# Patient Record
Sex: FEMALE | Race: WHITE | NOT HISPANIC OR LATINO
[De-identification: names, ages, dates, MRNs, and addresses within clinical notes are randomized per-mention and may not be internally consistent; named-entity substitution may affect disease eponyms.]

---

## 2016-08-25 RX ORDER — METOPROLOL TARTRATE 50 MG
0.5 TABLET ORAL
Qty: 30 | Refills: 0 | DISCHARGE
Start: 2016-08-25 | End: 2016-09-24

## 2017-01-18 ENCOUNTER — APPOINTMENT (OUTPATIENT)
Dept: PULMONOLOGY | Facility: CLINIC | Age: 62
End: 2017-01-18

## 2017-01-20 ENCOUNTER — OTHER (OUTPATIENT)
Age: 62
End: 2017-01-20

## 2017-01-22 ENCOUNTER — FORM ENCOUNTER (OUTPATIENT)
Age: 62
End: 2017-01-22

## 2017-01-23 ENCOUNTER — OUTPATIENT (OUTPATIENT)
Dept: OUTPATIENT SERVICES | Facility: HOSPITAL | Age: 62
LOS: 1 days | End: 2017-01-23
Payer: COMMERCIAL

## 2017-01-23 ENCOUNTER — APPOINTMENT (OUTPATIENT)
Dept: ULTRASOUND IMAGING | Facility: CLINIC | Age: 62
End: 2017-01-23

## 2017-01-23 DIAGNOSIS — M16.11 UNILATERAL PRIMARY OSTEOARTHRITIS, RIGHT HIP: ICD-10-CM

## 2017-01-23 PROCEDURE — 20611 DRAIN/INJ JOINT/BURSA W/US: CPT

## 2017-02-02 ENCOUNTER — APPOINTMENT (OUTPATIENT)
Dept: FAMILY MEDICINE | Facility: CLINIC | Age: 62
End: 2017-02-02

## 2017-02-02 VITALS — WEIGHT: 273 LBS | BODY MASS INDEX: 41.37 KG/M2 | HEIGHT: 68 IN

## 2017-02-02 VITALS — DIASTOLIC BLOOD PRESSURE: 75 MMHG | SYSTOLIC BLOOD PRESSURE: 130 MMHG | HEART RATE: 82 BPM

## 2017-02-02 DIAGNOSIS — Z87.898 PERSONAL HISTORY OF OTHER SPECIFIED CONDITIONS: ICD-10-CM

## 2017-02-02 DIAGNOSIS — Z87.2 PERSONAL HISTORY OF DISEASES OF THE SKIN AND SUBCUTANEOUS TISSUE: ICD-10-CM

## 2017-02-02 DIAGNOSIS — Z87.81 PERSONAL HISTORY OF (HEALED) TRAUMATIC FRACTURE: ICD-10-CM

## 2017-02-03 LAB
25(OH)D3 SERPL-MCNC: 18.1 NG/ML
ALBUMIN SERPL ELPH-MCNC: 4.2 G/DL
ALP BLD-CCNC: 77 U/L
ALT SERPL-CCNC: 39 U/L
ANION GAP SERPL CALC-SCNC: 20 MMOL/L
AST SERPL-CCNC: 37 U/L
BASOPHILS # BLD AUTO: 0.02 K/UL
BASOPHILS NFR BLD AUTO: 0.2 %
BILIRUB SERPL-MCNC: 0.3 MG/DL
BUN SERPL-MCNC: 16 MG/DL
CALCIUM SERPL-MCNC: 9.8 MG/DL
CHLORIDE SERPL-SCNC: 97 MMOL/L
CHOLEST SERPL-MCNC: 205 MG/DL
CHOLEST/HDLC SERPL: 3 RATIO
CO2 SERPL-SCNC: 27 MMOL/L
CREAT SERPL-MCNC: 0.86 MG/DL
EOSINOPHIL # BLD AUTO: 0.16 K/UL
EOSINOPHIL NFR BLD AUTO: 1.5 %
GLUCOSE SERPL-MCNC: 115 MG/DL
HBA1C MFR BLD HPLC: 5.9 %
HCT VFR BLD CALC: 41.5 %
HDLC SERPL-MCNC: 68 MG/DL
HGB BLD-MCNC: 12.8 G/DL
IMM GRANULOCYTES NFR BLD AUTO: 0.7 %
LDLC SERPL CALC-MCNC: 86 MG/DL
LYMPHOCYTES # BLD AUTO: 1.18 K/UL
LYMPHOCYTES NFR BLD AUTO: 11.3 %
MAN DIFF?: NORMAL
MCHC RBC-ENTMCNC: 30.3 PG
MCHC RBC-ENTMCNC: 30.8 GM/DL
MCV RBC AUTO: 98.1 FL
MONOCYTES # BLD AUTO: 0.79 K/UL
MONOCYTES NFR BLD AUTO: 7.6 %
NEUTROPHILS # BLD AUTO: 8.2 K/UL
NEUTROPHILS NFR BLD AUTO: 78.7 %
PLATELET # BLD AUTO: 251 K/UL
POTASSIUM SERPL-SCNC: 4 MMOL/L
PROT SERPL-MCNC: 7.4 G/DL
RBC # BLD: 4.23 M/UL
RBC # FLD: 14.7 %
SODIUM SERPL-SCNC: 144 MMOL/L
TRIGL SERPL-MCNC: 256 MG/DL
TSH SERPL-ACNC: 3.6 UIU/ML
WBC # FLD AUTO: 10.42 K/UL

## 2017-02-13 ENCOUNTER — APPOINTMENT (OUTPATIENT)
Dept: ORTHOPEDIC SURGERY | Facility: CLINIC | Age: 62
End: 2017-02-13

## 2017-02-13 VITALS
DIASTOLIC BLOOD PRESSURE: 72 MMHG | WEIGHT: 273 LBS | SYSTOLIC BLOOD PRESSURE: 132 MMHG | BODY MASS INDEX: 41.37 KG/M2 | HEART RATE: 84 BPM | HEIGHT: 68 IN

## 2017-03-06 ENCOUNTER — OTHER (OUTPATIENT)
Age: 62
End: 2017-03-06

## 2017-03-27 RX ORDER — AZITHROMYCIN 250 MG/1
250 TABLET, FILM COATED ORAL
Qty: 1 | Refills: 1 | Status: DISCONTINUED | COMMUNITY
Start: 2017-02-02 | End: 2017-03-27

## 2017-03-27 RX ORDER — METHYLPREDNISOLONE 4 MG/1
4 TABLET ORAL
Qty: 1 | Refills: 0 | Status: DISCONTINUED | COMMUNITY
Start: 2017-02-13 | End: 2017-03-27

## 2017-03-27 RX ORDER — METHYLPREDNISOLONE 4 MG/1
4 TABLET ORAL
Qty: 1 | Refills: 0 | Status: DISCONTINUED | COMMUNITY
Start: 2017-03-06 | End: 2017-03-27

## 2017-03-27 RX ORDER — GUAIFEN/DEXTROMETHORPHAN/PE 100-10-5MG
5-10-100 LIQUID (ML) ORAL EVERY 4 HOURS
Qty: 1 | Refills: 0 | Status: DISCONTINUED | COMMUNITY
Start: 2017-02-02 | End: 2017-03-27

## 2017-03-28 ENCOUNTER — RX RENEWAL (OUTPATIENT)
Age: 62
End: 2017-03-28

## 2017-04-07 ENCOUNTER — OUTPATIENT (OUTPATIENT)
Dept: OUTPATIENT SERVICES | Facility: HOSPITAL | Age: 62
LOS: 1 days | End: 2017-04-07
Payer: COMMERCIAL

## 2017-04-07 ENCOUNTER — APPOINTMENT (OUTPATIENT)
Dept: RADIOLOGY | Facility: CLINIC | Age: 62
End: 2017-04-07

## 2017-04-07 DIAGNOSIS — Z00.8 ENCOUNTER FOR OTHER GENERAL EXAMINATION: ICD-10-CM

## 2017-04-07 PROCEDURE — 71120 X-RAY EXAM BREASTBONE 2/>VWS: CPT

## 2017-04-11 ENCOUNTER — OUTPATIENT (OUTPATIENT)
Dept: OUTPATIENT SERVICES | Facility: HOSPITAL | Age: 62
LOS: 1 days | End: 2017-04-11
Payer: COMMERCIAL

## 2017-04-11 VITALS
HEIGHT: 68 IN | HEART RATE: 88 BPM | SYSTOLIC BLOOD PRESSURE: 120 MMHG | RESPIRATION RATE: 16 BRPM | DIASTOLIC BLOOD PRESSURE: 70 MMHG | TEMPERATURE: 99 F | WEIGHT: 271.83 LBS

## 2017-04-11 DIAGNOSIS — M16.11 UNILATERAL PRIMARY OSTEOARTHRITIS, RIGHT HIP: ICD-10-CM

## 2017-04-11 DIAGNOSIS — Z90.49 ACQUIRED ABSENCE OF OTHER SPECIFIED PARTS OF DIGESTIVE TRACT: Chronic | ICD-10-CM

## 2017-04-11 DIAGNOSIS — Z98.890 OTHER SPECIFIED POSTPROCEDURAL STATES: Chronic | ICD-10-CM

## 2017-04-11 DIAGNOSIS — I27.2 OTHER SECONDARY PULMONARY HYPERTENSION: ICD-10-CM

## 2017-04-11 DIAGNOSIS — I48.91 UNSPECIFIED ATRIAL FIBRILLATION: ICD-10-CM

## 2017-04-11 DIAGNOSIS — Z01.818 ENCOUNTER FOR OTHER PREPROCEDURAL EXAMINATION: ICD-10-CM

## 2017-04-11 DIAGNOSIS — I26.99 OTHER PULMONARY EMBOLISM WITHOUT ACUTE COR PULMONALE: ICD-10-CM

## 2017-04-11 DIAGNOSIS — I10 ESSENTIAL (PRIMARY) HYPERTENSION: ICD-10-CM

## 2017-04-11 DIAGNOSIS — Z95.828 PRESENCE OF OTHER VASCULAR IMPLANTS AND GRAFTS: Chronic | ICD-10-CM

## 2017-04-11 LAB
ANION GAP SERPL CALC-SCNC: 14 MMOL/L — SIGNIFICANT CHANGE UP (ref 5–17)
APTT BLD: 33.6 SEC — SIGNIFICANT CHANGE UP (ref 27.5–37.4)
BASOPHILS # BLD AUTO: 0 K/UL — SIGNIFICANT CHANGE UP (ref 0–0.2)
BASOPHILS NFR BLD AUTO: 0.2 % — SIGNIFICANT CHANGE UP (ref 0–2)
BLD GP AB SCN SERPL QL: SIGNIFICANT CHANGE UP
BUN SERPL-MCNC: 21 MG/DL — HIGH (ref 8–20)
CALCIUM SERPL-MCNC: 10.1 MG/DL — SIGNIFICANT CHANGE UP (ref 8.6–10.2)
CHLORIDE SERPL-SCNC: 97 MMOL/L — LOW (ref 98–107)
CO2 SERPL-SCNC: 30 MMOL/L — HIGH (ref 22–29)
CREAT SERPL-MCNC: 0.78 MG/DL — SIGNIFICANT CHANGE UP (ref 0.5–1.3)
EOSINOPHIL # BLD AUTO: 0.3 K/UL — SIGNIFICANT CHANGE UP (ref 0–0.5)
EOSINOPHIL NFR BLD AUTO: 3.3 % — SIGNIFICANT CHANGE UP (ref 0–6)
GLUCOSE SERPL-MCNC: 96 MG/DL — SIGNIFICANT CHANGE UP (ref 70–115)
HCT VFR BLD CALC: 40.9 % — SIGNIFICANT CHANGE UP (ref 37–47)
HGB BLD-MCNC: 13.3 G/DL — SIGNIFICANT CHANGE UP (ref 12–16)
INR BLD: 1.2 RATIO — HIGH (ref 0.88–1.16)
LYMPHOCYTES # BLD AUTO: 1.2 K/UL — SIGNIFICANT CHANGE UP (ref 1–4.8)
LYMPHOCYTES # BLD AUTO: 14.6 % — LOW (ref 20–55)
MCHC RBC-ENTMCNC: 30.3 PG — SIGNIFICANT CHANGE UP (ref 27–31)
MCHC RBC-ENTMCNC: 32.5 G/DL — SIGNIFICANT CHANGE UP (ref 32–36)
MCV RBC AUTO: 93.2 FL — SIGNIFICANT CHANGE UP (ref 81–99)
MONOCYTES # BLD AUTO: 0.9 K/UL — HIGH (ref 0–0.8)
MONOCYTES NFR BLD AUTO: 10.5 % — HIGH (ref 3–10)
MRSA PCR RESULT.: SIGNIFICANT CHANGE UP
NEUTROPHILS # BLD AUTO: 6 K/UL — SIGNIFICANT CHANGE UP (ref 1.8–8)
NEUTROPHILS NFR BLD AUTO: 70.8 % — SIGNIFICANT CHANGE UP (ref 37–73)
PLAT MORPH BLD: NORMAL — SIGNIFICANT CHANGE UP
PLATELET # BLD AUTO: 316 K/UL — SIGNIFICANT CHANGE UP (ref 150–400)
POTASSIUM SERPL-MCNC: 4.1 MMOL/L — SIGNIFICANT CHANGE UP (ref 3.5–5.3)
POTASSIUM SERPL-SCNC: 4.1 MMOL/L — SIGNIFICANT CHANGE UP (ref 3.5–5.3)
PROTHROM AB SERPL-ACNC: 13.2 SEC — HIGH (ref 9.8–12.7)
RBC # BLD: 4.39 M/UL — LOW (ref 4.4–5.2)
RBC # FLD: 14.6 % — SIGNIFICANT CHANGE UP (ref 11–15.6)
RBC BLD AUTO: NORMAL — SIGNIFICANT CHANGE UP
S AUREUS DNA NOSE QL NAA+PROBE: SIGNIFICANT CHANGE UP
SODIUM SERPL-SCNC: 141 MMOL/L — SIGNIFICANT CHANGE UP (ref 135–145)
TYPE + AB SCN PNL BLD: SIGNIFICANT CHANGE UP
WBC # BLD: 8.5 K/UL — SIGNIFICANT CHANGE UP (ref 4.8–10.8)
WBC # FLD AUTO: 8.5 K/UL — SIGNIFICANT CHANGE UP (ref 4.8–10.8)

## 2017-04-11 PROCEDURE — 86900 BLOOD TYPING SEROLOGIC ABO: CPT

## 2017-04-11 PROCEDURE — 93005 ELECTROCARDIOGRAM TRACING: CPT

## 2017-04-11 PROCEDURE — 85730 THROMBOPLASTIN TIME PARTIAL: CPT

## 2017-04-11 PROCEDURE — 93010 ELECTROCARDIOGRAM REPORT: CPT

## 2017-04-11 PROCEDURE — 87640 STAPH A DNA AMP PROBE: CPT

## 2017-04-11 PROCEDURE — 87641 MR-STAPH DNA AMP PROBE: CPT

## 2017-04-11 PROCEDURE — 86901 BLOOD TYPING SEROLOGIC RH(D): CPT

## 2017-04-11 PROCEDURE — 80048 BASIC METABOLIC PNL TOTAL CA: CPT

## 2017-04-11 PROCEDURE — G0463: CPT

## 2017-04-11 PROCEDURE — 86850 RBC ANTIBODY SCREEN: CPT

## 2017-04-11 PROCEDURE — 85027 COMPLETE CBC AUTOMATED: CPT

## 2017-04-11 PROCEDURE — 85610 PROTHROMBIN TIME: CPT

## 2017-04-11 NOTE — H&P PST ADULT - HISTORY OF PRESENT ILLNESS
61F 61F with right hip pain for over 1 year. Has had some back pain as well, seeing pain management for injections, with some relief. Has put off fixing the hip secondary to back pain, which has improved. Now for Right Total Hip Replacement.

## 2017-04-11 NOTE — H&P PST ADULT - PSH
History of cholecystectomy History of cholecystectomy    History of dilation and curettage    History of ear surgery    Presence of IVC filter

## 2017-04-11 NOTE — H&P PST ADULT - PMH
Anemia    Atrial fibrillation    Hip fx    HTN (hypertension)    Hypothyroid    Sleep apnea Anemia    Atrial fibrillation    DVT (deep venous thrombosis)    Hiatal hernia    Hip fx    HTN (hypertension)    Hypothyroid    Osteoarthritis    Osteopenia    Pulmonary embolism  Saddle Embolus  Sleep apnea Anemia    Atrial fibrillation    DVT (deep venous thrombosis)    Hiatal hernia    Hip fx    HTN (hypertension)    Hypothyroid    Osteoarthritis    Osteopenia    Pulmonary embolism  Saddle Embolus  Pulmonary hypertension    Sleep apnea

## 2017-04-11 NOTE — H&P PST ADULT - FAMILY HISTORY
No pertinent family history in first degree relatives Mother  Still living? No  Family history of pulmonary hypertension, Age at diagnosis: Age Unknown  Family history of CHF (congestive heart failure), Age at diagnosis: 81-90     Father  Still living? No  Family history of esophageal cancer, Age at diagnosis: 71-80  Family history of lung cancer, Age at diagnosis: Age Unknown  Family history of rheumatoid arthritis, Age at diagnosis: Age Unknown

## 2017-04-11 NOTE — H&P PST ADULT - ASSESSMENT
61F PMH HTN, AF, PE, DVT, Sleep Apnea, Hiatal Hernia, Hypothyroid, Osteopenia, Anemia and Osteoarthritis for Right Total Hip Replacement. 61F PMH HTN, AF, PE, DVT, Sleep Apnea, Pulmonary Hypertension, Hiatal Hernia, Hypothyroid, Osteopenia, Anemia and Osteoarthritis for Right Total Hip Replacement.

## 2017-04-12 ENCOUNTER — OTHER (OUTPATIENT)
Age: 62
End: 2017-04-12

## 2017-04-12 DIAGNOSIS — M16.11 UNILATERAL PRIMARY OSTEOARTHRITIS, RIGHT HIP: ICD-10-CM

## 2017-04-12 DIAGNOSIS — Z01.818 ENCOUNTER FOR OTHER PREPROCEDURAL EXAMINATION: ICD-10-CM

## 2017-04-17 ENCOUNTER — APPOINTMENT (OUTPATIENT)
Dept: PULMONOLOGY | Facility: CLINIC | Age: 62
End: 2017-04-17

## 2017-04-17 VITALS — SYSTOLIC BLOOD PRESSURE: 122 MMHG | DIASTOLIC BLOOD PRESSURE: 80 MMHG

## 2017-04-17 VITALS — WEIGHT: 271 LBS | BODY MASS INDEX: 41.21 KG/M2

## 2017-04-17 VITALS — OXYGEN SATURATION: 99 %

## 2017-04-17 DIAGNOSIS — Z86.79 PERSONAL HISTORY OF OTHER DISEASES OF THE CIRCULATORY SYSTEM: ICD-10-CM

## 2017-04-20 ENCOUNTER — APPOINTMENT (OUTPATIENT)
Dept: FAMILY MEDICINE | Facility: CLINIC | Age: 62
End: 2017-04-20

## 2017-04-20 VITALS
OXYGEN SATURATION: 100 % | WEIGHT: 272 LBS | SYSTOLIC BLOOD PRESSURE: 120 MMHG | HEIGHT: 68 IN | BODY MASS INDEX: 41.22 KG/M2 | RESPIRATION RATE: 12 BRPM | DIASTOLIC BLOOD PRESSURE: 80 MMHG

## 2017-04-20 DIAGNOSIS — J06.9 ACUTE UPPER RESPIRATORY INFECTION, UNSPECIFIED: ICD-10-CM

## 2017-04-21 ENCOUNTER — APPOINTMENT (OUTPATIENT)
Dept: ORTHOPEDIC SURGERY | Facility: CLINIC | Age: 62
End: 2017-04-21

## 2017-04-21 VITALS
HEIGHT: 68 IN | SYSTOLIC BLOOD PRESSURE: 131 MMHG | HEART RATE: 86 BPM | WEIGHT: 272 LBS | TEMPERATURE: 97.9 F | DIASTOLIC BLOOD PRESSURE: 83 MMHG | BODY MASS INDEX: 41.22 KG/M2

## 2017-04-28 ENCOUNTER — APPOINTMENT (OUTPATIENT)
Dept: FAMILY MEDICINE | Facility: CLINIC | Age: 62
End: 2017-04-28

## 2017-04-28 VITALS
OXYGEN SATURATION: 99 % | BODY MASS INDEX: 41.22 KG/M2 | WEIGHT: 272 LBS | DIASTOLIC BLOOD PRESSURE: 86 MMHG | TEMPERATURE: 97.6 F | SYSTOLIC BLOOD PRESSURE: 148 MMHG | HEART RATE: 74 BPM | HEIGHT: 68 IN

## 2017-04-28 RX ORDER — IPRATROPIUM BROMIDE AND ALBUTEROL SULFATE 2.5; .5 MG/3ML; MG/3ML
0.5-2.5 (3) SOLUTION RESPIRATORY (INHALATION)
Qty: 0 | Refills: 0 | Status: COMPLETED | OUTPATIENT
Start: 2017-04-28

## 2017-04-28 RX ORDER — AZITHROMYCIN 250 MG/1
250 TABLET, FILM COATED ORAL
Qty: 1 | Refills: 1 | Status: DISCONTINUED | COMMUNITY
Start: 2017-04-24 | End: 2017-04-28

## 2017-04-28 RX ADMIN — IPRATROPIUM BROMIDE AND ALBUTEROL SULFATE 1 MG/3ML: .5; 3 SOLUTION RESPIRATORY (INHALATION) at 00:00

## 2017-05-01 RX ORDER — OXYCODONE HYDROCHLORIDE 5 MG/1
20 TABLET ORAL ONCE
Qty: 0 | Refills: 0 | Status: DISCONTINUED | OUTPATIENT
Start: 2017-05-02 | End: 2017-05-02

## 2017-05-01 RX ORDER — CELECOXIB 200 MG/1
400 CAPSULE ORAL ONCE
Qty: 0 | Refills: 0 | Status: COMPLETED | OUTPATIENT
Start: 2017-05-02 | End: 2017-05-02

## 2017-05-01 RX ORDER — GABAPENTIN 400 MG/1
600 CAPSULE ORAL ONCE
Qty: 0 | Refills: 0 | Status: COMPLETED | OUTPATIENT
Start: 2017-05-02 | End: 2017-05-02

## 2017-05-02 ENCOUNTER — APPOINTMENT (OUTPATIENT)
Dept: ORTHOPEDIC SURGERY | Facility: HOSPITAL | Age: 62
End: 2017-05-02

## 2017-05-02 ENCOUNTER — INPATIENT (INPATIENT)
Facility: HOSPITAL | Age: 62
LOS: 1 days | Discharge: HOME | DRG: 470 | End: 2017-05-04
Attending: ORTHOPAEDIC SURGERY | Admitting: ORTHOPAEDIC SURGERY
Payer: COMMERCIAL

## 2017-05-02 ENCOUNTER — TRANSCRIPTION ENCOUNTER (OUTPATIENT)
Age: 62
End: 2017-05-02

## 2017-05-02 ENCOUNTER — RESULT REVIEW (OUTPATIENT)
Age: 62
End: 2017-05-02

## 2017-05-02 VITALS
OXYGEN SATURATION: 100 % | HEIGHT: 68 IN | DIASTOLIC BLOOD PRESSURE: 75 MMHG | RESPIRATION RATE: 16 BRPM | HEART RATE: 84 BPM | WEIGHT: 270.07 LBS | TEMPERATURE: 208 F | SYSTOLIC BLOOD PRESSURE: 153 MMHG

## 2017-05-02 DIAGNOSIS — I48.0 PAROXYSMAL ATRIAL FIBRILLATION: ICD-10-CM

## 2017-05-02 DIAGNOSIS — Z95.828 PRESENCE OF OTHER VASCULAR IMPLANTS AND GRAFTS: Chronic | ICD-10-CM

## 2017-05-02 DIAGNOSIS — Z98.890 OTHER SPECIFIED POSTPROCEDURAL STATES: Chronic | ICD-10-CM

## 2017-05-02 DIAGNOSIS — M16.11 UNILATERAL PRIMARY OSTEOARTHRITIS, RIGHT HIP: ICD-10-CM

## 2017-05-02 DIAGNOSIS — Z90.49 ACQUIRED ABSENCE OF OTHER SPECIFIED PARTS OF DIGESTIVE TRACT: Chronic | ICD-10-CM

## 2017-05-02 DIAGNOSIS — Z86.711 PERSONAL HISTORY OF PULMONARY EMBOLISM: ICD-10-CM

## 2017-05-02 DIAGNOSIS — E03.9 HYPOTHYROIDISM, UNSPECIFIED: ICD-10-CM

## 2017-05-02 DIAGNOSIS — I10 ESSENTIAL (PRIMARY) HYPERTENSION: ICD-10-CM

## 2017-05-02 DIAGNOSIS — G47.30 SLEEP APNEA, UNSPECIFIED: ICD-10-CM

## 2017-05-02 LAB
APTT BLD: 29.1 SEC — SIGNIFICANT CHANGE UP (ref 27.5–37.4)
BLD GP AB SCN SERPL QL: SIGNIFICANT CHANGE UP
INR BLD: 0.96 RATIO — SIGNIFICANT CHANGE UP (ref 0.88–1.16)
PROTHROM AB SERPL-ACNC: 10.6 SEC — SIGNIFICANT CHANGE UP (ref 9.8–12.7)
TYPE + AB SCN PNL BLD: SIGNIFICANT CHANGE UP

## 2017-05-02 PROCEDURE — 99223 1ST HOSP IP/OBS HIGH 75: CPT

## 2017-05-02 PROCEDURE — 27130 TOTAL HIP ARTHROPLASTY: CPT | Mod: RT

## 2017-05-02 PROCEDURE — 88305 TISSUE EXAM BY PATHOLOGIST: CPT | Mod: 26

## 2017-05-02 PROCEDURE — 27130 TOTAL HIP ARTHROPLASTY: CPT | Mod: AS,RT

## 2017-05-02 PROCEDURE — 88311 DECALCIFY TISSUE: CPT | Mod: 26

## 2017-05-02 RX ORDER — CELECOXIB 200 MG/1
200 CAPSULE ORAL
Qty: 0 | Refills: 0 | Status: DISCONTINUED | OUTPATIENT
Start: 2017-05-02 | End: 2017-05-04

## 2017-05-02 RX ORDER — TRANEXAMIC ACID 100 MG/ML
1250 INJECTION, SOLUTION INTRAVENOUS ONCE
Qty: 0 | Refills: 0 | Status: DISCONTINUED | OUTPATIENT
Start: 2017-05-02 | End: 2017-05-02

## 2017-05-02 RX ORDER — DOCUSATE SODIUM 100 MG
100 CAPSULE ORAL THREE TIMES A DAY
Qty: 0 | Refills: 0 | Status: DISCONTINUED | OUTPATIENT
Start: 2017-05-02 | End: 2017-05-04

## 2017-05-02 RX ORDER — METOPROLOL TARTRATE 50 MG
12.5 TABLET ORAL DAILY
Qty: 0 | Refills: 0 | Status: DISCONTINUED | OUTPATIENT
Start: 2017-05-02 | End: 2017-05-04

## 2017-05-02 RX ORDER — FENTANYL CITRATE 50 UG/ML
50 INJECTION INTRAVENOUS
Qty: 0 | Refills: 0 | Status: DISCONTINUED | OUTPATIENT
Start: 2017-05-02 | End: 2017-05-02

## 2017-05-02 RX ORDER — BUPROPION HYDROCHLORIDE 150 MG/1
150 TABLET, EXTENDED RELEASE ORAL DAILY
Qty: 0 | Refills: 0 | Status: DISCONTINUED | OUTPATIENT
Start: 2017-05-02 | End: 2017-05-04

## 2017-05-02 RX ORDER — HYDROMORPHONE HYDROCHLORIDE 2 MG/ML
2 INJECTION INTRAMUSCULAR; INTRAVENOUS; SUBCUTANEOUS
Qty: 0 | Refills: 0 | Status: DISCONTINUED | OUTPATIENT
Start: 2017-05-02 | End: 2017-05-04

## 2017-05-02 RX ORDER — ONDANSETRON 8 MG/1
4 TABLET, FILM COATED ORAL EVERY 6 HOURS
Qty: 0 | Refills: 0 | Status: DISCONTINUED | OUTPATIENT
Start: 2017-05-02 | End: 2017-05-04

## 2017-05-02 RX ORDER — ALBUTEROL 90 UG/1
1 AEROSOL, METERED ORAL EVERY 4 HOURS
Qty: 0 | Refills: 0 | Status: DISCONTINUED | OUTPATIENT
Start: 2017-05-02 | End: 2017-05-02

## 2017-05-02 RX ORDER — HYDROMORPHONE HYDROCHLORIDE 2 MG/ML
0.5 INJECTION INTRAMUSCULAR; INTRAVENOUS; SUBCUTANEOUS
Qty: 0 | Refills: 0 | Status: DISCONTINUED | OUTPATIENT
Start: 2017-05-02 | End: 2017-05-04

## 2017-05-02 RX ORDER — ACETAMINOPHEN 500 MG
650 TABLET ORAL EVERY 6 HOURS
Qty: 0 | Refills: 0 | Status: DISCONTINUED | OUTPATIENT
Start: 2017-05-02 | End: 2017-05-04

## 2017-05-02 RX ORDER — SENNA PLUS 8.6 MG/1
2 TABLET ORAL AT BEDTIME
Qty: 0 | Refills: 0 | Status: DISCONTINUED | OUTPATIENT
Start: 2017-05-02 | End: 2017-05-04

## 2017-05-02 RX ORDER — OXYCODONE HYDROCHLORIDE 5 MG/1
10 TABLET ORAL
Qty: 0 | Refills: 0 | Status: DISCONTINUED | OUTPATIENT
Start: 2017-05-02 | End: 2017-05-04

## 2017-05-02 RX ORDER — ACETAMINOPHEN 500 MG
1000 TABLET ORAL
Qty: 0 | Refills: 0 | Status: COMPLETED | OUTPATIENT
Start: 2017-05-02 | End: 2017-05-02

## 2017-05-02 RX ORDER — FAMOTIDINE 10 MG/ML
20 INJECTION INTRAVENOUS DAILY
Qty: 0 | Refills: 0 | Status: DISCONTINUED | OUTPATIENT
Start: 2017-05-02 | End: 2017-05-04

## 2017-05-02 RX ORDER — DIGOXIN 250 MCG
0.25 TABLET ORAL DAILY
Qty: 0 | Refills: 0 | Status: DISCONTINUED | OUTPATIENT
Start: 2017-05-02 | End: 2017-05-04

## 2017-05-02 RX ORDER — SODIUM CHLORIDE 9 MG/ML
1000 INJECTION, SOLUTION INTRAVENOUS
Qty: 0 | Refills: 0 | Status: DISCONTINUED | OUTPATIENT
Start: 2017-05-02 | End: 2017-05-04

## 2017-05-02 RX ORDER — ACETAMINOPHEN 500 MG
1000 TABLET ORAL ONCE
Qty: 0 | Refills: 0 | Status: DISCONTINUED | OUTPATIENT
Start: 2017-05-02 | End: 2017-05-02

## 2017-05-02 RX ORDER — LEVOTHYROXINE SODIUM 125 MCG
150 TABLET ORAL DAILY
Qty: 0 | Refills: 0 | Status: DISCONTINUED | OUTPATIENT
Start: 2017-05-02 | End: 2017-05-04

## 2017-05-02 RX ORDER — VANCOMYCIN HCL 1 G
1000 VIAL (EA) INTRAVENOUS
Qty: 0 | Refills: 0 | Status: COMPLETED | OUTPATIENT
Start: 2017-05-02 | End: 2017-05-02

## 2017-05-02 RX ORDER — CEFAZOLIN SODIUM 1 G
2000 VIAL (EA) INJECTION
Qty: 0 | Refills: 0 | Status: COMPLETED | OUTPATIENT
Start: 2017-05-02 | End: 2017-05-03

## 2017-05-02 RX ORDER — ALBUTEROL 90 UG/1
1 AEROSOL, METERED ORAL EVERY 4 HOURS
Qty: 0 | Refills: 0 | Status: DISCONTINUED | OUTPATIENT
Start: 2017-05-02 | End: 2017-05-04

## 2017-05-02 RX ORDER — APIXABAN 2.5 MG/1
2.5 TABLET, FILM COATED ORAL
Qty: 0 | Refills: 0 | Status: DISCONTINUED | OUTPATIENT
Start: 2017-05-03 | End: 2017-05-04

## 2017-05-02 RX ORDER — VANCOMYCIN HCL 1 G
1500 VIAL (EA) INTRAVENOUS ONCE
Qty: 0 | Refills: 0 | Status: COMPLETED | OUTPATIENT
Start: 2017-05-02 | End: 2017-05-02

## 2017-05-02 RX ORDER — SODIUM CHLORIDE 9 MG/ML
3 INJECTION INTRAMUSCULAR; INTRAVENOUS; SUBCUTANEOUS EVERY 8 HOURS
Qty: 0 | Refills: 0 | Status: DISCONTINUED | OUTPATIENT
Start: 2017-05-02 | End: 2017-05-02

## 2017-05-02 RX ORDER — OXYCODONE HYDROCHLORIDE 5 MG/1
5 TABLET ORAL
Qty: 0 | Refills: 0 | Status: DISCONTINUED | OUTPATIENT
Start: 2017-05-02 | End: 2017-05-04

## 2017-05-02 RX ORDER — OXYCODONE HYDROCHLORIDE 5 MG/1
10 TABLET ORAL EVERY 12 HOURS
Qty: 0 | Refills: 0 | Status: DISCONTINUED | OUTPATIENT
Start: 2017-05-02 | End: 2017-05-04

## 2017-05-02 RX ORDER — ACETAMINOPHEN 500 MG
975 TABLET ORAL EVERY 8 HOURS
Qty: 0 | Refills: 0 | Status: DISCONTINUED | OUTPATIENT
Start: 2017-05-02 | End: 2017-05-04

## 2017-05-02 RX ORDER — BUPIVACAINE 13.3 MG/ML
20 INJECTION, SUSPENSION, LIPOSOMAL INFILTRATION ONCE
Qty: 0 | Refills: 0 | Status: DISCONTINUED | OUTPATIENT
Start: 2017-05-02 | End: 2017-05-02

## 2017-05-02 RX ORDER — FUROSEMIDE 40 MG
10 TABLET ORAL DAILY
Qty: 0 | Refills: 0 | Status: DISCONTINUED | OUTPATIENT
Start: 2017-05-03 | End: 2017-05-04

## 2017-05-02 RX ORDER — CEFAZOLIN SODIUM 1 G
3000 VIAL (EA) INJECTION ONCE
Qty: 0 | Refills: 0 | Status: DISCONTINUED | OUTPATIENT
Start: 2017-05-02 | End: 2017-05-02

## 2017-05-02 RX ADMIN — SODIUM CHLORIDE 125 MILLILITER(S): 9 INJECTION, SOLUTION INTRAVENOUS at 19:56

## 2017-05-02 RX ADMIN — ALBUTEROL 1 PUFF(S): 90 AEROSOL, METERED ORAL at 20:37

## 2017-05-02 RX ADMIN — GABAPENTIN 600 MILLIGRAM(S): 400 CAPSULE ORAL at 06:48

## 2017-05-02 RX ADMIN — Medication 400 MILLIGRAM(S): at 21:39

## 2017-05-02 RX ADMIN — Medication 300 MILLIGRAM(S): at 07:35

## 2017-05-02 RX ADMIN — Medication 1000 MILLIGRAM(S): at 21:44

## 2017-05-02 RX ADMIN — OXYCODONE HYDROCHLORIDE 10 MILLIGRAM(S): 5 TABLET ORAL at 15:30

## 2017-05-02 RX ADMIN — OXYCODONE HYDROCHLORIDE 10 MILLIGRAM(S): 5 TABLET ORAL at 14:31

## 2017-05-02 RX ADMIN — CELECOXIB 400 MILLIGRAM(S): 200 CAPSULE ORAL at 06:48

## 2017-05-02 RX ADMIN — Medication 250 MILLIGRAM(S): at 21:39

## 2017-05-02 RX ADMIN — Medication 100 MILLIGRAM(S): at 21:40

## 2017-05-02 RX ADMIN — Medication 100 MILLIGRAM(S): at 17:32

## 2017-05-02 RX ADMIN — OXYCODONE HYDROCHLORIDE 20 MILLIGRAM(S): 5 TABLET ORAL at 06:48

## 2017-05-02 NOTE — CONSULT NOTE ADULT - ASSESSMENT
62 yo morbidly obese female with multiple medical problems, cronic back pain , osteoarthritis of hip s/p R hip arthroplasty post op medical care requested

## 2017-05-02 NOTE — PHYSICAL THERAPY INITIAL EVALUATION ADULT - PERTINENT HX OF CURRENT PROBLEM, REHAB EVAL
OA of right hip, pt now s/p elective posterior THR OA of right hip, pt now s/p elective posterior THR, of note: Pt with h/o left hip THR approx 1 year ago with DVT and PE complications

## 2017-05-02 NOTE — PHYSICAL THERAPY INITIAL EVALUATION ADULT - GENERAL OBSERVATIONS, REHAB EVAL
Pt received supine in bed in PACU, c/o 1/10 pain, agreeable to PT, + Venous Compression Boots + telemetry

## 2017-05-02 NOTE — PROGRESS NOTE ADULT - SUBJECTIVE AND OBJECTIVE BOX
Orthopedic PA Postop Note  Patient S/P Right CHAPIS  Patient in bed comfortable   Right Leg  Dressing C/D/I  Pulse intact  Calf Soft NT  Dorsi/Plantar Flexion Intact  Abduction Pillow in place     Vital Signs Last 24 Hrs  T(C): 36.6, Max: 98 (05-02 @ 06:22)  T(F): 97.9, Max: 208.4 (05-02 @ 06:22)  HR: 76 (62 - 84)  BP: 103/57 (98/56 - 153/75)  BP(mean): --  RR: 12 (10 - 20)  SpO2: 96% (96% - 100%)

## 2017-05-02 NOTE — DISCHARGE NOTE ADULT - MEDICATION SUMMARY - MEDICATIONS TO TAKE
I will START or STAY ON the medications listed below when I get home from the hospital:    dme  -- rolling walker  -- Indication: For Assistance    dme  -- 3 in 1 commode  -- Indication: For Assistance    oxyCODONE 5 mg oral tablet  -- 1-2 tab(s) by mouth every 4 to 6 hours, As Needed, Pain MDD:8  -- Indication: For Pain    digoxin 250 mcg (0.25 mg) oral tablet  -- 1 tab(s) by mouth once a day  -- Indication: For Home med    apixaban 5 mg oral tablet  -- 1 tab(s) by mouth 2 times a day  -- Indication: For Home med    Toprol-XL 25 mg oral tablet, extended release  -- 0.5 tab(s) by mouth once a day  -- Indication: For Home emd    ProAir HFA 90 mcg/inh inhalation aerosol  -- 1 inhaler(s) inhaled every 4 hours as needed if Shortness of breath/wheezing  -- Indication: For Home med    Lasix 20 mg oral tablet  -- 0.5 tab(s) by mouth once a day  -- Indication: For Home med    famotidine 20 mg oral tablet  -- 1 tab(s) by mouth once a day  -- Indication: For Home med    docusate sodium 100 mg oral capsule  -- 1 cap(s) by mouth 2 times a day  -- Indication: For constipation    Wellbutrin  mg/24 hours oral tablet, extended release  -- 1 tab(s) by mouth every 24 hours  -- Indication: For Home med    Synthroid 150 mcg (0.15 mg) oral tablet  -- 1 tab(s) by mouth once a day  -- Indication: For Home med

## 2017-05-02 NOTE — PHYSICAL THERAPY INITIAL EVALUATION ADULT - RANGE OF MOTION EXAMINATION, REHAB EVAL
bilateral upper extremity ROM was WFL (within functional limits)/bilateral lower extremity ROM was WFL (within functional limits)/right hip not tested > 90 degree flexion

## 2017-05-02 NOTE — DISCHARGE NOTE ADULT - HOSPITAL COURSE
The patient underwent a Right POSTERIOR TOTAL HIP REPLACEMENT on 5/2/17. The patient received antibiotics consistent with SCIP guidelines. The patient underwent the procedure and had no intra-operative complications. Post-operatively, the patient was seen by medicine and PT. The patient received Eliquis for DVTP. The patient received pain medications per orthopedic pain managment protocol and the pain was appropriately controlled. Patient was instructed on posterior total hip precautions by PT. The patient did not have any post-operative medical complications. The patient was discharged in stable condition.

## 2017-05-02 NOTE — DISCHARGE NOTE ADULT - CARE PROVIDERS DIRECT ADDRESSES
[email or web address]

## 2017-05-02 NOTE — CONSULT NOTE ADULT - SUBJECTIVE AND OBJECTIVE BOX
PMD :Dr Hogan  Cardio: [facility] cardiology     CC : hip pain       HPI:  61F with right hip pain for many years worsening over the last year , she has also cronic back pain and has been getting injections, she had postponed her hip surgery last year due to DVT and saddle pulmonary embolism , had pain limiting her daily functions inability to walk due to severe pain on the hip . She is s/p surgery spinal anesthesia seen in recovery room, awake alert no pain at present legs numb below waist , feels her thigh slightly can not  her legs yet .      PAST MEDICAL & SURGICAL HISTORY:  Pulmonary hypertension  Hiatal hernia  Pulmonary embolism: Saddle Embolus  DVT (deep venous thrombosis)  Osteoarthritis  Osteopenia  Anemia  Sleep apnea  Hip fx  Atrial fibrillation  Hypothyroid  HTN (hypertension)  History of ear surgery  Presence of IVC filter  History of dilation and curettage  History of cholecystectomy  No significant past surgical history      Social History:  Tabacco - denies  ETOH - denies   Illicit drug abuse - denies    FAMILY HISTORY:  Family history of rheumatoid arthritis (Father)  Family history of lung cancer (Father)  Family history of esophageal cancer (Father)  Family history of CHF (congestive heart failure) (Mother)  Family history of pulmonary hypertension (Mother)  No pertinent family history in first degree relatives      Allergies    No Known Allergies    Intolerances        HOME MEDICATIONS :     REVIEW OF SYSTEMS:    CONSTITUTIONAL: No fever, weight loss, or fatigue  EYES: No eye pain, visual disturbances, or discharge  NECK: No pain or stiffness  RESPIRATORY: No cough, wheezing, chills or hemoptysis; No shortness of breath  CARDIOVASCULAR: No chest pain, palpitations, dizziness, or leg swelling  GASTROINTESTINAL: No abdominal or epigastric pain. No nausea, vomiting, or hematemesis; No diarrhea or constipation. No melena or hematochezia.  GENITOURINARY: No dysuria, frequency, hematuria, or incontinence  NEUROLOGICAL: No headaches, memory loss, loss of strength, numbness, or tremors  SKIN: No itching, burning, rashes, or lesions   LYMPH NODES: No enlarged glands  ENDOCRINE: No heat or cold intolerance; No hair loss  MUSCULOSKELETAL:v hip and back pain   PSYCHIATRIC: No depression, anxiety, mood swings, or difficulty sleeping  HEME/LYMPH: No easy bruising, or bleeding gums  ALLERGY AND IMMUNOLOGIC: No hives or eczema    MEDICATIONS  (STANDING):  ceFAZolin   IVPB 2000milliGRAM(s) IV Intermittent <User Schedule>  vancomycin  IVPB 1000milliGRAM(s) IV Intermittent <User Schedule>    MEDICATIONS  (PRN):  fentaNYL    Injectable 50MICROGram(s) IV Push every 5 minutes PRN Severe Pain      Vital Signs Last 24 Hrs  T(C): 36.6, Max: 98 (05-02 @ 06:22)  T(F): 97.9, Max: 208.4 (05-02 @ 06:22)  HR: 63 (63 - 84)  BP: 113/65 (107/63 - 153/75)  BP(mean): --  RR: 11 (11 - 19)  SpO2: 100% (99% - 100%)    PHYSICAL EXAM:    GENERAL: NAD, well-groomed, well-developed  HEAD:  Atraumatic, Normocephalic  EYES: EOMI, PERRLA, conjunctiva and sclera clear  NECK: Supple, No JVD, Normal thyroid  NERVOUS SYSTEM:  Alert & Oriented X3, Good concentration; Motor Strength 5/5 B/L upper and lower extremities; DTRs 2+ intact and symmetric  CHEST/LUNG: CTA  b/l,  no rales, rhonchi, wheezing, or rubs  HEART: Regular rate and rhythm; No murmurs, rubs, or gallops  ABDOMEN: Soft, Nontender, Nondistended; Bowel sounds present  EXTREMITIES:  2+ Peripheral Pulses, No clubbing, cyanosis, or edema ,   LYMPH: No lymphadenopathy noted  SKIN: No rashes or lesions    LABS:          PT/INR - ( 02 May 2017 06:36 )   PT: 10.6 sec;   INR: 0.96 ratio         PTT - ( 02 May 2017 06:36 )  PTT:29.1 sec        RADIOLOGY & ADDITIONAL STUDIES:

## 2017-05-02 NOTE — DISCHARGE NOTE ADULT - CARE PLAN
Instructions for follow-up, activity and diet:	The patient will be seen in the office between 2-3 weeks for wound check. Tape will be removed at that time. Patient may shower after post-op day #5. The dressing is to be removed on day # 7. The patient will contact the office if the wound becomes red, has increasing pain, develops bleeding or discharge, an injury occurs, or has other concerns. The patient will continue PT consistent with posterior total hip replacement protocol. The patient will continue to practice posterior total hip precautions for a minimum of 6 week. The patient will continue LOVENOX for 2 weeks and then begin Eliquis for DVTP. The patient will take oxycodone for pain control and titrate according to prescription and patient needs. The patient is FULL weight bearing. Principal Discharge DX:	Primary osteoarthritis of right hip  Goal:	improve pain and function  Instructions for follow-up, activity and diet:	The patient will be seen in the office between 2-3 weeks for wound check. Tape will be removed at that time. Patient may shower after post-op day #5. The dressing is to be removed on day # 7. The patient will contact the office if the wound becomes red, has increasing pain, develops bleeding or discharge, an injury occurs, or has other concerns. The patient will continue PT consistent with posterior total hip replacement protocol. The patient will continue to practice posterior total hip precautions for a minimum of 6 week. The patient will continue LOVENOX for 2 weeks and then begin Eliquis for DVTP. The patient will take oxycodone for pain control and titrate according to prescription and patient needs. The patient is FULL weight bearing.

## 2017-05-02 NOTE — PHYSICAL THERAPY INITIAL EVALUATION ADULT - ADDITIONAL COMMENTS
Pt lives in a private home with her spouse. 3 steps to enter with handrails, no steps inside. Pt was independent PTA with SAC. Pt owns SAC, RW, and grab bars in bath. Pt lives in a private home with her spouse. 3 steps to enter with handrails, no steps inside. Pt was independent PTA with SAC. Pt owns SAC, rollator, shower chair, commode and grab bars in bath.

## 2017-05-02 NOTE — CONSULT NOTE ADULT - PROBLEM SELECTOR RECOMMENDATION 9
post op care, perioperative abx use   pain medications ,DVT prophylaxis full anticoagulation once ok by orthopedics surgeon

## 2017-05-02 NOTE — DISCHARGE NOTE ADULT - PLAN OF CARE
The patient will be seen in the office between 2-3 weeks for wound check. Tape will be removed at that time. Patient may shower after post-op day #5. The dressing is to be removed on day # 7. The patient will contact the office if the wound becomes red, has increasing pain, develops bleeding or discharge, an injury occurs, or has other concerns. The patient will continue PT consistent with posterior total hip replacement protocol. The patient will continue to practice posterior total hip precautions for a minimum of 6 week. The patient will continue LOVENOX for 2 weeks and then begin Eliquis for DVTP. The patient will take oxycodone for pain control and titrate according to prescription and patient needs. The patient is FULL weight bearing. improve pain and function

## 2017-05-02 NOTE — DISCHARGE NOTE ADULT - CARE PROVIDER_API CALL
Qamar Bradley), Orthopaedic Surgery  [address] [place] [address]  Phone: [phone]  Fax: [phone]

## 2017-05-03 ENCOUNTER — TRANSCRIPTION ENCOUNTER (OUTPATIENT)
Age: 62
End: 2017-05-03

## 2017-05-03 LAB
ANION GAP SERPL CALC-SCNC: 11 MMOL/L — SIGNIFICANT CHANGE UP (ref 5–17)
BUN SERPL-MCNC: 16 MG/DL — SIGNIFICANT CHANGE UP (ref 8–20)
CALCIUM SERPL-MCNC: 8.6 MG/DL — SIGNIFICANT CHANGE UP (ref 8.6–10.2)
CHLORIDE SERPL-SCNC: 100 MMOL/L — SIGNIFICANT CHANGE UP (ref 98–107)
CO2 SERPL-SCNC: 27 MMOL/L — SIGNIFICANT CHANGE UP (ref 22–29)
CREAT SERPL-MCNC: 0.85 MG/DL — SIGNIFICANT CHANGE UP (ref 0.5–1.3)
GLUCOSE SERPL-MCNC: 112 MG/DL — SIGNIFICANT CHANGE UP (ref 70–115)
HCT VFR BLD CALC: 31.7 % — LOW (ref 37–47)
HGB BLD-MCNC: 9.9 G/DL — LOW (ref 12–16)
MCHC RBC-ENTMCNC: 30.2 PG — SIGNIFICANT CHANGE UP (ref 27–31)
MCHC RBC-ENTMCNC: 31.2 G/DL — LOW (ref 32–36)
MCV RBC AUTO: 96.6 FL — SIGNIFICANT CHANGE UP (ref 81–99)
PLATELET # BLD AUTO: 234 K/UL — SIGNIFICANT CHANGE UP (ref 150–400)
POTASSIUM SERPL-MCNC: 4.3 MMOL/L — SIGNIFICANT CHANGE UP (ref 3.5–5.3)
POTASSIUM SERPL-SCNC: 4.3 MMOL/L — SIGNIFICANT CHANGE UP (ref 3.5–5.3)
RBC # BLD: 3.28 M/UL — LOW (ref 4.4–5.2)
RBC # FLD: 13.9 % — SIGNIFICANT CHANGE UP (ref 11–15.6)
SODIUM SERPL-SCNC: 138 MMOL/L — SIGNIFICANT CHANGE UP (ref 135–145)
WBC # BLD: 7.6 K/UL — SIGNIFICANT CHANGE UP (ref 4.8–10.8)
WBC # FLD AUTO: 7.6 K/UL — SIGNIFICANT CHANGE UP (ref 4.8–10.8)

## 2017-05-03 PROCEDURE — 99233 SBSQ HOSP IP/OBS HIGH 50: CPT

## 2017-05-03 RX ORDER — ENOXAPARIN SODIUM 100 MG/ML
0 INJECTION SUBCUTANEOUS
Qty: 0 | Refills: 0 | COMMUNITY

## 2017-05-03 RX ORDER — DOCUSATE SODIUM 100 MG
1 CAPSULE ORAL
Qty: 60 | Refills: 0 | OUTPATIENT
Start: 2017-05-03

## 2017-05-03 RX ORDER — TRAMADOL HYDROCHLORIDE 50 MG/1
1 TABLET ORAL
Qty: 0 | Refills: 0 | COMMUNITY

## 2017-05-03 RX ORDER — OXYCODONE HYDROCHLORIDE 5 MG/1
1 TABLET ORAL
Qty: 60 | Refills: 0 | OUTPATIENT
Start: 2017-05-03

## 2017-05-03 RX ADMIN — OXYCODONE HYDROCHLORIDE 10 MILLIGRAM(S): 5 TABLET ORAL at 02:28

## 2017-05-03 RX ADMIN — Medication 100 MILLIGRAM(S): at 05:49

## 2017-05-03 RX ADMIN — CELECOXIB 200 MILLIGRAM(S): 200 CAPSULE ORAL at 19:29

## 2017-05-03 RX ADMIN — Medication 12.5 MILLIGRAM(S): at 05:48

## 2017-05-03 RX ADMIN — FAMOTIDINE 20 MILLIGRAM(S): 10 INJECTION INTRAVENOUS at 13:26

## 2017-05-03 RX ADMIN — CELECOXIB 200 MILLIGRAM(S): 200 CAPSULE ORAL at 18:59

## 2017-05-03 RX ADMIN — Medication 975 MILLIGRAM(S): at 21:37

## 2017-05-03 RX ADMIN — Medication 10 MILLIGRAM(S): at 05:48

## 2017-05-03 RX ADMIN — Medication 975 MILLIGRAM(S): at 05:48

## 2017-05-03 RX ADMIN — BUPROPION HYDROCHLORIDE 150 MILLIGRAM(S): 150 TABLET, EXTENDED RELEASE ORAL at 13:26

## 2017-05-03 RX ADMIN — Medication 1 TABLET(S): at 13:27

## 2017-05-03 RX ADMIN — OXYCODONE HYDROCHLORIDE 10 MILLIGRAM(S): 5 TABLET ORAL at 13:27

## 2017-05-03 RX ADMIN — OXYCODONE HYDROCHLORIDE 10 MILLIGRAM(S): 5 TABLET ORAL at 03:29

## 2017-05-03 RX ADMIN — Medication 100 MILLIGRAM(S): at 13:26

## 2017-05-03 RX ADMIN — CELECOXIB 200 MILLIGRAM(S): 200 CAPSULE ORAL at 08:54

## 2017-05-03 RX ADMIN — Medication 0.25 MILLIGRAM(S): at 05:48

## 2017-05-03 RX ADMIN — Medication 100 MILLIGRAM(S): at 21:37

## 2017-05-03 RX ADMIN — OXYCODONE HYDROCHLORIDE 10 MILLIGRAM(S): 5 TABLET ORAL at 18:59

## 2017-05-03 RX ADMIN — OXYCODONE HYDROCHLORIDE 10 MILLIGRAM(S): 5 TABLET ORAL at 05:55

## 2017-05-03 RX ADMIN — Medication 975 MILLIGRAM(S): at 13:26

## 2017-05-03 RX ADMIN — APIXABAN 2.5 MILLIGRAM(S): 2.5 TABLET, FILM COATED ORAL at 06:46

## 2017-05-03 RX ADMIN — OXYCODONE HYDROCHLORIDE 10 MILLIGRAM(S): 5 TABLET ORAL at 19:29

## 2017-05-03 RX ADMIN — Medication 100 MILLIGRAM(S): at 02:03

## 2017-05-03 RX ADMIN — OXYCODONE HYDROCHLORIDE 10 MILLIGRAM(S): 5 TABLET ORAL at 05:47

## 2017-05-03 RX ADMIN — Medication 150 MICROGRAM(S): at 05:48

## 2017-05-03 RX ADMIN — APIXABAN 2.5 MILLIGRAM(S): 2.5 TABLET, FILM COATED ORAL at 18:59

## 2017-05-03 RX ADMIN — OXYCODONE HYDROCHLORIDE 10 MILLIGRAM(S): 5 TABLET ORAL at 14:15

## 2017-05-03 RX ADMIN — CELECOXIB 200 MILLIGRAM(S): 200 CAPSULE ORAL at 09:30

## 2017-05-03 NOTE — PROGRESS NOTE ADULT - ASSESSMENT
62 yo morbidly obese female with multiple medical problems, cronic back pain , osteoarthritis of hip s/p R hip arthroplasty post op day # 1, anemia of blood loss post operative

## 2017-05-03 NOTE — PROGRESS NOTE ADULT - SUBJECTIVE AND OBJECTIVE BOX
05-03    138  |  100  |  16.0  ----------------------------<  112  4.3   |  27.0  |  0.85    Ca    8.6      03 May 2017 06:00    PMD :Dr Hogan  Cardio: [facility] cardiology     CC : hip pain       HPI:  61F with right hip pain for many years worsening over the last year , she has also cronic back pain and has been getting injections, she had postponed her hip surgery last year due to DVT and saddle pulmonary embolism , had pain limiting her daily functions inability to walk due to severe pain on the hip . She is s/p surgery spinal anesthesia seen in recovery room, awake alert no pain at present legs numb below waist , feels her thigh slightly can not  her legs yet .      HOME MEDICATIONS : reviewed in file     REVIEW OF SYSTEMS:  as above , + back pain ,otherwise negative     Vital Signs Last 24 Hrs  T(C): 37.4, Max: 37.7 (05-02 @ 22:32)  T(F): 99.3, Max: 99.8 (05-02 @ 22:32)  HR: 86 (62 - 97)  BP: 124/61 (90/56 - 142/67)  BP(mean): --  RR: 16 (10 - 20)  SpO2: 91% (91% - 100%)PHYSICAL EXAM:    GENERAL: NAD, well-groomed, well-developed  CHEST/LUNG: CTA  b/l,  no rales, rhonchi, wheezing, or rubs  HEART: Regular rate and rhythm; No murmurs, rubs, or gallops  ABDOMEN: Soft, Nontender, Nondistended; Bowel sounds present  EXTREMITIES:  2+ Peripheral Pulses, No clubbing, cyanosis, or edema ,   right hip dressing intact       :                      9.9    7.6   )-----------( 234      ( 03 May 2017 06:00 )             31.7

## 2017-05-03 NOTE — PROGRESS NOTE ADULT - SUBJECTIVE AND OBJECTIVE BOX
KIERAN WINTER    [id number]    History: Patient is status post right posterior total hip arthroplasty on 5/2/2017, pod #1. Patient is doing well. The patient's pain is controlled using the prescribed pain medications. The patient is participating in physical therapy. Denies nausea, vomiting, chest pain, shortness of breath, abdominal pain or fever. No new complaints.                              9.9    7.6   )-----------( 234      ( 03 May 2017 06:00 )             31.7     05-03    138  |  100  |  16.0  ----------------------------<  112  4.3   |  27.0  |  0.85    Ca    8.6      03 May 2017 06:00          Vital Signs Last 24 Hrs  T(C): 37.4, Max: 37.7 (05-02 @ 22:32)  T(F): 99.3, Max: 99.8 (05-02 @ 22:32)  HR: 86 (62 - 97)  BP: 124/61 (90/56 - 142/67)  BP(mean): --  RR: 16 (10 - 20)  SpO2: 91% (91% - 100%)      Physical exam: The right hip dressing is clean, dry and intact. No drainage or discharge. No erythema is noted. No blistering. No ecchymosis. The calf is supple nontender. Passive range of motion is acceptable to due postoperative pain. No calf tenderness. Sensation to light touch is grossly intact distally. Motor function distally is 5/5. No foot drop. 2+ dorsalis pedis pulse. Capillary refill is less than 2 seconds. No cyanosis.    Primary Orthopedic Assessment:  • s/p RIGHT POSTERIOR total hip replacement pod #1    Plan:   • DVT prophylaxis eliquis, including use of compression devices and ankle pumps  • Continue physical therapy  • Weightbearing as tolerated of the right lower extremity with assistance of a walker  • Incentive spirometry encouraged  • Pain control as clinically indicated  • Posterior hip precautions reviewed with patient  • Discharge planning – anticipated discharge is Home Thursday

## 2017-05-04 VITALS
SYSTOLIC BLOOD PRESSURE: 96 MMHG | RESPIRATION RATE: 18 BRPM | OXYGEN SATURATION: 99 % | TEMPERATURE: 98 F | DIASTOLIC BLOOD PRESSURE: 58 MMHG | HEART RATE: 68 BPM

## 2017-05-04 PROCEDURE — C1713: CPT

## 2017-05-04 PROCEDURE — 86900 BLOOD TYPING SEROLOGIC ABO: CPT

## 2017-05-04 PROCEDURE — 86901 BLOOD TYPING SEROLOGIC RH(D): CPT

## 2017-05-04 PROCEDURE — 85730 THROMBOPLASTIN TIME PARTIAL: CPT

## 2017-05-04 PROCEDURE — 86850 RBC ANTIBODY SCREEN: CPT

## 2017-05-04 PROCEDURE — 85610 PROTHROMBIN TIME: CPT

## 2017-05-04 PROCEDURE — 97167 OT EVAL HIGH COMPLEX 60 MIN: CPT

## 2017-05-04 PROCEDURE — 36415 COLL VENOUS BLD VENIPUNCTURE: CPT

## 2017-05-04 PROCEDURE — 73501 X-RAY EXAM HIP UNI 1 VIEW: CPT

## 2017-05-04 PROCEDURE — C1776: CPT

## 2017-05-04 PROCEDURE — 99233 SBSQ HOSP IP/OBS HIGH 50: CPT

## 2017-05-04 PROCEDURE — 85027 COMPLETE CBC AUTOMATED: CPT

## 2017-05-04 PROCEDURE — 88305 TISSUE EXAM BY PATHOLOGIST: CPT

## 2017-05-04 PROCEDURE — 80048 BASIC METABOLIC PNL TOTAL CA: CPT

## 2017-05-04 PROCEDURE — 97163 PT EVAL HIGH COMPLEX 45 MIN: CPT

## 2017-05-04 PROCEDURE — 88311 DECALCIFY TISSUE: CPT

## 2017-05-04 PROCEDURE — 94640 AIRWAY INHALATION TREATMENT: CPT

## 2017-05-04 RX ADMIN — OXYCODONE HYDROCHLORIDE 5 MILLIGRAM(S): 5 TABLET ORAL at 06:53

## 2017-05-04 RX ADMIN — CELECOXIB 200 MILLIGRAM(S): 200 CAPSULE ORAL at 09:43

## 2017-05-04 RX ADMIN — CELECOXIB 200 MILLIGRAM(S): 200 CAPSULE ORAL at 08:44

## 2017-05-04 RX ADMIN — Medication 0.25 MILLIGRAM(S): at 05:25

## 2017-05-04 RX ADMIN — Medication 100 MILLIGRAM(S): at 05:24

## 2017-05-04 RX ADMIN — OXYCODONE HYDROCHLORIDE 10 MILLIGRAM(S): 5 TABLET ORAL at 05:26

## 2017-05-04 RX ADMIN — Medication 10 MILLIGRAM(S): at 05:26

## 2017-05-04 RX ADMIN — Medication 12.5 MILLIGRAM(S): at 05:24

## 2017-05-04 RX ADMIN — Medication 975 MILLIGRAM(S): at 05:24

## 2017-05-04 RX ADMIN — OXYCODONE HYDROCHLORIDE 5 MILLIGRAM(S): 5 TABLET ORAL at 05:08

## 2017-05-04 RX ADMIN — Medication 150 MICROGRAM(S): at 05:23

## 2017-05-04 RX ADMIN — APIXABAN 2.5 MILLIGRAM(S): 2.5 TABLET, FILM COATED ORAL at 06:36

## 2017-05-04 NOTE — PROGRESS NOTE ADULT - ASSESSMENT
62 yo morbidly obese female with multiple medical problems, cronic back pain , osteoarthritis of hip s/p R hip arthroplasty post op day # 2, post op anemia of blood loss, constipation due to opiates

## 2017-05-04 NOTE — PROGRESS NOTE ADULT - PROBLEM SELECTOR PLAN 2
post op hypotension  holding parameters for metoprolol , instructed the patient as well
stable now, continue home medications

## 2017-05-04 NOTE — PROGRESS NOTE ADULT - SUBJECTIVE AND OBJECTIVE BOX
PMD :Dr Hogan  Cardio: [facility] cardiology     CC : hip pain better controlled with pain medications s/p hip replacement   chart reviewed no overnight events       HPI:  61F with right hip pain for many years worsening over the last year , she has also cronic back pain and has been getting injections, she had postponed her hip surgery last year due to DVT and saddle pulmonary embolism , had pain limiting her daily functions inability to walk due to severe pain on the hip . She is s/p surgery spinal anesthesia seen in recovery room, awake alert no pain at present legs numb below waist , feels her thigh slightly can not  her legs yet .          REVIEW OF SYSTEMS: hip pain intermittent , cronic  back pain ,denies nausea, chest pain sob ,dizziness , no bowel movement constipated  Vital Signs Last 24 Hrs  T(C): 36.4, Max: 37.3 (05-03 @ 16:17)  T(F): 97.5, Max: 99.2 (05-03 @ 16:17)  HR: 65 (65 - 95)  BP: 88/53 (88/53 - 123/70)  BP(mean): --  RR: 16 (16 - 17)  SpO2: 99% (92% - 99%)  GENERAL: NAD, well-groomed, well-developed  CHEST/LUNG: CTA  b/l,  no rales, rhonchi, wheezing, or rubs  HEART: Regular rate and rhythm; No murmurs, rubs, or gallops  ABDOMEN: Soft, Nontender, Nondistended; Bowel sounds present  EXTREMITIES:  2+ Peripheral Pulses, No clubbing, cyanosis, or edema ,   right hip dressing intact                           9.9    7.6   )-----------( 234      ( 03 May 2017 06:00 )             31.7   05-03    138  |  100  |  16.0  ----------------------------<  112  4.3   |  27.0  |  0.85    Ca    8.6      03 May 2017 06:00      :

## 2017-05-04 NOTE — PROGRESS NOTE ADULT - PROBLEM SELECTOR PLAN 1
post op hip replacement day # 2  PT/OT , pain medications as needed, continue  bowel regimen for opiate induced constipation  h/o DVT /PE and a fib on eliquis ;full anticoagulation started 5/3
post op hip replacement day # 1   PT/OT , pain control, h/o DVT /PE , a fib full anticoagulation when debby by orthopedics

## 2017-05-05 LAB — SURGICAL PATHOLOGY FINAL REPORT - CH: SIGNIFICANT CHANGE UP

## 2017-05-24 ENCOUNTER — APPOINTMENT (OUTPATIENT)
Dept: ORTHOPEDIC SURGERY | Facility: CLINIC | Age: 62
End: 2017-05-24

## 2017-05-24 VITALS
BODY MASS INDEX: 41.22 KG/M2 | HEART RATE: 87 BPM | WEIGHT: 272 LBS | SYSTOLIC BLOOD PRESSURE: 143 MMHG | DIASTOLIC BLOOD PRESSURE: 92 MMHG | HEIGHT: 68 IN

## 2017-05-30 ENCOUNTER — APPOINTMENT (OUTPATIENT)
Dept: ORTHOPEDIC SURGERY | Facility: CLINIC | Age: 62
End: 2017-05-30

## 2017-05-30 ENCOUNTER — FORM ENCOUNTER (OUTPATIENT)
Age: 62
End: 2017-05-30

## 2017-05-30 VITALS
SYSTOLIC BLOOD PRESSURE: 138 MMHG | DIASTOLIC BLOOD PRESSURE: 84 MMHG | TEMPERATURE: 97.9 F | WEIGHT: 272 LBS | BODY MASS INDEX: 41.22 KG/M2 | HEART RATE: 77 BPM | HEIGHT: 68 IN

## 2017-05-30 DIAGNOSIS — M16.11 UNILATERAL PRIMARY OSTEOARTHRITIS, RIGHT HIP: ICD-10-CM

## 2017-05-31 ENCOUNTER — CHART COPY (OUTPATIENT)
Age: 62
End: 2017-05-31

## 2017-05-31 ENCOUNTER — APPOINTMENT (OUTPATIENT)
Dept: CT IMAGING | Facility: CLINIC | Age: 62
End: 2017-05-31

## 2017-05-31 ENCOUNTER — OUTPATIENT (OUTPATIENT)
Dept: OUTPATIENT SERVICES | Facility: HOSPITAL | Age: 62
LOS: 1 days | End: 2017-05-31
Payer: COMMERCIAL

## 2017-05-31 DIAGNOSIS — Z98.890 OTHER SPECIFIED POSTPROCEDURAL STATES: Chronic | ICD-10-CM

## 2017-05-31 DIAGNOSIS — Z90.49 ACQUIRED ABSENCE OF OTHER SPECIFIED PARTS OF DIGESTIVE TRACT: Chronic | ICD-10-CM

## 2017-05-31 DIAGNOSIS — Z96.641 PRESENCE OF RIGHT ARTIFICIAL HIP JOINT: ICD-10-CM

## 2017-05-31 DIAGNOSIS — Z95.828 PRESENCE OF OTHER VASCULAR IMPLANTS AND GRAFTS: Chronic | ICD-10-CM

## 2017-05-31 PROCEDURE — 73700 CT LOWER EXTREMITY W/O DYE: CPT

## 2017-05-31 PROCEDURE — 76377 3D RENDER W/INTRP POSTPROCES: CPT

## 2017-06-05 ENCOUNTER — APPOINTMENT (OUTPATIENT)
Dept: ORTHOPEDIC SURGERY | Facility: CLINIC | Age: 62
End: 2017-06-05

## 2017-06-20 ENCOUNTER — APPOINTMENT (OUTPATIENT)
Dept: ORTHOPEDIC SURGERY | Facility: CLINIC | Age: 62
End: 2017-06-20

## 2017-06-20 VITALS
TEMPERATURE: 97.9 F | WEIGHT: 272 LBS | HEART RATE: 89 BPM | DIASTOLIC BLOOD PRESSURE: 101 MMHG | HEIGHT: 68 IN | BODY MASS INDEX: 41.22 KG/M2 | SYSTOLIC BLOOD PRESSURE: 162 MMHG

## 2017-06-26 ENCOUNTER — RX RENEWAL (OUTPATIENT)
Age: 62
End: 2017-06-26

## 2017-07-10 ENCOUNTER — APPOINTMENT (OUTPATIENT)
Dept: FAMILY MEDICINE | Facility: CLINIC | Age: 62
End: 2017-07-10

## 2017-07-10 VITALS
HEART RATE: 84 BPM | BODY MASS INDEX: 41.83 KG/M2 | DIASTOLIC BLOOD PRESSURE: 90 MMHG | HEIGHT: 68 IN | SYSTOLIC BLOOD PRESSURE: 122 MMHG | WEIGHT: 276 LBS

## 2017-07-10 DIAGNOSIS — Z71.9 COUNSELING, UNSPECIFIED: ICD-10-CM

## 2017-07-10 DIAGNOSIS — Z01.811 ENCOUNTER FOR PREPROCEDURAL RESPIRATORY EXAMINATION: ICD-10-CM

## 2017-07-10 DIAGNOSIS — M79.89 OTHER SPECIFIED SOFT TISSUE DISORDERS: ICD-10-CM

## 2017-07-10 RX ORDER — OXYCODONE 5 MG/1
5 TABLET ORAL
Qty: 60 | Refills: 0 | Status: COMPLETED | COMMUNITY
Start: 2017-05-03

## 2017-07-10 RX ORDER — ENOXAPARIN SODIUM 100 MG/ML
80 INJECTION SUBCUTANEOUS
Qty: 4 | Refills: 0 | Status: DISCONTINUED | COMMUNITY
Start: 2017-04-17 | End: 2017-07-10

## 2017-07-12 PROBLEM — Z01.811 PREOP RESPIRATORY EXAM: Status: RESOLVED | Noted: 2017-04-17 | Resolved: 2017-07-12

## 2017-07-12 PROBLEM — M79.89 LEG SWELLING: Status: ACTIVE | Noted: 2017-07-12

## 2017-07-12 PROBLEM — Z71.9 ENCOUNTER FOR CONSULTATION: Status: RESOLVED | Noted: 2017-04-21 | Resolved: 2017-07-12

## 2017-07-13 ENCOUNTER — FORM ENCOUNTER (OUTPATIENT)
Age: 62
End: 2017-07-13

## 2017-07-14 ENCOUNTER — OUTPATIENT (OUTPATIENT)
Dept: OUTPATIENT SERVICES | Facility: HOSPITAL | Age: 62
LOS: 1 days | End: 2017-07-14
Payer: COMMERCIAL

## 2017-07-14 ENCOUNTER — APPOINTMENT (OUTPATIENT)
Dept: ULTRASOUND IMAGING | Facility: CLINIC | Age: 62
End: 2017-07-14

## 2017-07-14 ENCOUNTER — APPOINTMENT (OUTPATIENT)
Dept: RADIOLOGY | Facility: CLINIC | Age: 62
End: 2017-07-14

## 2017-07-14 DIAGNOSIS — Z90.49 ACQUIRED ABSENCE OF OTHER SPECIFIED PARTS OF DIGESTIVE TRACT: Chronic | ICD-10-CM

## 2017-07-14 DIAGNOSIS — Z95.828 PRESENCE OF OTHER VASCULAR IMPLANTS AND GRAFTS: Chronic | ICD-10-CM

## 2017-07-14 DIAGNOSIS — Z98.890 OTHER SPECIFIED POSTPROCEDURAL STATES: Chronic | ICD-10-CM

## 2017-07-14 DIAGNOSIS — Z00.8 ENCOUNTER FOR OTHER GENERAL EXAMINATION: ICD-10-CM

## 2017-07-14 PROCEDURE — 93970 EXTREMITY STUDY: CPT

## 2017-07-14 PROCEDURE — 77080 DXA BONE DENSITY AXIAL: CPT

## 2017-07-17 ENCOUNTER — APPOINTMENT (OUTPATIENT)
Dept: RADIOLOGY | Facility: CLINIC | Age: 62
End: 2017-07-17

## 2017-07-21 DIAGNOSIS — M79.89 OTHER SPECIFIED SOFT TISSUE DISORDERS: ICD-10-CM

## 2017-07-21 DIAGNOSIS — M85.89 OTHER SPECIFIED DISORDERS OF BONE DENSITY AND STRUCTURE, MULTIPLE SITES: ICD-10-CM

## 2017-07-26 ENCOUNTER — APPOINTMENT (OUTPATIENT)
Dept: ORTHOPEDIC SURGERY | Facility: CLINIC | Age: 62
End: 2017-07-26

## 2017-07-26 VITALS
BODY MASS INDEX: 41.83 KG/M2 | DIASTOLIC BLOOD PRESSURE: 82 MMHG | HEART RATE: 83 BPM | HEIGHT: 68 IN | WEIGHT: 276 LBS | SYSTOLIC BLOOD PRESSURE: 140 MMHG

## 2017-08-07 ENCOUNTER — OUTPATIENT (OUTPATIENT)
Dept: OUTPATIENT SERVICES | Facility: HOSPITAL | Age: 62
LOS: 1 days | End: 2017-08-07
Payer: COMMERCIAL

## 2017-08-07 ENCOUNTER — APPOINTMENT (OUTPATIENT)
Dept: ULTRASOUND IMAGING | Facility: CLINIC | Age: 62
End: 2017-08-07
Payer: COMMERCIAL

## 2017-08-07 DIAGNOSIS — Z90.49 ACQUIRED ABSENCE OF OTHER SPECIFIED PARTS OF DIGESTIVE TRACT: Chronic | ICD-10-CM

## 2017-08-07 DIAGNOSIS — Z95.828 PRESENCE OF OTHER VASCULAR IMPLANTS AND GRAFTS: Chronic | ICD-10-CM

## 2017-08-07 DIAGNOSIS — Z00.8 ENCOUNTER FOR OTHER GENERAL EXAMINATION: ICD-10-CM

## 2017-08-07 DIAGNOSIS — Z98.890 OTHER SPECIFIED POSTPROCEDURAL STATES: Chronic | ICD-10-CM

## 2017-08-07 PROCEDURE — 76700 US EXAM ABDOM COMPLETE: CPT | Mod: 26

## 2017-08-07 PROCEDURE — 76700 US EXAM ABDOM COMPLETE: CPT

## 2017-08-07 PROCEDURE — 76856 US EXAM PELVIC COMPLETE: CPT

## 2017-08-07 PROCEDURE — 76856 US EXAM PELVIC COMPLETE: CPT | Mod: 26

## 2017-09-15 ENCOUNTER — APPOINTMENT (OUTPATIENT)
Dept: ORTHOPEDIC SURGERY | Facility: CLINIC | Age: 62
End: 2017-09-15
Payer: COMMERCIAL

## 2017-09-15 VITALS
DIASTOLIC BLOOD PRESSURE: 85 MMHG | WEIGHT: 276 LBS | HEART RATE: 88 BPM | SYSTOLIC BLOOD PRESSURE: 133 MMHG | HEIGHT: 68 IN | BODY MASS INDEX: 41.83 KG/M2

## 2017-09-15 PROCEDURE — 99214 OFFICE O/P EST MOD 30 MIN: CPT

## 2017-09-15 PROCEDURE — 73502 X-RAY EXAM HIP UNI 2-3 VIEWS: CPT | Mod: RT

## 2017-09-15 RX ORDER — TRAMADOL HYDROCHLORIDE 50 MG/1
50 TABLET, COATED ORAL
Qty: 40 | Refills: 0 | Status: DISCONTINUED | COMMUNITY
Start: 2017-04-20 | End: 2017-09-15

## 2017-09-15 RX ORDER — ALBUTEROL SULFATE 90 UG/1
108 (90 BASE) AEROSOL, METERED RESPIRATORY (INHALATION)
Qty: 1 | Refills: 2 | Status: DISCONTINUED | COMMUNITY
Start: 2017-04-28 | End: 2017-09-15

## 2017-09-15 RX ORDER — TRAMADOL HYDROCHLORIDE 50 MG/1
50 TABLET, COATED ORAL
Qty: 60 | Refills: 0 | Status: DISCONTINUED | COMMUNITY
Start: 2017-04-21 | End: 2017-09-15

## 2017-09-15 RX ORDER — FLUTICASONE PROPIONATE 50 UG/1
50 SPRAY, METERED NASAL DAILY
Qty: 1 | Refills: 3 | Status: DISCONTINUED | COMMUNITY
Start: 2017-04-24 | End: 2017-09-15

## 2017-09-15 RX ORDER — ERGOCALCIFEROL 1.25 MG/1
1.25 MG CAPSULE, LIQUID FILLED ORAL
Qty: 1 | Refills: 0 | Status: DISCONTINUED | COMMUNITY
Start: 2017-02-03 | End: 2017-09-15

## 2017-09-24 ENCOUNTER — RX RENEWAL (OUTPATIENT)
Age: 62
End: 2017-09-24

## 2017-09-29 ENCOUNTER — APPOINTMENT (OUTPATIENT)
Dept: FAMILY MEDICINE | Facility: CLINIC | Age: 62
End: 2017-09-29
Payer: COMMERCIAL

## 2017-09-29 VITALS — TEMPERATURE: 97.8 F

## 2017-09-29 PROCEDURE — 90686 IIV4 VACC NO PRSV 0.5 ML IM: CPT

## 2017-09-29 PROCEDURE — G0008: CPT

## 2017-10-19 ENCOUNTER — APPOINTMENT (OUTPATIENT)
Dept: PULMONOLOGY | Facility: CLINIC | Age: 62
End: 2017-10-19
Payer: COMMERCIAL

## 2017-10-19 VITALS — BODY MASS INDEX: 41.36 KG/M2 | SYSTOLIC BLOOD PRESSURE: 160 MMHG | DIASTOLIC BLOOD PRESSURE: 80 MMHG | WEIGHT: 272 LBS

## 2017-10-19 VITALS — HEART RATE: 67 BPM | OXYGEN SATURATION: 96 %

## 2017-10-19 DIAGNOSIS — Z87.39 PERSONAL HISTORY OF OTHER DISEASES OF THE MUSCULOSKELETAL SYSTEM AND CONNECTIVE TISSUE: ICD-10-CM

## 2017-10-19 PROCEDURE — 99214 OFFICE O/P EST MOD 30 MIN: CPT

## 2017-11-07 ENCOUNTER — APPOINTMENT (OUTPATIENT)
Dept: ORTHOPEDIC SURGERY | Facility: CLINIC | Age: 62
End: 2017-11-07
Payer: COMMERCIAL

## 2017-11-07 ENCOUNTER — RX RENEWAL (OUTPATIENT)
Age: 62
End: 2017-11-07

## 2017-11-07 VITALS — HEIGHT: 68 IN | BODY MASS INDEX: 41.22 KG/M2 | WEIGHT: 272 LBS

## 2017-11-07 PROCEDURE — 20610 DRAIN/INJ JOINT/BURSA W/O US: CPT | Mod: LT

## 2017-11-16 ENCOUNTER — OTHER (OUTPATIENT)
Age: 62
End: 2017-11-16

## 2017-12-04 ENCOUNTER — APPOINTMENT (OUTPATIENT)
Dept: ORTHOPEDIC SURGERY | Facility: CLINIC | Age: 62
End: 2017-12-04
Payer: COMMERCIAL

## 2017-12-04 VITALS
WEIGHT: 272 LBS | SYSTOLIC BLOOD PRESSURE: 158 MMHG | HEART RATE: 79 BPM | DIASTOLIC BLOOD PRESSURE: 88 MMHG | BODY MASS INDEX: 41.22 KG/M2 | HEIGHT: 68 IN

## 2017-12-04 PROCEDURE — 20610 DRAIN/INJ JOINT/BURSA W/O US: CPT | Mod: LT

## 2017-12-04 PROCEDURE — 99213 OFFICE O/P EST LOW 20 MIN: CPT | Mod: 25

## 2017-12-20 ENCOUNTER — RX RENEWAL (OUTPATIENT)
Age: 62
End: 2017-12-20

## 2018-02-22 ENCOUNTER — APPOINTMENT (OUTPATIENT)
Dept: FAMILY MEDICINE | Facility: CLINIC | Age: 63
End: 2018-02-22
Payer: COMMERCIAL

## 2018-02-22 VITALS
BODY MASS INDEX: 43.35 KG/M2 | WEIGHT: 286 LBS | DIASTOLIC BLOOD PRESSURE: 80 MMHG | HEART RATE: 80 BPM | SYSTOLIC BLOOD PRESSURE: 130 MMHG | HEIGHT: 68 IN

## 2018-02-22 PROCEDURE — 99214 OFFICE O/P EST MOD 30 MIN: CPT

## 2018-02-22 RX ORDER — HYALURONATE SOD, CROSS-LINKED 30 MG/3 ML
30 SYRINGE (ML) INTRAARTICULAR
Qty: 3 | Refills: 0 | Status: DISCONTINUED | OUTPATIENT
Start: 2017-09-15 | End: 2018-02-22

## 2018-02-22 RX ORDER — PREDNISONE 5 MG/5ML
5 SOLUTION ORAL
Refills: 0 | Status: DISCONTINUED | COMMUNITY
End: 2018-02-22

## 2018-02-22 RX ORDER — AMOXICILLIN 500 MG/1
500 CAPSULE ORAL
Qty: 12 | Refills: 0 | Status: DISCONTINUED | COMMUNITY
Start: 2017-08-16 | End: 2018-02-22

## 2018-02-22 RX ORDER — NEOMYCIN AND POLYMYXIN B SULFATES AND DEXAMETHASONE 3.5; 10000; 1 MG/G; [IU]/G; MG/G
3.5-10000-0.1 OINTMENT OPHTHALMIC
Qty: 3 | Refills: 0 | Status: DISCONTINUED | COMMUNITY
Start: 2017-11-06 | End: 2018-02-22

## 2018-03-15 ENCOUNTER — APPOINTMENT (OUTPATIENT)
Dept: PULMONOLOGY | Facility: CLINIC | Age: 63
End: 2018-03-15
Payer: COMMERCIAL

## 2018-03-15 VITALS
SYSTOLIC BLOOD PRESSURE: 130 MMHG | OXYGEN SATURATION: 96 % | BODY MASS INDEX: 42.88 KG/M2 | DIASTOLIC BLOOD PRESSURE: 78 MMHG | HEIGHT: 68 IN | HEART RATE: 74 BPM

## 2018-03-15 VITALS — BODY MASS INDEX: 42.88 KG/M2 | WEIGHT: 282 LBS

## 2018-03-15 DIAGNOSIS — I26.02 SADDLE EMBOLUS OF PULMONARY ARTERY WITH ACUTE COR PULMONALE: ICD-10-CM

## 2018-03-15 PROCEDURE — 94010 BREATHING CAPACITY TEST: CPT

## 2018-03-15 PROCEDURE — 94729 DIFFUSING CAPACITY: CPT

## 2018-03-15 PROCEDURE — 99215 OFFICE O/P EST HI 40 MIN: CPT | Mod: 25

## 2018-03-15 PROCEDURE — 85018 HEMOGLOBIN: CPT | Mod: QW

## 2018-03-15 PROCEDURE — 94727 GAS DIL/WSHOT DETER LNG VOL: CPT

## 2018-03-16 ENCOUNTER — FORM ENCOUNTER (OUTPATIENT)
Age: 63
End: 2018-03-16

## 2018-03-17 ENCOUNTER — APPOINTMENT (OUTPATIENT)
Dept: CT IMAGING | Facility: CLINIC | Age: 63
End: 2018-03-17
Payer: COMMERCIAL

## 2018-03-17 ENCOUNTER — OUTPATIENT (OUTPATIENT)
Dept: OUTPATIENT SERVICES | Facility: HOSPITAL | Age: 63
LOS: 1 days | End: 2018-03-17
Payer: COMMERCIAL

## 2018-03-17 DIAGNOSIS — Z95.828 PRESENCE OF OTHER VASCULAR IMPLANTS AND GRAFTS: Chronic | ICD-10-CM

## 2018-03-17 DIAGNOSIS — Z98.890 OTHER SPECIFIED POSTPROCEDURAL STATES: Chronic | ICD-10-CM

## 2018-03-17 DIAGNOSIS — Z90.49 ACQUIRED ABSENCE OF OTHER SPECIFIED PARTS OF DIGESTIVE TRACT: Chronic | ICD-10-CM

## 2018-03-17 DIAGNOSIS — R06.02 SHORTNESS OF BREATH: ICD-10-CM

## 2018-03-17 DIAGNOSIS — Z00.8 ENCOUNTER FOR OTHER GENERAL EXAMINATION: ICD-10-CM

## 2018-03-17 PROCEDURE — 71275 CT ANGIOGRAPHY CHEST: CPT | Mod: 26

## 2018-03-17 PROCEDURE — 82565 ASSAY OF CREATININE: CPT

## 2018-03-17 PROCEDURE — 71275 CT ANGIOGRAPHY CHEST: CPT

## 2018-03-21 ENCOUNTER — FORM ENCOUNTER (OUTPATIENT)
Age: 63
End: 2018-03-21

## 2018-03-22 ENCOUNTER — OUTPATIENT (OUTPATIENT)
Dept: OUTPATIENT SERVICES | Facility: HOSPITAL | Age: 63
LOS: 1 days | End: 2018-03-22
Payer: COMMERCIAL

## 2018-03-22 ENCOUNTER — APPOINTMENT (OUTPATIENT)
Dept: ULTRASOUND IMAGING | Facility: CLINIC | Age: 63
End: 2018-03-22
Payer: COMMERCIAL

## 2018-03-22 DIAGNOSIS — R06.02 SHORTNESS OF BREATH: ICD-10-CM

## 2018-03-22 DIAGNOSIS — I27.20 PULMONARY HYPERTENSION, UNSPECIFIED: ICD-10-CM

## 2018-03-22 DIAGNOSIS — Z95.828 PRESENCE OF OTHER VASCULAR IMPLANTS AND GRAFTS: Chronic | ICD-10-CM

## 2018-03-22 DIAGNOSIS — Z98.890 OTHER SPECIFIED POSTPROCEDURAL STATES: Chronic | ICD-10-CM

## 2018-03-22 DIAGNOSIS — Z90.49 ACQUIRED ABSENCE OF OTHER SPECIFIED PARTS OF DIGESTIVE TRACT: Chronic | ICD-10-CM

## 2018-03-22 DIAGNOSIS — I26.02 SADDLE EMBOLUS OF PULMONARY ARTERY WITH ACUTE COR PULMONALE: ICD-10-CM

## 2018-03-22 PROCEDURE — 93970 EXTREMITY STUDY: CPT

## 2018-03-22 PROCEDURE — 93970 EXTREMITY STUDY: CPT | Mod: 26

## 2018-03-27 ENCOUNTER — RX RENEWAL (OUTPATIENT)
Age: 63
End: 2018-03-27

## 2018-04-04 ENCOUNTER — OUTPATIENT (OUTPATIENT)
Dept: OUTPATIENT SERVICES | Facility: HOSPITAL | Age: 63
LOS: 1 days | End: 2018-04-04
Payer: COMMERCIAL

## 2018-04-04 VITALS
WEIGHT: 279.99 LBS | RESPIRATION RATE: 18 BRPM | HEART RATE: 67 BPM | OXYGEN SATURATION: 99 % | TEMPERATURE: 98 F | DIASTOLIC BLOOD PRESSURE: 88 MMHG | SYSTOLIC BLOOD PRESSURE: 177 MMHG | HEIGHT: 68 IN

## 2018-04-04 DIAGNOSIS — Z95.828 PRESENCE OF OTHER VASCULAR IMPLANTS AND GRAFTS: Chronic | ICD-10-CM

## 2018-04-04 DIAGNOSIS — I20.9 ANGINA PECTORIS, UNSPECIFIED: ICD-10-CM

## 2018-04-04 DIAGNOSIS — Z96.641 PRESENCE OF RIGHT ARTIFICIAL HIP JOINT: Chronic | ICD-10-CM

## 2018-04-04 DIAGNOSIS — Z01.810 ENCOUNTER FOR PREPROCEDURAL CARDIOVASCULAR EXAMINATION: ICD-10-CM

## 2018-04-04 DIAGNOSIS — Z98.890 OTHER SPECIFIED POSTPROCEDURAL STATES: Chronic | ICD-10-CM

## 2018-04-04 DIAGNOSIS — Z90.49 ACQUIRED ABSENCE OF OTHER SPECIFIED PARTS OF DIGESTIVE TRACT: Chronic | ICD-10-CM

## 2018-04-04 DIAGNOSIS — I27.89 OTHER SPECIFIED PULMONARY HEART DISEASES: ICD-10-CM

## 2018-04-04 DIAGNOSIS — R06.02 SHORTNESS OF BREATH: ICD-10-CM

## 2018-04-04 LAB
ANION GAP SERPL CALC-SCNC: 13 MMOL/L — SIGNIFICANT CHANGE UP (ref 5–17)
ANISOCYTOSIS BLD QL: SLIGHT — SIGNIFICANT CHANGE UP
APTT BLD: 32.7 SEC — SIGNIFICANT CHANGE UP (ref 27.5–37.4)
BASOPHILS NFR BLD AUTO: 1 % — SIGNIFICANT CHANGE UP (ref 0–2)
BUN SERPL-MCNC: 17 MG/DL — SIGNIFICANT CHANGE UP (ref 8–20)
CALCIUM SERPL-MCNC: 8.8 MG/DL — SIGNIFICANT CHANGE UP (ref 8.6–10.2)
CHLORIDE SERPL-SCNC: 101 MMOL/L — SIGNIFICANT CHANGE UP (ref 98–107)
CO2 SERPL-SCNC: 27 MMOL/L — SIGNIFICANT CHANGE UP (ref 22–29)
CREAT SERPL-MCNC: 0.8 MG/DL — SIGNIFICANT CHANGE UP (ref 0.5–1.3)
EOSINOPHIL NFR BLD AUTO: 2 % — SIGNIFICANT CHANGE UP (ref 0–5)
GLUCOSE SERPL-MCNC: 109 MG/DL — SIGNIFICANT CHANGE UP (ref 70–115)
HCT VFR BLD CALC: 40.2 % — SIGNIFICANT CHANGE UP (ref 37–47)
HGB BLD-MCNC: 12.1 G/DL — SIGNIFICANT CHANGE UP (ref 12–16)
HYPOCHROMIA BLD QL: SLIGHT — SIGNIFICANT CHANGE UP
INR BLD: 1.23 RATIO — HIGH (ref 0.88–1.16)
LYMPHOCYTES # BLD AUTO: 20 % — SIGNIFICANT CHANGE UP (ref 20–55)
MACROCYTES BLD QL: SLIGHT — SIGNIFICANT CHANGE UP
MCHC RBC-ENTMCNC: 28.6 PG — SIGNIFICANT CHANGE UP (ref 27–31)
MCHC RBC-ENTMCNC: 30.1 G/DL — LOW (ref 32–36)
MCV RBC AUTO: 95 FL — SIGNIFICANT CHANGE UP (ref 81–99)
METAMYELOCYTES # FLD: 1 % — HIGH (ref 0–0)
MICROCYTES BLD QL: SLIGHT — SIGNIFICANT CHANGE UP
MONOCYTES NFR BLD AUTO: 2 % — LOW (ref 3–10)
NEUTROPHILS NFR BLD AUTO: 71 % — SIGNIFICANT CHANGE UP (ref 37–73)
NEUTS BAND # BLD: 1 % — SIGNIFICANT CHANGE UP (ref 0–8)
PLAT MORPH BLD: NORMAL — SIGNIFICANT CHANGE UP
PLATELET # BLD AUTO: 224 K/UL — SIGNIFICANT CHANGE UP (ref 150–400)
POTASSIUM SERPL-MCNC: 4.2 MMOL/L — SIGNIFICANT CHANGE UP (ref 3.5–5.3)
POTASSIUM SERPL-SCNC: 4.2 MMOL/L — SIGNIFICANT CHANGE UP (ref 3.5–5.3)
PROTHROM AB SERPL-ACNC: 13.6 SEC — HIGH (ref 9.8–12.7)
RBC # BLD: 4.23 M/UL — LOW (ref 4.4–5.2)
RBC # FLD: 14.1 % — SIGNIFICANT CHANGE UP (ref 11–15.6)
RBC BLD AUTO: ABNORMAL
SODIUM SERPL-SCNC: 141 MMOL/L — SIGNIFICANT CHANGE UP (ref 135–145)
VARIANT LYMPHS # BLD: 2 % — SIGNIFICANT CHANGE UP (ref 0–6)
WBC # BLD: 7.5 K/UL — SIGNIFICANT CHANGE UP (ref 4.8–10.8)
WBC # FLD AUTO: 7.5 K/UL — SIGNIFICANT CHANGE UP (ref 4.8–10.8)

## 2018-04-04 PROCEDURE — 80048 BASIC METABOLIC PNL TOTAL CA: CPT

## 2018-04-04 PROCEDURE — 85610 PROTHROMBIN TIME: CPT

## 2018-04-04 PROCEDURE — 93005 ELECTROCARDIOGRAM TRACING: CPT

## 2018-04-04 PROCEDURE — 93010 ELECTROCARDIOGRAM REPORT: CPT

## 2018-04-04 PROCEDURE — 85027 COMPLETE CBC AUTOMATED: CPT

## 2018-04-04 PROCEDURE — G0463: CPT

## 2018-04-04 PROCEDURE — 85730 THROMBOPLASTIN TIME PARTIAL: CPT

## 2018-04-04 PROCEDURE — 36415 COLL VENOUS BLD VENIPUNCTURE: CPT

## 2018-04-04 RX ORDER — ENOXAPARIN SODIUM 100 MG/ML
120 INJECTION SUBCUTANEOUS
Qty: 480 | Refills: 0 | OUTPATIENT
Start: 2018-04-04 | End: 2018-04-05

## 2018-04-04 NOTE — ASU PATIENT PROFILE, ADULT - PSH
History of cholecystectomy    History of dilation and curettage    History of ear surgery    History of total hip replacement, right    Presence of IVC filter

## 2018-04-04 NOTE — H&P PST ADULT - HISTORY OF PRESENT ILLNESS
61 yo female with history of paroxsymal afib on eliquis, s/p PE, hypertension, s/p acute saddle PE, hypothyroidism, pulmonary hypertension, thyroid disease, IVC filter for DVT and PE and home O2.   Patient has recently been complaining of SOB at rest.  Patient has a new diagnosis of lupus and Scholegrenes and was started on prednisone and plaquenil.  Patient put on 30 lbs with prednisone and was weaned off.  Patient started at that time with SOB again.  She is here today to r/o CAD with type 3 or 4 angina from SOB at rest.  Patient suffers from sleep apnea but uses Cpap machine every night.

## 2018-04-04 NOTE — ASU PATIENT PROFILE, ADULT - PMH
Anemia    Atrial fibrillation    DVT (deep venous thrombosis)    Hiatal hernia    Hip fx    HTN (hypertension)    Hypothyroid    Osteoarthritis    Osteopenia    Pulmonary embolism  Saddle Embolus  Pulmonary hypertension    Sleep apnea

## 2018-04-09 ENCOUNTER — OUTPATIENT (OUTPATIENT)
Dept: OUTPATIENT SERVICES | Facility: HOSPITAL | Age: 63
LOS: 1 days | Discharge: HOME | End: 2018-04-09
Payer: COMMERCIAL

## 2018-04-09 ENCOUNTER — TRANSCRIPTION ENCOUNTER (OUTPATIENT)
Age: 63
End: 2018-04-09

## 2018-04-09 VITALS
HEART RATE: 62 BPM | RESPIRATION RATE: 14 BRPM | TEMPERATURE: 98 F | OXYGEN SATURATION: 99 % | SYSTOLIC BLOOD PRESSURE: 167 MMHG | DIASTOLIC BLOOD PRESSURE: 77 MMHG

## 2018-04-09 VITALS
DIASTOLIC BLOOD PRESSURE: 74 MMHG | OXYGEN SATURATION: 97 % | RESPIRATION RATE: 16 BRPM | TEMPERATURE: 98 F | SYSTOLIC BLOOD PRESSURE: 145 MMHG | HEART RATE: 79 BPM

## 2018-04-09 DIAGNOSIS — Z01.810 ENCOUNTER FOR PREPROCEDURAL CARDIOVASCULAR EXAMINATION: ICD-10-CM

## 2018-04-09 DIAGNOSIS — Z96.641 PRESENCE OF RIGHT ARTIFICIAL HIP JOINT: Chronic | ICD-10-CM

## 2018-04-09 DIAGNOSIS — Z90.49 ACQUIRED ABSENCE OF OTHER SPECIFIED PARTS OF DIGESTIVE TRACT: Chronic | ICD-10-CM

## 2018-04-09 DIAGNOSIS — I27.89 OTHER SPECIFIED PULMONARY HEART DISEASES: ICD-10-CM

## 2018-04-09 DIAGNOSIS — Z95.828 PRESENCE OF OTHER VASCULAR IMPLANTS AND GRAFTS: Chronic | ICD-10-CM

## 2018-04-09 DIAGNOSIS — Z98.890 OTHER SPECIFIED POSTPROCEDURAL STATES: Chronic | ICD-10-CM

## 2018-04-09 PROCEDURE — C1894: CPT

## 2018-04-09 PROCEDURE — C1889: CPT

## 2018-04-09 PROCEDURE — 93451 RIGHT HEART CATH: CPT

## 2018-04-09 PROCEDURE — C1769: CPT

## 2018-04-09 NOTE — DISCHARGE NOTE ADULT - PATIENT PORTAL LINK FT
You can access the [facility], offered by [facility], by registering with the following website: [email or web address]

## 2018-04-09 NOTE — PROGRESS NOTE ADULT - SUBJECTIVE AND OBJECTIVE BOX
Nurse Practitioner Progress note:     INTERVAL HISTORY: 62 year old female with pHTN.      TELEMETRY: NSR 69 bpm    T(C): 36.9 (04-09-18 @ 12:15), Max: 36.9 (04-09-18 @ 12:15)  HR: 62 (04-09-18 @ 12:15) (62 - 62)  BP: 167/77 (04-09-18 @ 12:15) (167/77 - 167/77)  RR: 14 (04-09-18 @ 12:15) (14 - 14)  SpO2: 99% (04-09-18 @ 12:15) (99% - 99%)  Wt(kg): --    PHYSICAL EXAM:  Appearance: Normal	  Cardiovascular: Normal S1 S2, No JVD, No murmurs, No edema  Respiratory: Lungs clear to auscultation	  Psychiatry: A & O x 3, Mood & affect appropriate  Neurologic: Non-focal, A&O X3.  No neuro deficits  Procedure Site: Right groin site benign.  No bleeding/hematoma/ecchymosis.  + palp pedal pulse      PROCEDURE RESULTS: S/P RHC which revealed pHTN.  Full report to follow    ASSESSMENT/PLAN: 	  -Groin precautions  -Bedrest X 3h  -Resume home meds  -Follow up with Dr. Houston  -Discharge to home when criteria met Nurse Practitioner Progress note:     INTERVAL HISTORY: 62 year old female with pHTN.      TELEMETRY: NSR 69 bpm    T(C): 36.9 (04-09-18 @ 12:15), Max: 36.9 (04-09-18 @ 12:15)  HR: 62 (04-09-18 @ 12:15) (62 - 62)  BP: 167/77 (04-09-18 @ 12:15) (167/77 - 167/77)  RR: 14 (04-09-18 @ 12:15) (14 - 14)  SpO2: 99% (04-09-18 @ 12:15) (99% - 99%)  Wt(kg): --    PHYSICAL EXAM:  Appearance: Normal	  Cardiovascular: Normal S1 S2, No JVD, No murmurs, No edema  Respiratory: Lungs clear to auscultation	  Psychiatry: A & O x 3, Mood & affect appropriate  Neurologic: Non-focal, A&O X3.  No neuro deficits  Procedure Site: Right groin site benign.  No bleeding/hematoma/ecchymosis.  + palp pedal pulse      PROCEDURE RESULTS: S/P RHC which revealed pHTN.  Full report to follow    ASSESSMENT/PLAN: 	  -Groin precautions  -Bedrest X 3h  -Resume home meds  -Follow up with Dr. Houston  -As per Dr. Betancur pt does not require lovenox bridge d/t IVC filter.  Resume AC tonight.   -Discharge to home when criteria met

## 2018-04-09 NOTE — DISCHARGE NOTE ADULT - MEDICATION SUMMARY - MEDICATIONS TO TAKE
I will START or STAY ON the medications listed below when I get home from the hospital:    predniSONE 5 mg oral tablet  -- 1 tab(s) by mouth once a day  -- Indication: For steriod    digoxin 250 mcg (0.25 mg) oral tablet  -- 1 tab(s) by mouth once a day  -- Indication: For heart    apixaban 5 mg oral tablet  -- 1 tab(s) by mouth 2 times a day  -- Indication: For blood thinner    hydroxychloroquine 200 mg oral tablet  -- 2 tab(s) by mouth once a day  -- Indication: For pain    Toprol-XL 25 mg oral tablet, extended release  -- 0.5 tab(s) by mouth once a day  -- Indication: For heart    Lasix 20 mg oral tablet  -- 0.5 tab(s) by mouth once a day  -- Indication: For water pill    famotidine 20 mg oral tablet  -- 1 tab(s) by mouth once a day  -- Indication: For gerd    Wellbutrin  mg/24 hours oral tablet, extended release  -- 1 tab(s) by mouth every 24 hours  -- Indication: For mood    Synthroid 150 mcg (0.15 mg) oral tablet  -- 1 tab(s) by mouth once a day  -- Indication: For thyroid

## 2018-04-09 NOTE — DISCHARGE NOTE ADULT - CARE PROVIDER_API CALL
Lucretia Houston), Cardiovascular Disease; Internal Medicine  [address] [place] [id number]  Phone: [phone]  Fax: [phone]

## 2018-04-09 NOTE — DISCHARGE NOTE ADULT - PLAN OF CARE
optimal cardiac/respiratory function No changes to medications No heavy lifting, driving, sex, tub baths, swimming, or any activity that submerges the lower half of the body in water for 48 hours.  Limited walking and stairs for 48 hours.    Change the bandaid after 24 hours and every 24 hours after that.  Keep the puncture site dry and covered with a bandaid until a scab forms.    Observe the site frequently.  If bleeding or a large lump (the size of a golf ball or bigger) occurs lie flat, apply continuous direct pressure just above the puncture site for at least 10 minutes, and notify your physician immediately.  If the bleeding cannot be controlled, call 911 immediately for assistance.  Notify your physician of pain, swelling or any drainage.    Notify your physician immediately if coldness, numbness, discoloration or pain in your foot occurs.

## 2018-04-09 NOTE — DISCHARGE NOTE ADULT - NS AS ACTIVITY OBS
Walking-Indoors allowed/No Heavy lifting/straining/Walking-Outdoors allowed/Stairs allowed/Showering allowed

## 2018-04-09 NOTE — DISCHARGE NOTE ADULT - HOSPITAL COURSE
62 year old female with pulmonary HTN, Afib, s/p PE with IVC filter now s/p RHC which revealed pulmonary HTN,  No change to medications.  Resume AC tonight, no lovenox bridge.   Discharge to home.

## 2018-04-09 NOTE — DISCHARGE NOTE ADULT - CARE PLAN
Principal Discharge DX:	Pulmonary hypertension  Goal:	optimal cardiac/respiratory function  Assessment and plan of treatment:	No changes to medications Principal Discharge DX:	Pulmonary hypertension  Goal:	optimal cardiac/respiratory function  Assessment and plan of treatment:	No heavy lifting, driving, sex, tub baths, swimming, or any activity that submerges the lower half of the body in water for 48 hours.  Limited walking and stairs for 48 hours.    Change the bandaid after 24 hours and every 24 hours after that.  Keep the puncture site dry and covered with a bandaid until a scab forms.    Observe the site frequently.  If bleeding or a large lump (the size of a golf ball or bigger) occurs lie flat, apply continuous direct pressure just above the puncture site for at least 10 minutes, and notify your physician immediately.  If the bleeding cannot be controlled, call 911 immediately for assistance.  Notify your physician of pain, swelling or any drainage.    Notify your physician immediately if coldness, numbness, discoloration or pain in your foot occurs.

## 2018-04-17 DIAGNOSIS — I27.0 PRIMARY PULMONARY HYPERTENSION: ICD-10-CM

## 2018-04-18 ENCOUNTER — APPOINTMENT (OUTPATIENT)
Dept: PULMONOLOGY | Facility: CLINIC | Age: 63
End: 2018-04-18
Payer: COMMERCIAL

## 2018-04-18 VITALS
SYSTOLIC BLOOD PRESSURE: 130 MMHG | DIASTOLIC BLOOD PRESSURE: 60 MMHG | BODY MASS INDEX: 43.19 KG/M2 | OXYGEN SATURATION: 98 % | WEIGHT: 285 LBS | HEIGHT: 68 IN | HEART RATE: 80 BPM

## 2018-04-18 PROCEDURE — 99215 OFFICE O/P EST HI 40 MIN: CPT

## 2018-04-23 ENCOUNTER — FORM ENCOUNTER (OUTPATIENT)
Age: 63
End: 2018-04-23

## 2018-04-24 ENCOUNTER — OUTPATIENT (OUTPATIENT)
Dept: OUTPATIENT SERVICES | Facility: HOSPITAL | Age: 63
LOS: 1 days | End: 2018-04-24
Payer: COMMERCIAL

## 2018-04-24 ENCOUNTER — APPOINTMENT (OUTPATIENT)
Dept: MAMMOGRAPHY | Facility: CLINIC | Age: 63
End: 2018-04-24
Payer: COMMERCIAL

## 2018-04-24 DIAGNOSIS — Z95.828 PRESENCE OF OTHER VASCULAR IMPLANTS AND GRAFTS: Chronic | ICD-10-CM

## 2018-04-24 DIAGNOSIS — Z98.890 OTHER SPECIFIED POSTPROCEDURAL STATES: Chronic | ICD-10-CM

## 2018-04-24 DIAGNOSIS — Z96.641 PRESENCE OF RIGHT ARTIFICIAL HIP JOINT: Chronic | ICD-10-CM

## 2018-04-24 DIAGNOSIS — Z90.49 ACQUIRED ABSENCE OF OTHER SPECIFIED PARTS OF DIGESTIVE TRACT: Chronic | ICD-10-CM

## 2018-04-24 DIAGNOSIS — Z12.31 ENCOUNTER FOR SCREENING MAMMOGRAM FOR MALIGNANT NEOPLASM OF BREAST: ICD-10-CM

## 2018-04-24 PROCEDURE — 77067 SCR MAMMO BI INCL CAD: CPT

## 2018-04-24 PROCEDURE — 77067 SCR MAMMO BI INCL CAD: CPT | Mod: 26

## 2018-04-24 PROCEDURE — 77063 BREAST TOMOSYNTHESIS BI: CPT | Mod: 26

## 2018-04-24 PROCEDURE — 77063 BREAST TOMOSYNTHESIS BI: CPT

## 2018-05-11 ENCOUNTER — APPOINTMENT (OUTPATIENT)
Dept: ORTHOPEDIC SURGERY | Facility: CLINIC | Age: 63
End: 2018-05-11
Payer: COMMERCIAL

## 2018-05-11 VITALS
HEART RATE: 70 BPM | WEIGHT: 275 LBS | BODY MASS INDEX: 41.68 KG/M2 | SYSTOLIC BLOOD PRESSURE: 164 MMHG | HEIGHT: 68 IN | DIASTOLIC BLOOD PRESSURE: 91 MMHG

## 2018-05-11 PROCEDURE — 99214 OFFICE O/P EST MOD 30 MIN: CPT | Mod: 25

## 2018-05-11 PROCEDURE — 73502 X-RAY EXAM HIP UNI 2-3 VIEWS: CPT | Mod: RT

## 2018-05-11 PROCEDURE — 20610 DRAIN/INJ JOINT/BURSA W/O US: CPT | Mod: LT

## 2018-06-14 ENCOUNTER — RX RENEWAL (OUTPATIENT)
Age: 63
End: 2018-06-14

## 2018-06-19 ENCOUNTER — APPOINTMENT (OUTPATIENT)
Dept: PULMONOLOGY | Facility: CLINIC | Age: 63
End: 2018-06-19
Payer: COMMERCIAL

## 2018-06-19 VITALS — SYSTOLIC BLOOD PRESSURE: 118 MMHG | DIASTOLIC BLOOD PRESSURE: 76 MMHG | OXYGEN SATURATION: 99 % | HEART RATE: 69 BPM

## 2018-06-19 PROCEDURE — G0009: CPT

## 2018-06-19 PROCEDURE — 90670 PCV13 VACCINE IM: CPT

## 2018-06-19 PROCEDURE — 99215 OFFICE O/P EST HI 40 MIN: CPT | Mod: 25

## 2018-06-19 RX ORDER — ENOXAPARIN SODIUM 150 MG/ML
120 INJECTION SUBCUTANEOUS
Qty: 16 | Refills: 0 | Status: DISCONTINUED | COMMUNITY
Start: 2018-04-04 | End: 2018-06-19

## 2018-06-21 ENCOUNTER — RX RENEWAL (OUTPATIENT)
Age: 63
End: 2018-06-21

## 2018-07-16 PROBLEM — I26.99 OTHER PULMONARY EMBOLISM WITHOUT ACUTE COR PULMONALE: Chronic | Status: ACTIVE | Noted: 2017-04-11

## 2018-07-23 ENCOUNTER — OTHER (OUTPATIENT)
Age: 63
End: 2018-07-23

## 2018-08-03 ENCOUNTER — OTHER (OUTPATIENT)
Age: 63
End: 2018-08-03

## 2018-09-13 ENCOUNTER — RX RENEWAL (OUTPATIENT)
Age: 63
End: 2018-09-13

## 2018-09-14 ENCOUNTER — RX RENEWAL (OUTPATIENT)
Age: 63
End: 2018-09-14

## 2018-09-17 ENCOUNTER — RX RENEWAL (OUTPATIENT)
Age: 63
End: 2018-09-17

## 2018-10-04 PROBLEM — I27.2 OTHER SECONDARY PULMONARY HYPERTENSION: Chronic | Status: ACTIVE | Noted: 2017-04-11

## 2018-10-04 PROBLEM — D64.9 ANEMIA, UNSPECIFIED: Chronic | Status: ACTIVE | Noted: 2017-04-11

## 2018-10-04 PROBLEM — I82.409 ACUTE EMBOLISM AND THROMBOSIS OF UNSPECIFIED DEEP VEINS OF UNSPECIFIED LOWER EXTREMITY: Chronic | Status: ACTIVE | Noted: 2017-04-11

## 2018-10-04 PROBLEM — M19.90 UNSPECIFIED OSTEOARTHRITIS, UNSPECIFIED SITE: Chronic | Status: ACTIVE | Noted: 2017-04-11

## 2018-10-04 PROBLEM — M85.80 OTHER SPECIFIED DISORDERS OF BONE DENSITY AND STRUCTURE, UNSPECIFIED SITE: Chronic | Status: ACTIVE | Noted: 2017-04-11

## 2018-10-04 PROBLEM — K44.9 DIAPHRAGMATIC HERNIA WITHOUT OBSTRUCTION OR GANGRENE: Chronic | Status: ACTIVE | Noted: 2017-04-11

## 2018-10-11 ENCOUNTER — APPOINTMENT (OUTPATIENT)
Dept: FAMILY MEDICINE | Facility: CLINIC | Age: 63
End: 2018-10-11

## 2018-12-12 ENCOUNTER — RX RENEWAL (OUTPATIENT)
Age: 63
End: 2018-12-12

## 2019-02-06 ENCOUNTER — APPOINTMENT (OUTPATIENT)
Dept: PULMONOLOGY | Facility: CLINIC | Age: 64
End: 2019-02-06
Payer: COMMERCIAL

## 2019-02-06 VITALS
OXYGEN SATURATION: 97 % | SYSTOLIC BLOOD PRESSURE: 124 MMHG | HEART RATE: 77 BPM | DIASTOLIC BLOOD PRESSURE: 80 MMHG | RESPIRATION RATE: 16 BRPM

## 2019-02-06 VITALS — BODY MASS INDEX: 45.31 KG/M2 | WEIGHT: 293 LBS

## 2019-02-06 DIAGNOSIS — J96.91 RESPIRATORY FAILURE, UNSPECIFIED WITH HYPOXIA: ICD-10-CM

## 2019-02-06 PROCEDURE — 94060 EVALUATION OF WHEEZING: CPT

## 2019-02-06 PROCEDURE — 99215 OFFICE O/P EST HI 40 MIN: CPT | Mod: 25

## 2019-02-06 PROCEDURE — 94664 DEMO&/EVAL PT USE INHALER: CPT | Mod: 59

## 2019-02-06 RX ORDER — SPIRONOLACTONE 25 MG/1
25 TABLET ORAL
Qty: 30 | Refills: 0 | Status: DISCONTINUED | COMMUNITY
Start: 2018-04-23 | End: 2019-02-06

## 2019-02-06 RX ORDER — PREDNISONE 5 MG/1
5 TABLET ORAL
Qty: 90 | Refills: 0 | Status: DISCONTINUED | COMMUNITY
Start: 2017-09-14 | End: 2019-02-06

## 2019-02-06 NOTE — REVIEW OF SYSTEMS
[As Noted in HPI] : as noted in HPI [Negative] : Dermatologic [Fever] : no fever [Chills] : no chills [Fatigue] : no fatigue [Poor Appetite] : normal appetite  [Dry Eyes] : no dryness of the eyes [Eye Irritation] : no ~T irritation of the eyes [Epistaxis] : no nosebleeds [Postnasal Drip] : no postnasal drip [Heartburn] : no heartburn [Reflux] : no reflux

## 2019-02-06 NOTE — REASON FOR VISIT
[Follow-Up] : a follow-up visit [Pulmonary Embolism] : pulmonary embolism [Shortness of Breath] : shortness of Breath [Sleep Apnea] : sleep apnea

## 2019-02-06 NOTE — PROCEDURE
[FreeTextEntry1] : spirometry: obstruction which is new, decreased FVC\par \par  review of records from Dr Houston 2/9/19\par \par review of echo report 10/22/18

## 2019-02-06 NOTE — CONSULT LETTER
[Dear  ___] : Dear  [unfilled], [Courtesy Letter:] : I had the pleasure of seeing your patient, [unfilled], in my office today. [Consult Closing:] : Thank you very much for allowing me to participate in the care of this patient.  If you have any questions, please do not hesitate to contact me. [DrSimona  ___] : Dr. COLES [Bentley Meek MD] : Bentley Meek MD

## 2019-02-06 NOTE — PHYSICAL EXAM
[General Appearance - Well Developed] : well developed [General Appearance - Well Nourished] : well nourished [General Appearance - In No Acute Distress] : no acute distress [Normal Conjunctiva] : the conjunctiva exhibited no abnormalities [Neck Appearance] : the appearance of the neck was normal [Neck Circumference: ___] : neck circumference is [unfilled] [Heart Rate And Rhythm] : heart rate and rhythm were normal [Heart Sounds] : normal S1 and S2 [Veins - Varicosity Changes] : no varicosital changes were noted in the lower extremities [] : no respiratory distress [Respiration, Rhythm And Depth] : normal respiratory rhythm and effort [Auscultation Breath Sounds / Voice Sounds] : lungs were clear to auscultation bilaterally [Bowel Sounds] : normal bowel sounds [Abdomen Soft] : soft [Abnormal Walk] : normal gait [Cranial Nerves] : cranial nerves 2-12 were intact [Oriented To Time, Place, And Person] : oriented to person, place, and time [Impaired Insight] : insight and judgment were intact [Normal Oral Mucosa] : normal oral mucosa [No Oral Cyanosis] : no oral cyanosis [Nail Clubbing] : no clubbing of the fingernails [Cyanosis, Localized] : no localized cyanosis [FreeTextEntry1] : severely crowded oropharynx

## 2019-02-06 NOTE — ASSESSMENT
[FreeTextEntry1] : Multiple respiratory issues. S/P saddle PE with cor pulmonale for which she received TPA, IVCF. Still on eliquis. Chronic PE seen on CTPA but RHC with elevated wedge indicating pulm htn also from L sided heart dz. Diastolic CHF. Hypoxia resolved.  Latest echo with improved PASP. New onset obstruction, possibly asthmatic bronchitis. Decreased lung volumes as well. Underlying Sjogren's. Underlying POOL. Weight gain contributing. Episodes of afib with RVR. Currently rate controlled.

## 2019-02-06 NOTE — HISTORY OF PRESENT ILLNESS
[FreeTextEntry1] : Hx of POOL. 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 Had refractory hypoxia while in the hospital which resolved.  Also  diagnosed with SLE and Sjogren's for which she is on plaquenil and low dose prednisone. She had recurrence of hypoxia in March. That has since resolved. O2 was returned. \par \par She has had issues lately with afib with RVR. Plans on seeing an EP at [facility]. \par \par Latest echo done in Oct showed improved of pulm pressures. \par \par Some increased THEODORE.  Some cough with postnasal drip. No wheeze, no cp. \par \par She is using her CPAP every night. Compliance excellent. Auto-titrating machine 4-20. Therapeutic AHI WNL.\par \par On pred from her rheumatologist. \par \par

## 2019-03-08 ENCOUNTER — RX RENEWAL (OUTPATIENT)
Age: 64
End: 2019-03-08

## 2019-04-10 ENCOUNTER — APPOINTMENT (OUTPATIENT)
Dept: FAMILY MEDICINE | Facility: CLINIC | Age: 64
End: 2019-04-10
Payer: COMMERCIAL

## 2019-04-15 ENCOUNTER — APPOINTMENT (OUTPATIENT)
Dept: FAMILY MEDICINE | Facility: CLINIC | Age: 64
End: 2019-04-15
Payer: COMMERCIAL

## 2019-04-15 VITALS
HEART RATE: 114 BPM | SYSTOLIC BLOOD PRESSURE: 126 MMHG | HEIGHT: 68 IN | DIASTOLIC BLOOD PRESSURE: 80 MMHG | BODY MASS INDEX: 44.41 KG/M2 | OXYGEN SATURATION: 96 % | WEIGHT: 293 LBS

## 2019-04-15 DIAGNOSIS — Z87.898 PERSONAL HISTORY OF OTHER SPECIFIED CONDITIONS: ICD-10-CM

## 2019-04-15 DIAGNOSIS — Z92.29 PERSONAL HISTORY OF OTHER DRUG THERAPY: ICD-10-CM

## 2019-04-15 PROCEDURE — G0402 INITIAL PREVENTIVE EXAM: CPT

## 2019-04-15 PROCEDURE — 36415 COLL VENOUS BLD VENIPUNCTURE: CPT

## 2019-04-15 PROCEDURE — 99214 OFFICE O/P EST MOD 30 MIN: CPT | Mod: 25

## 2019-04-15 RX ORDER — AZITHROMYCIN 250 MG/1
250 TABLET, FILM COATED ORAL
Qty: 1 | Refills: 1 | Status: DISCONTINUED | COMMUNITY
Start: 2019-02-06 | End: 2019-04-15

## 2019-04-15 NOTE — COUNSELING
[Healthy eating counseling provided] : healthy eating [Fall prevention counseling provided] : fall prevention

## 2019-04-15 NOTE — HEALTH RISK ASSESSMENT
[Patient reported mammogram was normal] : Patient reported mammogram was normal [Good] : ~his/her~  mood as  good [No falls in past year] : Patient reported no falls in the past year [0] : 2) Feeling down, depressed, or hopeless: Not at all (0) [Patient reported bone density results were abnormal] : Patient reported bone density results were abnormal [Patient reported colonoscopy was normal] : Patient reported colonoscopy was normal [None] : None [With Significant Other] : lives with significant other [Employed] : employed [High School] : high school [] :  [# Of Children ___] : has [unfilled] children [Feels Safe at Home] : Feels safe at home [Fully functional (bathing, dressing, toileting, transferring, walking, feeding)] : Fully functional (bathing, dressing, toileting, transferring, walking, feeding) [Fully functional (using the telephone, shopping, preparing meals, housekeeping, doing laundry, using] : Fully functional and needs no help or supervision to perform IADLs (using the telephone, shopping, preparing meals, housekeeping, doing laundry, using transportation, managing medications and managing finances) [Smoke Detector] : smoke detector [Carbon Monoxide Detector] : carbon monoxide detector [Seat Belt] :  uses seat belt [FreeTextEntry1] : more generalized fatigue  [] : No [de-identified] : Pulmonology /Dr. Meek;  cardiology/Dr. Houston/ Rheumatology    [de-identified] : limited due to pain  [[id number]] : 0 [Change in mental status noted] : No change in mental status noted [Reports changes in hearing] : Reports no changes in hearing [Guns at Home] : no guns at home [Travel to Developing Areas] : does not  travel to developing areas [MammogramDate] : 4/18 [MammogramComments] : Bi Rads 1 [BoneDensityDate] : 2017  [ColonoscopyDate] : "Years"  [BoneDensityComments] : osteopenia  [FreeTextEntry2] :  at [facility] .

## 2019-04-15 NOTE — REVIEW OF SYSTEMS
[Fatigue] : fatigue [Lower Ext Edema] : lower extremity edema [Joint Pain] : joint pain [Joint Stiffness] : joint stiffness [Negative] : Gastrointestinal [Fever] : no fever [Night Sweats] : no night sweats [Redness] : no redness [Vision Problems] : no vision problems [Chest Pain] : no chest pain [FreeTextEntry9] : Acute on Chronic LEFT KNEE  [de-identified] : as in HPI  [de-identified] : endorses neuropathy of LE

## 2019-04-15 NOTE — HISTORY OF PRESENT ILLNESS
[FreeTextEntry1] : BIGG [de-identified] : Last seen  here in Feb. 2018 and encounter reviewed.\par Since reports: \par Episodes of AFIB ( cardiology reviewed 2/19) ; last episode January\par Consultation with EPS MD (Gato) at [facility] next week\par \par Rheumatology  Dr. Zavala  remains on Plaquenil  unable to come off Prednisone due to joint pain  managed on 5 mgs daily ; now on Duloxetine 30 mgs \par \par Dermatologist ( [facility]) for LEFT SHIN wound as consequence of rubbing from low boots and treated with Silvadene .  \par Seen in  consultation with  Vascular, Dr. Tillman , and now in [place].

## 2019-04-15 NOTE — ASSESSMENT
[FreeTextEntry1] :  Well exam for  63  year old WF with PMH as stated in HPI / active list. \par \par Management : \par \par See HPI and Plan\par \par Labs in office today.   Will advise. \par \par Best wishes offered !\par

## 2019-04-15 NOTE — PHYSICAL EXAM
[No Acute Distress] : no acute distress [Well Developed] : well developed [Normal Sclera/Conjunctiva] : normal sclera/conjunctiva [Normal TMs] : both tympanic membranes were normal [Supple] : supple [No Respiratory Distress] : no respiratory distress  [No Lymphadenopathy] : no lymphadenopathy [Clear to Auscultation] : lungs were clear to auscultation bilaterally [Normal Rate] : normal rate  [Regular Rhythm] : with a regular rhythm [Soft] : abdomen soft [Non Tender] : non-tender [Normal Supraclavicular Nodes] : no supraclavicular lymphadenopathy [No Spinal Tenderness] : no spinal tenderness [No Rash] : no rash [Alert and Oriented x3] : oriented to person, place, and time [No Focal Deficits] : no focal deficits [Normal Insight/Judgement] : insight and judgment were intact [de-identified] : calm and  engaging [de-identified] : difficult to visulaize pharynx  [de-identified] : 1+ edema ankles and pedal region  [de-identified] : OBESE  [de-identified] : warm and dry  [de-identified] : a bit antalgic

## 2019-04-16 LAB
ALBUMIN SERPL ELPH-MCNC: 4.5 G/DL
ALP BLD-CCNC: 102 U/L
ALT SERPL-CCNC: 18 U/L
ANION GAP SERPL CALC-SCNC: 23 MMOL/L
APPEARANCE: CLEAR
AST SERPL-CCNC: 25 U/L
BASOPHILS # BLD AUTO: 0.05 K/UL
BASOPHILS NFR BLD AUTO: 0.7 %
BILIRUB SERPL-MCNC: 0.4 MG/DL
BILIRUBIN URINE: NEGATIVE
BLOOD URINE: NEGATIVE
BUN SERPL-MCNC: 25 MG/DL
CALCIUM SERPL-MCNC: 9.8 MG/DL
CHLORIDE SERPL-SCNC: 92 MMOL/L
CHOLEST SERPL-MCNC: 216 MG/DL
CHOLEST/HDLC SERPL: 2 RATIO
CO2 SERPL-SCNC: 27 MMOL/L
COLOR: NORMAL
CREAT SERPL-MCNC: 1.07 MG/DL
EOSINOPHIL # BLD AUTO: 0.09 K/UL
EOSINOPHIL NFR BLD AUTO: 1.2 %
ESTIMATED AVERAGE GLUCOSE: 111 MG/DL
GLUCOSE QUALITATIVE U: NEGATIVE
GLUCOSE SERPL-MCNC: 35 MG/DL
HBA1C MFR BLD HPLC: 5.5 %
HCT VFR BLD CALC: 47 %
HDLC SERPL-MCNC: 110 MG/DL
HGB BLD-MCNC: 14.5 G/DL
IMM GRANULOCYTES NFR BLD AUTO: 0.3 %
KETONES URINE: NEGATIVE
LDLC SERPL CALC-MCNC: 80 MG/DL
LEUKOCYTE ESTERASE URINE: NEGATIVE
LYMPHOCYTES # BLD AUTO: 1.54 K/UL
LYMPHOCYTES NFR BLD AUTO: 21.2 %
MAN DIFF?: NORMAL
MCHC RBC-ENTMCNC: 30.9 GM/DL
MCHC RBC-ENTMCNC: 31.7 PG
MCV RBC AUTO: 102.8 FL
MONOCYTES # BLD AUTO: 0.69 K/UL
MONOCYTES NFR BLD AUTO: 9.5 %
NEUTROPHILS # BLD AUTO: 4.86 K/UL
NEUTROPHILS NFR BLD AUTO: 67.1 %
NITRITE URINE: NEGATIVE
PH URINE: 6.5
PLATELET # BLD AUTO: 256 K/UL
POTASSIUM SERPL-SCNC: 4.7 MMOL/L
PROT SERPL-MCNC: 7.8 G/DL
PROTEIN URINE: NEGATIVE
RBC # BLD: 4.57 M/UL
RBC # FLD: 14.4 %
SODIUM SERPL-SCNC: 142 MMOL/L
SPECIFIC GRAVITY URINE: 1.01
TRIGL SERPL-MCNC: 132 MG/DL
TSH SERPL-ACNC: 3.37 UIU/ML
UROBILINOGEN URINE: NORMAL
WBC # FLD AUTO: 7.25 K/UL

## 2019-05-03 ENCOUNTER — APPOINTMENT (OUTPATIENT)
Dept: ORTHOPEDIC SURGERY | Facility: CLINIC | Age: 64
End: 2019-05-03
Payer: COMMERCIAL

## 2019-05-03 VITALS — HEIGHT: 68 IN | BODY MASS INDEX: 44.41 KG/M2 | WEIGHT: 293 LBS

## 2019-05-03 PROCEDURE — 99214 OFFICE O/P EST MOD 30 MIN: CPT | Mod: 25

## 2019-05-03 PROCEDURE — 20610 DRAIN/INJ JOINT/BURSA W/O US: CPT

## 2019-05-03 NOTE — HISTORY OF PRESENT ILLNESS
[Worsening] : worsening [___ mths] : [unfilled] month(s) ago [de-identified] : 63 year old female presents for evaluation of left knee pain. 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 She notes that pain has worsened about 2 months ago. Her pain is localized medially. She has received cortisone injections in the past with good relief. \par She is also S/P right THR DOS:05/02/17. She has no complaints of pain in her right hip today. \par She is taking Eliquis and Prednisone. She reports a pressure ulcer in her distal left lower extremity, and is using a compression wrap.

## 2019-05-03 NOTE — PHYSICAL EXAM
[Normal] : Gait: normal [LE] : Sensory: Intact in bilateral lower extremities [ALL] : dorsalis pedis, posterior tibial, femoral, popliteal, and radial 2+ and symmetric bilaterally [Obese] : obese [Antalgic] : not antalgic [Poor Appearance] : well-appearing [Acute Distress] : not in acute distress [de-identified] : GENERAL APPEARANCE: Well nourished and hydrated, pleasant, alert, and oriented x 3. Appears their stated age. \par HEENT: Normocephalic, extraocular eye motion intact. Nasal septum midline. Oral cavity clear. External auditory canal clear. \par RESPIRATORY: Breath sounds clear and audible in all lobes. No wheezing, No accessory muscle use.\par CARDIOVASCULAR: No apparent abnormalities. No lower leg edema. No varicosities. Pedal pulses are palpable.\par NEUROLOGIC: Sensation is normal, no muscle weakness in the upper or lower extremities.\par DERMATOLOGIC: No apparent skin lesions, moist, warm, no rash.\par SPINE: Cervical spine appears normal and moves freely; thoracic spine appears normal and moves freely; lumbosacral spine appears normal and moves freely, normal, nontender.\par MUSCULOSKELETAL: Hands, wrists, and elbows are normal and move freely, shoulders are normal and move freely.  [de-identified] : Left knee examination demonstrates medial and lateral joint line tenderness and pain and crepitus with range of motion. Compression wrap intact on the left lower extremity. \par

## 2019-05-03 NOTE — REASON FOR VISIT
[Follow-Up Visit] : a follow-up visit for [FreeTextEntry2] : Left knee; S/P right THR DOS:05/02/17.

## 2019-05-03 NOTE — PROCEDURE
[de-identified] : I injected the patient's left knee today with cortisone.\par \par I discussed at length with the patient the planned steroid and lidocaine injection. The risks, benefits, convalescence and alternatives were reviewed. The possible side effects discussed included but were not limited to: pain, swelling, heat, bleeding, and redness. Symptoms are generally mild but if they are extensive then contact the office. Giving pain relievers by mouth such as NSAIDs or Tylenol can generally treat the reactions to steroid and lidocaine. Rare cases of infection have been noted. Rash, hives and itching may occur post injection. If you have muscle pain or cramps, flushing and or swelling of the face, rapid heart beat, nausea, dizziness, fever, chills, headache, difficulty breathing, swelling in the arms or legs, or have a prickly feeling of your skin, contact a health care provider immediately. Following this discussion, the knee was prepped with Alcohol and under sterile condition the 80 mg Depo-Medrol and 6 cc Lidocaine injection was performed with a 20 gauge needle through a superolateral injection site. The needle was introduced into the joint, aspiration was performed to ensure intra-articular placement and the medication was injected. Upon withdrawal of the needle the site was cleaned with alcohol and a band aid applied. The patient tolerated the injection well and there were no adverse effects. Post injection instructions included no strenuous activity for 24 hours, cryotherapy and if there are any adverse effects to contact the office. \par \par \par

## 2019-05-03 NOTE — DISCUSSION/SUMMARY
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 Ultimately she is a candidate for left TKA based on imaging. She defers surgery and wishes to continue with conservative treatment at this time. Her pain has been well controlled with cortisone injections in the past. She elected to receive a cortisone injection in her left knee which she tolerated well. \par \par She is also S/P successful right THR DOS:05/02/17, with no residual pain or discomfort. \par \par She can follow-up in 3 months for re-evaluation and possible repeat cortisone injections. \par \par A knee replacement means resurfacing of all 3 surfaces of the patella, the femur, and tibia with metal and plastic parts. The prosthetic parts are usually cemented into position and well outpatient range of motion from full extension to about 120° of flexion. The postoperative motion, however, is determined by multiple factors, the most important of which is preoperative motion. In general, the better motion preoperatively, the better the motion postoperatively. The operation, pending medical clearance, gently requires hospitalization of 3-4 days for one knee, 5-7 days for bilateral knee replacements. In general, we prefer to perform the procedure under spinal anesthesia with femoral nerve block and occasional single shot sciatic nerve block. We may ask a patient to give 2 units of blood for bilateral total knee arthroplasties, for one knee we institute a normogram which may include administration of preoperative Procrit. The operative procedure takes probably 1-2 hours. The operation requires a straight incision anywhere from 5-7 inches down from the knee. Postoperatively, the patient is positioned in a continuous passive motion machine within the first 24 hours and is walking the day after surgery. The first couple days are very painful and the pain medication will alleviate, but not eliminate the pain. The patient must really push hard to get range of motion. Our goal for having a person go home as that the range of motion is approximately 0-90° of flexion and that they can walk with a walker or cane. A walking aid is to be dispensed once the patient is secure enough. In general there, there is no cast or brace required with routine knee replacement. In the long term, we do not encourage our patients to run for the sake of running, although pending their preoperative status, we often allow patient to play doubles tennis or comparative activities. We also allowed them to do gentle intermediate downhill skiing if they are truly an expert skier. Biking is encouraged as well swimming. The followup periods are usually 3 weeks, 6 weeks, 3 months, and yearly intervals. Potential complications with total knee replacement included anesthetic complications and death, infection around 1%, nerve damage, by which means peroneal nerve palsy, footdrop or flapping foot with ambulation. This is particularly more apt to occur in the patient with a valgus or knock-knee deformity. The incidence can be quite high in this particular patient population. There will be areas of skin numbness, but this is not an untoward effect nor do we consider it a complication. Other potential complications include dislocation of the patella component, usually less than 2%; loosening of the tibial or femoral component is much more infrequent. Most often this occurs with infection or long-term use. Patient of extreme risk including markedly overweight patient's may be more prone to prosthetic wear. Major blood vessel damage is also extremely rare. Directly because of the anatomic proximity of the popliteal artery this could be lacerated with subsequent repair required. Be it unlikely, disruption of the popliteal artery could theoretically result in amputation. Similarly, infection could theoretically result in amputation if one were to grow out of an organism that cannot be controlled with antibiotics. General medical complications include phlebitis, for which we would prophylactically anticoagulate patients, but could still occur, and fatal pulmonary embolus which has been reported. Cardiovascular problems, such as heart attack or ischemia are always a concern with such hemodynamic changes in the blood vascular system. Other General complications are rare, but anything medicine could theoretically happen. I think the patient understands the risk benefit ratio of total knee replacement and will think about whether there like to pursue with an operation or nonoperative treatment program.

## 2019-05-03 NOTE — ADDENDUM
[FreeTextEntry1] : I, Suresh Mcginnis, acted solely as a scribe for Dr. Qamar Bradley on this date 05/03/2019.

## 2019-05-07 ENCOUNTER — APPOINTMENT (OUTPATIENT)
Dept: PULMONOLOGY | Facility: CLINIC | Age: 64
End: 2019-05-07

## 2019-06-09 ENCOUNTER — FORM ENCOUNTER (OUTPATIENT)
Age: 64
End: 2019-06-09

## 2019-06-10 ENCOUNTER — RX RENEWAL (OUTPATIENT)
Age: 64
End: 2019-06-10

## 2019-06-10 ENCOUNTER — OUTPATIENT (OUTPATIENT)
Dept: OUTPATIENT SERVICES | Facility: HOSPITAL | Age: 64
LOS: 1 days | End: 2019-06-10
Payer: COMMERCIAL

## 2019-06-10 DIAGNOSIS — Z98.890 OTHER SPECIFIED POSTPROCEDURAL STATES: Chronic | ICD-10-CM

## 2019-06-10 DIAGNOSIS — Z96.641 PRESENCE OF RIGHT ARTIFICIAL HIP JOINT: Chronic | ICD-10-CM

## 2019-06-10 DIAGNOSIS — I48.0 PAROXYSMAL ATRIAL FIBRILLATION: ICD-10-CM

## 2019-06-10 DIAGNOSIS — Z95.828 PRESENCE OF OTHER VASCULAR IMPLANTS AND GRAFTS: Chronic | ICD-10-CM

## 2019-06-10 DIAGNOSIS — Z90.49 ACQUIRED ABSENCE OF OTHER SPECIFIED PARTS OF DIGESTIVE TRACT: Chronic | ICD-10-CM

## 2019-06-10 PROCEDURE — 75574 CT ANGIO HRT W/3D IMAGE: CPT

## 2019-06-10 PROCEDURE — 75574 CT ANGIO HRT W/3D IMAGE: CPT | Mod: 26

## 2019-07-30 ENCOUNTER — TRANSCRIPTION ENCOUNTER (OUTPATIENT)
Age: 64
End: 2019-07-30

## 2019-08-20 ENCOUNTER — RX RENEWAL (OUTPATIENT)
Age: 64
End: 2019-08-20

## 2019-09-20 ENCOUNTER — FORM ENCOUNTER (OUTPATIENT)
Age: 64
End: 2019-09-20

## 2019-09-21 ENCOUNTER — APPOINTMENT (OUTPATIENT)
Dept: RADIOLOGY | Facility: CLINIC | Age: 64
End: 2019-09-21
Payer: COMMERCIAL

## 2019-09-21 ENCOUNTER — OUTPATIENT (OUTPATIENT)
Dept: OUTPATIENT SERVICES | Facility: HOSPITAL | Age: 64
LOS: 1 days | End: 2019-09-21
Payer: COMMERCIAL

## 2019-09-21 ENCOUNTER — APPOINTMENT (OUTPATIENT)
Dept: MAMMOGRAPHY | Facility: CLINIC | Age: 64
End: 2019-09-21
Payer: COMMERCIAL

## 2019-09-21 DIAGNOSIS — Z95.828 PRESENCE OF OTHER VASCULAR IMPLANTS AND GRAFTS: Chronic | ICD-10-CM

## 2019-09-21 DIAGNOSIS — Z90.49 ACQUIRED ABSENCE OF OTHER SPECIFIED PARTS OF DIGESTIVE TRACT: Chronic | ICD-10-CM

## 2019-09-21 DIAGNOSIS — Z98.890 OTHER SPECIFIED POSTPROCEDURAL STATES: Chronic | ICD-10-CM

## 2019-09-21 DIAGNOSIS — Z12.31 ENCOUNTER FOR SCREENING MAMMOGRAM FOR MALIGNANT NEOPLASM OF BREAST: ICD-10-CM

## 2019-09-21 DIAGNOSIS — Z96.641 PRESENCE OF RIGHT ARTIFICIAL HIP JOINT: Chronic | ICD-10-CM

## 2019-09-21 PROCEDURE — 77080 DXA BONE DENSITY AXIAL: CPT | Mod: 26

## 2019-09-21 PROCEDURE — 77080 DXA BONE DENSITY AXIAL: CPT

## 2019-09-21 PROCEDURE — 77063 BREAST TOMOSYNTHESIS BI: CPT | Mod: 26

## 2019-09-21 PROCEDURE — 77063 BREAST TOMOSYNTHESIS BI: CPT

## 2019-09-21 PROCEDURE — 77067 SCR MAMMO BI INCL CAD: CPT | Mod: 26

## 2019-09-21 PROCEDURE — 77067 SCR MAMMO BI INCL CAD: CPT

## 2019-09-25 ENCOUNTER — RX RENEWAL (OUTPATIENT)
Age: 64
End: 2019-09-25

## 2019-09-25 RX ORDER — POTASSIUM CHLORIDE 1500 MG/1
20 TABLET, EXTENDED RELEASE ORAL DAILY
Qty: 90 | Refills: 1 | Status: ACTIVE | COMMUNITY
Start: 2017-07-10 | End: 1900-01-01

## 2019-10-10 ENCOUNTER — APPOINTMENT (OUTPATIENT)
Dept: FAMILY MEDICINE | Facility: CLINIC | Age: 64
End: 2019-10-10
Payer: COMMERCIAL

## 2019-10-10 PROCEDURE — 90662 IIV NO PRSV INCREASED AG IM: CPT

## 2019-10-10 PROCEDURE — G0008: CPT

## 2019-10-11 ENCOUNTER — TRANSCRIPTION ENCOUNTER (OUTPATIENT)
Age: 64
End: 2019-10-11

## 2019-10-14 ENCOUNTER — APPOINTMENT (OUTPATIENT)
Dept: FAMILY MEDICINE | Facility: CLINIC | Age: 64
End: 2019-10-14
Payer: COMMERCIAL

## 2019-10-14 VITALS — SYSTOLIC BLOOD PRESSURE: 128 MMHG | DIASTOLIC BLOOD PRESSURE: 88 MMHG

## 2019-10-14 VITALS
BODY MASS INDEX: 44.41 KG/M2 | HEART RATE: 84 BPM | DIASTOLIC BLOOD PRESSURE: 100 MMHG | SYSTOLIC BLOOD PRESSURE: 150 MMHG | WEIGHT: 293 LBS | HEIGHT: 68 IN

## 2019-10-14 DIAGNOSIS — Z47.1 AFTERCARE FOLLOWING JOINT REPLACEMENT SURGERY: ICD-10-CM

## 2019-10-14 DIAGNOSIS — Z96.641 AFTERCARE FOLLOWING JOINT REPLACEMENT SURGERY: ICD-10-CM

## 2019-10-14 DIAGNOSIS — Z96.641 PRESENCE OF RIGHT ARTIFICIAL HIP JOINT: ICD-10-CM

## 2019-10-14 PROCEDURE — 99214 OFFICE O/P EST MOD 30 MIN: CPT

## 2019-10-19 PROBLEM — Z47.1 AFTERCARE FOLLOWING RIGHT HIP JOINT REPLACEMENT SURGERY: Status: RESOLVED | Noted: 2017-05-24 | Resolved: 2019-10-19

## 2019-10-19 PROBLEM — Z96.641 STATUS POST TOTAL REPLACEMENT OF RIGHT HIP: Status: RESOLVED | Noted: 2017-05-24 | Resolved: 2019-10-19

## 2019-10-21 NOTE — PHYSICAL EXAM
[No Acute Distress] : no acute distress [Well Developed] : well developed [Normal Sclera/Conjunctiva] : normal sclera/conjunctiva [Normal TMs] : both tympanic membranes were normal [Supple] : supple [No Respiratory Distress] : no respiratory distress  [Normal Rate] : normal rate  [Clear to Auscultation] : lungs were clear to auscultation bilaterally [Regular Rhythm] : with a regular rhythm [Soft] : abdomen soft [Non Tender] : non-tender [Normal Supraclavicular Nodes] : no supraclavicular lymphadenopathy [No Spinal Tenderness] : no spinal tenderness [No Rash] : no rash [No Focal Deficits] : no focal deficits [Alert and Oriented x3] : oriented to person, place, and time [Normal Insight/Judgement] : insight and judgment were intact [de-identified] : [place] [de-identified] : calm and  engaging   a bit tearful at times.... [de-identified] : OBESE  [de-identified] : 1+ edema ankles and pedal region  CHRONIC  [de-identified] : warm and dry  [de-identified] : a bit antalgic  CANE  [de-identified] : challenges with regard to health heightens depression and anxiety

## 2019-10-21 NOTE — REVIEW OF SYSTEMS
[Fatigue] : fatigue [Lower Ext Edema] : lower extremity edema [Joint Pain] : joint pain [Joint Stiffness] : joint stiffness [Negative] : Gastrointestinal [Night Sweats] : no night sweats [Fever] : no fever [Redness] : no redness [Vision Problems] : no vision problems [Chest Pain] : no chest pain [FreeTextEntry9] : anticipating KNEE REPLACEMENT [de-identified] : endorses neuropathy of LE

## 2019-10-21 NOTE — HISTORY OF PRESENT ILLNESS
[de-identified] : Since she has undergone an ABLATION at [facility] with Dr. Hoskins for AFIIB and has ILR which she endorses was successful. \par \par Now follows with Rheumatology  Dr Aliya Leon  at [facility] ; anticipating starting Methotrexate  next week \par \par Endorse Vitamin D 3 low  13   now on 5000 IU daily  and now  31 after  one month of supplements\par PTH was elevated  now  improved \par BONE density pending. \par \par NEEDS Left knee replacement and will schedule.\par \par Would like to start Wellbutrin;  has improved mood in the past. Struggling with all her "illnesses"\par \par CRC screening  reminded to submit FIT! [FreeTextEntry1] : pt in  office for a follow up. \par Last seen in April for CPE and encounter reviewed.

## 2019-10-21 NOTE — ASSESSMENT
[FreeTextEntry1] : Much support rendered and Best wishes offered.\par \par See HPI and Plan. \par \par

## 2019-10-31 NOTE — H&P PST ADULT - CONSTITUTIONAL DETAILS
General Surgery Week 2 Survey      Responses   Facility patient discharged from?  [facility]   Does the patient have one of the following disease processes/diagnoses(primary or secondary)?  General Surgery   Week 2 attempt successful?  Yes   Call start time  [id number]   Call end time  1800   Discharge diagnosis  s/p amputation of 3rd toe of left foot   Person spoke with today (if not patient) and relationship  wife- Janie Cm reviewed with patient/caregiver?  Yes   Is the patient having any side effects they believe may be caused by any medication additions or changes?  No   Does the patient have all medications related to this admission filled (includes all antibiotics, pain medications, etc.)  Yes   Is the patient taking all medications as directed (includes completed medication regime)?  Yes   Does the patient have a follow up appointment scheduled with their surgeon?  Yes   Has the patient kept scheduled appointments due by today?  Yes   What is the Home health agency?   [facility] for IV ABO's   Has home health visited the patient within 72 hours of discharge?  Yes   Psychosocial issues?  No   Comments  PICC line dsgs done in office weekly   Did the patient receive a copy of their discharge instructions?  Yes   Nursing interventions  Reviewed instructions with patient   What is the patient's perception of their health status since discharge?  Improving   Nursing interventions  Nurse provided patient education   Is the patient /caregiver able to teach back basic post-op care?  Keep incision areas clean,dry and protected   Is the patient/caregiver able to teach back signs and symptoms of incisional infection?  Increased redness, swelling or pain at the incisonal site, Increased drainage or bleeding, Incisional warmth, Pus or odor from incision, Fever   Is the patient/caregiver able to teach back steps to recovery at home?  Set small, achievable goals for return to baseline health   Is the  patient/caregiver able to teach back the hierarchy of who to call/visit for symptoms/problems? PCP, Specialist, Home health nurse, Urgent Care, ED, 911  Yes   Week 2 call completed?  Yes          Jennifer Mera RN   obese

## 2019-12-03 ENCOUNTER — TRANSCRIPTION ENCOUNTER (OUTPATIENT)
Age: 64
End: 2019-12-03

## 2019-12-09 ENCOUNTER — RX RENEWAL (OUTPATIENT)
Age: 64
End: 2019-12-09

## 2020-01-24 ENCOUNTER — APPOINTMENT (OUTPATIENT)
Dept: ORTHOPEDIC SURGERY | Facility: CLINIC | Age: 65
End: 2020-01-24
Payer: COMMERCIAL

## 2020-01-24 VITALS — WEIGHT: 293 LBS | HEIGHT: 68 IN | BODY MASS INDEX: 44.41 KG/M2

## 2020-01-24 PROCEDURE — 99214 OFFICE O/P EST MOD 30 MIN: CPT

## 2020-01-24 PROCEDURE — 73562 X-RAY EXAM OF KNEE 3: CPT | Mod: LT

## 2020-01-24 NOTE — REVIEW OF SYSTEMS
[Joint Pain] : joint pain [Negative] : Heme/Lymph [Joint Stiffness] : joint stiffness [Joint Swelling] : joint swelling [FreeTextEntry9] : left knee

## 2020-01-24 NOTE — DISCUSSION/SUMMARY
[Surgical risks reviewed] : Surgical risks reviewed [de-identified] : 64 year old female with severe bone-on-bone medial compartment osteoarthritis of the left knee with varus deformity. Based on imaging and the failure of conservative treatment to provide adequate relief she is a candidate for left TKA. We discussed total knee replacement in detail. She notes medical hx of MARICHUY rosas and is taking Eliquis. She will need clearance from her cardiologist. She is interested in pursuing surgery. She can schedule left TKA. \par \par A knee replacement means resurfacing of all 3 surfaces of the patella, the femur, and tibia with metal and plastic parts. The prosthetic parts are usually cemented into position and well outpatient range of motion from full extension to about 120° of flexion. The postoperative motion, however, is determined by multiple factors, the most important of which is preoperative motion. In general, the better motion preoperatively, the better the motion postoperatively. The operation, pending medical clearance, gently requires hospitalization of 1 to 2 days for one knee, 2 to 4 days for bilateral knee replacements. In general, we prefer to perform the procedure under spinal anesthesia with femoral nerve block and occasional single shot sciatic nerve block. We may ask a patient to give 2 units of blood for bilateral total knee arthroplasties, for one knee we institute a normogram which may include administration of preoperative Procrit. The operative procedure takes probably 1-2 hours. The operation requires a straight incision anywhere from 5-7 inches down from the knee. Post-operatively the patient will be walking the day of surgery. The first couple days are very painful and the pain medication will alleviate, but not eliminate the pain. The patient must really push hard to get range of motion. Our goal for having a person go home as that the range of motion is approximately 0-90° of flexion and that they can walk with a walker or cane. A walking aid is to be dispensed once the patient is secure enough. In general there, there is no cast or brace required with routine knee replacement. In the long term, we do not encourage our patients to run for the sake of running, although pending their preoperative status, we often allow patient to play doubles tennis or comparative activities. We also allowed them to do gentle intermediate downhill skiing if they are truly an expert skier. Biking is encouraged as well swimming. The followup periods are usually 3 weeks, 6 weeks, 3 months, and yearly intervals. Potential complications with total knee replacement included anesthetic complications and death, infection around 1%, nerve damage, by which means peroneal nerve palsy, footdrop or flapping foot with ambulation. This is particularly more apt to occur in the patient with a valgus or knock-knee deformity. The incidence can be quite high in this particular patient population. There will be areas of skin numbness, but this is not an untoward effect nor do we consider it a complication. Other potential complications include dislocation of the patella component, usually less than 2%; loosening of the tibial or femoral component is much more infrequent. Most often this occurs with infection or long-term use. Patient of extreme risk including markedly overweight patient's may be more prone to prosthetic wear. Major blood vessel damage is also extremely rare. Directly because of the anatomic proximity of the popliteal artery this could be lacerated with subsequent repair required. Be it unlikely, disruption of the popliteal artery could theoretically result in amputation. Similarly, infection could theoretically result in amputation if one were to grow out of an organism that cannot be controlled with antibiotics. General medical complications include phlebitis, for which we would prophylactically anticoagulate patients, but could still occur, and fatal pulmonary embolus which has been reported. Cardiovascular problems, such as heart attack or ischemia are always a concern with such hemodynamic changes in the blood vascular system. Other General complications are rare, but anything medicine could theoretically happen. I think the patient understands the risk benefit ratio of total knee replacement and will think about whether there like to pursue with an operation or nonoperative treatment program. I reviewed the plan of care as well as a model of a total knee implant equivalent to the one that will be used for their total knee joint replacement. The patient agreed to the plan of care as well as the use of implants for their knee total joint replacement.

## 2020-01-24 NOTE — HISTORY OF PRESENT ILLNESS
[Worsening] : worsening [___ mths] : [unfilled] month(s) ago [Pain Location] : pain [8] : a current pain level of 8/10 [Walking] : worsened by walking [Knee Flexion] : worsened with knee flexion [NSAIDs] : relieved by nonsteroidal anti-inflammatory drugs [Rest] : relieved by rest [de-identified] : 64 year old female here for evaluation of left knee pain. Patient with known bone-on-bone left knee OA. Patient had left knee cortisone injection at last visit in May 2019. Patient states the cortisone injection did not help. She notes worsening left knee pain. She states she has returned to using cane due to left knee pain. She is considering left total knee arthroplasty. She shares that she is on a Eliquis for A. fib. She had cardiac ablation recently. She will be seeing cardiologist next month. She is also on methotrexate from the rheumatologist. patient underwent right total hip arthroplasty with good outcome 2 years ago. [de-identified] : standing  [de-identified] : using cane

## 2020-01-24 NOTE — PHYSICAL EXAM
[LE] : 5/5 motor strength in bilateral lower extremities [ALL] : Biceps, brachioradialis, triceps, patellar, ankle and plantar 2+ and symmetric bilaterally [Obese] : obese [Antalgic] : antalgic [Cane] : ambulates with cane [Poor Appearance] : well-appearing [de-identified] : GENERAL APPEARANCE: Well nourished and hydrated, pleasant, alert, and oriented x 3. Appears their stated age. \par HEENT: Normocephalic, extraocular eye motion intact. Nasal septum midline. Oral cavity clear. External auditory canal clear. \par RESPIRATORY: Breath sounds clear and audible in all lobes. No wheezing, No accessory muscle use.\par CARDIOVASCULAR: No apparent abnormalities. No lower leg edema. No varicosities. Pedal pulses are palpable.\par NEUROLOGIC: Sensation is normal, no muscle weakness in the upper or lower extremities.\par DERMATOLOGIC: No apparent skin lesions, moist, warm, no rash.\par SPINE: Cervical spine appears normal and moves freely; thoracic spine appears normal and moves freely; lumbosacral spine appears normal and moves freely, normal, nontender.\par MUSCULOSKELETAL: Hands, wrists, and elbows are normal and move freely, shoulders are normal and move freely.  [Acute Distress] : not in acute distress [de-identified] : Left knee examination demonstrates medial and lateral joint line tenderness and pain and crepitus with range of motion. ROM is 0-90 degrees. \par  [de-identified] : 3V Xray of the left knee done in office today and reviewed by Dr. Qamar Bradley demonstrates severe bone-on-bone medial compartment osteoarthritis of the left knee with varus deformity and periarticular osteophytes.

## 2020-01-24 NOTE — ADDENDUM
[FreeTextEntry1] : I, Suresh Mcginnis, acted solely as a scribe for Dr. Qamar Bradley on this date 01/24/2020.

## 2020-02-03 ENCOUNTER — APPOINTMENT (OUTPATIENT)
Dept: FAMILY MEDICINE | Facility: CLINIC | Age: 65
End: 2020-02-03
Payer: COMMERCIAL

## 2020-02-03 VITALS
SYSTOLIC BLOOD PRESSURE: 124 MMHG | HEIGHT: 68 IN | DIASTOLIC BLOOD PRESSURE: 86 MMHG | HEART RATE: 68 BPM | BODY MASS INDEX: 44.41 KG/M2 | WEIGHT: 293 LBS

## 2020-02-03 DIAGNOSIS — G62.9 POLYNEUROPATHY, UNSPECIFIED: ICD-10-CM

## 2020-02-03 PROCEDURE — 99214 OFFICE O/P EST MOD 30 MIN: CPT

## 2020-02-03 RX ORDER — PREDNISONE 5 MG/1
5 TABLET ORAL
Refills: 0 | Status: DISCONTINUED | COMMUNITY
Start: 2017-10-19 | End: 2020-02-03

## 2020-02-03 RX ORDER — DOXYCYCLINE HYCLATE 100 MG/1
100 TABLET ORAL
Qty: 14 | Refills: 0 | Status: COMPLETED | COMMUNITY
Start: 2019-12-03

## 2020-02-03 RX ORDER — TOPIRAMATE 25 MG/1
25 TABLET, FILM COATED ORAL
Qty: 30 | Refills: 0 | Status: COMPLETED | COMMUNITY
Start: 2019-11-06

## 2020-02-03 RX ORDER — BENZONATATE 200 MG/1
200 CAPSULE ORAL
Qty: 21 | Refills: 0 | Status: COMPLETED | COMMUNITY
Start: 2019-12-03

## 2020-02-11 PROBLEM — G62.9 NEUROPATHY, PERIPHERAL: Status: ACTIVE | Noted: 2020-02-11

## 2020-02-11 NOTE — REVIEW OF SYSTEMS
[Fatigue] : fatigue [Lower Ext Edema] : lower extremity edema [Joint Pain] : joint pain [Joint Stiffness] : joint stiffness [Negative] : Gastrointestinal [Fever] : no fever [Night Sweats] : no night sweats [Redness] : no redness [Chest Pain] : no chest pain [Vision Problems] : no vision problems [FreeTextEntry9] : anticipating KNEE REPLACEMENT [de-identified] : endorses neuropathy of LE

## 2020-02-11 NOTE — PHYSICAL EXAM
[Well Developed] : well developed [Normal Sclera/Conjunctiva] : normal sclera/conjunctiva [No Acute Distress] : no acute distress [Normal TMs] : both tympanic membranes were normal [Supple] : supple [Clear to Auscultation] : lungs were clear to auscultation bilaterally [No Respiratory Distress] : no respiratory distress  [Soft] : abdomen soft [Normal Rate] : normal rate  [Regular Rhythm] : with a regular rhythm [No Spinal Tenderness] : no spinal tenderness [Non Tender] : non-tender [Normal Supraclavicular Nodes] : no supraclavicular lymphadenopathy [Normal Insight/Judgement] : insight and judgment were intact [No Rash] : no rash [No Focal Deficits] : no focal deficits [Alert and Oriented x3] : oriented to person, place, and time [de-identified] : calm and  engaging   a bit tearful at times.... [de-identified] : 1+ edema ankles and pedal region  CHRONIC  [de-identified] : [place] [de-identified] : OBESE  [de-identified] : warm and dry  [de-identified] : a bit antalgic  CANE  [de-identified] : challenges with regard to health heightens depression and anxiety

## 2020-02-11 NOTE — HISTORY OF PRESENT ILLNESS
[FreeTextEntry8] : pt in office to talk about medications form another doctor. \par \par Last seen by me in OCTOBER 2019 in [facility] \par Follows with multiple specialists: Pulmonology/ Rheumatology/Cardiology/ Ortho\par Complains that " my joints ache "all the time" \par Neuropathy of feet " a bit better" on gabapentin ( her 's; requesting a script).\par \par Felt best when taking steroids  discontinued by rheumatology due to decrease BD evident on DEXA.

## 2020-02-11 NOTE — ASSESSMENT
[FreeTextEntry1] : Advised to DW rheumatology risks vs benefits of Steroids \par BONE density from September for bump in Femoral Neck to 2.7 ( from 2.3 in 2017) .\par \par Will allow for gabapentin.

## 2020-03-06 ENCOUNTER — RX RENEWAL (OUTPATIENT)
Age: 65
End: 2020-03-06

## 2020-04-17 ENCOUNTER — APPOINTMENT (OUTPATIENT)
Dept: FAMILY MEDICINE | Facility: CLINIC | Age: 65
End: 2020-04-17

## 2020-04-28 ENCOUNTER — APPOINTMENT (OUTPATIENT)
Dept: FAMILY MEDICINE | Facility: CLINIC | Age: 65
End: 2020-04-28
Payer: COMMERCIAL

## 2020-04-28 PROCEDURE — 99213 OFFICE O/P EST LOW 20 MIN: CPT | Mod: 95

## 2020-04-28 NOTE — HISTORY OF PRESENT ILLNESS
[Home] : at home, [unfilled] , at the time of the visit. [Medical Office: ([facility])___] : at the medical office located in  [Patient] : the patient [Self] : self [FreeTextEntry8] : 9:51-10:02am\par patient started with nasal congestion 1 week ago and then she started allegra and then two days ago she started \par fever 100.6 , feels better on tylenol  and then fever goes up again. no bodyaches, left ear is clogged. chronic ear inf\par Pndrip kind of cough, dry mostly no chest discomfort appetite,taste and smell is normal\par  is nurse who examined patient and her lungs are clear\par /62/ temp 99.4\par HR 64\par Pulse ox 96-98\par lungs are clear/ normal rhythm after ablation

## 2020-04-28 NOTE — PLAN
[FreeTextEntry1] : sinusitis/ ear pain - zpack and allegra/ Flonase\par may get tested for COVID is symptoms continues\par .PND-Supportive and conservative therapies reviewed and advised: to consider:\par Increase fluids  and rest.\par Consider saline nasal spray/ irrigation  3-4 times daily\par Salt water gargles 3-4 times daily\par Cool mist humidifier. \par HAND WASHING/PURELL\par Acetaminophen/Advil for fever,  headache, myalgias\par \par

## 2020-04-30 ENCOUNTER — APPOINTMENT (OUTPATIENT)
Dept: PULMONOLOGY | Facility: CLINIC | Age: 65
End: 2020-04-30

## 2020-05-03 ENCOUNTER — RX RENEWAL (OUTPATIENT)
Age: 65
End: 2020-05-03

## 2020-05-19 ENCOUNTER — APPOINTMENT (OUTPATIENT)
Dept: ORTHOPEDIC SURGERY | Facility: HOSPITAL | Age: 65
End: 2020-05-19

## 2020-06-04 ENCOUNTER — TRANSCRIPTION ENCOUNTER (OUTPATIENT)
Age: 65
End: 2020-06-04

## 2020-06-10 ENCOUNTER — APPOINTMENT (OUTPATIENT)
Dept: ORTHOPEDIC SURGERY | Facility: CLINIC | Age: 65
End: 2020-06-10

## 2020-06-15 ENCOUNTER — APPOINTMENT (OUTPATIENT)
Dept: FAMILY MEDICINE | Facility: CLINIC | Age: 65
End: 2020-06-15
Payer: COMMERCIAL

## 2020-06-15 ENCOUNTER — LABORATORY RESULT (OUTPATIENT)
Age: 65
End: 2020-06-15

## 2020-06-15 VITALS
DIASTOLIC BLOOD PRESSURE: 90 MMHG | HEART RATE: 62 BPM | HEIGHT: 68 IN | WEIGHT: 284 LBS | TEMPERATURE: 98.2 F | BODY MASS INDEX: 43.04 KG/M2 | SYSTOLIC BLOOD PRESSURE: 122 MMHG

## 2020-06-15 PROCEDURE — G0439: CPT

## 2020-06-15 PROCEDURE — 36415 COLL VENOUS BLD VENIPUNCTURE: CPT

## 2020-06-15 PROCEDURE — 86580 TB INTRADERMAL TEST: CPT

## 2020-06-15 PROCEDURE — 99396 PREV VISIT EST AGE 40-64: CPT

## 2020-06-15 RX ORDER — DULOXETINE HYDROCHLORIDE 30 MG/1
30 CAPSULE, DELAYED RELEASE ORAL
Refills: 0 | Status: COMPLETED | COMMUNITY
Start: 1900-01-01 | End: 2020-06-15

## 2020-06-15 NOTE — COUNSELING
[Fall prevention counseling provided] : fall prevention  [Healthy eating counseling provided] : healthy eating

## 2020-06-22 NOTE — ASSESSMENT
[FreeTextEntry1] :  Well exam for  65  year old WF with PMH as stated in HPI / active list. \par \par Management : \par \par See HPI and Plan\par \par Labs in office today.   Will advise. \par \par Best wishes offered !\par

## 2020-06-22 NOTE — PHYSICAL EXAM
[No Acute Distress] : no acute distress [Well Developed] : well developed [Normal TMs] : both tympanic membranes were normal [Supple] : supple [Normal Sclera/Conjunctiva] : normal sclera/conjunctiva [No Respiratory Distress] : no respiratory distress  [No Lymphadenopathy] : no lymphadenopathy [Clear to Auscultation] : lungs were clear to auscultation bilaterally [Normal Rate] : normal rate  [Non Tender] : non-tender [Regular Rhythm] : with a regular rhythm [Soft] : abdomen soft [No Spinal Tenderness] : no spinal tenderness [Normal Supraclavicular Nodes] : no supraclavicular lymphadenopathy [No Focal Deficits] : no focal deficits [Alert and Oriented x3] : oriented to person, place, and time [No Rash] : no rash [Normal Insight/Judgement] : insight and judgment were intact [de-identified] : difficult to visualize pharynx  [de-identified] : calm and  engaging [de-identified] : 1+ edema ankles and pedal region  [de-identified] : warm and dry  [de-identified] : OBESE  [de-identified] : a bit antalgic

## 2020-06-22 NOTE — HEALTH RISK ASSESSMENT
[Good] : ~his/her~  mood as  good [No falls in past year] : Patient reported no falls in the past year [0] : 2) Feeling down, depressed, or hopeless: Not at all (0) [Patient reported bone density results were abnormal] : Patient reported bone density results were abnormal [Patient reported mammogram was normal] : Patient reported mammogram was normal [Patient reported colonoscopy was normal] : Patient reported colonoscopy was normal [None] : None [With Significant Other] : lives with significant other [Employed] : employed [High School] : high school [] :  [# Of Children ___] : has [unfilled] children [Fully functional (bathing, dressing, toileting, transferring, walking, feeding)] : Fully functional (bathing, dressing, toileting, transferring, walking, feeding) [Feels Safe at Home] : Feels safe at home [Fully functional (using the telephone, shopping, preparing meals, housekeeping, doing laundry, using] : Fully functional and needs no help or supervision to perform IADLs (using the telephone, shopping, preparing meals, housekeeping, doing laundry, using transportation, managing medications and managing finances) [Carbon Monoxide Detector] : carbon monoxide detector [Smoke Detector] : smoke detector [Seat Belt] :  uses seat belt [FreeTextEntry1] : more generalized fatigue  [] : No [de-identified] : Pulmonology /Dr. Meek;  cardiology/Dr. Houston/ Rheumatology    [[id number]] : 0 [de-identified] : limited due to pain  [Change in mental status noted] : No change in mental status noted [Guns at Home] : no guns at home [Reports changes in hearing] : Reports no changes in hearing [Travel to Developing Areas] : does not  travel to developing areas [MammogramComments] : Bi Rads 1 [MammogramDate] : 4/18 [BoneDensityComments] : osteopenia  [BoneDensityDate] : 2017  [ColonoscopyDate] : "Years"  [FreeTextEntry2] :  at [facility] .

## 2020-06-22 NOTE — REVIEW OF SYSTEMS
[Fatigue] : fatigue [Joint Pain] : joint pain [Lower Ext Edema] : lower extremity edema [Joint Stiffness] : joint stiffness [Negative] : Gastrointestinal [Fever] : no fever [Night Sweats] : no night sweats [Redness] : no redness [Vision Problems] : no vision problems [Chest Pain] : no chest pain [Anxiety] : anxiety [de-identified] : as in HPI  [FreeTextEntry9] : Acute on Chronic LEFT KNEE  [de-identified] : endorses neuropathy of LE

## 2020-06-22 NOTE — REVIEW OF SYSTEMS
[Fatigue] : fatigue [Joint Pain] : joint pain [Lower Ext Edema] : lower extremity edema [Joint Stiffness] : joint stiffness [Negative] : Gastrointestinal [Fever] : no fever [Night Sweats] : no night sweats [Vision Problems] : no vision problems [Redness] : no redness [Chest Pain] : no chest pain [Anxiety] : anxiety [FreeTextEntry9] : Acute on Chronic LEFT KNEE  [de-identified] : as in HPI  [de-identified] : endorses neuropathy of LE

## 2020-06-22 NOTE — PHYSICAL EXAM
[Well Developed] : well developed [No Acute Distress] : no acute distress [Supple] : supple [Normal Sclera/Conjunctiva] : normal sclera/conjunctiva [Normal TMs] : both tympanic membranes were normal [No Lymphadenopathy] : no lymphadenopathy [No Respiratory Distress] : no respiratory distress  [Normal Rate] : normal rate  [Clear to Auscultation] : lungs were clear to auscultation bilaterally [Non Tender] : non-tender [Soft] : abdomen soft [Regular Rhythm] : with a regular rhythm [Normal Supraclavicular Nodes] : no supraclavicular lymphadenopathy [No Spinal Tenderness] : no spinal tenderness [No Rash] : no rash [No Focal Deficits] : no focal deficits [Alert and Oriented x3] : oriented to person, place, and time [Normal Insight/Judgement] : insight and judgment were intact [de-identified] : difficult to visualize pharynx  [de-identified] : calm and  engaging [de-identified] : 1+ edema ankles and pedal region  [de-identified] : OBESE  [de-identified] : warm and dry  [de-identified] : a bit antalgic

## 2020-06-22 NOTE — HISTORY OF PRESENT ILLNESS
[FreeTextEntry1] : AMCW\par Last was April 2019 \par Last seen by me in Feb. 20201 and encounter reviewed. \par Appreciates benefit of gabapentin for neuropathy of feet. \par  [de-identified] : In recent year, Followed with ORTHO Dr. Bradley, and advised LEFT TKR. \par Cardiac clearance noted however no scheduled date as yet. \par Cardiac status stable. \par \par With regard to COVID worked her usual job ;  lives with  who also works. \par No concerning symptoms for illness. \par \par \par In review : April 2019 \par Last seen  here in Feb. 2018 and encounter reviewed.\par Since reports: \par Episodes of AFIB ( cardiology reviewed 2/19) ; last episode January\par Consultation with EPS MD (Gato) at [facility] next week\par \par Rheumatology  Dr. Zavala  remains on Plaquenil  unable to come off Prednisone due to joint pain  managed on 5 mgs daily ; now on Duloxetine 30 mgs \par \par Dermatologist ( [facility] office) for LEFT SHIN wound as consequence of rubbing from low boots and treated with Silvadene .  \par Seen in  consultation with  Vascular, Dr. Tillman , and now in [place].

## 2020-06-22 NOTE — HISTORY OF PRESENT ILLNESS
[FreeTextEntry1] : AMCW\par Last was April 2019 \par Last seen by me in Feb. 20201 and encounter reviewed. \par Appreciates benefit of gabapentin for neuropathy of feet. \par  [de-identified] : In recent year, Followed with ORTHO Dr. Bradley, and advised LEFT TKR. \par Cardiac clearance noted however no scheduled date as yet. \par Cardiac status stable. \par \par With regard to COVID worked her usual job ;  lives with  who also works. \par No concerning symptoms for illness. \par \par \par In review : April 2019 \par Last seen  here in Feb. 2018 and encounter reviewed.\par Since reports: \par Episodes of AFIB ( cardiology reviewed 2/19) ; last episode January\par Consultation with EPS MD (Gato) at [facility] next week\par \par Rheumatology  Dr. Zavala  remains on Plaquenil  unable to come off Prednisone due to joint pain  managed on 5 mgs daily ; now on Duloxetine 30 mgs \par \par Dermatologist ( [facility] office) for LEFT SHIN wound as consequence of rubbing from low boots and treated with Silvadene .  \par Seen in  consultation with  Vascular, Dr. Tillman , and now in [place].

## 2020-06-23 LAB
ALBUMIN SERPL ELPH-MCNC: 4.6 G/DL
ALP BLD-CCNC: 63 U/L
ALT SERPL-CCNC: 13 U/L
ANION GAP SERPL CALC-SCNC: 13 MMOL/L
APPEARANCE: ABNORMAL
AST SERPL-CCNC: 19 U/L
BASOPHILS # BLD AUTO: 0.03 K/UL
BASOPHILS NFR BLD AUTO: 0.7 %
BILIRUB SERPL-MCNC: 0.4 MG/DL
BILIRUBIN URINE: NEGATIVE
BLOOD URINE: NEGATIVE
BUN SERPL-MCNC: 22 MG/DL
CALCIUM SERPL-MCNC: 9.8 MG/DL
CHLORIDE SERPL-SCNC: 95 MMOL/L
CHOLEST SERPL-MCNC: 219 MG/DL
CHOLEST/HDLC SERPL: 2.6 RATIO
CO2 SERPL-SCNC: 30 MMOL/L
COLOR: YELLOW
CREAT SERPL-MCNC: 1.13 MG/DL
EOSINOPHIL # BLD AUTO: 0.09 K/UL
EOSINOPHIL NFR BLD AUTO: 2 %
ESTIMATED AVERAGE GLUCOSE: 97 MG/DL
GLUCOSE QUALITATIVE U: NEGATIVE
GLUCOSE SERPL-MCNC: 96 MG/DL
HBA1C MFR BLD HPLC: 5 %
HCT VFR BLD CALC: 41.1 %
HDLC SERPL-MCNC: 85 MG/DL
HGB BLD-MCNC: 12.9 G/DL
IMM GRANULOCYTES NFR BLD AUTO: 0.2 %
KETONES URINE: NEGATIVE
LDLC SERPL CALC-MCNC: 88 MG/DL
LEUKOCYTE ESTERASE URINE: ABNORMAL
LYMPHOCYTES # BLD AUTO: 0.92 K/UL
LYMPHOCYTES NFR BLD AUTO: 20.3 %
MAN DIFF?: NORMAL
MCHC RBC-ENTMCNC: 31.4 GM/DL
MCHC RBC-ENTMCNC: 31.5 PG
MCV RBC AUTO: 100.5 FL
MONOCYTES # BLD AUTO: 0.46 K/UL
MONOCYTES NFR BLD AUTO: 10.1 %
NEUTROPHILS # BLD AUTO: 3.03 K/UL
NEUTROPHILS NFR BLD AUTO: 66.7 %
NITRITE URINE: NEGATIVE
PH URINE: 6
PLATELET # BLD AUTO: 239 K/UL
POTASSIUM SERPL-SCNC: 4.6 MMOL/L
PROT SERPL-MCNC: 7.4 G/DL
PROTEIN URINE: NORMAL
RBC # BLD: 4.09 M/UL
RBC # FLD: 15.1 %
SODIUM SERPL-SCNC: 138 MMOL/L
SPECIFIC GRAVITY URINE: 1.02
TRIGL SERPL-MCNC: 228 MG/DL
TSH SERPL-ACNC: 4.3 UIU/ML
UROBILINOGEN URINE: NORMAL
WBC # FLD AUTO: 4.54 K/UL

## 2020-06-26 NOTE — PHYSICAL THERAPY INITIAL EVALUATION ADULT - LIVES WITH, PROFILE
47 y/o female with a PMHx of anxiety, GERD presents to the ED c/o toe pain. Pt reports she developed a growth on her right great toe 1 week ago. Family hx of skin cancer. Pt has a dermatologist scheduled in one month. No other complaints at this time. spouse

## 2020-06-28 ENCOUNTER — OUTPATIENT (OUTPATIENT)
Dept: OUTPATIENT SERVICES | Facility: HOSPITAL | Age: 65
LOS: 1 days | End: 2020-06-28

## 2020-06-28 ENCOUNTER — APPOINTMENT (OUTPATIENT)
Dept: MRI IMAGING | Facility: CLINIC | Age: 65
End: 2020-06-28

## 2020-06-28 DIAGNOSIS — Z96.641 PRESENCE OF RIGHT ARTIFICIAL HIP JOINT: Chronic | ICD-10-CM

## 2020-06-28 DIAGNOSIS — Z98.890 OTHER SPECIFIED POSTPROCEDURAL STATES: Chronic | ICD-10-CM

## 2020-06-28 DIAGNOSIS — G62.9 POLYNEUROPATHY, UNSPECIFIED: ICD-10-CM

## 2020-06-28 DIAGNOSIS — Z95.828 PRESENCE OF OTHER VASCULAR IMPLANTS AND GRAFTS: Chronic | ICD-10-CM

## 2020-06-28 DIAGNOSIS — Z90.49 ACQUIRED ABSENCE OF OTHER SPECIFIED PARTS OF DIGESTIVE TRACT: Chronic | ICD-10-CM

## 2020-07-02 ENCOUNTER — APPOINTMENT (OUTPATIENT)
Dept: MRI IMAGING | Facility: CLINIC | Age: 65
End: 2020-07-02

## 2020-07-02 ENCOUNTER — OUTPATIENT (OUTPATIENT)
Dept: OUTPATIENT SERVICES | Facility: HOSPITAL | Age: 65
LOS: 1 days | End: 2020-07-02

## 2020-07-02 DIAGNOSIS — Z90.49 ACQUIRED ABSENCE OF OTHER SPECIFIED PARTS OF DIGESTIVE TRACT: Chronic | ICD-10-CM

## 2020-07-02 DIAGNOSIS — G62.9 POLYNEUROPATHY, UNSPECIFIED: ICD-10-CM

## 2020-07-02 DIAGNOSIS — Z98.890 OTHER SPECIFIED POSTPROCEDURAL STATES: Chronic | ICD-10-CM

## 2020-07-02 DIAGNOSIS — Z00.00 ENCOUNTER FOR GENERAL ADULT MEDICAL EXAMINATION WITHOUT ABNORMAL FINDINGS: ICD-10-CM

## 2020-07-02 DIAGNOSIS — Z96.641 PRESENCE OF RIGHT ARTIFICIAL HIP JOINT: Chronic | ICD-10-CM

## 2020-07-02 DIAGNOSIS — Z95.828 PRESENCE OF OTHER VASCULAR IMPLANTS AND GRAFTS: Chronic | ICD-10-CM

## 2020-07-07 ENCOUNTER — APPOINTMENT (OUTPATIENT)
Dept: ORTHOPEDIC SURGERY | Facility: CLINIC | Age: 65
End: 2020-07-07

## 2020-07-11 ENCOUNTER — APPOINTMENT (OUTPATIENT)
Dept: MRI IMAGING | Facility: CLINIC | Age: 65
End: 2020-07-11
Payer: COMMERCIAL

## 2020-07-11 ENCOUNTER — OUTPATIENT (OUTPATIENT)
Dept: OUTPATIENT SERVICES | Facility: HOSPITAL | Age: 65
LOS: 1 days | End: 2020-07-11
Payer: COMMERCIAL

## 2020-07-11 DIAGNOSIS — Z98.890 OTHER SPECIFIED POSTPROCEDURAL STATES: Chronic | ICD-10-CM

## 2020-07-11 DIAGNOSIS — Z90.49 ACQUIRED ABSENCE OF OTHER SPECIFIED PARTS OF DIGESTIVE TRACT: Chronic | ICD-10-CM

## 2020-07-11 DIAGNOSIS — Z96.641 PRESENCE OF RIGHT ARTIFICIAL HIP JOINT: Chronic | ICD-10-CM

## 2020-07-11 DIAGNOSIS — G62.9 POLYNEUROPATHY, UNSPECIFIED: ICD-10-CM

## 2020-07-11 DIAGNOSIS — Z95.828 PRESENCE OF OTHER VASCULAR IMPLANTS AND GRAFTS: Chronic | ICD-10-CM

## 2020-07-11 PROCEDURE — 72148 MRI LUMBAR SPINE W/O DYE: CPT | Mod: 26

## 2020-07-11 PROCEDURE — 72148 MRI LUMBAR SPINE W/O DYE: CPT

## 2020-07-17 ENCOUNTER — TRANSCRIPTION ENCOUNTER (OUTPATIENT)
Age: 65
End: 2020-07-17

## 2020-07-29 NOTE — PHYSICAL THERAPY INITIAL EVALUATION ADULT - SITTING BALANCE: DYNAMIC
Virtual Telephone Check-In    Tobiasroxane Casper verbally consents to a Virtual/Telephone Check-In visit on 07/29/20. Patient has been referred to the [facility] website at [email or web address] to review the yearly Consent to Treat document.     Patient Desiree Moscoso good balance

## 2020-08-12 ENCOUNTER — APPOINTMENT (OUTPATIENT)
Dept: ORTHOPEDIC SURGERY | Facility: CLINIC | Age: 65
End: 2020-08-12

## 2020-08-28 ENCOUNTER — TRANSCRIPTION ENCOUNTER (OUTPATIENT)
Age: 65
End: 2020-08-28

## 2020-09-02 ENCOUNTER — RX RENEWAL (OUTPATIENT)
Age: 65
End: 2020-09-02

## 2020-09-07 ENCOUNTER — RX RENEWAL (OUTPATIENT)
Age: 65
End: 2020-09-07

## 2020-09-24 ENCOUNTER — RX RENEWAL (OUTPATIENT)
Age: 65
End: 2020-09-24

## 2020-09-25 RX ORDER — FLUTICASONE PROPIONATE 50 UG/1
50 SPRAY, METERED NASAL
Qty: 1 | Refills: 0 | Status: ACTIVE | COMMUNITY
Start: 1900-01-01 | End: 1900-01-01

## 2020-10-12 ENCOUNTER — APPOINTMENT (OUTPATIENT)
Dept: FAMILY MEDICINE | Facility: CLINIC | Age: 65
End: 2020-10-12
Payer: COMMERCIAL

## 2020-10-12 VITALS
TEMPERATURE: 97.6 F | SYSTOLIC BLOOD PRESSURE: 140 MMHG | WEIGHT: 290 LBS | DIASTOLIC BLOOD PRESSURE: 90 MMHG | HEART RATE: 82 BPM | BODY MASS INDEX: 43.95 KG/M2 | HEIGHT: 68 IN

## 2020-10-12 VITALS — DIASTOLIC BLOOD PRESSURE: 78 MMHG | SYSTOLIC BLOOD PRESSURE: 134 MMHG

## 2020-10-12 DIAGNOSIS — Z11.9 ENCOUNTER FOR SCREENING FOR INFECTIOUS AND PARASITIC DISEASES, UNSPECIFIED: ICD-10-CM

## 2020-10-12 DIAGNOSIS — Z87.898 PERSONAL HISTORY OF OTHER SPECIFIED CONDITIONS: ICD-10-CM

## 2020-10-12 DIAGNOSIS — Z86.39 PERSONAL HISTORY OF OTHER ENDOCRINE, NUTRITIONAL AND METABOLIC DISEASE: ICD-10-CM

## 2020-10-12 DIAGNOSIS — J01.10 ACUTE FRONTAL SINUSITIS, UNSPECIFIED: ICD-10-CM

## 2020-10-12 DIAGNOSIS — G62.9 POLYNEUROPATHY, UNSPECIFIED: ICD-10-CM

## 2020-10-12 DIAGNOSIS — Z11.1 ENCOUNTER FOR SCREENING FOR RESPIRATORY TUBERCULOSIS: ICD-10-CM

## 2020-10-12 PROCEDURE — 90662 IIV NO PRSV INCREASED AG IM: CPT

## 2020-10-12 PROCEDURE — 90746 HEPB VACCINE 3 DOSE ADULT IM: CPT

## 2020-10-12 PROCEDURE — 99214 OFFICE O/P EST MOD 30 MIN: CPT | Mod: 25

## 2020-10-12 PROCEDURE — 90472 IMMUNIZATION ADMIN EACH ADD: CPT

## 2020-10-12 PROCEDURE — G0008: CPT

## 2020-10-12 RX ORDER — AZITHROMYCIN 250 MG/1
250 TABLET, FILM COATED ORAL
Qty: 1 | Refills: 0 | Status: DISCONTINUED | COMMUNITY
Start: 2020-04-28 | End: 2020-10-12

## 2020-10-14 ENCOUNTER — RX RENEWAL (OUTPATIENT)
Age: 65
End: 2020-10-14

## 2020-10-15 NOTE — PHYSICAL EXAM
[No Acute Distress] : no acute distress [Normal Sclera/Conjunctiva] : normal sclera/conjunctiva [No JVD] : no jugular venous distention [No Respiratory Distress] : no respiratory distress  [Clear to Auscultation] : lungs were clear to auscultation bilaterally [Regular Rhythm] : with a regular rhythm [Normal S1, S2] : normal S1 and S2 [No Edema] : there was no peripheral edema [Alert and Oriented x3] : oriented to person, place, and time [Normal Insight/Judgement] : insight and judgment were intact [de-identified] : calm and engaging  [de-identified] : OMM  MASK  [de-identified] : walks with cane

## 2020-10-15 NOTE — REVIEW OF SYSTEMS
[Fatigue] : fatigue [Negative] : Psychiatric [Fever] : no fever [Memory Loss] : no memory loss [Dizziness] : no dizziness

## 2020-10-15 NOTE — HISTORY OF PRESENT ILLNESS
[FreeTextEntry1] : pt in office for a follow up, flu shot and hep B shot.  [de-identified] : LAst seen for CPE in JUNE and encounter reviewed.\par \par New issues: \par Follows with Rheumatology  now on Humira  only had 2 doses \par Stopped Gabapentin b/c "to sedating" was taking 2 tabs HS   advised try taking only one \par \par Hep B;  labs "not immune "  Will give booster.\par \par LEFT KNEE anticipating replacement   NOVEMBER 10, Dr. Bradley \par \par POOL  nasal cushions good compliance

## 2020-10-15 NOTE — HISTORY OF PRESENT ILLNESS
[FreeTextEntry1] : pt in office for a follow up, flu shot and hep B shot.  [de-identified] : LAst seen for CPE in JUNE and encounter reviewed.\par \par New issues: \par Follows with Rheumatology  now on Humira  only had 2 doses \par Stopped Gabapentin b/c "to sedating" was taking 2 tabs HS   advised try taking only one \par \par Hep B;  labs "not immune "  Will give booster.\par \par LEFT KNEE anticipating replacement   NOVEMBER 10, Dr. Bradley \par \par POOL  nasal cushions good compliance

## 2020-10-15 NOTE — PHYSICAL EXAM
[No Acute Distress] : no acute distress [Normal Sclera/Conjunctiva] : normal sclera/conjunctiva [No JVD] : no jugular venous distention [No Respiratory Distress] : no respiratory distress  [Clear to Auscultation] : lungs were clear to auscultation bilaterally [Regular Rhythm] : with a regular rhythm [Normal S1, S2] : normal S1 and S2 [No Edema] : there was no peripheral edema [Alert and Oriented x3] : oriented to person, place, and time [Normal Insight/Judgement] : insight and judgment were intact [de-identified] : calm and engaging  [de-identified] : OMM  MASK  [de-identified] : walks with cane

## 2020-10-20 ENCOUNTER — RESULT REVIEW (OUTPATIENT)
Age: 65
End: 2020-10-20

## 2020-10-20 ENCOUNTER — OUTPATIENT (OUTPATIENT)
Dept: OUTPATIENT SERVICES | Facility: HOSPITAL | Age: 65
LOS: 1 days | End: 2020-10-20
Payer: COMMERCIAL

## 2020-10-20 VITALS
WEIGHT: 288.81 LBS | DIASTOLIC BLOOD PRESSURE: 70 MMHG | HEIGHT: 68 IN | RESPIRATION RATE: 20 BRPM | TEMPERATURE: 99 F | SYSTOLIC BLOOD PRESSURE: 130 MMHG | HEART RATE: 88 BPM

## 2020-10-20 DIAGNOSIS — Z98.890 OTHER SPECIFIED POSTPROCEDURAL STATES: Chronic | ICD-10-CM

## 2020-10-20 DIAGNOSIS — Z96.641 PRESENCE OF RIGHT ARTIFICIAL HIP JOINT: Chronic | ICD-10-CM

## 2020-10-20 DIAGNOSIS — Z01.818 ENCOUNTER FOR OTHER PREPROCEDURAL EXAMINATION: ICD-10-CM

## 2020-10-20 DIAGNOSIS — Z95.828 PRESENCE OF OTHER VASCULAR IMPLANTS AND GRAFTS: Chronic | ICD-10-CM

## 2020-10-20 DIAGNOSIS — M17.12 UNILATERAL PRIMARY OSTEOARTHRITIS, LEFT KNEE: ICD-10-CM

## 2020-10-20 DIAGNOSIS — I48.91 UNSPECIFIED ATRIAL FIBRILLATION: ICD-10-CM

## 2020-10-20 DIAGNOSIS — Z90.49 ACQUIRED ABSENCE OF OTHER SPECIFIED PARTS OF DIGESTIVE TRACT: Chronic | ICD-10-CM

## 2020-10-20 DIAGNOSIS — I10 ESSENTIAL (PRIMARY) HYPERTENSION: ICD-10-CM

## 2020-10-20 DIAGNOSIS — Z29.9 ENCOUNTER FOR PROPHYLACTIC MEASURES, UNSPECIFIED: ICD-10-CM

## 2020-10-20 LAB
A1C WITH ESTIMATED AVERAGE GLUCOSE RESULT: 5.1 % — SIGNIFICANT CHANGE UP (ref 4–5.6)
ANION GAP SERPL CALC-SCNC: 12 MMOL/L — SIGNIFICANT CHANGE UP (ref 5–17)
APTT BLD: 34.7 SEC — SIGNIFICANT CHANGE UP (ref 27.5–35.5)
BASOPHILS # BLD AUTO: 0.02 K/UL — SIGNIFICANT CHANGE UP (ref 0–0.2)
BASOPHILS NFR BLD AUTO: 0.4 % — SIGNIFICANT CHANGE UP (ref 0–2)
BLD GP AB SCN SERPL QL: SIGNIFICANT CHANGE UP
BUN SERPL-MCNC: 25 MG/DL — HIGH (ref 8–20)
CALCIUM SERPL-MCNC: 9.1 MG/DL — SIGNIFICANT CHANGE UP (ref 8.6–10.2)
CHLORIDE SERPL-SCNC: 98 MMOL/L — SIGNIFICANT CHANGE UP (ref 98–107)
CO2 SERPL-SCNC: 30 MMOL/L — HIGH (ref 22–29)
CREAT SERPL-MCNC: 1.08 MG/DL — SIGNIFICANT CHANGE UP (ref 0.5–1.3)
EOSINOPHIL # BLD AUTO: 0.08 K/UL — SIGNIFICANT CHANGE UP (ref 0–0.5)
EOSINOPHIL NFR BLD AUTO: 1.6 % — SIGNIFICANT CHANGE UP (ref 0–6)
ESTIMATED AVERAGE GLUCOSE: 100 MG/DL — SIGNIFICANT CHANGE UP (ref 68–114)
GLUCOSE SERPL-MCNC: 91 MG/DL — SIGNIFICANT CHANGE UP (ref 70–99)
HCT VFR BLD CALC: 37.9 % — SIGNIFICANT CHANGE UP (ref 34.5–45)
HGB BLD-MCNC: 12.3 G/DL — SIGNIFICANT CHANGE UP (ref 11.5–15.5)
IMM GRANULOCYTES NFR BLD AUTO: 0.4 % — SIGNIFICANT CHANGE UP (ref 0–1.5)
INR BLD: 1.5 RATIO — HIGH (ref 0.88–1.16)
LYMPHOCYTES # BLD AUTO: 1.28 K/UL — SIGNIFICANT CHANGE UP (ref 1–3.3)
LYMPHOCYTES # BLD AUTO: 25 % — SIGNIFICANT CHANGE UP (ref 13–44)
MCHC RBC-ENTMCNC: 32.1 PG — SIGNIFICANT CHANGE UP (ref 27–34)
MCHC RBC-ENTMCNC: 32.5 GM/DL — SIGNIFICANT CHANGE UP (ref 32–36)
MCV RBC AUTO: 99 FL — SIGNIFICANT CHANGE UP (ref 80–100)
MONOCYTES # BLD AUTO: 0.66 K/UL — SIGNIFICANT CHANGE UP (ref 0–0.9)
MONOCYTES NFR BLD AUTO: 12.9 % — SIGNIFICANT CHANGE UP (ref 2–14)
MRSA PCR RESULT.: SIGNIFICANT CHANGE UP
NEUTROPHILS # BLD AUTO: 3.05 K/UL — SIGNIFICANT CHANGE UP (ref 1.8–7.4)
NEUTROPHILS NFR BLD AUTO: 59.7 % — SIGNIFICANT CHANGE UP (ref 43–77)
PLATELET # BLD AUTO: 207 K/UL — SIGNIFICANT CHANGE UP (ref 150–400)
POTASSIUM SERPL-MCNC: 4.9 MMOL/L — SIGNIFICANT CHANGE UP (ref 3.5–5.3)
POTASSIUM SERPL-SCNC: 4.9 MMOL/L — SIGNIFICANT CHANGE UP (ref 3.5–5.3)
PROTHROM AB SERPL-ACNC: 17.1 SEC — HIGH (ref 10.6–13.6)
RBC # BLD: 3.83 M/UL — SIGNIFICANT CHANGE UP (ref 3.8–5.2)
RBC # FLD: 13.2 % — SIGNIFICANT CHANGE UP (ref 10.3–14.5)
S AUREUS DNA NOSE QL NAA+PROBE: SIGNIFICANT CHANGE UP
SODIUM SERPL-SCNC: 139 MMOL/L — SIGNIFICANT CHANGE UP (ref 135–145)
WBC # BLD: 5.11 K/UL — SIGNIFICANT CHANGE UP (ref 3.8–10.5)
WBC # FLD AUTO: 5.11 K/UL — SIGNIFICANT CHANGE UP (ref 3.8–10.5)

## 2020-10-20 PROCEDURE — 93010 ELECTROCARDIOGRAM REPORT: CPT

## 2020-10-20 PROCEDURE — G0463: CPT

## 2020-10-20 PROCEDURE — 71046 X-RAY EXAM CHEST 2 VIEWS: CPT

## 2020-10-20 PROCEDURE — 71046 X-RAY EXAM CHEST 2 VIEWS: CPT | Mod: 26

## 2020-10-20 PROCEDURE — 93005 ELECTROCARDIOGRAM TRACING: CPT

## 2020-10-20 RX ORDER — FUROSEMIDE 40 MG
0.5 TABLET ORAL
Qty: 0 | Refills: 0 | DISCHARGE

## 2020-10-20 NOTE — H&P PST ADULT - NSICDXPASTMEDICALHX_GEN_ALL_CORE_FT
PAST MEDICAL HISTORY:  Anemia     Atrial fibrillation     DVT (deep venous thrombosis)     Hiatal hernia     Hip fx     HTN (hypertension)     Hypothyroid     Osteoarthritis     Osteopenia     Pulmonary embolism Saddle Embolus    Pulmonary hypertension     Sleep apnea      PAST MEDICAL HISTORY:  Anemia     Atrial fibrillation     DVT (deep venous thrombosis)     Hiatal hernia     Hip fx     HTN (hypertension)     Hypothyroid     Osteoarthritis     Osteopenia     Pulmonary embolism Saddle Embolus    Pulmonary hypertension     Rheumatoid arthritis     Sjogren's disease     Sleep apnea cpap

## 2020-10-20 NOTE — H&P PST ADULT - RS GEN PE MLT RESP DETAILS PC
breath sounds equal/no chest wall tenderness/no rhonchi/good air movement/airway patent/no rales/respirations non-labored/clear to auscultation bilaterally

## 2020-10-20 NOTE — H&P PST ADULT - NSICDXFAMILYHX_GEN_ALL_CORE_FT
FAMILY HISTORY:  Father  Still living? No  Family history of esophageal cancer, Age at diagnosis: 71-80  Family history of lung cancer, Age at diagnosis: Age Unknown  Family history of rheumatoid arthritis, Age at diagnosis: Age Unknown    Mother  Still living? No  Family history of CHF (congestive heart failure), Age at diagnosis: 81-90  Family history of pulmonary hypertension, Age at diagnosis: Age Unknown

## 2020-10-20 NOTE — H&P PST ADULT - NSICDXPASTSURGICALHX_GEN_ALL_CORE_FT
PAST SURGICAL HISTORY:  History of cholecystectomy     History of dilation and curettage     History of ear surgery     History of total hip replacement, right     Presence of IVC filter      PAST SURGICAL HISTORY:  H/O cardiac radiofrequency ablation     History of cholecystectomy     History of dilation and curettage     History of ear surgery     History of loop recorder     History of total hip replacement, right     Presence of IVC filter

## 2020-10-20 NOTE — H&P PST ADULT - RESPIRATORY
BRIEF OPERATIVE NOTE    Date of Procedure: 12/6/2019   Preoperative Diagnosis: CHRONIC KIDNEY DISEASE STAGE V  Postoperative Diagnosis: CHRONIC KIDNEY DISEASE STAGE V    Procedure(s):  CREATION TRANSPOSED ARTERIO VENOUS FISTULA LEFT ARM (brachio-basilic)  Surgeon(s) and Role:     Nereida Ibanez MD - Primary         Surgical Assistant: staff    Surgical Staff:  Circ-1: Amando Aquino RN  Scrub Tech-1: Radha [facility]  Surg Asst-1: Ana Garcia  Event Time In Time Out   Incision Start 0804    Incision Close       Anesthesia: General   Estimated Blood Loss: 50 ml  Specimens: * No specimens in log *   Findings: Palpable thrill in AV fistula post op.      Complications: none  Implants: * No implants in log *
detailed exam

## 2020-10-20 NOTE — H&P PST ADULT - ASSESSMENT
CAPRINI SCORE [CLOT]    AGE RELATED RISK FACTORS                                                       MOBILITY RELATED FACTORS  [ ] Age 41-60 years                                            (1 Point)                  [ ] Bed rest                                                        (1 Point)  [ ] Age: 61-74 years                                           (2 Points)                 [ ] Plaster cast                                                   (2 Points)  [ ] Age= 75 years                                              (3 Points)                 [ ] Bed bound for more than 72 hours                 (2 Points)    DISEASE RELATED RISK FACTORS                                               GENDER SPECIFIC FACTORS  [ ] Edema in the lower extremities                       (1 Point)                  [ ] Pregnancy                                                     (1 Point)  [ ] Varicose veins                                               (1 Point)                  [ ] Post-partum < 6 weeks                                   (1 Point)             [ ] BMI > 25 Kg/m2                                            (1 Point)                  [ ] Hormonal therapy  or oral contraception          (1 Point)                 [ ] Sepsis (in the previous month)                        (1 Point)                  [ ] History of pregnancy complications                 (1 point)  [ ] Pneumonia or serious lung disease                                               [ ] Unexplained or recurrent                     (1 Point)           (in the previous month)                               (1 Point)  [ ] Abnormal pulmonary function test                     (1 Point)                 SURGERY RELATED RISK FACTORS  [ ] Acute myocardial infarction                              (1 Point)                 [ ]  Section                                             (1 Point)  [ ] Congestive heart failure (in the previous month)  (1 Point)               [ ] Minor surgery                                                  (1 Point)   [ ] Inflammatory bowel disease                             (1 Point)                 [ ] Arthroscopic surgery                                        (2 Points)  [ ] Central venous access                                      (2 Points)                [ ] General surgery lasting more than 45 minutes   (2 Points)       [ ] Stroke (in the previous month)                          (5 Points)               [ ] Elective arthroplasty                                         (5 Points)                                                                                                                                               HEMATOLOGY RELATED FACTORS                                                 TRAUMA RELATED RISK FACTORS  [ ] Prior episodes of VTE                                     (3 Points)                [ ] Fracture of the hip, pelvis, or leg                       (5 Points)  [ ] Positive family history for VTE                         (3 Points)                 [ ] Acute spinal cord injury (in the previous month)  (5 Points)  [ ] Prothrombin 66368 A                                     (3 Points)                 [ ] Paralysis  (less than 1 month)                             (5 Points)  [ ] Factor V Leiden                                             (3 Points)                  [ ] Multiple Trauma within 1 month                        (5 Points)  [ ] Lupus anticoagulants                                     (3 Points)                                                           [ ] Anticardiolipin antibodies                               (3 Points)                                                       [ ] High homocysteine in the blood                      (3 Points)                                             [ ] Other congenital or acquired thrombophilia      (3 Points)                                                [ ] Heparin induced thrombocytopenia                  (3 Points)                                          Total Score [          ]    Caprini Score 0 - 2:  Low Risk, No VTE Prophylaxis required for most patients, encourage ambulation  Caprini Score 3 - 6:  At Risk, pharmacologic VTE prophylaxis is indicated for most patients (in the absence of a contraindication)  Caprini Score Greater than or = 7:  High Risk, pharmacologic VTE prophylaxis is indicated for most patients (in the absence of a contraindication)  OPIOID RISK TOOL    DANNY EACH BOX THAT APPLIES AND ADD TOTALS AT THE END    FAMILY HISTORY OF SUBSTANCE ABUSE                 FEMALE         MALE                                                Alcohol                             [  ]1 pt          [  ]3pts                                               Illegal Durgs                     [  ]2 pts        [  ]3pts                                               Rx Drugs                           [  ]4 pts        [  ]4 pts    PERSONAL HISTORY OF SUBSTANCE ABUSE                                                                                          Alcohol                             [  ]3 pts       [  ]3 pts                                               Illegal Drugs                     [  ]4 pts        [  ]4 pts                                               Rx Drugs                           [  ]5 pts        [  ]5 pts    AGE BETWEEN 16-45 YEARS                                      [  ]1 pt         [  ]1 pt    HISTORY OF PREADOLESCENT   SEXUAL ABUSE                                                             [  ]3 pts        [  ]0pts    PSYCHOLOGICAL DISEASE                     ADD, OCD, Bipolar, Schizophrenia        [  ]2 pts         [  ]2 pts                      Depression                                               [  ]1 pt           [  ]1 pt           SCORING TOTAL   (add numbers and type here)              (***)                                     A score of 3 or lower indicated LOW risk for future opioid abuse  A score of 4 to 7 indicated moderate risk for future opioid abuse  A score of 8 or higher indicates a high risk for opioid abuse CAPRINI SCORE [CLOT]    AGE RELATED RISK FACTORS                                                       MOBILITY RELATED FACTORS  [ ] Age 41-60 years                                            (1 Point)                  [ ] Bed rest                                                        (1 Point)  [x ] Age: 61-74 years                                           (2 Points)                 [ ] Plaster cast                                                   (2 Points)  [ ] Age= 75 years                                              (3 Points)                 [ ] Bed bound for more than 72 hours                 (2 Points)    DISEASE RELATED RISK FACTORS                                               GENDER SPECIFIC FACTORS  [x ] Edema in the lower extremities                       (1 Point)                  [ ] Pregnancy                                                     (1 Point)  [x ] Varicose veins                                               (1 Point)                  [ ] Post-partum < 6 weeks                                   (1 Point)             [ ] BMI > 25 Kg/m2                                            (1 Point)                  [ ] Hormonal therapy  or oral contraception          (1 Point)                 [ ] Sepsis (in the previous month)                        (1 Point)                  [ ] History of pregnancy complications                 (1 point)  [ ] Pneumonia or serious lung disease                                               [ ] Unexplained or recurrent                     (1 Point)           (in the previous month)                               (1 Point)  [ ] Abnormal pulmonary function test                     (1 Point)                 SURGERY RELATED RISK FACTORS  [ ] Acute myocardial infarction                              (1 Point)                 [ ]  Section                                             (1 Point)  [ ] Congestive heart failure (in the previous month)  (1 Point)               [ ] Minor surgery                                                  (1 Point)   [ ] Inflammatory bowel disease                             (1 Point)                 [ ] Arthroscopic surgery                                        (2 Points)  [ ] Central venous access                                      (2 Points)                [ ] General surgery lasting more than 45 minutes   (2 Points)       [ ] Stroke (in the previous month)                          (5 Points)               [x ] Elective arthroplasty                                         (5 Points)                                                                                                                                               HEMATOLOGY RELATED FACTORS                                                 TRAUMA RELATED RISK FACTORS  [x ] Prior episodes of VTE                                     (3 Points)                [ ] Fracture of the hip, pelvis, or leg                       (5 Points)  [ ] Positive family history for VTE                         (3 Points)                 [ ] Acute spinal cord injury (in the previous month)  (5 Points)  [ ] Prothrombin 96900 A                                     (3 Points)                 [ ] Paralysis  (less than 1 month)                             (5 Points)  [ ] Factor V Leiden                                             (3 Points)                  [ ] Multiple Trauma within 1 month                        (5 Points)  [ ] Lupus anticoagulants                                     (3 Points)                                                           [ ] Anticardiolipin antibodies                               (3 Points)                                                       [ ] High homocysteine in the blood                      (3 Points)                                             [ ] Other congenital or acquired thrombophilia      (3 Points)                                                [ ] Heparin induced thrombocytopenia                  (3 Points)                                          Total Score [ 12 ]    Caprini Score 0 - 2:  Low Risk, No VTE Prophylaxis required for most patients, encourage ambulation  Caprini Score 3 - 6:  At Risk, pharmacologic VTE prophylaxis is indicated for most patients (in the absence of a contraindication)  Caprini Score Greater than or = 7:  High Risk, pharmacologic VTE prophylaxis is indicated for most patients (in the absence of a contraindication)  OPIOID RISK TOOL    DANNY EACH BOX THAT APPLIES AND ADD TOTALS AT THE END    FAMILY HISTORY OF SUBSTANCE ABUSE                 FEMALE         MALE                                                Alcohol                             [  ]1 pt          [  ]3pts                                               Illegal Durgs                     [  ]2 pts        [  ]3pts                                               Rx Drugs                           [  ]4 pts        [  ]4 pts    PERSONAL HISTORY OF SUBSTANCE ABUSE                                                                                          Alcohol                             [  ]3 pts       [  ]3 pts                                               Illegal Drugs                     [  ]4 pts        [  ]4 pts                                               Rx Drugs                           [  ]5 pts        [  ]5 pts    AGE BETWEEN 16-45 YEARS                                      [  ]1 pt         [  ]1 pt    HISTORY OF PREADOLESCENT   SEXUAL ABUSE                                                             [  ]3 pts        [  ]0pts    PSYCHOLOGICAL DISEASE                     ADD, OCD, Bipolar, Schizophrenia        [  ]2 pts         [  ]2 pts                      Depression                                               [  ]1 pt           [  ]1 pt           SCORING TOTAL   (zero)                                     A score of 3 or lower indicated LOW risk for future opioid abuse  A score of 4 to 7 indicated moderate risk for future opioid abuse  A score of 8 or higher indicates a high risk for opioid abuse      65 year old female present to pst with OA of the left knee. She is schedule for left total knee replacement.   Patient educated on pre op written and verbal instructions.

## 2020-10-20 NOTE — PATIENT PROFILE ADULT - NSPREOP1_ABLETOREACHPT_GEN_A_NUR
[phone]    denies domestic or international travel in the past 3 weeks [phone]    denies domestic or international travel in the past 3 weeks/yes

## 2020-10-20 NOTE — H&P PST ADULT - NSICDXPROBLEM_GEN_ALL_CORE_FT
PROBLEM DIAGNOSES  Problem: Osteoarthritis of left knee  Assessment and Plan:     Problem: Atrial fibrillation  Assessment and Plan:     Problem: HTN (hypertension)  Assessment and Plan:     Problem: Need for prophylactic measure  Assessment and Plan:        PROBLEM DIAGNOSES  Problem: HTN (hypertension)  Assessment and Plan: routine labs and ekg  continue medication as directed  medical and cardiac clearance     Problem: Atrial fibrillation  Assessment and Plan: s/p cardiac ablation, patient was sinus khadijah on ekg  cardiac evaluation  Patient on eliquis- will follow up with cardiologist on when to hold.    Problem: Osteoarthritis of left knee  Assessment and Plan: left total knee replacement     Problem: Need for prophylactic measure  Assessment and Plan: high risk, the surgical team will order appropriate VTE prophylaxis

## 2020-10-20 NOTE — PATIENT PROFILE ADULT - NSPROHMSYMPCOND_GEN_A_NUR
none/Pre op teaching surgical scrub pain management instructions given to pt    Covid swab to be done Nov 7

## 2020-10-20 NOTE — H&P PST ADULT - NEGATIVE CARDIOVASCULAR SYMPTOMS
no palpitations/no paroxysmal nocturnal dyspnea/no orthopnea/no claudication/no dyspnea on exertion/no chest pain

## 2020-10-26 PROBLEM — M06.9 RHEUMATOID ARTHRITIS, UNSPECIFIED: Chronic | Status: ACTIVE | Noted: 2020-10-20

## 2020-10-26 PROBLEM — M35.00 SJOGREN SYNDROME, UNSPECIFIED: Chronic | Status: ACTIVE | Noted: 2020-10-20

## 2020-10-26 PROBLEM — G47.30 SLEEP APNEA, UNSPECIFIED: Chronic | Status: ACTIVE | Noted: 2017-04-11

## 2020-10-27 ENCOUNTER — RX RENEWAL (OUTPATIENT)
Age: 65
End: 2020-10-27

## 2020-10-28 ENCOUNTER — APPOINTMENT (OUTPATIENT)
Dept: DISASTER EMERGENCY | Facility: CLINIC | Age: 65
End: 2020-10-28

## 2020-10-28 ENCOUNTER — LABORATORY RESULT (OUTPATIENT)
Age: 65
End: 2020-10-28

## 2020-10-29 ENCOUNTER — APPOINTMENT (OUTPATIENT)
Dept: FAMILY MEDICINE | Facility: CLINIC | Age: 65
End: 2020-10-29
Payer: COMMERCIAL

## 2020-10-29 VITALS
DIASTOLIC BLOOD PRESSURE: 60 MMHG | OXYGEN SATURATION: 97 % | HEART RATE: 67 BPM | SYSTOLIC BLOOD PRESSURE: 140 MMHG | WEIGHT: 290 LBS | HEIGHT: 68 IN | TEMPERATURE: 98.5 F | BODY MASS INDEX: 43.95 KG/M2

## 2020-10-29 DIAGNOSIS — Z92.29 PERSONAL HISTORY OF OTHER DRUG THERAPY: ICD-10-CM

## 2020-10-29 PROCEDURE — 99072 ADDL SUPL MATRL&STAF TM PHE: CPT

## 2020-10-29 PROCEDURE — 99215 OFFICE O/P EST HI 40 MIN: CPT

## 2020-10-29 RX ORDER — METOPROLOL SUCCINATE 25 MG/1
25 TABLET, EXTENDED RELEASE ORAL DAILY
Qty: 90 | Refills: 3 | Status: DISCONTINUED | COMMUNITY
Start: 2019-04-15 | End: 2020-10-29

## 2020-10-29 RX ORDER — FOLIC ACID 1 MG/1
1 TABLET ORAL
Qty: 90 | Refills: 0 | Status: DISCONTINUED | COMMUNITY
Start: 2020-01-22 | End: 2020-10-29

## 2020-10-29 RX ORDER — METHOTREXATE 2.5 MG/1
2.5 TABLET ORAL
Qty: 12 | Refills: 0 | Status: DISCONTINUED | COMMUNITY
Start: 2020-01-22 | End: 2020-10-29

## 2020-10-30 ENCOUNTER — APPOINTMENT (OUTPATIENT)
Dept: PULMONOLOGY | Facility: CLINIC | Age: 65
End: 2020-10-30
Payer: COMMERCIAL

## 2020-10-30 ENCOUNTER — RX RENEWAL (OUTPATIENT)
Age: 65
End: 2020-10-30

## 2020-10-30 VITALS — DIASTOLIC BLOOD PRESSURE: 76 MMHG | OXYGEN SATURATION: 90 % | HEART RATE: 78 BPM | SYSTOLIC BLOOD PRESSURE: 124 MMHG

## 2020-10-30 VITALS — HEIGHT: 67 IN | WEIGHT: 290 LBS | BODY MASS INDEX: 45.52 KG/M2

## 2020-10-30 DIAGNOSIS — E66.9 OBESITY, UNSPECIFIED: ICD-10-CM

## 2020-10-30 PROCEDURE — 99215 OFFICE O/P EST HI 40 MIN: CPT | Mod: 25

## 2020-10-30 PROCEDURE — 94727 GAS DIL/WSHOT DETER LNG VOL: CPT

## 2020-10-30 PROCEDURE — 99072 ADDL SUPL MATRL&STAF TM PHE: CPT

## 2020-10-30 PROCEDURE — 94729 DIFFUSING CAPACITY: CPT

## 2020-10-30 PROCEDURE — 94010 BREATHING CAPACITY TEST: CPT

## 2020-10-30 PROCEDURE — 85018 HEMOGLOBIN: CPT | Mod: QW

## 2020-11-01 PROBLEM — Z92.29 HISTORY OF HEPATITIS B VACCINATION: Status: RESOLVED | Noted: 2020-10-12 | Resolved: 2020-11-01

## 2020-11-01 NOTE — PHYSICAL EXAM
[No Acute Distress] : no acute distress [Well-Appearing] : well-appearing [Normal Sclera/Conjunctiva] : normal sclera/conjunctiva [No JVD] : no jugular venous distention [Supple] : supple [No Respiratory Distress] : no respiratory distress  [Clear to Auscultation] : lungs were clear to auscultation bilaterally [Regular Rhythm] : with a regular rhythm [Normal S1, S2] : normal S1 and S2 [No Edema] : there was no peripheral edema [Soft] : abdomen soft [Non Tender] : non-tender [Normal Supraclavicular Nodes] : no supraclavicular lymphadenopathy [No Spinal Tenderness] : no spinal tenderness [No Joint Swelling] : no joint swelling [No Focal Deficits] : no focal deficits [Alert and Oriented x3] : oriented to person, place, and time [Normal Insight/Judgement] : insight and judgment were intact [de-identified] : calm and engaging  [de-identified] : OMM  MASK  [de-identified] : obese  [de-identified] : no tremors   CANE   [de-identified] : a

## 2020-11-01 NOTE — HISTORY OF PRESENT ILLNESS
[Atrial Fibrillation] : atrial fibrillation [Implantable Device/Pacemaker] : implantable device/pacemaker [Sleep Apnea] : sleep apnea [(Patient denies any chest pain, claudication, dyspnea on exertion, orthopnea, palpitations or syncope)] : Patient denies any chest pain, claudication, dyspnea on exertion, orthopnea, palpitations or syncope [Moderate (4-6 METs)] : Moderate (4-6 METs) [Aortic Stenosis] : no aortic stenosis [Coronary Artery Disease] : no coronary artery disease [Recent Myocardial Infarction] : no recent myocardial infarction [Asthma] : no asthma [COPD] : no COPD [Smoker] : not a smoker [Self] : no previous adverse anesthesia reaction [Chronic Anticoagulation] : chronic anticoagulation [Chronic Kidney Disease] : no chronic kidney disease [Diabetes] : no diabetes [FreeTextEntry1] : LEFT TKR [FreeTextEntry2] : 11/10/20 [FreeTextEntry3] : Dr. Bradley [FreeTextEntry4] : at [facility] \par  [id number]\par \par Has cardiac clearance next week \par Pulmonary CLearance and  PFT's   tomorrow. \par \par Chronic medical Conditions HTN/ RA ( follows with rheumatology) and hypothyroidism ALL STABLE.  [FreeTextEntry5] : LOOP recorder

## 2020-11-01 NOTE — ASSESSMENT
[FreeTextEntry4] :  At this time, I see no absolute  contraindication for proposed procedure if cleared by Pulmonogy and cardiology and PENDING COVID TEST RESULTS.\par Reviewed, and advised no aspirin, NSAIDs, fish oil vitamin E. one week prior to procedure.\par If you take BP medication, take the AM of surgery with small sip of water. \par Advised to ensure normal BM day prior to surgery.\par Strategies to achieve reviewed. \par \par Best wishes offered.

## 2020-11-01 NOTE — REVIEW OF SYSTEMS
[Fatigue] : fatigue [Joint Pain] : joint pain [Joint Stiffness] : joint stiffness [Negative] : Psychiatric [Fever] : no fever [Night Sweats] : no night sweats [Abdominal Pain] : no abdominal pain [Constipation] : no constipation [Heartburn] : no heartburn

## 2020-11-06 ENCOUNTER — APPOINTMENT (OUTPATIENT)
Dept: ORTHOPEDIC SURGERY | Facility: CLINIC | Age: 65
End: 2020-11-06
Payer: COMMERCIAL

## 2020-11-06 VITALS
DIASTOLIC BLOOD PRESSURE: 84 MMHG | SYSTOLIC BLOOD PRESSURE: 147 MMHG | HEART RATE: 64 BPM | WEIGHT: 290 LBS | HEIGHT: 67 IN | BODY MASS INDEX: 45.52 KG/M2

## 2020-11-06 VITALS — TEMPERATURE: 97.4 F

## 2020-11-06 DIAGNOSIS — M17.12 UNILATERAL PRIMARY OSTEOARTHRITIS, LEFT KNEE: ICD-10-CM

## 2020-11-06 PROCEDURE — 99214 OFFICE O/P EST MOD 30 MIN: CPT

## 2020-11-06 PROCEDURE — 99072 ADDL SUPL MATRL&STAF TM PHE: CPT

## 2020-11-06 NOTE — DISCUSSION/SUMMARY
[Medication Risks Reviewed] : Medication risks reviewed [de-identified] : 64 y/o F with severe bone-on-bone medial compartment osteoarthritis of the left knee with varus deformity. She is scheduled to her left TKA on 11/10/2020. I counseled the patient regarding the surgery and post-op. She is currently taking Eliquis and we reviewed the dosage of the medication. She understands that she will take 2.5 mg of Eliquis BID for 5 days after the surgery. After the 5th day, she may return to full dosage of Eliquis. \par \par \par \par The patient is a 65 year individual with end stage arthritis of their left knee joint. Based upon the patient's continued symptoms and failure to respond to conservative treatment I have recommended a left total knee arthroplasty for this patient. A long discussion took place with the patient describing what a total joint replacement is and what the procedure would entail. A total knee arthroplasty model, similar to the implant that was used during the operation, was utilized to demonstrate and to discuss the various bearing surfaces of the implants. The hospitalization and post-operative care and rehabilitation were also discussed. The use of perioperative antibiotics and DVT prophylaxis were discussed. The risk, benefits and alternatives to a surgical intervention were discussed at length with the patient. The patient was also advised of risks related to the medical comorbidities, elevated body mass index (BMI), and smoking where applicable. We discussed how to reduce modifiable risk factors and encouraged smoking cessation were applicable.. A lengthy discussion took place to review the most common complications including but not limited to: deep vein thrombosis, pulmonary embolus, heart attack, stroke, infection, wound breakdown, numbness, damage to nerves, tendon, muscles, arteries or other blood vessels, death and other possible complications from anesthesia. The patient was told that we will take steps to minimize these risks by using sterile technique, antibiotics and DVT prophylaxis when appropriate and follow the patient postoperatively in the office setting to monitor progress. The possibility of recurrent pain, no improvement in pain and actual worsening of pain were also discussed with the patient.\par The discharge plan of care focused on the patient going home following surgery. The patient was encouraged to make the necessary arrangements to have someone stay with them when they are discharged home. Following discharge, a home care nurse was to the patient. The home care nurse would open the patient’s home care case and request home physical therapy services. Home physical therapy was to commence following discharge provided it was appropriate and covered by the health insurance benefit plan. \par The benefits of surgery were discussed with the patient including the potential for improving her current clinical condition through operative intervention. Alternatives to surgical intervention including continued conservative management were also discussed in detail. All questions were answered to the satisfaction of the patient. The treatment plan of care, as well as a model of a total knee arthroplasty equivalent to the one that will be used for their total joint replacement, was shared with the patient. The patient agreed to the plan of care as well as the use of implants in their total joint replacement.

## 2020-11-06 NOTE — REASON FOR VISIT
[Follow-Up Visit] : a follow-up visit for [Other: ____] : [unfilled] [FreeTextEntry2] : S/P right THR DOS:05/02/17.  \par  \par

## 2020-11-06 NOTE — END OF VISIT
[FreeTextEntry3] : I, Vic Becerra, acted solely as a scribe for Dr. Qamar Bradley on this date 11/06/2020.

## 2020-11-06 NOTE — HISTORY OF PRESENT ILLNESS
[Pain Location] : pain [Worsening] : worsening [___ mths] : [unfilled] month(s) ago [8] : a current pain level of 8/10 [Walking] : worsened by walking [Knee Flexion] : worsened with knee flexion [NSAIDs] : relieved by nonsteroidal anti-inflammatory drugs [Rest] : relieved by rest [de-identified] : 64 y/o F presents with left knee pain. She has known severe bone-on-bone medial compartment osteoarthritis of the left knee with varus deformity. She is scheduled to her left TKA on 11/10/2020. She is here today to review the surgery. The patient is taking Eliquis. Pt has neuropathy in both feet and states that it limits her. [de-identified] : standing  [de-identified] : using cane

## 2020-11-06 NOTE — PHYSICAL EXAM
[LE] : Sensory: Intact in bilateral lower extremities [ALL] : dorsalis pedis, posterior tibial, femoral, popliteal, and radial 2+ and symmetric bilaterally [Cane] : ambulates with cane [de-identified] : GENERAL APPEARANCE: Well nourished and hydrated, pleasant, alert, and oriented x 3. Appears their stated age. \par HEENT: Normocephalic, extraocular eye motion intact. Nasal septum midline. Oral cavity clear. External auditory canal clear. \par RESPIRATORY: Breath sounds clear and audible in all lobes. No wheezing, No accessory muscle use.\par CARDIOVASCULAR: No apparent abnormalities. No lower leg edema. No varicosities. Pedal pulses are palpable.\par NEUROLOGIC: Sensation is normal, no muscle weakness in the upper or lower extremities.\par DERMATOLOGIC: No apparent skin lesions, moist, warm, no rash.\par SPINE: Cervical spine appears normal and moves freely; thoracic spine appears normal and moves freely; lumbosacral spine appears normal and moves freely, normal, nontender.\par MUSCULOSKELETAL: Hands, wrists, and elbows are normal and move freely, shoulders are normal and move freely.  [de-identified] : Left knee examination demonstrates no open wounds, medial and lateral joint line tenderness, pain and crepitus with range of motion, ROM is 0-110 degrees.

## 2020-11-07 ENCOUNTER — APPOINTMENT (OUTPATIENT)
Dept: DISASTER EMERGENCY | Facility: CLINIC | Age: 65
End: 2020-11-07

## 2020-11-09 ENCOUNTER — TRANSCRIPTION ENCOUNTER (OUTPATIENT)
Age: 65
End: 2020-11-09

## 2020-11-09 ENCOUNTER — FORM ENCOUNTER (OUTPATIENT)
Age: 65
End: 2020-11-09

## 2020-11-09 LAB — SARS-COV-2 N GENE NPH QL NAA+PROBE: NOT DETECTED

## 2020-11-10 ENCOUNTER — TRANSCRIPTION ENCOUNTER (OUTPATIENT)
Age: 65
End: 2020-11-10

## 2020-11-10 ENCOUNTER — APPOINTMENT (OUTPATIENT)
Dept: ORTHOPEDIC SURGERY | Facility: HOSPITAL | Age: 65
End: 2020-11-10

## 2020-11-10 ENCOUNTER — INPATIENT (INPATIENT)
Facility: HOSPITAL | Age: 65
LOS: 0 days | Discharge: HOME | DRG: 470 | End: 2020-11-11
Attending: ORTHOPAEDIC SURGERY | Admitting: ORTHOPAEDIC SURGERY
Payer: COMMERCIAL

## 2020-11-10 VITALS
RESPIRATION RATE: 20 BRPM | WEIGHT: 288.81 LBS | HEART RATE: 67 BPM | SYSTOLIC BLOOD PRESSURE: 170 MMHG | TEMPERATURE: 99 F | HEIGHT: 68 IN | OXYGEN SATURATION: 99 % | DIASTOLIC BLOOD PRESSURE: 80 MMHG

## 2020-11-10 DIAGNOSIS — Z98.890 OTHER SPECIFIED POSTPROCEDURAL STATES: Chronic | ICD-10-CM

## 2020-11-10 DIAGNOSIS — M17.12 UNILATERAL PRIMARY OSTEOARTHRITIS, LEFT KNEE: ICD-10-CM

## 2020-11-10 DIAGNOSIS — Z90.49 ACQUIRED ABSENCE OF OTHER SPECIFIED PARTS OF DIGESTIVE TRACT: Chronic | ICD-10-CM

## 2020-11-10 DIAGNOSIS — Z95.828 PRESENCE OF OTHER VASCULAR IMPLANTS AND GRAFTS: Chronic | ICD-10-CM

## 2020-11-10 DIAGNOSIS — Z96.641 PRESENCE OF RIGHT ARTIFICIAL HIP JOINT: Chronic | ICD-10-CM

## 2020-11-10 LAB
APTT BLD: 29.7 SEC — SIGNIFICANT CHANGE UP (ref 27.5–35.5)
BLD GP AB SCN SERPL QL: SIGNIFICANT CHANGE UP
GLUCOSE BLDC GLUCOMTR-MCNC: 110 MG/DL — HIGH (ref 70–99)
GLUCOSE BLDC GLUCOMTR-MCNC: 92 MG/DL — SIGNIFICANT CHANGE UP (ref 70–99)
GLUCOSE BLDC GLUCOMTR-MCNC: 99 MG/DL — SIGNIFICANT CHANGE UP (ref 70–99)
INR BLD: 0.98 RATIO — SIGNIFICANT CHANGE UP (ref 0.88–1.16)
PROTHROM AB SERPL-ACNC: 11.4 SEC — SIGNIFICANT CHANGE UP (ref 10.6–13.6)

## 2020-11-10 PROCEDURE — 73560 X-RAY EXAM OF KNEE 1 OR 2: CPT | Mod: 26,LT

## 2020-11-10 PROCEDURE — 99223 1ST HOSP IP/OBS HIGH 75: CPT

## 2020-11-10 PROCEDURE — 27447 TOTAL KNEE ARTHROPLASTY: CPT | Mod: LT

## 2020-11-10 PROCEDURE — 20985 CPTR-ASST DIR MS PX: CPT

## 2020-11-10 PROCEDURE — 27447 TOTAL KNEE ARTHROPLASTY: CPT | Mod: AS,LT

## 2020-11-10 RX ORDER — OXYCODONE HYDROCHLORIDE 5 MG/1
10 TABLET ORAL
Refills: 0 | Status: DISCONTINUED | OUTPATIENT
Start: 2020-11-10 | End: 2020-11-11

## 2020-11-10 RX ORDER — DIPHENHYDRAMINE HCL 50 MG
25 CAPSULE ORAL AT BEDTIME
Refills: 0 | Status: DISCONTINUED | OUTPATIENT
Start: 2020-11-10 | End: 2020-11-11

## 2020-11-10 RX ORDER — METOPROLOL TARTRATE 50 MG
25 TABLET ORAL DAILY
Refills: 0 | Status: DISCONTINUED | OUTPATIENT
Start: 2020-11-11 | End: 2020-11-11

## 2020-11-10 RX ORDER — SODIUM CHLORIDE 9 MG/ML
1000 INJECTION, SOLUTION INTRAVENOUS
Refills: 0 | Status: DISCONTINUED | OUTPATIENT
Start: 2020-11-10 | End: 2020-11-11

## 2020-11-10 RX ORDER — FENTANYL CITRATE 50 UG/ML
50 INJECTION INTRAVENOUS
Refills: 0 | Status: DISCONTINUED | OUTPATIENT
Start: 2020-11-10 | End: 2020-11-10

## 2020-11-10 RX ORDER — CEPHALEXIN 500 MG
500 CAPSULE ORAL
Refills: 0 | Status: DISCONTINUED | OUTPATIENT
Start: 2020-11-11 | End: 2020-11-11

## 2020-11-10 RX ORDER — CELECOXIB 200 MG/1
400 CAPSULE ORAL ONCE
Refills: 0 | Status: COMPLETED | OUTPATIENT
Start: 2020-11-10 | End: 2020-11-10

## 2020-11-10 RX ORDER — CEFAZOLIN SODIUM 1 G
2000 VIAL (EA) INJECTION
Refills: 0 | Status: COMPLETED | OUTPATIENT
Start: 2020-11-10 | End: 2020-11-11

## 2020-11-10 RX ORDER — POLYETHYLENE GLYCOL 3350 17 G/17G
17 POWDER, FOR SOLUTION ORAL DAILY
Refills: 0 | Status: DISCONTINUED | OUTPATIENT
Start: 2020-11-10 | End: 2020-11-11

## 2020-11-10 RX ORDER — SODIUM CHLORIDE 9 MG/ML
1000 INJECTION, SOLUTION INTRAVENOUS
Refills: 0 | Status: DISCONTINUED | OUTPATIENT
Start: 2020-11-10 | End: 2020-11-10

## 2020-11-10 RX ORDER — SENNA PLUS 8.6 MG/1
2 TABLET ORAL AT BEDTIME
Refills: 0 | Status: DISCONTINUED | OUTPATIENT
Start: 2020-11-10 | End: 2020-11-11

## 2020-11-10 RX ORDER — LEVOTHYROXINE SODIUM 125 MCG
150 TABLET ORAL DAILY
Refills: 0 | Status: DISCONTINUED | OUTPATIENT
Start: 2020-11-10 | End: 2020-11-11

## 2020-11-10 RX ORDER — ACETAMINOPHEN 500 MG
975 TABLET ORAL ONCE
Refills: 0 | Status: COMPLETED | OUTPATIENT
Start: 2020-11-10 | End: 2020-11-10

## 2020-11-10 RX ORDER — ALBUTEROL 90 UG/1
1 AEROSOL, METERED ORAL EVERY 4 HOURS
Refills: 0 | Status: DISCONTINUED | OUTPATIENT
Start: 2020-11-10 | End: 2020-11-11

## 2020-11-10 RX ORDER — ONDANSETRON 8 MG/1
4 TABLET, FILM COATED ORAL ONCE
Refills: 0 | Status: DISCONTINUED | OUTPATIENT
Start: 2020-11-10 | End: 2020-11-10

## 2020-11-10 RX ORDER — CEFAZOLIN SODIUM 1 G
3000 VIAL (EA) INJECTION ONCE
Refills: 0 | Status: DISCONTINUED | OUTPATIENT
Start: 2020-11-10 | End: 2020-11-10

## 2020-11-10 RX ORDER — ONDANSETRON 8 MG/1
4 TABLET, FILM COATED ORAL EVERY 6 HOURS
Refills: 0 | Status: DISCONTINUED | OUTPATIENT
Start: 2020-11-10 | End: 2020-11-11

## 2020-11-10 RX ORDER — LEVOTHYROXINE SODIUM 125 MCG
1 TABLET ORAL
Qty: 30 | Refills: 0 | DISCHARGE

## 2020-11-10 RX ORDER — BUDESONIDE AND FORMOTEROL FUMARATE DIHYDRATE 160; 4.5 UG/1; UG/1
2 AEROSOL RESPIRATORY (INHALATION)
Refills: 0 | Status: DISCONTINUED | OUTPATIENT
Start: 2020-11-10 | End: 2020-11-11

## 2020-11-10 RX ORDER — SODIUM CHLORIDE 9 MG/ML
500 INJECTION INTRAMUSCULAR; INTRAVENOUS; SUBCUTANEOUS ONCE
Refills: 0 | Status: COMPLETED | OUTPATIENT
Start: 2020-11-10 | End: 2020-11-10

## 2020-11-10 RX ORDER — KETOROLAC TROMETHAMINE 30 MG/ML
15 SYRINGE (ML) INJECTION EVERY 6 HOURS
Refills: 0 | Status: COMPLETED | OUTPATIENT
Start: 2020-11-10 | End: 2020-11-11

## 2020-11-10 RX ORDER — APREPITANT 80 MG/1
40 CAPSULE ORAL ONCE
Refills: 0 | Status: COMPLETED | OUTPATIENT
Start: 2020-11-10 | End: 2020-11-10

## 2020-11-10 RX ORDER — OXYCODONE HYDROCHLORIDE 5 MG/1
5 TABLET ORAL
Refills: 0 | Status: DISCONTINUED | OUTPATIENT
Start: 2020-11-10 | End: 2020-11-11

## 2020-11-10 RX ORDER — TRANEXAMIC ACID 100 MG/ML
1000 INJECTION, SOLUTION INTRAVENOUS ONCE
Refills: 0 | Status: DISCONTINUED | OUTPATIENT
Start: 2020-11-10 | End: 2020-11-10

## 2020-11-10 RX ORDER — HYDROMORPHONE HYDROCHLORIDE 2 MG/ML
0.5 INJECTION INTRAMUSCULAR; INTRAVENOUS; SUBCUTANEOUS
Refills: 0 | Status: DISCONTINUED | OUTPATIENT
Start: 2020-11-10 | End: 2020-11-11

## 2020-11-10 RX ORDER — ACETAMINOPHEN 500 MG
975 TABLET ORAL EVERY 8 HOURS
Refills: 0 | Status: DISCONTINUED | OUTPATIENT
Start: 2020-11-10 | End: 2020-11-11

## 2020-11-10 RX ORDER — APIXABAN 2.5 MG/1
2.5 TABLET, FILM COATED ORAL
Refills: 0 | Status: DISCONTINUED | OUTPATIENT
Start: 2020-11-11 | End: 2020-11-11

## 2020-11-10 RX ORDER — FUROSEMIDE 40 MG
40 TABLET ORAL DAILY
Refills: 0 | Status: DISCONTINUED | OUTPATIENT
Start: 2020-11-12 | End: 2020-11-11

## 2020-11-10 RX ORDER — MAGNESIUM HYDROXIDE 400 MG/1
30 TABLET, CHEWABLE ORAL DAILY
Refills: 0 | Status: DISCONTINUED | OUTPATIENT
Start: 2020-11-10 | End: 2020-11-11

## 2020-11-10 RX ORDER — HYDROMORPHONE HYDROCHLORIDE 2 MG/ML
4 INJECTION INTRAMUSCULAR; INTRAVENOUS; SUBCUTANEOUS
Refills: 0 | Status: DISCONTINUED | OUTPATIENT
Start: 2020-11-10 | End: 2020-11-11

## 2020-11-10 RX ORDER — BUPROPION HYDROCHLORIDE 150 MG/1
150 TABLET, EXTENDED RELEASE ORAL DAILY
Refills: 0 | Status: DISCONTINUED | OUTPATIENT
Start: 2020-11-10 | End: 2020-11-11

## 2020-11-10 RX ORDER — CELECOXIB 200 MG/1
200 CAPSULE ORAL
Refills: 0 | Status: DISCONTINUED | OUTPATIENT
Start: 2020-11-12 | End: 2020-11-11

## 2020-11-10 RX ORDER — SODIUM CHLORIDE 9 MG/ML
3 INJECTION INTRAMUSCULAR; INTRAVENOUS; SUBCUTANEOUS EVERY 8 HOURS
Refills: 0 | Status: DISCONTINUED | OUTPATIENT
Start: 2020-11-10 | End: 2020-11-10

## 2020-11-10 RX ORDER — HYDROXYCHLOROQUINE SULFATE 200 MG
200 TABLET ORAL DAILY
Refills: 0 | Status: DISCONTINUED | OUTPATIENT
Start: 2020-11-10 | End: 2020-11-11

## 2020-11-10 RX ORDER — ACETAMINOPHEN 500 MG
1000 TABLET ORAL ONCE
Refills: 0 | Status: DISCONTINUED | OUTPATIENT
Start: 2020-11-10 | End: 2020-11-11

## 2020-11-10 RX ADMIN — Medication 15 MILLIGRAM(S): at 23:18

## 2020-11-10 RX ADMIN — Medication 975 MILLIGRAM(S): at 06:51

## 2020-11-10 RX ADMIN — Medication 100 MILLIGRAM(S): at 18:41

## 2020-11-10 RX ADMIN — CELECOXIB 400 MILLIGRAM(S): 200 CAPSULE ORAL at 07:17

## 2020-11-10 RX ADMIN — CELECOXIB 400 MILLIGRAM(S): 200 CAPSULE ORAL at 06:51

## 2020-11-10 RX ADMIN — OXYCODONE HYDROCHLORIDE 10 MILLIGRAM(S): 5 TABLET ORAL at 17:51

## 2020-11-10 RX ADMIN — OXYCODONE HYDROCHLORIDE 10 MILLIGRAM(S): 5 TABLET ORAL at 16:51

## 2020-11-10 RX ADMIN — SODIUM CHLORIDE 1000 MILLILITER(S): 9 INJECTION INTRAMUSCULAR; INTRAVENOUS; SUBCUTANEOUS at 13:29

## 2020-11-10 RX ADMIN — Medication 975 MILLIGRAM(S): at 21:16

## 2020-11-10 RX ADMIN — APREPITANT 40 MILLIGRAM(S): 80 CAPSULE ORAL at 06:50

## 2020-11-10 RX ADMIN — Medication 975 MILLIGRAM(S): at 07:17

## 2020-11-10 RX ADMIN — Medication 15 MILLIGRAM(S): at 18:41

## 2020-11-10 RX ADMIN — BUDESONIDE AND FORMOTEROL FUMARATE DIHYDRATE 2 PUFF(S): 160; 4.5 AEROSOL RESPIRATORY (INHALATION) at 22:12

## 2020-11-10 RX ADMIN — Medication 975 MILLIGRAM(S): at 23:06

## 2020-11-10 NOTE — PHYSICAL THERAPY INITIAL EVALUATION ADULT - ADDITIONAL COMMENTS
Pt reports living in a private home with her  who is semi-retired able to assist as needed. Pt has 3 +1 steps to enter with bilateral handrails and no stairs inside. Pt ambulates with cane at baseline, also owns RW, shower chair and commode. Pt drives & works.

## 2020-11-10 NOTE — DISCHARGE NOTE PROVIDER - CARE PROVIDER_API CALL
Qamar Bradley  ORTHOPAEDIC SURGERY  [address] [facility] [facility] [address]  [place] [address]  Phone: [phone]  Fax: [phone]  Follow Up Time:

## 2020-11-10 NOTE — DISCHARGE NOTE PROVIDER - NSDCCPCAREPLAN_GEN_ALL_CORE_FT
PRINCIPAL DISCHARGE DIAGNOSIS  Diagnosis: Primary osteoarthritis of left knee  Assessment and Plan of Treatment:       SECONDARY DISCHARGE DIAGNOSES  Diagnosis: Hypothyroid  Assessment and Plan of Treatment:

## 2020-11-10 NOTE — DISCHARGE NOTE PROVIDER - NSDCMRMEDTOKEN_GEN_ALL_CORE_FT
ELIQUIS 5 MG TABLET:   HUMIRA(CF) 40 MG/0.4 ML SYRING:   hydroxychloroquine 200 mg oral tablet: 2 tab(s) orally once a day  Lasix 40 mg oral tablet: 1 tab(s) orally once a day  METOPROLOL SUCC ER 25 MG TAB:   Synthroid 150 mcg (0.15 mg) oral tablet: 1 tab(s) orally once a day  Wellbutrin  mg/24 hours oral tablet, extended release: 1 tab(s) orally every 24 hours   apixaban 2.5 mg oral tablet: 1 tab(s) orally 2 times a day.  resume home dose on 11/16  cefadroxil 500 mg oral capsule: 1 cap(s) orally every 12 hours   celecoxib 200 mg oral capsule: 1 cap(s) orally 2 times a day  ELIQUIS 5 MG TABLET: 5 milligram(s) orally 2 times a day resume on 11/16  HUMIRA(CF) 40 MG/0.4 ML SYRING:   hydroxychloroquine 200 mg oral tablet: 2 tab(s) orally once a day  Lasix 40 mg oral tablet: 1 tab(s) orally once a day  METOPROLOL SUCC ER 25 MG TAB:   oxyCODONE 5 mg oral tablet: 1-2 tab(s) orally every 4 to 6 hours, As Needed  Pain (1 - 3) MDD:6  Senna S 50 mg-8.6 mg oral tablet: 2 tab(s) orally once a day (at bedtime)   Synthroid 150 mcg (0.15 mg) oral tablet: 1 tab(s) orally once a day  Wellbutrin  mg/24 hours oral tablet, extended release: 1 tab(s) orally every 24 hours

## 2020-11-10 NOTE — PHYSICAL THERAPY INITIAL EVALUATION ADULT - RANGE OF MOTION EXAMINATION, REHAB EVAL
Left Knee AROM flexion/extension decreased by ~25% otherwise WNL/bilateral upper extremity ROM was WNL (within normal limits)/Right LE ROM was WNL (within normal limits)

## 2020-11-10 NOTE — DISCHARGE NOTE PROVIDER - NSDCFUSCHEDAPPT_GEN_ALL_CORE_FT
KIERAN WINTER ; 12/02/2020 ; [facility] [address] ; 12/29/2020 ; [facility] [address] ; 01/26/2021 ; [facility] [address] [facility]  KIERAN WITNER ; 02/01/2021 ; [facility] [address] [facility]

## 2020-11-10 NOTE — DISCHARGE NOTE PROVIDER - NSDCFUADDINST_GEN_ALL_CORE_FT
The patient will be seen in the office between 2-3 weeks for wound check.   **Your first post-operative visit has been scheduled prior to your admission. PLEASE CONTACT OFFICE TO CONFIRM THE APPOINTMENT DATE. Sutures/Staples/Tape will be removed at that time.  **  The silver based dressing is to be removed 7 days from the date of surgery.   ** CONTACT THE OFFICE IF THE FOLLOWING DEVELOP:  - the dressing becomes soiled or saturated  - you develop a fever greater that 101F  - the wound becomes red or you develop blistering around the wound  * Patient may shower after post-op day #3.   * The patient will continue home PT consistent with  total knee replacement protocol. Transition to outpatient PT will occur at the time of the first office visit.   * The patient will practice knee extension exercises regularly to minimize hamstring contraction.   * The patient is FULL weight bearing.  The patient will continue Eliquis 2.5mg twice daily by mouth for 5 days after surgery - starting on 11/11/20 - last dose on 11/15/20. Patient is to return to Eliquis 5mg twice daily on 11/16/20.  * The patient will take OXYCODONE AND TYLENOL for pain control and adjust according to prescription and patient needs. Contact the office if pain increases while taking prescribed pain medications or related concerns develop.  * Celebrex will be taken twice daily for 3 weeks for pain control and prevention of excessive bone growth. Additional prescription may be requested at your office follow-up visit.   * The patient will take Senna S while taking oxycodone to prevent narcotic associated constipation.  Additionally, increase water intake (drink at least 8 glasses of water daily) and try adding fiber to the diet by eating fruits, vegetables and foods that are rich in grains. If constipation is experienced, contact the medical/primary care provider to discuss further treatment options.  * To avoid injury at home:  - continue use of rolling walker until cleared by physical therapist  - have family or friend remove all throw rug or objects in hallways that may present a trip hazard.  - if you experience any dizziness or medical concerns, call your medical doctor or  911.  * The implant may activate metal detection devices.

## 2020-11-10 NOTE — PHYSICAL THERAPY INITIAL EVALUATION ADULT - GENERAL OBSERVATIONS, REHAB EVAL
Pt received in bed, + IV, + bilateral venous compression boots + Left ace bandage, breathing on RA in NAD, in 2/10 pain,  present, agreeable to PT evaluation

## 2020-11-10 NOTE — CONSULT NOTE ADULT - SUBJECTIVE AND OBJECTIVE BOX
chief complaint of left knee pain       HPI:  65 year old female with PMH of Sjogren's syndrome, RA, Pulm HTN, POOL on CPAP, DVT, PE - submassive requiring EKOS, HTN, afib ( s/p cardiac ablations), hypothyroidism, morbidly obese with chronic c/o left knee pain with failed conservative medical therapies limiting their daily activities presented for an elective left knee replacement. She is s/p Left knee arthroplasty POD#0.  Doing well, pain is well controlled , ambulated with PT,tolerating po, urinating without any issues. denies any light headedness/dizziness on ambulation        PAST MEDICAL & SURGICAL HISTORY:  Sjogren&#x27;s disease  Rheumatoid arthritis  Pulmonary hypertension  Hiatal hernia  Pulmonary embolism Saddle Embolus  DVT (deep venous thrombosis)  Osteoarthritis  Osteopenia  Anemia  Sleep apnea cpap  Hip fx  Atrial fibrillation  Hypothyroid  HTN (hypertension)  History of loop recorder  H/O cardiac radiofrequency ablation  History of total hip replacement, right  History of ear surgery  Presence of IVC filter  History of dilation and curettage  History of cholecystectomy        Social History:  Tobacco - denies  ETOH - denies   Illicit drug abuse - denies    FAMILY HISTORY:  Family history of rheumatoid arthritis (Father)  Family history of lung cancer (Father)  Family history of esophageal cancer (Father)  Family history of CHF (congestive heart failure) (Mother)  Famiy history of pulmonary hypertension (Mother)        Allergies    No Known Allergies  Intolerances        HOME MEDICATIONS :   Home Medications:  ELIQUIS 5 MG TABLET:  (10 Nov 2020 07:00)  HUMIRA(CF) 40 MG/0.4 ML SYRING:  (10 Nov 2020 07:00)  hydroxychloroquine 200 mg oral tablet: 2 tab(s) orally once a day (10 Nov 2020 07:00)  Lasix 40 mg oral tablet: 1 tab(s) orally once a day (10 Nov 2020 07:00)  METOPROLOL SUCC ER 25 MG TAB:  (10 Nov 2020 07:00)  Synthroid 150 mcg (0.15 mg) oral tablet: 1 tab(s) orally once a day (10 Nov 2020 07:00)  Wellbutrin  mg/24 hours oral tablet, extended release: 1 tab(s) orally every 24 hours (10 Nov 2020 07:00)        REVIEW OF SYSTEMS:    CONSTITUTIONAL: No fever, weight loss, or fatigue  EYES: No eye pain, visual disturbances, or discharge  NECK: No pain or stiffness  RESPIRATORY: No cough, wheezing, chills No shortness of breath  CARDIOVASCULAR: No chest pain, palpitations, dizziness, or leg swelling  GASTROINTESTINAL: No abdominal or epigastric pain. No nausea, vomiting  GENITOURINARY: No dysuria, frequency, hematuria, or incontinence  NEUROLOGICAL: No headaches, memory loss, loss of strength, numbness, or tremors  SKIN: No itching, burning, rashes, or lesions   ENDOCRINE: No heat or cold intolerance; No hair loss  MUSCULOSKELETAL:   PSYCHIATRIC: No depression, anxiety, mood swings, or difficulty sleeping        MEDICATIONS  (STANDING):  acetaminophen   Tablet .. 975 milliGRAM(s) Oral every 8 hours  acetaminophen  IVPB .. 1000 milliGRAM(s) IV Intermittent once  ALBUTerol    90 MICROgram(s) HFA Inhaler 1 Puff(s) Inhalation every 4 hours  budesonide  80 MICROgram(s)/formoterol 4.5 MICROgram(s) Inhaler 2 Puff(s) Inhalation two times a day  buPROPion XL . 150 milliGRAM(s) Oral daily  ceFAZolin   IVPB 2000 milliGRAM(s) IV Intermittent <User Schedule>  hydroxychloroquine 200 milliGRAM(s) Oral daily  ketorolac   Injectable 15 milliGRAM(s) IV Push every 6 hours  lactated ringers. 1000 milliLiter(s) (150 mL/Hr) IV Continuous <Continuous>  levothyroxine 150 MICROGram(s) Oral daily  polyethylene glycol 3350 17 Gram(s) Oral daily    MEDICATIONS  (PRN):  aluminum hydroxide/magnesium hydroxide/simethicone Suspension 30 milliLiter(s) Oral four times a day PRN Indigestion  diphenhydrAMINE 25 milliGRAM(s) Oral at bedtime PRN Insomnia  HYDROmorphone   Tablet 4 milliGRAM(s) Oral every 3 hours PRN Severe Pain (7 - 10)  HYDROmorphone  Injectable 0.5 milliGRAM(s) IV Push every 3 hours PRN Severe/ breakthrough Pain (7 - 10)  magnesium hydroxide Suspension 30 milliLiter(s) Oral daily PRN Constipation  ondansetron Injectable 4 milliGRAM(s) IV Push every 6 hours PRN Nausea and/or Vomiting  oxyCODONE    IR 5 milliGRAM(s) Oral every 3 hours PRN Mild Pain (1 - 3)  oxyCODONE    IR 10 milliGRAM(s) Oral every 3 hours PRN Moderate Pain (4 - 6)  senna 2 Tablet(s) Oral at bedtime PRN Constipation      Vital Signs Last 24 Hrs  T(C): 36.7 (10 Nov 2020 15:20), Max: 37.1 (10 Nov 2020 06:46)  T(F): 98 (10 Nov 2020 15:20), Max: 98.8 (10 Nov 2020 06:46)  HR: 64 (10 Nov 2020 15:20) (50 - 67)  BP: 145/82 (10 Nov 2020 15:20) (113/59 - 170/80)  BP(mean): --  RR: 18 (10 Nov 2020 15:20) (12 - 20)  SpO2: 96% (10 Nov 2020 15:20) (96% - 100%)    PHYSICAL EXAM:    GENERAL: NAD, well-groomed, well-developed  HEAD:  Atraumatic, Normocephalic  EYES: EOMI, PERRLA, conjunctiva and sclera clear  NECK: Supple  NERVOUS SYSTEM:  Alert & Oriented X3, Good concentration  CHEST/LUNG: CTA  b/l,  no rales, rhonchi  HEART: Regular rate and rhythm; No murmur  ABDOMEN: Soft, Nontender, Nondistended; Bowel sounds present  EXTREMITIES: No clubbing, cyanosis, or edema ,   SKIN: No rashes or lesions    LABS:      PT/INR - ( 10 Nov 2020 06:50 )   PT: 11.4 sec;   INR: 0.98 ratio       PTT - ( 10 Nov 2020 06:50 )  PTT:29.7 sec      reviewed       RADIOLOGY & ADDITIONAL STUDIES:    Office notes from PMD, cardio, Pulm reviewed      chief complaint of left knee pain       HPI:  65 year old female with PMH of Sjogren's syndrome, RA, Pulm HTN, POOL on CPAP, DVT, PE in 2016 post hip replacement - submassive requiring EKOS, HTN, afib ( s/p cardiac ablations), hypothyroidism, morbidly obese with chronic c/o left knee pain with failed conservative medical therapies limiting their daily activities presented for an elective left knee replacement. She is s/p Left knee arthroplasty POD#0.  Doing well, c/o left knee pain 5/10, ambulated with PT. denies any light headedness/dizziness on ambulation.  at bedside.         PAST MEDICAL & SURGICAL HISTORY:  Sjogren&#x27;s disease  Rheumatoid arthritis  Pulmonary hypertension  Hiatal hernia  Pulmonary embolism Saddle Embolus  DVT (deep venous thrombosis)  Osteoarthritis  Osteopenia  Anemia  Sleep apnea cpap  Hip fx  Atrial fibrillation  Hypothyroid  HTN (hypertension)  History of loop recorder  H/O cardiac radiofrequency ablation  History of total hip replacement, right  History of ear surgery  Presence of IVC filter  History of dilation and curettage  History of cholecystectomy        Social History:  Tobacco - denies  ETOH - denies   Illicit drug abuse - denies    FAMILY HISTORY:  Family history of rheumatoid arthritis (Father)  Family history of lung cancer (Father)  Family history of esophageal cancer (Father)  Family history of CHF (congestive heart failure) (Mother)  Famiy history of pulmonary hypertension (Mother)        Allergies    No Known Allergies  Intolerances        HOME MEDICATIONS :   Home Medications:  ELIQUIS 5 MG TABLET:  (10 Nov 2020 07:00)  HUMIRA(CF) 40 MG/0.4 ML SYRING:  (10 Nov 2020 07:00)  hydroxychloroquine 200 mg oral tablet: 2 tab(s) orally once a day (10 Nov 2020 07:00)  Lasix 40 mg oral tablet: 1 tab(s) orally once a day (10 Nov 2020 07:00)  METOPROLOL SUCC ER 25 MG TAB:  (10 Nov 2020 07:00)  Synthroid 150 mcg (0.15 mg) oral tablet: 1 tab(s) orally once a day (10 Nov 2020 07:00)  Wellbutrin  mg/24 hours oral tablet, extended release: 1 tab(s) orally every 24 hours (10 Nov 2020 07:00)        REVIEW OF SYSTEMS:    CONSTITUTIONAL: No fever, weight loss, or fatigue  EYES: No eye pain, visual disturbances, or discharge  NECK: No pain or stiffness  RESPIRATORY: No cough, wheezing, chills No shortness of breath  CARDIOVASCULAR: No chest pain, palpitations, dizziness, or leg swelling  GASTROINTESTINAL: No abdominal or epigastric pain. No nausea, vomiting  GENITOURINARY: No dysuria, frequency, hematuria, or incontinence  NEUROLOGICAL: No headaches, memory loss, loss of strength, numbness, or tremors  SKIN: No itching, burning, rashes, or lesions   ENDOCRINE: No heat or cold intolerance; No hair loss  MUSCULOSKELETAL:   PSYCHIATRIC: No depression, anxiety, mood swings, or difficulty sleeping        MEDICATIONS  (STANDING):  acetaminophen   Tablet .. 975 milliGRAM(s) Oral every 8 hours  acetaminophen  IVPB .. 1000 milliGRAM(s) IV Intermittent once  ALBUTerol    90 MICROgram(s) HFA Inhaler 1 Puff(s) Inhalation every 4 hours  budesonide  80 MICROgram(s)/formoterol 4.5 MICROgram(s) Inhaler 2 Puff(s) Inhalation two times a day  buPROPion XL . 150 milliGRAM(s) Oral daily  ceFAZolin   IVPB 2000 milliGRAM(s) IV Intermittent <User Schedule>  hydroxychloroquine 200 milliGRAM(s) Oral daily  ketorolac   Injectable 15 milliGRAM(s) IV Push every 6 hours  lactated ringers. 1000 milliLiter(s) (150 mL/Hr) IV Continuous <Continuous>  levothyroxine 150 MICROGram(s) Oral daily  polyethylene glycol 3350 17 Gram(s) Oral daily    MEDICATIONS  (PRN):  aluminum hydroxide/magnesium hydroxide/simethicone Suspension 30 milliLiter(s) Oral four times a day PRN Indigestion  diphenhydrAMINE 25 milliGRAM(s) Oral at bedtime PRN Insomnia  HYDROmorphone   Tablet 4 milliGRAM(s) Oral every 3 hours PRN Severe Pain (7 - 10)  HYDROmorphone  Injectable 0.5 milliGRAM(s) IV Push every 3 hours PRN Severe/ breakthrough Pain (7 - 10)  magnesium hydroxide Suspension 30 milliLiter(s) Oral daily PRN Constipation  ondansetron Injectable 4 milliGRAM(s) IV Push every 6 hours PRN Nausea and/or Vomiting  oxyCODONE    IR 5 milliGRAM(s) Oral every 3 hours PRN Mild Pain (1 - 3)  oxyCODONE    IR 10 milliGRAM(s) Oral every 3 hours PRN Moderate Pain (4 - 6)  senna 2 Tablet(s) Oral at bedtime PRN Constipation      Vital Signs Last 24 Hrs  T(C): 36.7 (10 Nov 2020 15:20), Max: 37.1 (10 Nov 2020 06:46)  T(F): 98 (10 Nov 2020 15:20), Max: 98.8 (10 Nov 2020 06:46)  HR: 64 (10 Nov 2020 15:20) (50 - 67)  BP: 145/82 (10 Nov 2020 15:20) (113/59 - 170/80)  BP(mean): --  RR: 18 (10 Nov 2020 15:20) (12 - 20)  SpO2: 96% (10 Nov 2020 15:20) (96% - 100%)    PHYSICAL EXAM:    GENERAL: NAD, well-groomed, obese  HEAD:  Atraumatic, Normocephalic  EYES: EOMI, PERRLA, conjunctiva and sclera clear  NECK: Supple  NERVOUS SYSTEM:  Alert & Oriented X3, Good concentration  CHEST/LUNG: CTA  b/l,  no rales, rhonchi  HEART: Regular rate and rhythm; No murmur  ABDOMEN: Soft, Nontender, Nondistended; Bowel sounds present  EXTREMITIES: No clubbing, cyanosis, or edema   SKIN: No rashes or lesions        LABS:      PT/INR - ( 10 Nov 2020 06:50 )   PT: 11.4 sec;   INR: 0.98 ratio       PTT - ( 10 Nov 2020 06:50 )  PTT:29.7 sec      reviewed       RADIOLOGY & ADDITIONAL STUDIES:    Office notes from PMD, cardio, Pulm reviewed

## 2020-11-10 NOTE — PROGRESS NOTE ADULT - SUBJECTIVE AND OBJECTIVE BOX
Orthopedic PA Postop Note  Patient S/P LEFT TKA  Patient in bed comfortable   LEFT Leg  Dressing C/D/I - ACE wrap without staining  DP Pulse intact   Calf Soft NT  Dorsi/Plantar Flexion/EHL/FHL intact   Sensation intact to light touch        Vital Signs Last 24 Hrs  T(C): 36.7 (10 Nov 2020 15:20), Max: 37.1 (10 Nov 2020 06:46)  T(F): 98 (10 Nov 2020 15:20), Max: 98.8 (10 Nov 2020 06:46)  HR: 64 (10 Nov 2020 15:20) (50 - 67)  BP: 145/82 (10 Nov 2020 15:20) (113/59 - 170/80)  BP(mean): --  RR: 18 (10 Nov 2020 15:20) (12 - 20)  SpO2: 96% (10 Nov 2020 15:20) (96% - 100%)    < from: Xray Knee 1 or 2 Views, Left (11.10.20 @ 12:25) >     EXAM:  KNEE-LEFT                          PROCEDURE DATE:  11/10/2020          INTERPRETATION:  History: Status post knee replacement    Technique: 2 views of the left knee    Findings:    Patient status post total left knee replacement in good anatomic alignment. Postsurgical changes and surgical drain in place.    Impression:    Status post left total knee replacement in good anatomic alignment                LAURA FERNANDO MD; Attending Radiologist  This document has been electronically signed. Nov 10 2020  1:18PM    < end of copied text >      A/P:  S/P LEFT TKA  1. DVTP - ELIQUIS  2. Physical Therapy   3. Pain Control as clinically indicated

## 2020-11-10 NOTE — DISCHARGE NOTE PROVIDER - HOSPITAL COURSE
The patient underwent a Left TOTAL KNEE REPLACEMENT on 11/10/20. The patient received antibiotics consistent with SCIP guidelines. The patient underwent the procedure and had no intra-operative complications. Post-operatively, the patient was seen by medicine and PT. The patient received Eliquis for DVTP. The patient received pain medications per orthopedic pain management protocol and the pain was appropriately controlled. The patient did not have any post-operative medical complications. The patient was discharged in stable condition.

## 2020-11-10 NOTE — DISCHARGE NOTE NURSING/CASE MANAGEMENT/SOCIAL WORK - PATIENT PORTAL LINK FT
You can access the FollowMyHealth Patient Portal offered by [facility] by registering at the following website: [email or web address]. By joining [employer]’s FollowMyHealth portal, you will also be able to view your health information using other applications (apps) compatible with our system.

## 2020-11-10 NOTE — CONSULT NOTE ADULT - ASSESSMENT
65 year old female with PMH of Sjogren's syndrome, RA, Pulm HTN, POOL on CPAP, DVT, PE - submassive requiring EKOS, HTN, afib (s/p cardiac ablations), hypothyroidism, morbidly obese with chronic c/o left knee pain with failed conservative medical therapies limiting their daily activities presented for an elective left knee replacement. She is s/p Left knee arthroplasty.      # Left knee OA - s/p Lt TKR   Pain control   Bowel regimen   Incentive spirometry  DVT px - per ortho   PT    # Sjogren's syndrome/ RA - on Humira, Plaquenil   # Afib s/p ablation - now in NSR.  - ct metoprolol with holding parameters  # Pulm HTN with asthmatic bronchitis - On anuity - advised Pre and post op albuterol prn per Pulm   # POOL on CPAP -   - use own equipement   # HTN - on lasix 40mg - hold for today   # DVT/PE - Resume Eliquis full Dose when ok with ortho   # Morbid obesity - will benefit from weight reduction, recommendation dietary eval on outpatient    Labs in am     will follow    65 year old female with PMH of Sjogren's syndrome, RA, Pulm HTN, POOL on CPAP, DVT, PE - submassive requiring EKOS, HTN, afib (s/p cardiac ablations), hypothyroidism, morbidly obese with chronic c/o left knee pain with failed conservative medical therapies limiting their daily activities presented for an elective left knee replacement. She is s/p Left knee arthroplasty.      # Left knee OA - s/p Lt TKR   Pain control   Bowel regimen   Incentive spirometry  DVT px - per ortho   PT    # Sjogren's syndrome/ RA - on Humira last dose 10/30, Plaquenil   # Afib s/p ablation - now in NSR.  - ct metoprolol with holding parameters  # Pulm HTN with asthmatic bronchitis - On anuity - advised Pre and post op albuterol prn per Pulm   # POOL on CPAP -   - use own equipment   # HTN - on lasix 40mg - hold for today   # DVT/PE - Resume Eliquis full Dose when ok with ortho   # Morbid obesity - will benefit from weight reduction, recommendation dietary eval on outpatient  # hypothyrodism - ct home dose synthroid  # Peripheral neuropathy - follows with neurology   # Depression/anxiety - on wellbutrin     Labs in am   will follow

## 2020-11-11 VITALS — OXYGEN SATURATION: 99 %

## 2020-11-11 LAB
ANION GAP SERPL CALC-SCNC: 11 MMOL/L — SIGNIFICANT CHANGE UP (ref 5–17)
BASE EXCESS BLDA CALC-SCNC: 0 MMOL/L — SIGNIFICANT CHANGE UP (ref -3–3)
BLOOD GAS COMMENTS ARTERIAL: SIGNIFICANT CHANGE UP
BUN SERPL-MCNC: 24 MG/DL — HIGH (ref 8–20)
CALCIUM SERPL-MCNC: 8.8 MG/DL — SIGNIFICANT CHANGE UP (ref 8.6–10.2)
CHLORIDE SERPL-SCNC: 100 MMOL/L — SIGNIFICANT CHANGE UP (ref 98–107)
CO2 SERPL-SCNC: 23 MMOL/L — SIGNIFICANT CHANGE UP (ref 22–29)
CREAT SERPL-MCNC: 1.15 MG/DL — SIGNIFICANT CHANGE UP (ref 0.5–1.3)
GAS PNL BLDA: SIGNIFICANT CHANGE UP
GLUCOSE SERPL-MCNC: 117 MG/DL — HIGH (ref 70–99)
HCO3 BLDA-SCNC: 24 MMOL/L — SIGNIFICANT CHANGE UP (ref 20–26)
HCT VFR BLD CALC: 32.6 % — LOW (ref 34.5–45)
HGB BLD-MCNC: 10.7 G/DL — LOW (ref 11.5–15.5)
HOROWITZ INDEX BLDA+IHG-RTO: 21 — SIGNIFICANT CHANGE UP
MCHC RBC-ENTMCNC: 32.8 GM/DL — SIGNIFICANT CHANGE UP (ref 32–36)
MCHC RBC-ENTMCNC: 32.8 PG — SIGNIFICANT CHANGE UP (ref 27–34)
MCV RBC AUTO: 100 FL — SIGNIFICANT CHANGE UP (ref 80–100)
PCO2 BLDA: 44 MMHG — SIGNIFICANT CHANGE UP (ref 35–45)
PH BLDA: 7.37 — SIGNIFICANT CHANGE UP (ref 7.35–7.45)
PLATELET # BLD AUTO: 168 K/UL — SIGNIFICANT CHANGE UP (ref 150–400)
PO2 BLDA: 88 MMHG — SIGNIFICANT CHANGE UP (ref 83–108)
POTASSIUM SERPL-MCNC: 5.3 MMOL/L — SIGNIFICANT CHANGE UP (ref 3.5–5.3)
POTASSIUM SERPL-SCNC: 5.3 MMOL/L — SIGNIFICANT CHANGE UP (ref 3.5–5.3)
RBC # BLD: 3.26 M/UL — LOW (ref 3.8–5.2)
RBC # FLD: 13.1 % — SIGNIFICANT CHANGE UP (ref 10.3–14.5)
SAO2 % BLDA: 98 % — SIGNIFICANT CHANGE UP (ref 95–99)
SODIUM SERPL-SCNC: 134 MMOL/L — LOW (ref 135–145)
WBC # BLD: 9.27 K/UL — SIGNIFICANT CHANGE UP (ref 3.8–10.5)
WBC # FLD AUTO: 9.27 K/UL — SIGNIFICANT CHANGE UP (ref 3.8–10.5)

## 2020-11-11 PROCEDURE — 82962 GLUCOSE BLOOD TEST: CPT

## 2020-11-11 PROCEDURE — 80048 BASIC METABOLIC PNL TOTAL CA: CPT

## 2020-11-11 PROCEDURE — 94640 AIRWAY INHALATION TREATMENT: CPT

## 2020-11-11 PROCEDURE — 85027 COMPLETE CBC AUTOMATED: CPT

## 2020-11-11 PROCEDURE — 99232 SBSQ HOSP IP/OBS MODERATE 35: CPT

## 2020-11-11 PROCEDURE — 86901 BLOOD TYPING SEROLOGIC RH(D): CPT

## 2020-11-11 PROCEDURE — 85610 PROTHROMBIN TIME: CPT

## 2020-11-11 PROCEDURE — 97167 OT EVAL HIGH COMPLEX 60 MIN: CPT

## 2020-11-11 PROCEDURE — 82803 BLOOD GASES ANY COMBINATION: CPT

## 2020-11-11 PROCEDURE — 85730 THROMBOPLASTIN TIME PARTIAL: CPT

## 2020-11-11 PROCEDURE — C1776: CPT

## 2020-11-11 PROCEDURE — 86900 BLOOD TYPING SEROLOGIC ABO: CPT

## 2020-11-11 PROCEDURE — 36415 COLL VENOUS BLD VENIPUNCTURE: CPT

## 2020-11-11 PROCEDURE — 73560 X-RAY EXAM OF KNEE 1 OR 2: CPT

## 2020-11-11 PROCEDURE — 99223 1ST HOSP IP/OBS HIGH 75: CPT

## 2020-11-11 PROCEDURE — 97163 PT EVAL HIGH COMPLEX 45 MIN: CPT

## 2020-11-11 PROCEDURE — 86850 RBC ANTIBODY SCREEN: CPT

## 2020-11-11 PROCEDURE — C1713: CPT

## 2020-11-11 RX ORDER — APIXABAN 2.5 MG/1
0 TABLET, FILM COATED ORAL
Qty: 0 | Refills: 0 | DISCHARGE

## 2020-11-11 RX ORDER — APIXABAN 2.5 MG/1
1 TABLET, FILM COATED ORAL
Qty: 9 | Refills: 0
Start: 2020-11-11 | End: 2020-11-14

## 2020-11-11 RX ORDER — OXYCODONE HYDROCHLORIDE 5 MG/1
1 TABLET ORAL
Qty: 40 | Refills: 0
Start: 2020-11-11

## 2020-11-11 RX ORDER — SENNOSIDES/DOCUSATE SODIUM 8.6MG-50MG
2 TABLET ORAL
Qty: 30 | Refills: 0
Start: 2020-11-11

## 2020-11-11 RX ADMIN — Medication 100 MILLIGRAM(S): at 02:03

## 2020-11-11 RX ADMIN — Medication 975 MILLIGRAM(S): at 06:20

## 2020-11-11 RX ADMIN — Medication 15 MILLIGRAM(S): at 06:20

## 2020-11-11 RX ADMIN — Medication 15 MILLIGRAM(S): at 00:15

## 2020-11-11 RX ADMIN — Medication 975 MILLIGRAM(S): at 05:20

## 2020-11-11 RX ADMIN — Medication 25 MILLIGRAM(S): at 05:20

## 2020-11-11 RX ADMIN — BUDESONIDE AND FORMOTEROL FUMARATE DIHYDRATE 2 PUFF(S): 160; 4.5 AEROSOL RESPIRATORY (INHALATION) at 09:11

## 2020-11-11 RX ADMIN — APIXABAN 2.5 MILLIGRAM(S): 2.5 TABLET, FILM COATED ORAL at 05:20

## 2020-11-11 RX ADMIN — Medication 500 MILLIGRAM(S): at 05:21

## 2020-11-11 RX ADMIN — Medication 150 MICROGRAM(S): at 05:20

## 2020-11-11 RX ADMIN — Medication 15 MILLIGRAM(S): at 05:20

## 2020-11-11 NOTE — OCCUPATIONAL THERAPY INITIAL EVALUATION ADULT - SENSORY TESTS
pt denies any recent changes in sensation; pt reports chronic numbness to bilateral feet; pt with +bilateral pedal pulses; pt with +capillary refill in bilateral toes

## 2020-11-11 NOTE — CHART NOTE - NSCHARTNOTEFT_GEN_A_CORE
Patient with oxygen saturation into 80's while ambulating to the restroom  Patient's O2 saturation increased to 100% when returning to bed on room air  Patient s/p Left knee arthroplasty POD#1   Patient seen and evaluated at bedside   Denies chest pain, palpitations, SOB, light headedness, dizziness.     Vital Signs  T(F): 97.5   HR: 52   BP: 140/84   RR: 18  SpO2: 100% on RA    GENERAL: NAD, lying comfortably  NERVOUS SYSTEM: Alert and awake, Good concentration  CHEST/LUNG: Clear to auscultation b/l; Unlabored respirations  HEART: S1S2+, Regular rate and rhythm  ABDOMEN: Soft, Nontender, Nondistended; BS+   EXTREMITIES:  2+ Peripheral Pulses, No LE edema, no tenderness to palpation of b/l LE  SKIN: Warm and dry    STAT ABG ordered -->   pH, Arterial: 7.37   pCO2, Arterial: 44 mmHg   pO2, Arterial: 88 mmHg   HCO3, Arterial: 24 mmoL/L   Base Excess, Arterial: 0.0 mmol/L   Oxygen Saturation, Arterial: 98 %   FIO2, Arterial: 21   Blood Gas Comments Arterial: room air   Blood Gas Source Arterial: Arterial    Patient in no acute distress at this time, no shortness of breath   Continue Eliquis 2.5mg BID   Continue with nocturnal CPAP  Continue with tele/ continuous pulse ox for further evidence of desaturation  RN to notify with any changes

## 2020-11-11 NOTE — PROGRESS NOTE ADULT - REASON FOR ADMISSION
Post Anesthesia Note





- EVALUATION WITHIN 48HRS OF ANESTHETIC


Vital Signs in Normal Range: Yes


Patient Participated in Evaluation: Yes


Respiratory Function Stable: Yes


Airway Patent: Yes


Cardiovascular Function Stable: Yes


Hydration Status Stable: Yes


Pain Control Satisfactory: Yes


Nausea and Vomiting Control Satisfactory: Yes


Mental Status Recovered: Yes


Vital Signs: 


                                Last Vital Signs











Temp  36.2 C   06/24/20 14:19


 


Pulse  67   06/24/20 14:46


 


Resp  14   06/24/20 14:46


 


BP  131/78   06/24/20 14:46


 


Pulse Ox  94 L  06/24/20 14:46














- COMMENTS/OBSERVATIONS


Free Text/Narrative:: 





No anesthesia problems
Lt knee replacement

## 2020-11-11 NOTE — PROGRESS NOTE ADULT - SUBJECTIVE AND OBJECTIVE BOX
KIERAN WINTER  [id number]    History: 65y Female is status post left total knee arthroplasty on 11/10/2020, POD # 1. Patient is doing well and is comfortable. The patient's pain is controlled using the prescribed pain medications. The patient is participating in physical therapy.   Pt got out of bed this am and O2 sat dropped to 80's.  Pt seen by sean FERNANDEZ, O2 sat returned to 100 when sat down.  Denies nausea, vomiting, chest pain, shortness of breath, palpitations, abdominal pain or fever. No new complaints.                              10.7   9.27  )-----------( 168      ( 11 Nov 2020 05:52 )             32.6     11-11    134<L>  |  100  |  24.0<H>  ----------------------------<  117<H>  5.3   |  23.0  |  1.15    Ca    8.8      11 Nov 2020 05:52        MEDICATIONS  (STANDING):  acetaminophen   Tablet .. 975 milliGRAM(s) Oral every 8 hours  acetaminophen  IVPB .. 1000 milliGRAM(s) IV Intermittent once  ALBUTerol    90 MICROgram(s) HFA Inhaler 1 Puff(s) Inhalation every 4 hours  apixaban 2.5 milliGRAM(s) Oral two times a day  budesonide  80 MICROgram(s)/formoterol 4.5 MICROgram(s) Inhaler 2 Puff(s) Inhalation two times a day  buPROPion XL . 150 milliGRAM(s) Oral daily  cephalexin 500 milliGRAM(s) Oral four times a day  hydroxychloroquine 200 milliGRAM(s) Oral daily  ketorolac   Injectable 15 milliGRAM(s) IV Push every 6 hours  lactated ringers. 1000 milliLiter(s) (150 mL/Hr) IV Continuous <Continuous>  levothyroxine 150 MICROGram(s) Oral daily  metoprolol succinate ER 25 milliGRAM(s) Oral daily  polyethylene glycol 3350 17 Gram(s) Oral daily    MEDICATIONS  (PRN):  aluminum hydroxide/magnesium hydroxide/simethicone Suspension 30 milliLiter(s) Oral four times a day PRN Indigestion  diphenhydrAMINE 25 milliGRAM(s) Oral at bedtime PRN Insomnia  HYDROmorphone   Tablet 4 milliGRAM(s) Oral every 3 hours PRN Severe Pain (7 - 10)  HYDROmorphone  Injectable 0.5 milliGRAM(s) IV Push every 3 hours PRN Severe/ breakthrough Pain (7 - 10)  magnesium hydroxide Suspension 30 milliLiter(s) Oral daily PRN Constipation  ondansetron Injectable 4 milliGRAM(s) IV Push every 6 hours PRN Nausea and/or Vomiting  oxyCODONE    IR 5 milliGRAM(s) Oral every 3 hours PRN Mild Pain (1 - 3)  oxyCODONE    IR 10 milliGRAM(s) Oral every 3 hours PRN Moderate Pain (4 - 6)  senna 2 Tablet(s) Oral at bedtime PRN Constipation      Physical exam: The left knee dressing is clean, dry and intact.  The calf is supple nontender.  No calf tenderness. Sensation to light touch is grossly intact distally. Motor function distally is 5/5. Extensor hallucis longus and flexor hallucis longus are intact. No foot drop. 2+ dorsalis pedis pulse. Capillary refill is less than 2 seconds. No cyanosis.    Primary Orthopedic Assessment:  •	s/p LEFT total knee replacement pod #1	    Plan:   •	DVT prophylaxis as prescribed, including use of compression devices and ankle pumps  •	Continue physical therapy  •	Weightbearing as tolerated of the left lower extremity with assistance of a walker  •	Incentive spirometry encouraged  •	Pain control as clinically indicated  •	Discharge planning – anticipated discharge is Home today when cleared by med/PT

## 2020-11-11 NOTE — OCCUPATIONAL THERAPY INITIAL EVALUATION ADULT - ASSISTIVE DEVICE FOR TOILET TRANSFER, REHAB EVAL
elevated toilet seat with right grab bar; recommend use of commode/raised toilet seat over standard toilet at home to increase surface height and provide armrests/rolling walker

## 2020-11-11 NOTE — OCCUPATIONAL THERAPY INITIAL EVALUATION ADULT - SPECIAL TRAINING, OT EVAL
Functional mobility around room and bathroom with modified independence with RW with overall good balance, proper foot placement/body positioning and safe negotiation of environmental obstacles. Pt without any losses of balance and with overall good safety awareness.

## 2020-11-11 NOTE — OCCUPATIONAL THERAPY INITIAL EVALUATION ADULT - ADDITIONAL COMMENTS
Pt lives in house with 3+1 CLYDE and no steps inside; bedroom and bathroom are on main level. Bathroom has shower stall with curtains. Pt owns shower chair, RW, cane, raised toilet seat, reacher. Pt is right handed. Pt drives. Pt's  works part-time however otherwise is available to assist upon discharge. Pt's daughter will be staying with pt for one week upon discharge to assist as needed. Pt's sister lives local and is able to assist upon discharge.

## 2020-11-11 NOTE — PROGRESS NOTE ADULT - ASSESSMENT
65 year old female with PMH of Sjogren's syndrome, RA, Pulm HTN, POOL on CPAP, DVT, PE - submassive requiring EKOS, HTN, afib (s/p cardiac ablations), hypothyroidism, morbidly obese with chronic c/o left knee pain with failed conservative medical therapies limiting their daily activities presented for an elective left knee replacement. She is s/p Left knee arthroplasty.      # Left knee OA - s/p Lt TKR   Pain control   Bowel regimen   Incentive spirometry  DVT px - per ortho   PT  # acute blood loss anemia 2/2 post operative state - HD stable   # Sjogren's syndrome/ RA - on Humira last dose 10/30, Plaquenil   # Afib s/p ablation - now in NSR.  - ct metoprolol with holding parameters  # Pulm HTN with asthmatic bronchitis - On anuity - advised Pre and post op albuterol prn per Pulm   # POOL on CPAP -   - use own equipment   # HTN - on lasix 40mg - hold for today   # DVT/PE - Resume Eliquis full Dose when ok with ortho   # Morbid obesity - will benefit from weight reduction, recommendation dietary eval on outpatient  # hypothyrodism - ct home dose synthroid  # Peripheral neuropathy - follows with neurology   # Depression/anxiety - on wellbutrin   # Possible CKD-3 - avoid nephrotoxic drugs. Consider Dc Celebrex.     Medically stable for discharge.

## 2020-11-12 RX ORDER — CELECOXIB 200 MG/1
1 CAPSULE ORAL
Qty: 42 | Refills: 0
Start: 2020-11-12

## 2020-11-13 ENCOUNTER — NON-APPOINTMENT (OUTPATIENT)
Age: 65
End: 2020-11-13

## 2020-11-16 RX ORDER — APIXABAN 2.5 MG/1
5 TABLET, FILM COATED ORAL
Qty: 0 | Refills: 0 | DISCHARGE
Start: 2020-11-16

## 2020-12-01 ENCOUNTER — RX RENEWAL (OUTPATIENT)
Age: 65
End: 2020-12-01

## 2020-12-02 ENCOUNTER — APPOINTMENT (OUTPATIENT)
Dept: ORTHOPEDIC SURGERY | Facility: CLINIC | Age: 65
End: 2020-12-02
Payer: COMMERCIAL

## 2020-12-02 VITALS
BODY MASS INDEX: 45.52 KG/M2 | WEIGHT: 290 LBS | SYSTOLIC BLOOD PRESSURE: 125 MMHG | HEART RATE: 85 BPM | HEIGHT: 67 IN | DIASTOLIC BLOOD PRESSURE: 79 MMHG

## 2020-12-02 PROCEDURE — 99024 POSTOP FOLLOW-UP VISIT: CPT

## 2020-12-02 PROCEDURE — 73562 X-RAY EXAM OF KNEE 3: CPT | Mod: LT

## 2020-12-02 NOTE — END OF VISIT
[FreeTextEntry3] : I, Vic Becerra, acted solely as a scribe for Dr. Qamar Bradley on this date 12/02/2020.

## 2020-12-02 NOTE — HISTORY OF PRESENT ILLNESS
[Procedure: ___] : status post [unfilled] [Clean/Dry/Intact] : clean, dry and intact [Healed] : healed [Swelling] : swollen [Xray (Date:___)] : [unfilled] Xray -  [Doing Well] : is doing well [No Sign of Infection] : is showing no signs of infection [4] : the patient reports pain that is 4/10 in severity [Adequate Pain Control] : has adequate pain control [Chills] : no chills [Constipation] : no constipation [Diarrhea] : no diarrhea [Dysuria] : no dysuria [Fever] : no fever [Nausea] : no nausea [Vomiting] : no vomiting [Erythema] : not erythematous [Discharge] : absent of discharge [Dehiscence] : not dehisced [de-identified] : S/P Left total knee arthroplasty.\par  Computerassisted tibial resection, OrthAlign procedure, DOS: 11/10/20. [de-identified] : Pt is 3 weeks post-op. Pt states that she is doing steps better now than prior to surgery. She has night pain. She only takes one tablet of medication at night. Pt ambulates with a cane. She is currently in home PT.  [de-identified] : Left knee exam shows healed incision with no sign of infection. Range of motion 0-90 degrees.  [de-identified] : 3V xray of the left knee done in the office today and reviewed by Dr. Qamar Bradley demonstrates s/p implants in good positioning with no evidence of wear, loosening, or subsidence.  [de-identified] : Pt should continue taking Aspirin BID for DVTP. We prescribed outpatient PT. Pt understands the importance of prophylaxis for invasive dental procedures. F/u with us in 3 weeks.

## 2020-12-06 ENCOUNTER — RX RENEWAL (OUTPATIENT)
Age: 65
End: 2020-12-06

## 2020-12-28 ENCOUNTER — TRANSCRIPTION ENCOUNTER (OUTPATIENT)
Age: 65
End: 2020-12-28

## 2020-12-29 ENCOUNTER — APPOINTMENT (OUTPATIENT)
Dept: ORTHOPEDIC SURGERY | Facility: CLINIC | Age: 65
End: 2020-12-29
Payer: COMMERCIAL

## 2020-12-29 VITALS
HEART RATE: 70 BPM | DIASTOLIC BLOOD PRESSURE: 103 MMHG | HEIGHT: 67 IN | BODY MASS INDEX: 45.52 KG/M2 | SYSTOLIC BLOOD PRESSURE: 166 MMHG | WEIGHT: 290 LBS

## 2020-12-29 PROCEDURE — 99024 POSTOP FOLLOW-UP VISIT: CPT

## 2020-12-29 PROCEDURE — 73562 X-RAY EXAM OF KNEE 3: CPT | Mod: 26,LT

## 2020-12-29 NOTE — HISTORY OF PRESENT ILLNESS
[0] : no pain reported [Healed] : healed [Neuro Intact] : an unremarkable neurological exam [Vascular Intact] : ~T peripheral vascular exam normal [Negative Antwon's] : maneuvers demonstrated a negative Antwon's sign [Xray (Date:___)] : [unfilled] Xray -  [Doing Well] : is doing well [Excellent Pain Control] : has excellent pain control [No Sign of Infection] : is showing no signs of infection [Chills] : no chills [Constipation] : no constipation [Diarrhea] : no diarrhea [Dysuria] : no dysuria [Fever] : no fever [Nausea] : no nausea [Erythema] : not erythematous [Discharge] : absent of discharge [Swelling] : not swollen [Dehiscence] : not dehisced [de-identified] : status post 11/10/2020. S/P Left total knee arthroplasty.\par  Computer_assisted tibial resection, OrthAlign procedure, DOS: 11/10/20.  [de-identified] : pt is 7 weeks from  from left total knee arthroplasty he is doing well. Physical therapy she walks with a cane mostly for bilateral foot neuropathy. She is not using any pain medication she is ready to return to work as a  in 2 weeks.   [de-identified] : Examination of left knee demonstrate well-healed incision. Smooth painless range of motion of 0-120 degree\par  [de-identified] : 3 view X-ray of left knee demonstrate implants are in good alignment. No evidence of subsidence or loosening or wear  [de-identified] : Patient will continue with exercise and PT I provided another PT rx. cleared to return to work on 1/11/2021 we discussed use of antibiotic before dental work for 2 years. f/u in 1 month.\par

## 2020-12-31 NOTE — BRIEF OPERATIVE NOTE - OPERATION/FINDINGS
Addended by: LAURA RODRIGUEZ on: 12/31/2020 01:55 PM     Modules accepted: Madisyn     Konrad Continuum 52/neutral/10 RNL EO/ 36+0

## 2021-01-23 ENCOUNTER — TRANSCRIPTION ENCOUNTER (OUTPATIENT)
Age: 66
End: 2021-01-23

## 2021-01-26 ENCOUNTER — APPOINTMENT (OUTPATIENT)
Dept: PULMONOLOGY | Facility: CLINIC | Age: 66
End: 2021-01-26

## 2021-02-01 ENCOUNTER — APPOINTMENT (OUTPATIENT)
Dept: ORTHOPEDIC SURGERY | Facility: CLINIC | Age: 66
End: 2021-02-01

## 2021-03-01 ENCOUNTER — RX RENEWAL (OUTPATIENT)
Age: 66
End: 2021-03-01

## 2021-03-08 ENCOUNTER — APPOINTMENT (OUTPATIENT)
Dept: FAMILY MEDICINE | Facility: CLINIC | Age: 66
End: 2021-03-08
Payer: COMMERCIAL

## 2021-03-08 VITALS
SYSTOLIC BLOOD PRESSURE: 160 MMHG | HEIGHT: 67 IN | BODY MASS INDEX: 45.2 KG/M2 | WEIGHT: 288 LBS | DIASTOLIC BLOOD PRESSURE: 110 MMHG | HEART RATE: 74 BPM

## 2021-03-08 DIAGNOSIS — M54.5 LOW BACK PAIN: ICD-10-CM

## 2021-03-08 PROCEDURE — 99214 OFFICE O/P EST MOD 30 MIN: CPT

## 2021-03-08 PROCEDURE — 99072 ADDL SUPL MATRL&STAF TM PHE: CPT

## 2021-03-10 NOTE — PHYSICAL EXAM
[No JVD] : no jugular venous distention [No Respiratory Distress] : no respiratory distress  [Clear to Auscultation] : lungs were clear to auscultation bilaterally [Regular Rhythm] : with a regular rhythm [Normal S1, S2] : normal S1 and S2 [No Spinal Tenderness] : no spinal tenderness [No Focal Deficits] : no focal deficits [Alert and Oriented x3] : oriented to person, place, and time [Normal Insight/Judgement] : insight and judgment were intact [de-identified] : moderate distress  [de-identified] : m [de-identified] : MASK   OMMM  [de-identified] : no appreciable

## 2021-03-10 NOTE — HISTORY OF PRESENT ILLNESS
[FreeTextEntry8] : 11 days of ACUTE LBP with spasming and "terrible pain" ( near tears when saying it) \par Was seen by chiropractor and received injections of Toradol/lidocaine/steroids given in L-S in  2 locations\par Relief for one day \par \par No inciting injury "Never had this before"  Sleep is difficult \par NO radicular or Alarm features\par \par

## 2021-03-10 NOTE — ASSESSMENT
[FreeTextEntry1] : Acute on CHronic LUMBAGO  IM  Medrol given    OP  steroids   and advised to take with food.\par                         ASE to muscle relaxers. will give Valium; advised no driving! \par \par Fu in 2-3 days \par

## 2021-03-10 NOTE — REVIEW OF SYSTEMS
[Fatigue] : fatigue [Unsteady Walking] : ataxia [Anxiety] : anxiety [Negative] : Psychiatric [Fever] : no fever [Dizziness] : no dizziness

## 2021-03-11 ENCOUNTER — MED ADMIN CHARGE (OUTPATIENT)
Age: 66
End: 2021-03-11

## 2021-03-11 RX ORDER — METHYLPREDNISOLONE 40 MG/ML
40 INJECTION, POWDER, LYOPHILIZED, FOR SOLUTION INTRAMUSCULAR; INTRAVENOUS
Qty: 1 | Refills: 0 | Status: COMPLETED | OUTPATIENT
Start: 2021-03-10

## 2021-03-12 ENCOUNTER — TRANSCRIPTION ENCOUNTER (OUTPATIENT)
Age: 66
End: 2021-03-12

## 2021-03-16 ENCOUNTER — TRANSCRIPTION ENCOUNTER (OUTPATIENT)
Age: 66
End: 2021-03-16

## 2021-03-17 ENCOUNTER — TRANSCRIPTION ENCOUNTER (OUTPATIENT)
Age: 66
End: 2021-03-17

## 2021-03-18 ENCOUNTER — TRANSCRIPTION ENCOUNTER (OUTPATIENT)
Age: 66
End: 2021-03-18

## 2021-03-21 ENCOUNTER — RX RENEWAL (OUTPATIENT)
Age: 66
End: 2021-03-21

## 2021-04-28 ENCOUNTER — RX RENEWAL (OUTPATIENT)
Age: 66
End: 2021-04-28

## 2021-04-28 NOTE — ASU PATIENT PROFILE, ADULT - CAREGIVER ADDRESS
DATE OF PROCEDURE: 04/27/2021



Age: 82 

Gender: Female  

Height: 152 cm  

Weight: 59 kg  



REFERRING PHYSICIAN: Hailey Ruiz MD.   



INDICATION: Syncope.  



MEASUREMENTS: 

2D Measurements:   

      Aortic root 3.0 cm

    Left atrium 3.6 cm

      Left ventricle diastole 3.5 cm 

      Intraventricular septum 1.11 cm

      Posterior wall 1.10 cm

      Inferior vena cava 1.6 cm (more than 50% respiratory variation)

      

Doppler Measurements:

      No aortic stenosis 

      No aortic regurgitation 

      Aortic valve velocity 148 cm/s

      LVOT velocity 127 cm/s

      LVOT VTI 26.9 cm 

      No mitral regurgitation 

      No mitral stenosis 

      Mitral E velocity 81.6 cm/s 

      Mitral A velocity 106 cm/s 

      Mitral deceleration time 243 msec 

      No tricuspid valve regurgitation

      No pulmonic regurgitation

      Pulmonary artery acceleration time 151 msec 



MITRAL ANNULAR TISSUE DOPPLER 

     E prime septal 5.3 cm/s, E prime lateral 7.5 cm/s 

 

DESCRIPTION: Rhythm was sinus. This was a moderately technically difficult 
echocardiogram. This was a 2D, M-mode, color flow Doppler, and pulsed wave 
Doppler examination including mitral annular tissue Doppler. 



CONCLUSIONS:

1.  Normal left ventricle internal dimensions and wall thickness. Normal 
regional LV wall motion and wall thickening. Hyperdynamic LV systolic function. 
LVEF 70% to 75% by visual assessment. Grade 1 LV diastolic dysfunction (impaired
relaxation filling pattern). 

2.  Normal right ventricle size and hyperdynamic RV systolic function. 
Suggestive of normal pulmonary artery pressure. Suggestive of central venous 
pressure 5-10 mmHg.  

3.  Mild aortic valve sclerosis. No aortic regurgitation. 

4.  No pericardial effusion. 

5.  Otherwise normal appearing echocardiogram Doppler findings.  

MTDD same

## 2021-05-14 ENCOUNTER — APPOINTMENT (OUTPATIENT)
Dept: PULMONOLOGY | Facility: CLINIC | Age: 66
End: 2021-05-14
Payer: COMMERCIAL

## 2021-05-14 VITALS
DIASTOLIC BLOOD PRESSURE: 90 MMHG | OXYGEN SATURATION: 94 % | SYSTOLIC BLOOD PRESSURE: 138 MMHG | RESPIRATION RATE: 16 BRPM | HEIGHT: 67 IN | WEIGHT: 290 LBS | TEMPERATURE: 98 F | BODY MASS INDEX: 45.52 KG/M2 | HEART RATE: 67 BPM

## 2021-05-14 PROCEDURE — 94010 BREATHING CAPACITY TEST: CPT

## 2021-05-14 PROCEDURE — 99215 OFFICE O/P EST HI 40 MIN: CPT | Mod: 25

## 2021-05-14 PROCEDURE — 99072 ADDL SUPL MATRL&STAF TM PHE: CPT

## 2021-05-14 RX ORDER — OXYCODONE AND ACETAMINOPHEN 5; 325 MG/1; MG/1
5-325 TABLET ORAL
Qty: 10 | Refills: 0 | Status: DISCONTINUED | COMMUNITY
Start: 2021-03-08 | End: 2021-05-14

## 2021-05-14 RX ORDER — FLUTICASONE FUROATE 200 UG/1
200 POWDER RESPIRATORY (INHALATION) DAILY
Qty: 1 | Refills: 5 | Status: DISCONTINUED | COMMUNITY
Start: 2020-10-30 | End: 2021-05-14

## 2021-05-14 RX ORDER — LOSARTAN POTASSIUM 25 MG/1
25 TABLET, FILM COATED ORAL DAILY
Qty: 30 | Refills: 0 | Status: DISCONTINUED | COMMUNITY
Start: 2021-03-08 | End: 2021-05-14

## 2021-05-14 RX ORDER — DIAZEPAM 5 MG/1
5 TABLET ORAL
Qty: 10 | Refills: 0 | Status: DISCONTINUED | COMMUNITY
Start: 2021-03-08 | End: 2021-05-14

## 2021-05-14 RX ORDER — PREDNISONE 20 MG/1
20 TABLET ORAL DAILY
Qty: 20 | Refills: 0 | Status: DISCONTINUED | COMMUNITY
Start: 2021-03-08 | End: 2021-05-14

## 2021-05-16 ENCOUNTER — APPOINTMENT (OUTPATIENT)
Dept: DISASTER EMERGENCY | Facility: CLINIC | Age: 66
End: 2021-05-16

## 2021-05-22 ENCOUNTER — OUTPATIENT (OUTPATIENT)
Dept: OUTPATIENT SERVICES | Facility: HOSPITAL | Age: 66
LOS: 1 days | End: 2021-05-22
Payer: COMMERCIAL

## 2021-05-22 ENCOUNTER — APPOINTMENT (OUTPATIENT)
Dept: RADIOLOGY | Facility: CLINIC | Age: 66
End: 2021-05-22
Payer: COMMERCIAL

## 2021-05-22 DIAGNOSIS — Z98.890 OTHER SPECIFIED POSTPROCEDURAL STATES: Chronic | ICD-10-CM

## 2021-05-22 DIAGNOSIS — Z96.641 PRESENCE OF RIGHT ARTIFICIAL HIP JOINT: Chronic | ICD-10-CM

## 2021-05-22 DIAGNOSIS — Z90.49 ACQUIRED ABSENCE OF OTHER SPECIFIED PARTS OF DIGESTIVE TRACT: Chronic | ICD-10-CM

## 2021-05-22 DIAGNOSIS — Z95.828 PRESENCE OF OTHER VASCULAR IMPLANTS AND GRAFTS: Chronic | ICD-10-CM

## 2021-05-22 DIAGNOSIS — M48.062 SPINAL STENOSIS, LUMBAR REGION WITH NEUROGENIC CLAUDICATION: ICD-10-CM

## 2021-05-22 DIAGNOSIS — Z00.00 ENCOUNTER FOR GENERAL ADULT MEDICAL EXAMINATION WITHOUT ABNORMAL FINDINGS: ICD-10-CM

## 2021-05-22 PROCEDURE — 72110 X-RAY EXAM L-2 SPINE 4/>VWS: CPT | Mod: 26

## 2021-05-22 PROCEDURE — 72110 X-RAY EXAM L-2 SPINE 4/>VWS: CPT

## 2021-05-30 ENCOUNTER — RX RENEWAL (OUTPATIENT)
Age: 66
End: 2021-05-30

## 2021-06-28 ENCOUNTER — RESULT REVIEW (OUTPATIENT)
Age: 66
End: 2021-06-28

## 2021-06-28 ENCOUNTER — APPOINTMENT (OUTPATIENT)
Dept: FAMILY MEDICINE | Facility: CLINIC | Age: 66
End: 2021-06-28
Payer: COMMERCIAL

## 2021-06-28 VITALS
SYSTOLIC BLOOD PRESSURE: 125 MMHG | HEART RATE: 59 BPM | DIASTOLIC BLOOD PRESSURE: 80 MMHG | BODY MASS INDEX: 45.04 KG/M2 | WEIGHT: 287 LBS | RESPIRATION RATE: 16 BRPM | HEIGHT: 67 IN | OXYGEN SATURATION: 99 %

## 2021-06-28 DIAGNOSIS — Z01.818 ENCOUNTER FOR OTHER PREPROCEDURAL EXAMINATION: ICD-10-CM

## 2021-06-28 DIAGNOSIS — Z12.11 ENCOUNTER FOR SCREENING FOR MALIGNANT NEOPLASM OF COLON: ICD-10-CM

## 2021-06-28 PROCEDURE — 36415 COLL VENOUS BLD VENIPUNCTURE: CPT

## 2021-06-28 PROCEDURE — 99397 PER PM REEVAL EST PAT 65+ YR: CPT | Mod: 25

## 2021-06-28 PROCEDURE — 99214 OFFICE O/P EST MOD 30 MIN: CPT | Mod: 25

## 2021-06-28 RX ORDER — ADALIMUMAB 40MG/0.4ML
40 KIT SUBCUTANEOUS
Qty: 2 | Refills: 0 | Status: DISCONTINUED | COMMUNITY
Start: 2020-12-12 | End: 2021-06-28

## 2021-06-28 RX ORDER — ADALIMUMAB 40MG/0.4ML
40 KIT SUBCUTANEOUS
Qty: 2 | Refills: 0 | Status: DISCONTINUED | COMMUNITY
Start: 2020-09-18 | End: 2021-06-28

## 2021-06-28 RX ORDER — LEVALBUTEROL TARTRATE 45 UG/1
45 AEROSOL, METERED ORAL
Qty: 1 | Refills: 3 | Status: DISCONTINUED | COMMUNITY
Start: 2021-05-14 | End: 2021-06-28

## 2021-06-30 PROBLEM — Z01.818 PRE-OP EVALUATION: Status: RESOLVED | Noted: 2017-04-21 | Resolved: 2021-06-30

## 2021-06-30 NOTE — HEALTH RISK ASSESSMENT
[Good] : ~his/her~  mood as  good [No falls in past year] : Patient reported no falls in the past year [0] : 2) Feeling down, depressed, or hopeless: Not at all (0) [None] : None [With Significant Other] : lives with significant other [Employed] : employed [High School] : high school [] :  [# Of Children ___] : has [unfilled] children [Feels Safe at Home] : Feels safe at home [Fully functional (bathing, dressing, toileting, transferring, walking, feeding)] : Fully functional (bathing, dressing, toileting, transferring, walking, feeding) [Fully functional (using the telephone, shopping, preparing meals, housekeeping, doing laundry, using] : Fully functional and needs no help or supervision to perform IADLs (using the telephone, shopping, preparing meals, housekeeping, doing laundry, using transportation, managing medications and managing finances) [Smoke Detector] : smoke detector [Carbon Monoxide Detector] : carbon monoxide detector [Seat Belt] :  uses seat belt [Intercurrent hospitalizations] : was admitted to the hospital  [Yes] : Yes [2 - 4 times a month (2 pts)] : 2-4 times a month (2 points) [FreeTextEntry1] : PAIN [] : No [de-identified] : Pulmonology /Dr. Meek;  cardiology/Dr. Houston/ Rheumatology  Dr. QUINTON Leon  [facility]  [de-identified] : limited due to pain  [Formerly named [facility]] : 11 [[id number]] : 0 [Patient reported mammogram was normal] : Patient reported mammogram was normal [Change in mental status noted] : No change in mental status noted [Reports changes in hearing] : Reports no changes in hearing [Guns at Home] : no guns at home [Travel to Developing Areas] : does not  travel to developing areas [MammogramDate] : 2019 [BoneDensityDate] : 2019 [BoneDensityComments] : Oporosis 2.7 femoral neck SPINE NORMAL; osteopenia left hip  [ColonoscopyDate] : "Years"  [FreeTextEntry2] :  at [facility] .

## 2021-06-30 NOTE — ADDENDUM
[FreeTextEntry1] : I, Fausto Apple acting as a scribe for Dr. Erika Espinoza MD on 06/28/2021 at 10:25 AM.\par

## 2021-06-30 NOTE — COUNSELING
[Healthy eating counseling provided] : healthy eating [Fall prevention counseling provided] : Fall prevention counseling provided [Adequate lighting] : Adequate lighting [No throw rugs] : No throw rugs [Use proper foot wear] : Use proper foot wear [Use recommended devices] : Use recommended devices [de-identified] : advised meal replacement with Premier Protein Shake daily and drink 60-80 ounces water daily.\par

## 2021-06-30 NOTE — HISTORY OF PRESENT ILLNESS
[FreeTextEntry1] : AMCW\par Last was June 2020.\par Last seen by me in March 2021 for acute, and encounter reviewed. \par Has been evaluated by ORTHO, Dr. Best and   and Pain Management ( Dr. Harley Gonzalez) for acute on chronic L-S pain.\par \par MRI reviewed ( 6/2/21): Significant for subacute compression fxs L2/3;  OLD compression deformities T 11, 12 and L4 ; spondylosis \par advised repeat DEXA and consult with her rheumatologist!  \par \par Cardioverted at [facility] ( for AFIB) May 4th "and still in normal rhythm". \par FOLLOWS with Pulmonology for "Asthmatic Bronchitis"; POOL NO RECENT Exacerbations. Last encounter MAy 14, 2921, reviewed ; compliant with CPAP; is using inhaler Arunity  [de-identified] : Madelyn is a 67 y/o F here for CPE. She has received the Covid-19 vaccination.\par  Her back pain is persistent but improving. \par Is concerned that bone weakness could be due to long term prednisone use. Claims that she has been feeling  relief of neuropathy with  from Gabapentin.  Has been doing PT at [facility]. She has had 2 epidural injections in L4 and L5. She is currently on medical leave.\par \par In review, 2020:\par In recent year, Followed with ORTHO Dr. Bradley, and advised LEFT TKR. \par Cardiac clearance noted however no scheduled date as yet. \par Cardiac status stable. \par \par With regard to COVID worked her usual job ;  lives with  who also works. \par No concerning symptoms for illness. \par \par \par In review : April 2019 \par Last seen  here in Feb. 2018 and encounter reviewed.\par Since reports: \par Episodes of AFIB ( cardiology reviewed 2/19) ; last episode January\par Consultation with EPS MD (Gato) at [facility] next week\par \par Rheumatology  Dr. Zavala  remains on Plaquenil  unable to come off Prednisone due to joint pain  managed on 5 mgs daily ; now on Duloxetine 30 mgs \par \par Dermatologist ( [facility] office) for LEFT SHIN wound as consequence of rubbing from low boots and treated with Silvadene .  \par Seen in  consultation with  Vascular, Dr. Tillman , and now in [place] [employer].

## 2021-06-30 NOTE — END OF VISIT
[FreeTextEntry3] : Medical record entries made by the scribe today, were at my direction and personally dictated to them by me, Dr. Erika Espinoza on 06/28/2021. I have reviewed the chart and agree that the record accurately reflects my personal performance of the history, physical exam, assessment and plan.\par

## 2021-06-30 NOTE — REVIEW OF SYSTEMS
[Anxiety] : anxiety [Dyspnea on Exertion] : dyspnea on exertion [Negative] : Gastrointestinal [Fever] : no fever [Night Sweats] : no night sweats [Redness] : no redness [Vision Problems] : no vision problems [Chest Pain] : no chest pain [FreeTextEntry3] : Left  eye upper lid stye; follows with DERM injections  [FreeTextEntry9] : Spinal stenosis   as in hpi

## 2021-06-30 NOTE — PHYSICAL EXAM
[No Acute Distress] : no acute distress [Well Developed] : well developed [Normal Sclera/Conjunctiva] : normal sclera/conjunctiva [Normal TMs] : both tympanic membranes were normal [Supple] : supple [No Lymphadenopathy] : no lymphadenopathy [No Respiratory Distress] : no respiratory distress  [Clear to Auscultation] : lungs were clear to auscultation bilaterally [Normal Rate] : normal rate  [Regular Rhythm] : with a regular rhythm [Soft] : abdomen soft [Non Tender] : non-tender [Normal Supraclavicular Nodes] : no supraclavicular lymphadenopathy [No Spinal Tenderness] : no spinal tenderness [No Rash] : no rash [No Focal Deficits] : no focal deficits [Alert and Oriented x3] : oriented to person, place, and time [Normal Insight/Judgement] : insight and judgment were intact [Kyphosis] : no kyphosis [de-identified] : calm and  engaging [de-identified] : as in ROS  [de-identified] : difficult to visualize pharynx  [de-identified] : 1+ edema ankles and pedal region  [de-identified] : OBESE  [de-identified] : CANE    [de-identified] : warm and dry  [de-identified] : a bit antalgic

## 2021-06-30 NOTE — PLAN
[FreeTextEntry1] : CPE for 66 year old F with PMH as stated in HPI / active list. \par \par Management : \par \par See HPI and Plan\par \par Cholesterol, thyroid labs in office today.\par -bone density   FU with rheumatology in consideration of MRI of L-S results and OP as noted in 2019 DEXA. \par -mammogram\par \par Labs from [facility] in May reviewed, BMP unremarkable, elevated SED rate (17), CBC unremarkable.\par \par Best wishes offered!\par

## 2021-07-04 ENCOUNTER — TRANSCRIPTION ENCOUNTER (OUTPATIENT)
Age: 66
End: 2021-07-04

## 2021-07-04 LAB
CHOLEST SERPL-MCNC: 176 MG/DL
ESTIMATED AVERAGE GLUCOSE: 94 MG/DL
HBA1C MFR BLD HPLC: 4.9 %
HDLC SERPL-MCNC: 68 MG/DL
LDLC SERPL CALC-MCNC: 75 MG/DL
NONHDLC SERPL-MCNC: 108 MG/DL
TRIGL SERPL-MCNC: 166 MG/DL
TSH SERPL-ACNC: 0.78 UIU/ML

## 2021-07-06 ENCOUNTER — TRANSCRIPTION ENCOUNTER (OUTPATIENT)
Age: 66
End: 2021-07-06

## 2021-07-07 ENCOUNTER — APPOINTMENT (OUTPATIENT)
Dept: MAMMOGRAPHY | Facility: CLINIC | Age: 66
End: 2021-07-07
Payer: COMMERCIAL

## 2021-07-07 ENCOUNTER — APPOINTMENT (OUTPATIENT)
Dept: RADIOLOGY | Facility: CLINIC | Age: 66
End: 2021-07-07
Payer: COMMERCIAL

## 2021-07-07 ENCOUNTER — RESULT REVIEW (OUTPATIENT)
Age: 66
End: 2021-07-07

## 2021-07-07 ENCOUNTER — OUTPATIENT (OUTPATIENT)
Dept: OUTPATIENT SERVICES | Facility: HOSPITAL | Age: 66
LOS: 1 days | End: 2021-07-07
Payer: COMMERCIAL

## 2021-07-07 DIAGNOSIS — Z98.890 OTHER SPECIFIED POSTPROCEDURAL STATES: Chronic | ICD-10-CM

## 2021-07-07 DIAGNOSIS — Z90.49 ACQUIRED ABSENCE OF OTHER SPECIFIED PARTS OF DIGESTIVE TRACT: Chronic | ICD-10-CM

## 2021-07-07 DIAGNOSIS — Z96.641 PRESENCE OF RIGHT ARTIFICIAL HIP JOINT: Chronic | ICD-10-CM

## 2021-07-07 DIAGNOSIS — Z13.820 ENCOUNTER FOR SCREENING FOR OSTEOPOROSIS: ICD-10-CM

## 2021-07-07 DIAGNOSIS — Z12.31 ENCOUNTER FOR SCREENING MAMMOGRAM FOR MALIGNANT NEOPLASM OF BREAST: ICD-10-CM

## 2021-07-07 DIAGNOSIS — Z95.828 PRESENCE OF OTHER VASCULAR IMPLANTS AND GRAFTS: Chronic | ICD-10-CM

## 2021-07-07 PROCEDURE — 77067 SCR MAMMO BI INCL CAD: CPT

## 2021-07-07 PROCEDURE — 77080 DXA BONE DENSITY AXIAL: CPT | Mod: 26

## 2021-07-07 PROCEDURE — 77063 BREAST TOMOSYNTHESIS BI: CPT | Mod: 26

## 2021-07-07 PROCEDURE — 77067 SCR MAMMO BI INCL CAD: CPT | Mod: 26

## 2021-07-07 PROCEDURE — 77080 DXA BONE DENSITY AXIAL: CPT

## 2021-07-07 PROCEDURE — 77063 BREAST TOMOSYNTHESIS BI: CPT

## 2021-07-15 ENCOUNTER — OUTPATIENT (OUTPATIENT)
Dept: OUTPATIENT SERVICES | Facility: HOSPITAL | Age: 66
LOS: 1 days | End: 2021-07-15
Payer: COMMERCIAL

## 2021-07-15 ENCOUNTER — APPOINTMENT (OUTPATIENT)
Dept: FAMILY MEDICINE | Facility: CLINIC | Age: 66
End: 2021-07-15

## 2021-07-15 ENCOUNTER — APPOINTMENT (OUTPATIENT)
Dept: RADIOLOGY | Facility: CLINIC | Age: 66
End: 2021-07-15
Payer: COMMERCIAL

## 2021-07-15 ENCOUNTER — RESULT REVIEW (OUTPATIENT)
Age: 66
End: 2021-07-15

## 2021-07-15 DIAGNOSIS — Z98.890 OTHER SPECIFIED POSTPROCEDURAL STATES: Chronic | ICD-10-CM

## 2021-07-15 DIAGNOSIS — Z90.49 ACQUIRED ABSENCE OF OTHER SPECIFIED PARTS OF DIGESTIVE TRACT: Chronic | ICD-10-CM

## 2021-07-15 DIAGNOSIS — M54.9 DORSALGIA, UNSPECIFIED: ICD-10-CM

## 2021-07-15 DIAGNOSIS — Z95.828 PRESENCE OF OTHER VASCULAR IMPLANTS AND GRAFTS: Chronic | ICD-10-CM

## 2021-07-15 DIAGNOSIS — Z96.641 PRESENCE OF RIGHT ARTIFICIAL HIP JOINT: Chronic | ICD-10-CM

## 2021-07-15 PROCEDURE — 72070 X-RAY EXAM THORAC SPINE 2VWS: CPT

## 2021-07-15 PROCEDURE — 72070 X-RAY EXAM THORAC SPINE 2VWS: CPT | Mod: 26

## 2021-07-21 ENCOUNTER — OUTPATIENT (OUTPATIENT)
Dept: OUTPATIENT SERVICES | Facility: HOSPITAL | Age: 66
LOS: 1 days | End: 2021-07-21
Payer: COMMERCIAL

## 2021-07-21 ENCOUNTER — APPOINTMENT (OUTPATIENT)
Dept: CT IMAGING | Facility: CLINIC | Age: 66
End: 2021-07-21
Payer: COMMERCIAL

## 2021-07-21 DIAGNOSIS — Z90.49 ACQUIRED ABSENCE OF OTHER SPECIFIED PARTS OF DIGESTIVE TRACT: Chronic | ICD-10-CM

## 2021-07-21 DIAGNOSIS — Z98.890 OTHER SPECIFIED POSTPROCEDURAL STATES: Chronic | ICD-10-CM

## 2021-07-21 DIAGNOSIS — R06.2 WHEEZING: ICD-10-CM

## 2021-07-21 DIAGNOSIS — Z95.828 PRESENCE OF OTHER VASCULAR IMPLANTS AND GRAFTS: Chronic | ICD-10-CM

## 2021-07-21 DIAGNOSIS — Z96.641 PRESENCE OF RIGHT ARTIFICIAL HIP JOINT: Chronic | ICD-10-CM

## 2021-07-21 DIAGNOSIS — Z00.8 ENCOUNTER FOR OTHER GENERAL EXAMINATION: ICD-10-CM

## 2021-07-21 PROCEDURE — 71250 CT THORAX DX C-: CPT

## 2021-07-21 PROCEDURE — 71250 CT THORAX DX C-: CPT | Mod: 26

## 2021-07-27 ENCOUNTER — RX RENEWAL (OUTPATIENT)
Age: 66
End: 2021-07-27

## 2021-08-28 ENCOUNTER — RX RENEWAL (OUTPATIENT)
Age: 66
End: 2021-08-28

## 2021-09-05 ENCOUNTER — RX RENEWAL (OUTPATIENT)
Age: 66
End: 2021-09-05

## 2021-09-08 ENCOUNTER — APPOINTMENT (OUTPATIENT)
Dept: PULMONOLOGY | Facility: CLINIC | Age: 66
End: 2021-09-08

## 2021-09-09 ENCOUNTER — NON-APPOINTMENT (OUTPATIENT)
Age: 66
End: 2021-09-09

## 2021-09-09 ENCOUNTER — APPOINTMENT (OUTPATIENT)
Dept: FAMILY MEDICINE | Facility: CLINIC | Age: 66
End: 2021-09-09
Payer: COMMERCIAL

## 2021-09-09 VITALS
DIASTOLIC BLOOD PRESSURE: 84 MMHG | HEART RATE: 74 BPM | BODY MASS INDEX: 45.2 KG/M2 | SYSTOLIC BLOOD PRESSURE: 140 MMHG | WEIGHT: 288 LBS | HEIGHT: 67 IN | OXYGEN SATURATION: 98 %

## 2021-09-09 PROCEDURE — 99215 OFFICE O/P EST HI 40 MIN: CPT

## 2021-09-16 NOTE — END OF VISIT
[FreeTextEntry3] : Medical record entries made by the scribe today, were at my direction and personally dictated to them by me, Dr. Erika Espinoza on 09/09/2021. I have reviewed the chart and agree that the record accurately reflects my personal performance of the history, physical exam, assessment and plan.\par

## 2021-09-16 NOTE — HISTORY OF PRESENT ILLNESS
[FreeTextEntry1] : F/u   REQUESTING completion of disability forms for her employer. \par \par Last seen in office 6/28/21 for CPE, encounter reviewed.\par She has received the COVID-19 vaccination. [de-identified] : KIERAN WINTER is a 66 year old female who presents for follow-up evaluation of of severe back pain.\par CONSULTS with following reviewed today and noted for: \par Has been seen in evaluation with ORTHO, Dr. Sissy Diaz as well as PAIN Management and Rheumatology for compression fractures L 1,\par L 2 and L 3 as well as most recently T- 9.\par Pain is not associated with Radicular features or ALARM features. \par Prescribed PT and TLSO brace.\par Rheumatology is now aggressively managing her osteoporosis with self SQ  injections of Tymlos  ( PTH ) \par as well as Humira for psoriatic arthritis.\par C/o significant constant fatigue.\par \par Following with cardiology on 9/22/21.

## 2021-09-16 NOTE — ADDENDUM
[FreeTextEntry1] : I, Fausto Apple acting as a scribe for Dr. Erika Espinoza MD on 09/09/2021 at 3:44 PM.\par

## 2021-09-16 NOTE — PLAN
[FreeTextEntry1] : F/u for 66 year old F with PMH as stated in HPI / active list. \par \par Management : \par \par Much support rendered. \par \par See HPI and Plan\par \par Forms filled for handicap parking Today.\par \par Will complete disability forms .\par \par Follow up with specialists as planned. \par \par Continue PT. \par \par Best wishes offered!\par \par \par Time Based Billing:   I have spent  45   minutes of time for this encounter.\par  Greater than 50 % of the face to face encounter time was spent on review of chart and consultations as well as  medication reconciliation,  counseling and/or coordination of care.                                                    .

## 2021-10-11 ENCOUNTER — APPOINTMENT (OUTPATIENT)
Dept: FAMILY MEDICINE | Facility: CLINIC | Age: 66
End: 2021-10-11
Payer: MEDICARE

## 2021-10-11 PROCEDURE — G0008: CPT

## 2021-10-11 PROCEDURE — 90662 IIV NO PRSV INCREASED AG IM: CPT

## 2021-10-20 ENCOUNTER — TRANSCRIPTION ENCOUNTER (OUTPATIENT)
Age: 66
End: 2021-10-20

## 2021-11-23 ENCOUNTER — TRANSCRIPTION ENCOUNTER (OUTPATIENT)
Age: 66
End: 2021-11-23

## 2021-11-26 ENCOUNTER — RX RENEWAL (OUTPATIENT)
Age: 66
End: 2021-11-26

## 2022-01-11 ENCOUNTER — TRANSCRIPTION ENCOUNTER (OUTPATIENT)
Age: 67
End: 2022-01-11

## 2022-02-17 ENCOUNTER — APPOINTMENT (OUTPATIENT)
Dept: PSYCHIATRY | Facility: CLINIC | Age: 67
End: 2022-02-17
Payer: MEDICARE

## 2022-02-17 DIAGNOSIS — F33.1 MAJOR DEPRESSIVE DISORDER, RECURRENT, MODERATE: ICD-10-CM

## 2022-02-17 PROCEDURE — 90791 PSYCH DIAGNOSTIC EVALUATION: CPT | Mod: 95

## 2022-02-18 PROBLEM — F33.1 MODERATE EPISODE OF RECURRENT MAJOR DEPRESSIVE DISORDER: Status: ACTIVE | Noted: 2022-02-18

## 2022-02-24 ENCOUNTER — APPOINTMENT (OUTPATIENT)
Dept: PSYCHIATRY | Facility: CLINIC | Age: 67
End: 2022-02-24
Payer: MEDICARE

## 2022-02-24 PROCEDURE — 90837 PSYTX W PT 60 MINUTES: CPT | Mod: 95

## 2022-03-02 ENCOUNTER — APPOINTMENT (OUTPATIENT)
Dept: PSYCHIATRY | Facility: CLINIC | Age: 67
End: 2022-03-02
Payer: MEDICARE

## 2022-03-02 PROCEDURE — 90837 PSYTX W PT 60 MINUTES: CPT

## 2022-03-09 ENCOUNTER — APPOINTMENT (OUTPATIENT)
Dept: PSYCHIATRY | Facility: CLINIC | Age: 67
End: 2022-03-09
Payer: MEDICARE

## 2022-03-09 PROCEDURE — 90837 PSYTX W PT 60 MINUTES: CPT | Mod: 95

## 2022-03-16 ENCOUNTER — APPOINTMENT (OUTPATIENT)
Dept: PSYCHIATRY | Facility: CLINIC | Age: 67
End: 2022-03-16
Payer: MEDICARE

## 2022-03-16 PROCEDURE — 90837 PSYTX W PT 60 MINUTES: CPT

## 2022-03-23 ENCOUNTER — APPOINTMENT (OUTPATIENT)
Dept: PSYCHIATRY | Facility: CLINIC | Age: 67
End: 2022-03-23
Payer: MEDICARE

## 2022-03-23 PROCEDURE — 90837 PSYTX W PT 60 MINUTES: CPT

## 2022-03-31 ENCOUNTER — APPOINTMENT (OUTPATIENT)
Dept: PSYCHIATRY | Facility: CLINIC | Age: 67
End: 2022-03-31
Payer: MEDICARE

## 2022-03-31 PROCEDURE — 90837 PSYTX W PT 60 MINUTES: CPT

## 2022-04-06 ENCOUNTER — APPOINTMENT (OUTPATIENT)
Dept: PSYCHIATRY | Facility: CLINIC | Age: 67
End: 2022-04-06
Payer: MEDICARE

## 2022-04-06 PROCEDURE — 90837 PSYTX W PT 60 MINUTES: CPT

## 2022-04-13 ENCOUNTER — APPOINTMENT (OUTPATIENT)
Dept: PSYCHIATRY | Facility: CLINIC | Age: 67
End: 2022-04-13
Payer: MEDICARE

## 2022-04-13 PROCEDURE — 90837 PSYTX W PT 60 MINUTES: CPT

## 2022-04-20 ENCOUNTER — APPOINTMENT (OUTPATIENT)
Dept: PSYCHIATRY | Facility: CLINIC | Age: 67
End: 2022-04-20
Payer: MEDICARE

## 2022-04-20 PROCEDURE — 90837 PSYTX W PT 60 MINUTES: CPT

## 2022-04-27 ENCOUNTER — APPOINTMENT (OUTPATIENT)
Dept: PSYCHIATRY | Facility: CLINIC | Age: 67
End: 2022-04-27
Payer: MEDICARE

## 2022-04-27 PROCEDURE — 90837 PSYTX W PT 60 MINUTES: CPT

## 2022-05-04 ENCOUNTER — APPOINTMENT (OUTPATIENT)
Dept: PSYCHIATRY | Facility: CLINIC | Age: 67
End: 2022-05-04
Payer: MEDICARE

## 2022-05-04 PROCEDURE — 90837 PSYTX W PT 60 MINUTES: CPT

## 2022-05-11 ENCOUNTER — APPOINTMENT (OUTPATIENT)
Dept: PSYCHIATRY | Facility: CLINIC | Age: 67
End: 2022-05-11
Payer: MEDICARE

## 2022-05-11 PROCEDURE — 90837 PSYTX W PT 60 MINUTES: CPT

## 2022-05-13 ENCOUNTER — APPOINTMENT (OUTPATIENT)
Dept: PULMONOLOGY | Facility: CLINIC | Age: 67
End: 2022-05-13
Payer: MEDICARE

## 2022-05-13 VITALS — HEART RATE: 70 BPM | OXYGEN SATURATION: 98 %

## 2022-05-13 VITALS — BODY MASS INDEX: 43.85 KG/M2 | WEIGHT: 280 LBS | DIASTOLIC BLOOD PRESSURE: 64 MMHG | SYSTOLIC BLOOD PRESSURE: 116 MMHG

## 2022-05-13 PROCEDURE — 99214 OFFICE O/P EST MOD 30 MIN: CPT

## 2022-05-13 RX ORDER — HYDROXYCHLOROQUINE SULFATE 200 MG/1
200 TABLET ORAL
Refills: 0 | Status: DISCONTINUED | COMMUNITY
End: 2022-05-13

## 2022-05-13 RX ORDER — GABAPENTIN 100 MG/1
100 CAPSULE ORAL
Qty: 180 | Refills: 1 | Status: DISCONTINUED | COMMUNITY
Start: 2020-02-06 | End: 2022-05-13

## 2022-05-13 RX ORDER — METFORMIN HYDROCHLORIDE 500 MG/1
500 TABLET, COATED ORAL
Qty: 180 | Refills: 0 | Status: DISCONTINUED | COMMUNITY
Start: 2019-11-19 | End: 2022-05-13

## 2022-05-13 RX ORDER — BUPROPION HYDROCHLORIDE 100 MG/1
100 TABLET, FILM COATED, EXTENDED RELEASE ORAL
Qty: 90 | Refills: 2 | Status: DISCONTINUED | COMMUNITY
Start: 2022-01-11 | End: 2022-05-13

## 2022-05-19 ENCOUNTER — APPOINTMENT (OUTPATIENT)
Dept: PSYCHIATRY | Facility: CLINIC | Age: 67
End: 2022-05-19
Payer: MEDICARE

## 2022-05-19 PROCEDURE — 90837 PSYTX W PT 60 MINUTES: CPT

## 2022-05-20 ENCOUNTER — APPOINTMENT (OUTPATIENT)
Age: 67
End: 2022-05-20
Payer: MEDICARE

## 2022-05-20 ENCOUNTER — APPOINTMENT (OUTPATIENT)
Dept: PULMONOLOGY | Facility: CLINIC | Age: 67
End: 2022-05-20

## 2022-05-20 VITALS — WEIGHT: 280 LBS | BODY MASS INDEX: 47.8 KG/M2 | HEIGHT: 64 IN

## 2022-05-20 VITALS
DIASTOLIC BLOOD PRESSURE: 82 MMHG | OXYGEN SATURATION: 94 % | HEART RATE: 84 BPM | SYSTOLIC BLOOD PRESSURE: 128 MMHG | RESPIRATION RATE: 16 BRPM

## 2022-05-20 PROCEDURE — 99214 OFFICE O/P EST MOD 30 MIN: CPT | Mod: 25

## 2022-05-20 PROCEDURE — 94010 BREATHING CAPACITY TEST: CPT

## 2022-05-25 ENCOUNTER — APPOINTMENT (OUTPATIENT)
Dept: PSYCHIATRY | Facility: CLINIC | Age: 67
End: 2022-05-25
Payer: MEDICARE

## 2022-05-25 ENCOUNTER — NON-APPOINTMENT (OUTPATIENT)
Age: 67
End: 2022-05-25

## 2022-05-25 PROCEDURE — 90837 PSYTX W PT 60 MINUTES: CPT

## 2022-05-26 ENCOUNTER — TRANSCRIPTION ENCOUNTER (OUTPATIENT)
Age: 67
End: 2022-05-26

## 2022-05-27 ENCOUNTER — APPOINTMENT (OUTPATIENT)
Age: 67
End: 2022-05-27

## 2022-05-31 ENCOUNTER — TRANSCRIPTION ENCOUNTER (OUTPATIENT)
Age: 67
End: 2022-05-31

## 2022-06-02 NOTE — REVIEW OF SYSTEMS
HEART CARE CONSULTATON NOTE        Assessment/Recommendations   Assessment:   1.  Chronic congestive heart failure with reduced ejection fraction.  Ischemic cardiomyopathy. LVEF: 40-45%. 2.  Coronary Artery disease with occluded mid right coronary artery.  Collaterals from the left and proximal RCA.   2.  Severe coronary calcification on CT, coronary artery disease.   3.  Ischemic cardiomyopathy  4.  Pulmonary embolism, bilateral  5.  DVT right leg   6.  Severe anemia  Hemoglobin   Date Value Ref Range Status   05/01/2022 8.6 (L) 13.3 - 17.7 g/dL Final     7.  Thrombocytopenia,  Platelet Count   Date Value Ref Range Status   05/01/2022 351 150 - 450 10e3/uL Final     8.  Myeloproliferative disorder      Plan:   1.  Lasix 10 mg daily  2.  Stop lisinopril, change losartan 12.5 mg daily given diarrhea concern (if no improvement will reduce atorvastatin or switch to Crestor)   3.  Continue atorvastatin to 80 mg daily  4.  Warfarin for DVT  5.  Aspirin for coronary disease    Today's clinic visit entailed:  Review of prior external note(s) from - [facility] information from [facility] reviewed  Review of the result(s) of each unique test - labs, echo, cath  The following tests were independently interpreted by me as noted in my documentation: ECG  Prescription drug management    The level of medical decision making during this visit was of moderate complexity.       History of Present Illness/Subjective    Patient presents today after discharge from the hospital for congestive heart failure.  Something discharged in the hospital he has had a stable course.  Currently notes mild dyspnea with strenuous activity such as carrying heavy object.  Otherwise no active chest pain.  He has mild lower extremity edema.  Checking his weight daily and's been very stable.  Weight has not changed more than 1 pound in the past month.  Blood pressures controlled today.  Recent labs demonstrate normal renal function.    ECG: April 2022.   Sinus rhythm with premature atrial contractions.  Inferior infarct pattern    The most recent outpatient note by Dr. Castellon.      Long conversation regarding this coronary artery disease.    Regarding medications he is worried that there is a side effect with his lisinopril causing diarrhea.  We will switch to losartan.  If no improvement would consider switching his atorvastatin to Crestor.    Reviewed hospital discharge summary.  Reviewed outpatient note from [facility].  Reviewed outpatient note from [facility].    ECHO:   1. The left ventricle is normal in size. Left ventricular systolic performance  is moderately reduced. The ejection fraction is estimated to be 40%.  2. There is severe inferior hypokinesis. There is moderate anteroseptal  hypokinesis. In addition, there is moderate mid to distal posterior  hypokinesis and severe distal lateral hypokinesis  3. No significant valvular heart disease is identified on this study.  4. Borderline right ventricular enlargement with low normal right ventricular  systolic performance.  5. There is mild left atrial enlargement.  6. Right ventricular systolic pressure relative to right atrial pressure is  mildly increased. The pulmonary artery pressure is estimated to be 45-50mmHg  plus right atrial pressure (the IVC is of normal caliber).    Coronary Angiogram:   Left Main   Mid LM to Dist LM lesion is 25% stenosed. The lesion is calcified.   Left Anterior Descending   Prox LAD to Mid LAD lesion is 20% stenosed. The lesion is calcified.   Ramus Intermedius   The vessel is large.   Left Circumflex   Mid Cx lesion is 30% stenosed. The lesion is calcified.   First Obtuse Marginal Branch   The vessel is small.   Second Obtuse Marginal Branch   The vessel is small.   Right Coronary Artery   Prox RCA lesion is 90% stenosed.   Prox RCA to Dist RCA lesion is 100% stenosed.   Acute Marginal Branch   Collaterals   Acute Mrg filled by collaterals from RV Branch.      Right  Posterior Descending Artery   Collaterals   RPDA filled by collaterals from 2nd Sept.      Right Posterior Atrioventricular Artery   Collaterals   RPAV filled by collaterals from Dist Cx          Physical Examination  Review of Systems   VITALS: /60 (BP Location: Right arm, Patient Position: Sitting, Cuff Size: Adult Regular)   Pulse 64   Resp 16   Wt 89.4 kg (197 lb 1.6 oz)   BMI 31.81 kg/m    BMI: Body mass index is 31.81 kg/m .  Wt Readings from Last 3 Encounters:   06/02/22 89.4 kg (197 lb 1.6 oz)   05/12/22 88.9 kg (196 lb)   05/01/22 89 kg (196 lb 1.6 oz)       Intake/Output Summary (Last 24 hours) at 4/23/2022 0835  Last data filed at 4/23/2022 0627  Gross per 24 hour   Intake 740 ml   Output 571 ml   Net 169 ml     General Appearance:   no distress, normal body habitus, in bed.    ENT/Mouth: membranes moist, no oral lesions or bleeding gums.      EYES:  no scleral icterus, normal conjunctivae   Neck: no carotid bruits or thyromegaly   Chest/Lungs:   Clear bilateral    Cardiovascular:   Regular. Normal first and second heart sounds with no murmurs, rubs, or gallops; the carotid, radial and posterior tibial pulses are intact, Jugular venous pressure 6, mild edema.    Abdomen:  no  tenderness; bowel sounds are present   Extremities: no cyanosis or clubbing   Skin: no xanthelasma, warm.    Neurologic: normal  bilateral, no tremors     Psychiatric: alert and oriented x3, calm     Review Of Systems  Skin: negative  Eyes: negative  Ears/Nose/Throat: negative  Respiratory: Mild dyspnea.   Cardiovascular: No chest pain.  + BELL.   Gastrointestinal: negative  Genitourinary: negative  Musculoskeletal: negative  Neurologic: negative  Psychiatric: negative  Hematologic/Lymphatic/Immunologic: negative  Endocrine: negative          Lab Results    Chemistry/lipid CBC Cardiac Enzymes/BNP/TSH/INR   Recent Labs   Lab Test 05/03/21  1557   CHOL 110   HDL 35*   LDL 52   TRIG 115     Recent Labs   Lab Test  05/03/21  1557 08/13/19  0926   LDL 52 111     Recent Labs   Lab Test 04/23/22  0441      POTASSIUM 3.8   CHLORIDE 106   CO2 27      BUN 22   CR 0.76   GFRESTIMATED >90   GLENDY 7.7*     Lab Results   Component Value Date    WBC 5.9 05/01/2022     Lab Results   Component Value Date    RBC 2.40 05/01/2022     Lab Results   Component Value Date    HGB 8.6 05/01/2022     Lab Results   Component Value Date    HCT 27.9 05/01/2022     No components found for: MCT  Lab Results   Component Value Date     05/01/2022     Lab Results   Component Value Date    MCH 35.8 05/01/2022     Lab Results   Component Value Date    MCHC 30.8 05/01/2022     Lab Results   Component Value Date    RDW  05/01/2022      Comment:      Dimorphic Population - Unable to Calculate     Lab Results   Component Value Date     05/01/2022          Recent Labs     Recent Labs   Lab Test 04/23/22  0441 04/22/22  0558 04/21/22  0502   HGB 7.0* 7.5* 7.3*    Recent Labs   Lab Test 04/20/22  0405 04/19/22  2143 04/19/22  1441   TROPONINI 0.09 0.11 0.10     Recent Labs   Lab Test 04/19/22  1441 04/01/22  1208   BNP 1,221* 379*     No results for input(s): TSH in the last 16711 hours.  Recent Labs   Lab Test 04/23/22  0441 04/21/22  0502 04/20/22  0405   INR 1.97* 2.82* 2.67*        Medical History  Surgical History Family History Social History   Past Medical History:   Diagnosis Date     Cerebral infarction (H)      COPD (chronic obstructive pulmonary disease) (H)      HLD (hyperlipidemia)      Hypertension      Myeloproliferative disease (H)      Past Surgical History:   Procedure Laterality Date     CV CORONARY ANGIOGRAM N/A 4/26/2022    Procedure: CV CORONARY ANGIOGRAM;  Surgeon: Audrey Holder MD;  Location: [facility] CV     CV LEFT HEART CATH N/A 4/26/2022    Procedure: Left Heart Catheterization;  Surgeon: Audrey Holder MD;  Location: [facility] CATH LAB CV     OPEN REDUCTION INTERNAL FIXATION FOOT Right 2010     OTHER  SURGICAL HISTORY  2001    Lip lesion removal     TONSILLECTOMY & ADENOIDECTOMY       Family History   Problem Relation Age of Onset     Alcoholism Mother         Social History     Socioeconomic History     Marital status:      Spouse name: Not on file     Number of children: Not on file     Years of education: Not on file     Highest education level: Not on file   Occupational History     Not on file   Tobacco Use     Smoking status: Former Smoker     Packs/day: 1.00     Start date: 6/8/1965     Quit date: 1/1/2012     Years since quitting: 10.4     Smokeless tobacco: Never Used   Substance and Sexual Activity     Alcohol use: Yes     Alcohol/week: 19.0 standard drinks     Drug use: Never     Sexual activity: Not on file   Other Topics Concern     Not on file   Social History Narrative     Not on file     Social Determinants of Health     Financial Resource Strain: Not on file   Food Insecurity: Not on file   Transportation Needs: Not on file   Physical Activity: Not on file   Stress: Not on file   Social Connections: Not on file   Intimate Partner Violence: Not on file   Housing Stability: Not on file         Medications  Allergies   Current Outpatient Medications   Medication Sig Dispense Refill     albuterol (PROAIR HFA;PROVENTIL HFA;VENTOLIN HFA) 90 mcg/actuation inhaler [ALBUTEROL (PROAIR HFA;PROVENTIL HFA;VENTOLIN HFA) 90 MCG/ACTUATION INHALER] Inhale 2 puffs every 6 (six) hours as needed for wheezing.       aspirin (ASA) 81 MG EC tablet Take 1 tablet (81 mg) by mouth daily Start tomorrow morning. 90 tablet 3     atorvastatin (LIPITOR) 80 MG tablet Take 1 tablet (80 mg) by mouth every morning 90 tablet 1     folic acid (FOLVITE) 1 MG tablet Take 1 tablet (1 mg) by mouth daily 30 tablet 0     furosemide (LASIX) 20 MG tablet Take 0.5 tablets (10 mg) by mouth daily 30 tablet 0     hydroxyurea (HYDREA) 500 MG capsule Take 1 capsule (500 mg) by mouth daily (Patient taking differently: Take 500 mg by mouth  "daily 1500 mg - Mon, Wed, & Fri.  1000 mg - Tue, Thurs, Sat, & Sun) 30 capsule 0     lisinopril (ZESTRIL) 2.5 MG tablet Take 1 tablet (2.5 mg) by mouth daily 90 tablet 1     metoprolol succinate ER (TOPROL-XL) 25 MG 24 hr tablet Take 25 mg by mouth daily       nitroGLYcerin (NITROSTAT) 0.4 MG sublingual tablet One tablet under the tongue every 5 minutes if needed for chest pain. May repeat every 5 minutes for a maximum of 3 doses in 15 minutes\" 25 tablet 3     tiotropium (SPIRIVA) 18 mcg inhalation capsule [TIOTROPIUM (SPIRIVA) 18 MCG INHALATION CAPSULE] Place 18 mcg into inhaler and inhale daily.       warfarin ANTICOAGULANT (COUMADIN) 1 MG tablet Take 5 mg on 5/2, re dose warfarin on 5/3 per oncology based on INR (Patient taking differently: Take 5 mg on 5/2, re dose warfarin on 5/3 per oncology based on INR.    Taking 2 mg tablets) 60 tablet 0      No Known Allergies      Sp Larson DO    " [Fatigue] : fatigue [Negative] : Psychiatric [Fever] : no fever [Dizziness] : no dizziness [Memory Loss] : no memory loss

## 2022-06-08 ENCOUNTER — APPOINTMENT (OUTPATIENT)
Dept: PSYCHIATRY | Facility: CLINIC | Age: 67
End: 2022-06-08

## 2022-06-08 PROCEDURE — 90837 PSYTX W PT 60 MINUTES: CPT

## 2022-06-17 ENCOUNTER — OUTPATIENT (OUTPATIENT)
Dept: OUTPATIENT SERVICES | Facility: HOSPITAL | Age: 67
LOS: 1 days | End: 2022-06-17
Payer: MEDICARE

## 2022-06-17 ENCOUNTER — APPOINTMENT (OUTPATIENT)
Dept: RADIOLOGY | Facility: CLINIC | Age: 67
End: 2022-06-17
Payer: MEDICARE

## 2022-06-17 DIAGNOSIS — Z98.890 OTHER SPECIFIED POSTPROCEDURAL STATES: Chronic | ICD-10-CM

## 2022-06-17 DIAGNOSIS — Z95.828 PRESENCE OF OTHER VASCULAR IMPLANTS AND GRAFTS: Chronic | ICD-10-CM

## 2022-06-17 DIAGNOSIS — R06.02 SHORTNESS OF BREATH: ICD-10-CM

## 2022-06-17 DIAGNOSIS — Z90.49 ACQUIRED ABSENCE OF OTHER SPECIFIED PARTS OF DIGESTIVE TRACT: Chronic | ICD-10-CM

## 2022-06-17 DIAGNOSIS — Z96.641 PRESENCE OF RIGHT ARTIFICIAL HIP JOINT: Chronic | ICD-10-CM

## 2022-06-17 PROCEDURE — 71046 X-RAY EXAM CHEST 2 VIEWS: CPT

## 2022-06-17 PROCEDURE — 71046 X-RAY EXAM CHEST 2 VIEWS: CPT | Mod: 26

## 2022-06-21 ENCOUNTER — APPOINTMENT (OUTPATIENT)
Dept: PULMONOLOGY | Facility: CLINIC | Age: 67
End: 2022-06-21
Payer: MEDICARE

## 2022-06-21 VITALS
SYSTOLIC BLOOD PRESSURE: 120 MMHG | RESPIRATION RATE: 16 BRPM | HEART RATE: 71 BPM | OXYGEN SATURATION: 97 % | DIASTOLIC BLOOD PRESSURE: 80 MMHG

## 2022-06-21 VITALS — HEIGHT: 64 IN | WEIGHT: 272 LBS | BODY MASS INDEX: 46.44 KG/M2

## 2022-06-21 VITALS — BODY MASS INDEX: 47.2 KG/M2 | WEIGHT: 275 LBS

## 2022-06-21 DIAGNOSIS — Z86.16 PERSONAL HISTORY OF COVID-19: ICD-10-CM

## 2022-06-21 DIAGNOSIS — G47.33 OBSTRUCTIVE SLEEP APNEA (ADULT) (PEDIATRIC): ICD-10-CM

## 2022-06-21 PROCEDURE — 99214 OFFICE O/P EST MOD 30 MIN: CPT | Mod: 25

## 2022-06-21 PROCEDURE — 94010 BREATHING CAPACITY TEST: CPT

## 2022-06-21 RX ORDER — FLECAINIDE ACETATE 50 MG/1
50 TABLET ORAL
Qty: 180 | Refills: 0 | Status: ACTIVE | COMMUNITY
Start: 2022-01-07

## 2022-06-22 ENCOUNTER — APPOINTMENT (OUTPATIENT)
Dept: PSYCHIATRY | Facility: CLINIC | Age: 67
End: 2022-06-22
Payer: MEDICARE

## 2022-06-22 PROCEDURE — 90837 PSYTX W PT 60 MINUTES: CPT

## 2022-06-27 ENCOUNTER — APPOINTMENT (OUTPATIENT)
Dept: BARIATRICS/WEIGHT MGMT | Facility: CLINIC | Age: 67
End: 2022-06-27

## 2022-06-29 ENCOUNTER — APPOINTMENT (OUTPATIENT)
Dept: PSYCHIATRY | Facility: CLINIC | Age: 67
End: 2022-06-29

## 2022-06-29 PROCEDURE — 90837 PSYTX W PT 60 MINUTES: CPT

## 2022-06-30 ENCOUNTER — APPOINTMENT (OUTPATIENT)
Dept: FAMILY MEDICINE | Facility: CLINIC | Age: 67
End: 2022-06-30

## 2022-06-30 VITALS
SYSTOLIC BLOOD PRESSURE: 126 MMHG | HEART RATE: 82 BPM | BODY MASS INDEX: 46.1 KG/M2 | HEIGHT: 64 IN | WEIGHT: 270 LBS | DIASTOLIC BLOOD PRESSURE: 84 MMHG | OXYGEN SATURATION: 98 %

## 2022-06-30 PROCEDURE — 99214 OFFICE O/P EST MOD 30 MIN: CPT

## 2022-07-05 RX ORDER — METOPROLOL SUCCINATE 50 MG/1
50 TABLET, EXTENDED RELEASE ORAL
Qty: 90 | Refills: 0 | Status: ACTIVE | COMMUNITY
Start: 2020-10-14 | End: 1900-01-01

## 2022-07-05 RX ORDER — FUROSEMIDE 40 MG/1
40 TABLET ORAL
Refills: 0 | Status: COMPLETED | COMMUNITY
End: 2022-07-05

## 2022-07-05 RX ORDER — DENOSUMAB 60 MG/ML
60 INJECTION SUBCUTANEOUS
Refills: 0 | Status: ACTIVE | COMMUNITY
Start: 2022-07-05

## 2022-07-05 RX ORDER — SERTRALINE HYDROCHLORIDE 50 MG/1
50 TABLET, FILM COATED ORAL
Qty: 30 | Refills: 0 | Status: DISCONTINUED | COMMUNITY
Start: 2022-02-16 | End: 2022-07-05

## 2022-07-05 RX ORDER — METOPROLOL TARTRATE 25 MG/1
25 TABLET, FILM COATED ORAL
Refills: 0 | Status: ACTIVE | COMMUNITY
Start: 2022-07-05

## 2022-07-05 RX ORDER — PREDNISONE 10 MG/1
10 TABLET ORAL
Qty: 21 | Refills: 0 | Status: COMPLETED | COMMUNITY
Start: 2022-05-20 | End: 2022-07-05

## 2022-07-05 NOTE — REVIEW OF SYSTEMS
[Back Pain] : back pain [Depression] : depression [Fever] : no fever [FreeTextEntry5] : see HPI [FreeTextEntry9] : chronic, see HPI [de-identified] : no longer on Wellbutrin regimen, see HPI

## 2022-07-05 NOTE — ASSESSMENT
[FreeTextEntry1] : FUV for 67 year old WF with PMH as stated in HPI / active list. \par \par Management : \par \par See HPI and Plan.\par \par Labs from 6/21/22 Pulm reviewed with patient in office today:  \par AST 40 (>34)\par Otherwise unremarkable \par TSH completed in February 2022 : 1.88\par \par Refill(s) provided for:\par Metoprolol\par Levothyroxine \par Pepsid \par Eliquis (samples) \par Otezla (samples) \par \par Continue current medication regimens. \par \par Mammogram script provided.  Last completed July 2021.  \par \par Shingles injection advised, to be completed at pharmacy of choice, advised to get both otherwise process will have to be reinitiated.  \par \par Best wishes offered! \par

## 2022-07-05 NOTE — ADDENDUM
[FreeTextEntry1] : Medical record entries made by the scribe today, were at my direction and personally dictated to them by me, Dr. Erika Espinoza on 06/30/2022.  I have reviewed the chart and agree that the record accurately reflects my personal performance of the history, physical exam, assessment and plan.\par \par Phill CABRERA acting as scribe for Dr. Erika Espinoza MD on 06/30/2022 at 11:30 AM.\par

## 2022-07-05 NOTE — HISTORY OF PRESENT ILLNESS
[FreeTextEntry1] : Last seen in office September 2021 for same, encounter reviewed. \par \par Presents for FUV on chronic medical conditions of: Hypothyroidism, paroxysmal atrial fibrillation, reflux \par \par Requesting medication refills of: levothyroxine, metoprolol, famotidine \par \par Recent consults reviewed and noted for:\par Psychotherapy \par Pulm \par  [de-identified] : In recent weeks KIERAN endorses:\par  \par Followed with Pulm Dr. Meek last week, labs completed.  TSH completed in February 2022.\par \par Pt states she had episode of AFib, managed by Cardiology, Flecainide regimen initiated, Wellbutrin regimen discontinued.  Pt states that she has helped more depressed since halting the Wellbutrin regimen, states that she follows weekly with LCSW Barby Davis regarding Psychotherapy, states she greatly appreciates and endorses consultations.  Pt states she continues to recovery regarding pulmonary embolism and fractures in spine.  \par \par Pt states her back pain is not terrible at rest, exacerbated by movement and walking for longer periods of time.  Continues with PT for back.  Trying her best to lose weight given orthopedic complications.  \par \par Pt states she has experienced visual changes, consulted with new Ophthalmologist, states that she now has cataracts.  Pt is now considering eye surgery, does not believe that she can lay flat a this point in time.  \par \par Pt states she cannot drive long distances, drove herself to office today due to short distance.  Pt states she has rented [employer] in past to aid mobility.  \par \par Pt states she tested POSITIVE for COVID-19 approximately 1 month ago, was tested after failing a pulmonary function test, followed approximately 1 week ago for pulmonary function test and states she improved notably.  Mild case, full recover achieved.  \par \par Taking Calcium 800 mg daily.  \par

## 2022-07-06 ENCOUNTER — APPOINTMENT (OUTPATIENT)
Dept: PSYCHIATRY | Facility: CLINIC | Age: 67
End: 2022-07-06

## 2022-07-06 PROCEDURE — 90837 PSYTX W PT 60 MINUTES: CPT

## 2022-07-13 ENCOUNTER — APPOINTMENT (OUTPATIENT)
Dept: PSYCHIATRY | Facility: CLINIC | Age: 67
End: 2022-07-13

## 2022-07-13 PROCEDURE — 90837 PSYTX W PT 60 MINUTES: CPT

## 2022-07-20 ENCOUNTER — APPOINTMENT (OUTPATIENT)
Dept: PSYCHIATRY | Facility: CLINIC | Age: 67
End: 2022-07-20

## 2022-07-20 PROCEDURE — 90837 PSYTX W PT 60 MINUTES: CPT

## 2022-07-27 ENCOUNTER — APPOINTMENT (OUTPATIENT)
Dept: PSYCHIATRY | Facility: CLINIC | Age: 67
End: 2022-07-27

## 2022-07-27 PROCEDURE — 90837 PSYTX W PT 60 MINUTES: CPT

## 2022-08-03 ENCOUNTER — APPOINTMENT (OUTPATIENT)
Dept: PSYCHIATRY | Facility: CLINIC | Age: 67
End: 2022-08-03

## 2022-08-03 PROCEDURE — 90837 PSYTX W PT 60 MINUTES: CPT | Mod: 95

## 2022-08-06 NOTE — DISCHARGE NOTE ADULT - NSCORESITESY/N_GEN_A_CORE_RD
EXAM DESCRIPTION:  CT - Thorax Wo Con - 8/3/2022 6:27 am

 

CLINICAL HISTORY:  74 years   Male   cough, hemoptysis, dyspnea

 

TECHNIQUE:  Contiguous axial images obtained through the chest without IV contrast administration.   
Coronal and sagittal reformatted images provided.

This CT exam was performed according to our departmental dose-optimization program, which includes on
e or more of the following dose reduction techniques: automated exposure control, adjustment of the m
A and/or kV according to patient size, and/or use of iterative reconstruction technique.

 

COMPARISON:  7/8/2022

 

FINDINGS:  Again seen is moderate diffuse emphysema. Also again seen is a right suprahilar mass which
 has increased in size since the prior exam, measuring 5.1 x 6.5 x 5.1 cm where it previously measure
d 4.5 x 5.8 x 4.0 cm.

Again seen is direct invasion of the right mainstem bronchus. This appears more pronounced than on th
e prior exam. There is now focal moderate to severe narrowing of the right mainstem bronchus due to t
his mass. Secretions versus blood products noted in the right lower lobe airways. There are scattered
 ill-defined nodules in the left lower lobe with more confluent airspace infiltrates in the right low
er lobe, new since the prior exam.

Again seen are borderline mediastinal lymph nodes. The heart is normal in size without pericardial ef
fusion. Atherosclerosis without thoracic aortic aneurysm.

No visualized acute upper abdominal or osseous abnormality.

 

IMPRESSION:  Right suprahilar mass has increased in size since the prior exam, currently measuring up
 to 6.5 cm. There is direct invasion of the right mainstem bronchus, which is focally narrowed. Findi
ngs are again consistent with a primary pulmonary neoplasm.

New airspace infiltrates in the right lower lobe and scattered ill-defined nodules in the left lower 
lobe could reflect hemorrhage, infection, or endobronchial spread of tumor.

 

Electronically signed by:   Leilani Green MD   8/2/2022 10:44 PM CDT Workstation: [id number]

 

 

Due to temporary technical issues with the PACS/Fluency reporting system, reports are being signed by
 the in house radiologists without review as a courtesy to insure prompt reporting. The interpreting 
radiologist is fully responsible for the content of the report.
EXAM DESCRIPTION:  RAD - Chest Single View - 8/2/2022 9:41 pm

 

CLINICAL HISTORY:  dyspnea

Chest pain.

 

COMPARISON:  Chest Single View dated 7/26/2022; Thorax Wo Con dated 7/8/2022; Small Bowel Series date
d 2/18/2020

 

FINDINGS:  Portable technique limits examination quality.

 

3 cm right parahilar mass lesion is again noted. Bilateral interstitial prominence is seen with incre
ased basilar lung markings present. This may represent mild pulmonary edema or a viral infection. The
 heart is mildly enlarged in size.
EXAM DESCRIPTION:  RAD - Chest Single View - 8/6/2022 9:51 am

 

CLINICAL HISTORY:  evaluate mass, sob

Chest pain.

 

COMPARISON:  Chest Single View dated 8/2/2022; Chest Single View dated 7/26/2022; Thorax Wo Con dated
 8/2/2022

 

FINDINGS:  Portable technique limits examination quality.

 

Bilateral pulmonary opacities are again seen, unchanged since 08/02/2022. Right suprahilar mass lesio
ns also unchanged. The heart is normal in size. No displaced fractures.

 

IMPRESSION:  Stable chest since 08/02/2022 study.
EXAM DESCRIPTION:  US - Renal Ultrasound-Complete - 8/3/2022 1:12 pm

 

CLINICAL HISTORY:  renal failure

 

COMPARISON:  No comparisons

 

FINDINGS:  The right kidney measures 9.7 x 5.5 x 4.1 cm.  The left kidney measures 10.0 x 5.0 x 4.3 c
m. Left renal cortex appears slightly thinned relative to the right but probably still falls within t
he range of normal. There is an increase in renal cortical echogenicity typical for medical renal dis
ease. No hydronephrosis or suspicious renal mass. A 1.7 centimeter thin-walled homogeneous anechoic t
o hypoechoic exophytic cysts present lateral lower pole right kidney a 1.6 centimeter thin-walled ane
choic exophytic cyst is present upper pole right kidney. A 4 mm nonshadowing hyperechoic focus presen
t in the lower central right renal hilum could be nonshadowing, nonobstructing stone, prominent hilum
 fat or a small angiomyolipoma. This is not regarded as significant.

 

No bladder wall thickening or mass. No intraluminal stone or mass.

 

 

IMPRESSION:  No hydronephrosis or suspicious mass of either kidney. Increased cortical echogenicity i
s present typical for medical renal disease.

 

Two benign-appearing exophytic right renal cysts are present largest at 1.7 cm.

 

No other significant findings.
No

## 2022-08-10 ENCOUNTER — APPOINTMENT (OUTPATIENT)
Dept: PSYCHIATRY | Facility: CLINIC | Age: 67
End: 2022-08-10

## 2022-08-10 PROCEDURE — 90837 PSYTX W PT 60 MINUTES: CPT | Mod: 95

## 2022-08-17 ENCOUNTER — APPOINTMENT (OUTPATIENT)
Dept: PSYCHIATRY | Facility: CLINIC | Age: 67
End: 2022-08-17

## 2022-08-17 PROCEDURE — 90837 PSYTX W PT 60 MINUTES: CPT | Mod: 95

## 2022-08-31 ENCOUNTER — APPOINTMENT (OUTPATIENT)
Dept: PSYCHIATRY | Facility: CLINIC | Age: 67
End: 2022-08-31

## 2022-08-31 PROCEDURE — 90837 PSYTX W PT 60 MINUTES: CPT

## 2022-09-01 NOTE — H&P PST ADULT - NSCAFFEAMTFREQ_GEN_ALL_CORE_SD
Introduction Text (Please End With A Colon): The following treatment was deferred for the following: X Size Of Lesion In Cm (Optional): 1 Detail Level: Detailed Size Of Lesion In Cm (Optional): 0 1-2 cups/cans per day

## 2022-09-07 ENCOUNTER — APPOINTMENT (OUTPATIENT)
Dept: PSYCHIATRY | Facility: CLINIC | Age: 67
End: 2022-09-07

## 2022-09-07 PROCEDURE — 90837 PSYTX W PT 60 MINUTES: CPT

## 2022-09-14 ENCOUNTER — APPOINTMENT (OUTPATIENT)
Dept: PSYCHIATRY | Facility: CLINIC | Age: 67
End: 2022-09-14

## 2022-09-14 PROCEDURE — 90837 PSYTX W PT 60 MINUTES: CPT

## 2022-09-21 ENCOUNTER — APPOINTMENT (OUTPATIENT)
Dept: PSYCHIATRY | Facility: CLINIC | Age: 67
End: 2022-09-21

## 2022-09-21 PROCEDURE — 90837 PSYTX W PT 60 MINUTES: CPT

## 2022-09-28 ENCOUNTER — APPOINTMENT (OUTPATIENT)
Dept: PSYCHIATRY | Facility: CLINIC | Age: 67
End: 2022-09-28

## 2022-09-28 PROCEDURE — 90837 PSYTX W PT 60 MINUTES: CPT

## 2022-10-03 ENCOUNTER — LABORATORY RESULT (OUTPATIENT)
Age: 67
End: 2022-10-03

## 2022-10-03 ENCOUNTER — APPOINTMENT (OUTPATIENT)
Dept: FAMILY MEDICINE | Facility: CLINIC | Age: 67
End: 2022-10-03

## 2022-10-03 VITALS — OXYGEN SATURATION: 96 % | HEIGHT: 64 IN | HEART RATE: 68 BPM | WEIGHT: 277 LBS | BODY MASS INDEX: 47.29 KG/M2

## 2022-10-03 VITALS — DIASTOLIC BLOOD PRESSURE: 74 MMHG | SYSTOLIC BLOOD PRESSURE: 140 MMHG

## 2022-10-03 VITALS — DIASTOLIC BLOOD PRESSURE: 100 MMHG | SYSTOLIC BLOOD PRESSURE: 187 MMHG

## 2022-10-03 DIAGNOSIS — Z87.81 PERSONAL HISTORY OF (HEALED) TRAUMATIC FRACTURE: ICD-10-CM

## 2022-10-03 DIAGNOSIS — S22.080A WEDGE COMPRESSION FRACTURE OF T11-T12 VERTEBRA, INITIAL ENCOUNTER FOR CLOSED FRACTURE: ICD-10-CM

## 2022-10-03 DIAGNOSIS — Z00.00 ENCOUNTER FOR GENERAL ADULT MEDICAL EXAMINATION W/OUT ABNORMAL FINDINGS: ICD-10-CM

## 2022-10-03 DIAGNOSIS — S32.010A WEDGE COMPRESSION FRACTURE OF T11-T12 VERTEBRA, INITIAL ENCOUNTER FOR CLOSED FRACTURE: ICD-10-CM

## 2022-10-03 PROCEDURE — 99214 OFFICE O/P EST MOD 30 MIN: CPT | Mod: 25

## 2022-10-03 PROCEDURE — G0008: CPT

## 2022-10-03 PROCEDURE — 90662 IIV NO PRSV INCREASED AG IM: CPT

## 2022-10-05 ENCOUNTER — APPOINTMENT (OUTPATIENT)
Dept: PSYCHIATRY | Facility: CLINIC | Age: 67
End: 2022-10-05

## 2022-10-05 PROCEDURE — 90837 PSYTX W PT 60 MINUTES: CPT

## 2022-10-08 PROBLEM — Z87.81 HISTORY OF COMPRESSION FRACTURE OF SPINE: Status: RESOLVED | Noted: 2021-09-16 | Resolved: 2022-10-08

## 2022-10-08 PROBLEM — S22.080A COMPRESSION FRACTURE OF THORACOLUMBAR VERTEBRA: Status: RESOLVED | Noted: 2021-06-28 | Resolved: 2022-10-08

## 2022-10-08 NOTE — HISTORY OF PRESENT ILLNESS
[FreeTextEntry1] : Last seen in office June 2022 for same, encounter reviewed. \par Presents for FUV on chronic medical conditions of: esophageal reflux\par requesting medication refills of: Famotidine\par \par Recent consults reviewed and noted for:\par  (anxiety and depression, September 2022)\par \par In recent weeks KIERAN endorses:\par  \par Pt states that she has discontinued all of her RA medications, states Otezla caused considerable discomfort in particular, cites elevated depression "was crying all the time," states that her mood greatly improved within 3-5 days of discontinuing regimen.  \par \par Pt states that she greatly appreciates  consults, is continuing to follow, "she is so good" re Barby Davis.\par \par Neuropathy continues, continues to ambulate with a cane.  Pt states that she followed with a Chiropractor regarding neuropathy, states low light therapy and procedure similar to Tens.  Pt states that she discussed this treatment with Rheum, states unpleasant discussion.  Pt states that she was "desperate" for relief re neuropathy.  Now, continuing PT and purchased machines and engages in treatments 2x a day, states that she can feel the floor when she walks, began treatments 8/16/2022.  Pt states that she is on a new vitamin regimen as well as suggested by Chiropractor. \par \par Increased reflux in recent weeks, states that she is compliant with Pepcid 20 mg qd regimen. \par \par Prolia injections 2x a year by Rheum, states that she will need to continue to follow with a Rheumatologist regarding Prolia treatments.  Pt states she is continuing to follow with other specialists regularly.  \par \par Pt states that she is now fully retired,  working part time job.  Has traveled out to [place], enjoyed summer months.  [de-identified] : In review June 2022:\par Last seen in office September 2021 for same, encounter reviewed. \par \par Presents for FUV on chronic medical conditions of: Hypothyroidism, paroxysmal atrial fibrillation, reflux \par \par Requesting medication refills of: levothyroxine, metoprolol, famotidine \par \par Recent consults reviewed and noted for:\par Psychotherapy \par Pulm \par \par In recent weeks KIERAN endorses:\par  \par Followed with Pulm Dr. Meek last week, labs completed.  TSH completed in February 2022.\par \par Pt states she had episode of AFib, managed by Cardiology, Flecainide regimen initiated, Wellbutrin regimen discontinued.  Pt states that she has helped more depressed since halting the Wellbutrin regimen, states that she follows weekly with STEFANI Davis regarding Psychotherapy, states she greatly appreciates and endorses consultations.  Pt states she continues to recovery regarding pulmonary embolism and fractures in spine.  \par \par Pt states her back pain is not terrible at rest, exacerbated by movement and walking for longer periods of time.  Continues with PT for back.  Trying her best to lose weight given orthopedic complications.  \par \par Pt states she has experienced visual changes, consulted with new Ophthalmologist, states that she now has cataracts.  Pt is now considering eye surgery, does not believe that she can lay flat a this point in time.  \par \par Pt states she cannot drive long distances, drove herself to office today due to short distance.  Pt states she has rented [employer] in past to aid mobility.  \par \par Pt states she tested POSITIVE for COVID-19 approximately 1 month ago, was tested after failing a pulmonary function test, followed approximately 1 week ago for pulmonary function test and states she improved notably.  Mild case, full recover achieved.  \par \par Taking Calcium 800 mg daily.  \par

## 2022-10-08 NOTE — ADDENDUM
[FreeTextEntry1] : Medical record entries made by the scribe today, were at my direction and personally dictated to them by me, Dr. Erika Espinoza on 10/03/2022.  I have reviewed the chart and agree that the record accurately reflects my personal performance of the history, physical exam, assessment and plan.\par \par Phill CABRERA acting as scribe for Dr. Erika Espinoza MD on 10/03/2022 at 10:59 AM.\par

## 2022-10-08 NOTE — PHYSICAL EXAM
[No Acute Distress] : no acute distress [de-identified] : tearful [de-identified] : see HPI, paroxysmal AFib now being managed [de-identified] : ambulation improved with regards to neuropathy

## 2022-10-08 NOTE — PLAN
[FreeTextEntry1] : In review June 2022:\par FUV for 67 year old WF with PMH as stated in HPI / active list. \par \par Management : \par \par See HPI and Plan.\par \par Labs from 6/21/22 Pulm reviewed with patient in office today:  \par AST 40 (>34)\par Otherwise unremarkable \par TSH completed in February 2022 : 1.88\par \par Refill(s) provided for:\par Metoprolol\par Levothyroxine \par Pepsid \par Eliquis (samples) \par Otezla (samples) \par \par Continue current medication regimens. \par \par Mammogram script provided.  Last completed July 2021.  \par \par Shingles injection advised, to be completed at pharmacy of choice, advised to get both otherwise process will have to be reinitiated.  \par \par Best wishes offered! \par

## 2022-10-08 NOTE — ASSESSMENT
[FreeTextEntry1] : FUV for 67 year old WF with PMH as stated in HPI / active list. \par \par Management : \par \par See HPI and Plan.\par \par Labs in office today, will advise.\par \par Rheum referral provided for local providers.\par \par Refill(s) provided for:\par Famotidine, increase to 40 mg qd due to increased discomfort, follow up as needed. \par \par Pt to inform Dr. Hogan of machines/therapy name and new vitamin regimen via the patient portal.\par \par Pt opted for the influenza vaccine today, high dose. \par \par Best wishes offered!

## 2022-10-08 NOTE — REVIEW OF SYSTEMS
[Back Pain] : back pain [Depression] : depression [Fever] : no fever [FreeTextEntry5] : see HPI [FreeTextEntry9] : chronic, see HPI [de-identified] : no longer on Wellbutrin regimen, see HPI

## 2022-10-10 ENCOUNTER — TRANSCRIPTION ENCOUNTER (OUTPATIENT)
Age: 67
End: 2022-10-10

## 2022-10-10 LAB
ALBUMIN SERPL ELPH-MCNC: 4.3 G/DL
ALP BLD-CCNC: 84 U/L
ALT SERPL-CCNC: 14 U/L
ANION GAP SERPL CALC-SCNC: 14 MMOL/L
AST SERPL-CCNC: 23 U/L
BASOPHILS # BLD AUTO: 0.03 K/UL
BASOPHILS NFR BLD AUTO: 0.5 %
BILIRUB SERPL-MCNC: 0.3 MG/DL
BUN SERPL-MCNC: 18 MG/DL
CALCIUM SERPL-MCNC: 9.7 MG/DL
CHLORIDE SERPL-SCNC: 97 MMOL/L
CO2 SERPL-SCNC: 28 MMOL/L
CREAT SERPL-MCNC: 0.99 MG/DL
EGFR: 62 ML/MIN/1.73M2
EOSINOPHIL # BLD AUTO: 0.18 K/UL
EOSINOPHIL NFR BLD AUTO: 2.9 %
ESTIMATED AVERAGE GLUCOSE: 97 MG/DL
GLUCOSE SERPL-MCNC: 95 MG/DL
HBA1C MFR BLD HPLC: 5 %
HCT VFR BLD CALC: 46.1 %
HGB BLD-MCNC: 14.6 G/DL
IMM GRANULOCYTES NFR BLD AUTO: 0.5 %
LYMPHOCYTES # BLD AUTO: 0.84 K/UL
LYMPHOCYTES NFR BLD AUTO: 13.6 %
MAN DIFF?: NORMAL
MCHC RBC-ENTMCNC: 31.7 GM/DL
MCHC RBC-ENTMCNC: 33.7 PG
MCV RBC AUTO: 106.5 FL
MONOCYTES # BLD AUTO: 0.73 K/UL
MONOCYTES NFR BLD AUTO: 11.9 %
NEUTROPHILS # BLD AUTO: 4.35 K/UL
NEUTROPHILS NFR BLD AUTO: 70.6 %
PLATELET # BLD AUTO: 220 K/UL
POTASSIUM SERPL-SCNC: 4.8 MMOL/L
PROT SERPL-MCNC: 7.2 G/DL
RBC # BLD: 4.33 M/UL
RBC # FLD: 14.5 %
SODIUM SERPL-SCNC: 139 MMOL/L
TSH SERPL-ACNC: 5.02 UIU/ML
WBC # FLD AUTO: 6.16 K/UL

## 2022-10-12 ENCOUNTER — APPOINTMENT (OUTPATIENT)
Dept: PSYCHIATRY | Facility: CLINIC | Age: 67
End: 2022-10-12

## 2022-10-12 PROCEDURE — 90837 PSYTX W PT 60 MINUTES: CPT

## 2022-10-26 ENCOUNTER — APPOINTMENT (OUTPATIENT)
Dept: PSYCHIATRY | Facility: CLINIC | Age: 67
End: 2022-10-26

## 2022-10-26 PROCEDURE — 90837 PSYTX W PT 60 MINUTES: CPT | Mod: 95

## 2022-11-02 ENCOUNTER — APPOINTMENT (OUTPATIENT)
Dept: PSYCHIATRY | Facility: CLINIC | Age: 67
End: 2022-11-02

## 2022-11-02 PROCEDURE — 90837 PSYTX W PT 60 MINUTES: CPT

## 2022-11-09 ENCOUNTER — APPOINTMENT (OUTPATIENT)
Dept: PSYCHIATRY | Facility: CLINIC | Age: 67
End: 2022-11-09

## 2022-11-09 PROCEDURE — 90837 PSYTX W PT 60 MINUTES: CPT

## 2022-11-14 ENCOUNTER — APPOINTMENT (OUTPATIENT)
Dept: RADIOLOGY | Facility: CLINIC | Age: 67
End: 2022-11-14

## 2022-11-14 ENCOUNTER — OUTPATIENT (OUTPATIENT)
Dept: OUTPATIENT SERVICES | Facility: HOSPITAL | Age: 67
LOS: 1 days | End: 2022-11-14
Payer: MEDICARE

## 2022-11-14 DIAGNOSIS — Z98.890 OTHER SPECIFIED POSTPROCEDURAL STATES: Chronic | ICD-10-CM

## 2022-11-14 DIAGNOSIS — Z96.641 PRESENCE OF RIGHT ARTIFICIAL HIP JOINT: Chronic | ICD-10-CM

## 2022-11-14 DIAGNOSIS — Z95.828 PRESENCE OF OTHER VASCULAR IMPLANTS AND GRAFTS: Chronic | ICD-10-CM

## 2022-11-14 DIAGNOSIS — Z90.49 ACQUIRED ABSENCE OF OTHER SPECIFIED PARTS OF DIGESTIVE TRACT: Chronic | ICD-10-CM

## 2022-11-14 DIAGNOSIS — M46.1 SACROILIITIS, NOT ELSEWHERE CLASSIFIED: ICD-10-CM

## 2022-11-14 PROCEDURE — 72200 X-RAY EXAM SI JOINTS: CPT

## 2022-11-14 PROCEDURE — 72200 X-RAY EXAM SI JOINTS: CPT | Mod: 26

## 2022-11-16 ENCOUNTER — APPOINTMENT (OUTPATIENT)
Dept: PSYCHIATRY | Facility: CLINIC | Age: 67
End: 2022-11-16

## 2022-11-16 PROCEDURE — 90837 PSYTX W PT 60 MINUTES: CPT

## 2022-11-23 ENCOUNTER — APPOINTMENT (OUTPATIENT)
Dept: PSYCHIATRY | Facility: CLINIC | Age: 67
End: 2022-11-23

## 2022-11-23 PROCEDURE — 90837 PSYTX W PT 60 MINUTES: CPT

## 2022-11-30 ENCOUNTER — APPOINTMENT (OUTPATIENT)
Dept: PSYCHIATRY | Facility: CLINIC | Age: 67
End: 2022-11-30

## 2022-11-30 PROCEDURE — 90837 PSYTX W PT 60 MINUTES: CPT | Mod: 95

## 2022-12-07 ENCOUNTER — APPOINTMENT (OUTPATIENT)
Dept: PSYCHIATRY | Facility: CLINIC | Age: 67
End: 2022-12-07

## 2022-12-07 PROCEDURE — 90837 PSYTX W PT 60 MINUTES: CPT

## 2022-12-14 ENCOUNTER — APPOINTMENT (OUTPATIENT)
Dept: PSYCHIATRY | Facility: CLINIC | Age: 67
End: 2022-12-14

## 2022-12-14 PROCEDURE — 90837 PSYTX W PT 60 MINUTES: CPT | Mod: 95

## 2022-12-16 ENCOUNTER — RX RENEWAL (OUTPATIENT)
Age: 67
End: 2022-12-16

## 2022-12-21 ENCOUNTER — APPOINTMENT (OUTPATIENT)
Dept: PSYCHIATRY | Facility: CLINIC | Age: 67
End: 2022-12-21

## 2022-12-21 ENCOUNTER — APPOINTMENT (OUTPATIENT)
Dept: PULMONOLOGY | Facility: CLINIC | Age: 67
End: 2022-12-21

## 2022-12-21 VITALS
WEIGHT: 270 LBS | HEIGHT: 64 IN | SYSTOLIC BLOOD PRESSURE: 132 MMHG | RESPIRATION RATE: 16 BRPM | DIASTOLIC BLOOD PRESSURE: 72 MMHG | BODY MASS INDEX: 46.1 KG/M2 | HEART RATE: 58 BPM | OXYGEN SATURATION: 98 %

## 2022-12-21 DIAGNOSIS — J44.9 CHRONIC OBSTRUCTIVE PULMONARY DISEASE, UNSPECIFIED: ICD-10-CM

## 2022-12-21 DIAGNOSIS — K44.9 DIAPHRAGMATIC HERNIA W/OUT OBSTRUCTION OR GANGRENE: ICD-10-CM

## 2022-12-21 DIAGNOSIS — J30.9 ALLERGIC RHINITIS, UNSPECIFIED: ICD-10-CM

## 2022-12-21 PROCEDURE — 99214 OFFICE O/P EST MOD 30 MIN: CPT

## 2022-12-21 PROCEDURE — 90837 PSYTX W PT 60 MINUTES: CPT | Mod: 95

## 2022-12-21 RX ORDER — FUROSEMIDE 40 MG/1
40 TABLET ORAL DAILY
Qty: 90 | Refills: 1 | Status: DISCONTINUED | COMMUNITY
Start: 2022-07-05 | End: 2022-12-21

## 2022-12-21 RX ORDER — APREMILAST 30 MG/1
30 TABLET, FILM COATED ORAL
Refills: 0 | Status: DISCONTINUED | COMMUNITY
End: 2022-12-21

## 2022-12-21 NOTE — REVIEW OF SYSTEMS
[Fever] : no fever [Chills] : no chills [Fatigue] : no fatigue [Poor Appetite] : normal appetite  [Dry Eyes] : no dryness of the eyes [Eye Irritation] : no ~T irritation of the eyes [Epistaxis] : no nosebleeds [Postnasal Drip] : no postnasal drip [Heartburn] : no heartburn [Reflux] : no reflux [As Noted in HPI] : as noted in HPI [Negative] : Dermatologic

## 2022-12-21 NOTE — HISTORY OF PRESENT ILLNESS
[FreeTextEntry1] : Hx of POOL. 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 Had refractory hypoxia while in the hospital which resolved.  Also  diagnosed with SLE and Sjogren's for which she is on plaquenil. She had recurrence of hypoxia in March 2020. That has since resolved. O2 was returned. She has pulse ox at home. \par \par Feeling well. Back to baseline. On arnuity. Has not needed albuterol. Difficulty with walking due to back pain. \par \par COVID positive on 5/26/22.\par \par Improved  back pain. \par \par She was having allergy symptoms with sneezing, rhinits. \par \par On 5/4/21 felt palpitations and knew she was in Afib. Went to Dr [place] office; saw NP and Dr Carpenter. Had cardioversion; converted to NSR. On flecanide.  \par \par O2 sat has been good. \par \par Still on eliquis.\par \par Reports has had significant anxiety; seeing a SW. Anxious about health, not working so nothing to take her mind off things. Seeing  team. Doing so much better. \par \par Sees a rheum now on [place], Dr Zavala.  \par   \par She is using her CPAP every night. Compliance excellent. Auto-titrating machine 4-20. Therapeutic AHI 3.8.  >4hrs 100%. Using nasal mask. Developed rash around nose; bought a nasal pillows mask and had rash in nose; went back to nasal mask. Was told not entitled to new machine yet.  \par \par Has appt with Dr Reeves next week. \par \par

## 2022-12-21 NOTE — PHYSICAL EXAM
[General Appearance - Well Developed] : well developed [General Appearance - Well Nourished] : well nourished [General Appearance - In No Acute Distress] : no acute distress [Normal Conjunctiva] : the conjunctiva exhibited no abnormalities [Neck Appearance] : the appearance of the neck was normal [Neck Circumference: ___] : neck circumference is [unfilled] [Heart Rate And Rhythm] : heart rate and rhythm were normal [Heart Sounds] : normal S1 and S2 [Veins - Varicosity Changes] : no varicosital changes were noted in the lower extremities [] : no respiratory distress [Respiration, Rhythm And Depth] : normal respiratory rhythm and effort [Auscultation Breath Sounds / Voice Sounds] : lungs were clear to auscultation bilaterally [Bowel Sounds] : normal bowel sounds [Abdomen Soft] : soft [Abnormal Walk] : normal gait [Cranial Nerves] : cranial nerves 2-12 were intact [Oriented To Time, Place, And Person] : oriented to person, place, and time [Impaired Insight] : insight and judgment were intact [Normal Oral Mucosa] : normal oral mucosa [No Oral Cyanosis] : no oral cyanosis [FreeTextEntry1] : severely crowded oropharynx [Nail Clubbing] : no clubbing of the fingernails [Cyanosis, Localized] : no localized cyanosis

## 2022-12-21 NOTE — ASSESSMENT
[FreeTextEntry1] : Extensive history of respiratory issues. S/P saddle PE with cor pulmonale for which she received TPA, IVCF. Still on eliquis. Chronic PE seen on CTPA but RHC with elevated wedge indicating pulm htn also from L sided heart dz. Diastolic CHF. Hypoxia resolved. Latest echo with improved PASP. Obstructive lung dz. Decreased lung volumes as well. Underlying Sjogren's. Underlying POOL. Weight gain contributing. Hx afib. \par \par Possible GERD from HH.\par \par  \par

## 2022-12-28 ENCOUNTER — APPOINTMENT (OUTPATIENT)
Dept: PSYCHIATRY | Facility: CLINIC | Age: 67
End: 2022-12-28
Payer: MEDICARE

## 2022-12-28 ENCOUNTER — APPOINTMENT (OUTPATIENT)
Dept: BARIATRICS/WEIGHT MGMT | Facility: CLINIC | Age: 67
End: 2022-12-28
Payer: MEDICARE

## 2022-12-28 VITALS
DIASTOLIC BLOOD PRESSURE: 81 MMHG | TEMPERATURE: 97.3 F | BODY MASS INDEX: 53.3 KG/M2 | WEIGHT: 271.5 LBS | HEIGHT: 60 IN | OXYGEN SATURATION: 96 % | RESPIRATION RATE: 16 BRPM | HEART RATE: 86 BPM | SYSTOLIC BLOOD PRESSURE: 161 MMHG

## 2022-12-28 DIAGNOSIS — E66.01 MORBID (SEVERE) OBESITY DUE TO EXCESS CALORIES: ICD-10-CM

## 2022-12-28 DIAGNOSIS — I26.92 SADDLE EMBOLUS OF PULMONARY ARTERY W/OUT ACUTE COR PULMONALE: ICD-10-CM

## 2022-12-28 DIAGNOSIS — J44.9 CHRONIC OBSTRUCTIVE PULMONARY DISEASE, UNSPECIFIED: ICD-10-CM

## 2022-12-28 DIAGNOSIS — M06.9 RHEUMATOID ARTHRITIS, UNSPECIFIED: ICD-10-CM

## 2022-12-28 DIAGNOSIS — M35.00 SICCA SYNDROME, UNSPECIFIED: ICD-10-CM

## 2022-12-28 DIAGNOSIS — F32.A DEPRESSION, UNSPECIFIED: ICD-10-CM

## 2022-12-28 DIAGNOSIS — M81.0 AGE-RELATED OSTEOPOROSIS W/OUT CURRENT PATHOLOGICAL FRACTURE: ICD-10-CM

## 2022-12-28 DIAGNOSIS — R06.02 SHORTNESS OF BREATH: ICD-10-CM

## 2022-12-28 PROCEDURE — 90837 PSYTX W PT 60 MINUTES: CPT

## 2022-12-28 PROCEDURE — 99205 OFFICE O/P NEW HI 60 MIN: CPT

## 2022-12-28 NOTE — CONSULT LETTER
[Dear  ___] : Dear  [unfilled], [Consult Letter:] : I had the pleasure of evaluating your patient, [unfilled]. [Please see my note below.] : Please see my note below. [Sincerely,] : Sincerely, [FreeTextEntry3] : Carri Reeves MD

## 2022-12-28 NOTE — ASSESSMENT
[FreeTextEntry1] : 67 year old woman with a hx of Sjrogens, RA afib, nafisa, vertebral fx , hypoth interested in wt loss\par \par 1. stop the junk food- get it out of the house, chips, cookies etc\par 2 no ETOH\par 3 increase veggies and add fruit daily , at least one piece \par 4 add complex carbs\par 5 journal food \par 6 meet with nutritionist \par 7. cont PT and as feels better needs to increase exer( uses a can at the moment) chair yoga aerobics-water\par 8 widen food varieties, try new things \par 9 labs : tsh was 5 in oct , and CRP recently was 17 but likely a component of her RA etc \par 60 min

## 2022-12-28 NOTE — HISTORY OF PRESENT ILLNESS
[Improved Health] : Improved health [Quality of Life] : Quality of life [Improved Mobility] : Improved mobility [Improve Mood] : Improved mood [Teens] : teens [Cut/Track Calories] : cut/track calories [Commerial Program: _____] : commercial program: [unfilled] [Prescription Medications: _____] : prescription medications: [unfilled] [Poor eating habits] : poor eating habits [Motivation] : motivation [Depression/anxiety] : depression/anxiety [Medical conditions] : medical conditions [Medications] : medications [Change in activity level] : change in activity level [6] : 6 [Every night] : I use a CPAP machine every night [I usually sleep 6-8 hours] : I usually sleep 6-8 hours [Breakfast] : breakfast [Dinner] : dinner [Afternoon] : afternoon [Evening] : evening [Crunchy] : crunchy [Salty] : salty [Baked goods] : baked goods [Less than 1] : Dairy: less than 1 [3-5] : Meat, fish, poultry, eggs, cheese: 3-5 [1-2] : Fats: 1-2 [3] : Fast food - meals per week: 3 [1] : Cups of coffee per day: 1 [Creamer] : creamer [Spouse/partner] : spouse/partner [Self] : self [Frequent Snacks] : frequent snacks [Skip Meals] : skip meals [Binge] : binge [Emotional Eating] : emotional eating [Boredom Eating] : boredom eating [Other: _______] : [unfilled] [0] : 0 [None] : none [2] : 2 [45] : 45 [HTN] : HTN [POOL] : POOL [Metformin] : metformin  [Other: ___] : [unfilled] [FreeTextEntry2] : 220 [FreeTextEntry3] : 300 [] : No [FreeTextEntry1] : 67 year old woman with a history of \par Sjrogen's, RA, HTN, afib , POOL, OP with vertebral fractures interested in wt loss\par \par breakfast\par coffee with 1/2 and 1/2 \par \par brunch 11am \par 2 eggs\par cheese \par 1 piece of white toast \par \par snack  x 1 lasts for a while \par cookies +++ second helping often \par chips\par \par dinner 6: 30 \par 4 ravioli with sauce  and italian bread\par or \par meat with  vegetable( only likes carrots, grean beans peas, fried egg plant  snap peas, spinach ) \par enjoys cold salad \par dessert : none \par glass of wine with dinner \par \par snack : 8:30- 9 \par cookies: 6 oreo thins \par \par No exer other than her PT 2 days a week , very fatigued on walking \par \par

## 2022-12-28 NOTE — PHYSICAL EXAM
[Obese, well nourished, in no acute distress] : obese, well nourished, in no acute distress [Normal] : affect appropriate [de-identified] : no thyroid enlargement or nodules palpated  [de-identified] : +1 peripheral edema [de-identified] : obese

## 2022-12-29 LAB
ALBUMIN SERPL ELPH-MCNC: 4.3 G/DL
ALP BLD-CCNC: 81 U/L
ALT SERPL-CCNC: 11 U/L
ANION GAP SERPL CALC-SCNC: 15 MMOL/L
AST SERPL-CCNC: 22 U/L
BASOPHILS # BLD AUTO: 0.03 K/UL
BASOPHILS NFR BLD AUTO: 0.5 %
BILIRUB SERPL-MCNC: 0.5 MG/DL
BUN SERPL-MCNC: 24 MG/DL
CALCIUM SERPL-MCNC: 9.5 MG/DL
CHLORIDE SERPL-SCNC: 100 MMOL/L
CHOLEST SERPL-MCNC: 190 MG/DL
CO2 SERPL-SCNC: 23 MMOL/L
CREAT SERPL-MCNC: 1.1 MG/DL
EGFR: 54 ML/MIN/1.73M2
EOSINOPHIL # BLD AUTO: 0.17 K/UL
EOSINOPHIL NFR BLD AUTO: 2.7 %
GLUCOSE SERPL-MCNC: 94 MG/DL
HCT VFR BLD CALC: 41.6 %
HDLC SERPL-MCNC: 69 MG/DL
HGB BLD-MCNC: 13.3 G/DL
IMM GRANULOCYTES NFR BLD AUTO: 0.5 %
LDLC SERPL CALC-MCNC: 90 MG/DL
LYMPHOCYTES # BLD AUTO: 0.89 K/UL
LYMPHOCYTES NFR BLD AUTO: 14.4 %
MAN DIFF?: NORMAL
MCHC RBC-ENTMCNC: 32 GM/DL
MCHC RBC-ENTMCNC: 32.7 PG
MCV RBC AUTO: 102.2 FL
MONOCYTES # BLD AUTO: 0.76 K/UL
MONOCYTES NFR BLD AUTO: 12.3 %
NEUTROPHILS # BLD AUTO: 4.31 K/UL
NEUTROPHILS NFR BLD AUTO: 69.6 %
NONHDLC SERPL-MCNC: 121 MG/DL
PLATELET # BLD AUTO: 185 K/UL
POTASSIUM SERPL-SCNC: 4.9 MMOL/L
PROT SERPL-MCNC: 7.3 G/DL
RBC # BLD: 4.07 M/UL
RBC # FLD: 13.9 %
SODIUM SERPL-SCNC: 137 MMOL/L
TRIGL SERPL-MCNC: 154 MG/DL
WBC # FLD AUTO: 6.19 K/UL

## 2022-12-29 NOTE — BEHAVIORAL HEALTH
[No changes since last visit] : No change since last visit. [No] : no [Cognitive and/or Behavior Therapy] : Cognitive and/or Behavior Therapy  [Psychoeducation] : Psychoeducation  [Skills training (all types)] : Skills training (all types)  [Supportive Therapy] : Supportive Therapy [FreeTextEntry2] : Problem: trauma: \par Objective and goal: psychoed, symptom management in context of critical health issues, many losses, Patient will become more aware of her symptoms and how she manages them\par \par Problem: Depression and anxiety\par Objective and Goal: decrease symptoms, build skills to cope effectively, use mindfulness and insight to feel less overwhelmed and unmotivated \par \par Problem: Isolation\par Goal: more activity with less depressive symptoms

## 2022-12-29 NOTE — REASON FOR VISIT
[Patient] : patient, [unfilled] is a ~age~ year old ~male/female~ being seen for a follow-up visit  [Duration of Psychotherapy Visit (minutes spent in synchronous communication): ____] : Duration of Psychotherapy Visit (minutes spent in synchronous communication): [unfilled] [Individual Psychotherapy for 53+ minutes] : Individual Psychotherapy for 53+ minutes  [FreeTextEntry1] : chronic depression and anxiety exacerbated by significant health issues and jail [FreeTextEntry3] : [facility] [FreeTextEntry5] : [facility]

## 2022-12-29 NOTE — PLAN
[FreeTextEntry2] : Problem: trauma: \par Objective and goal: psychoed, symptom management in context of critical health issues, many losses, Patient will become more aware of her symptoms and how she manages them\par \par Problem: Depression and anxiety\par Objective and Goal: decrease symptoms, build skills to cope effectively, use mindfulness and insight to feel less overwhelmed and unmotivated \par \par Problem: Isolation\par Goal: more activity with less depressive symptoms [de-identified] : The above named patient presents on time for her weekly session, she's alert and oriented,  engages well with good eye contact and always seems to benefit from support and time for ventilation \par \par Patient reports continued anxiety noting her acknowledgement of clutter around the house .This writer reviewed her increase  presence in her house s/p intermediate, as well as the impact of her own limitations in mobilizing and being unable to complete tasks as she has in the past .Reviewed how this may affect her overall stress response, patient agreed \par \par Patient reflects on the holidays as well as setting goals for the new year, she continues to sleep well and is appreciative of the purchase of their new bed \par \par No other needs or concerns noted at this time, patient looking forward to time with her family over the next few weeks as her daughter was ill and unable to be present for Babs \par \par Next appointment scheduled for the 4th at 2:00 o'clock.  [FreeTextEntry1] : weekly therapy session in person to address depression, anxiety, isolation, and impact of medical issues on emotional health. 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

## 2022-12-29 NOTE — PHYSICAL EXAM
[Cooperative] : cooperative [Full] : full [Clear] : clear [Linear/Goal Directed] : linear/goal directed [WNL] : within normal limits [de-identified] : always well engaged [FreeTextEntry8] : improved compared to last week

## 2023-01-03 LAB
25(OH)D3 SERPL-MCNC: 35.9 NG/ML
ESTIMATED AVERAGE GLUCOSE: 108 MG/DL
HBA1C MFR BLD HPLC: 5.4 %
T3FREE SERPL-MCNC: 2.58 PG/ML
T4 FREE SERPL-MCNC: 1.8 NG/DL

## 2023-01-04 ENCOUNTER — APPOINTMENT (OUTPATIENT)
Dept: PSYCHIATRY | Facility: CLINIC | Age: 68
End: 2023-01-04
Payer: MEDICARE

## 2023-01-04 PROCEDURE — 90837 PSYTX W PT 60 MINUTES: CPT

## 2023-01-04 NOTE — PLAN
[Cognitive and/or Behavior Therapy] : Cognitive and/or Behavior Therapy  [Psychodynamic Therapy] : Psychodynamic Therapy  [Psychoeducation] : Psychoeducation  [Skills training (all types)] : Skills training (all types)  [FreeTextEntry2] : Problem: trauma: \par Objective and goal: psychoed, symptom management in context of critical health issues, many losses, Patient will become more aware of her symptoms and how she manages them\par \par Problem: Depression and anxiety\par Objective and Goal: decrease symptoms, build skills to cope effectively, use mindfulness and insight to feel less overwhelmed and unmotivated \par \par Problem: Isolation\par Goal: more activity with less depressive symptoms [de-identified] : Patient presents on time for her weekly session, she's alert and oriented ,engages well with good eye contact and always seems to benefit from support and time for ventilation \par \par Content of the session was reviewing goals as well as anxiety levels ,patient spends time reviewing friendships from her past including connections at work where she is now detached from \par \par Time was spent reviewing past year of progress ,patient is able to reflect with satisfaction in noting her high level of activity each week and her increase in overall independence \par \par Reviewed the importance of remaining focused on her own medical needs as well as needs for medical follow up, encouraged her to set goals and timelines, she agreed \par \par Patient looks forward to the weekend spending time with family, no other needs or concerns noted at this time, will meet with patient again on the 11th at 2:00 o'clock  [FreeTextEntry1] : weekly therapy session in person to address depression, anxiety, isolation, and impact of medical issues on emotional health. 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

## 2023-01-04 NOTE — PLAN
[FreeTextEntry2] : Problem: trauma: \par Objective and goal: psychoed, symptom management in context of critical health issues, many losses, Patient will become more aware of her symptoms and how she manages them\par \par Problem: Depression and anxiety\par Objective and Goal: decrease symptoms, build skills to cope effectively, use mindfulness and insight to feel less overwhelmed and unmotivated \par \par Problem: Isolation\par Goal: more activity with less depressive symptoms [de-identified] :  cameron presents on time for her weekly session, she's alert and oriented ,engages well with good eye contact and always seems to benefit from support and time for ventilation \par \par Content of the session was reviewing goals as well as anxiety levels ,patient spends time reviewing friendships from her past including connections at work where she is now detached from \par \par Time was spent reviewing past year of progress ,patient is able to reflect with satisfaction in noting her high level of activity each week and her increase in overall independence \par \par Reviewed the importance of remaining focused on her own medical needs as well as needs for medical follow up, encouraged her to set goals and timelines, she agreed \par \par Patient looks forward to the weekend spending time with family, no other needs or concerns noted at this time, will meet with patient again on the 11th at 2:00 o'clock  [FreeTextEntry1] : weekly therapy session in person to address depression, anxiety, isolation, and impact of medical issues on emotional health. 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

## 2023-01-04 NOTE — PHYSICAL EXAM
[Cooperative] : cooperative [Full] : full [Clear] : clear [Linear/Goal Directed] : linear/goal directed [WNL] : within normal limits [de-identified] : always well engaged [FreeTextEntry8] : improved compared to last week

## 2023-01-04 NOTE — PLAN
[Cognitive and/or Behavior Therapy] : Cognitive and/or Behavior Therapy  [Psychodynamic Therapy] : Psychodynamic Therapy  [Psychoeducation] : Psychoeducation  [Skills training (all types)] : Skills training (all types)  [FreeTextEntry2] : Problem: trauma: \par Objective and goal: psychoed, symptom management in context of critical health issues, many losses, Patient will become more aware of her symptoms and how she manages them\par \par Problem: Depression and anxiety\par Objective and Goal: decrease symptoms, build skills to cope effectively, use mindfulness and insight to feel less overwhelmed and unmotivated \par \par Problem: Isolation\par Goal: more activity with less depressive symptoms [de-identified] : Patient presents on time for her weekly session, she's alert and oriented ,engages well with good eye contact and always seems to benefit from support and time for ventilation \par \par Content of the session was reviewing goals as well as anxiety levels ,patient spends time reviewing friendships from her past including connections at work where she is now detached from \par \par Time was spent reviewing past year of progress ,patient is able to reflect with satisfaction in noting her high level of activity each week and her increase in overall independence \par \par Reviewed the importance of remaining focused on her own medical needs as well as needs for medical follow up, encouraged her to set goals and timelines, she agreed \par \par Patient looks forward to the weekend spending time with family, no other needs or concerns noted at this time, will meet with patient again on the 11th at 2:00 o'clock  [FreeTextEntry1] : weekly therapy session in person to address depression, anxiety, isolation, and impact of medical issues on emotional health. 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

## 2023-01-04 NOTE — BEHAVIORAL HEALTH
[FreeTextEntry1] : weekly therapy session in person to address depression, anxiety, isolation, and impact of medical issues on emotional health. \par \par Will also engage patient in discussion regarding being  to a combat  which has impacted her life for a significant amount of time [No changes since last visit] : No change since last visit. [No] : no [FreeTextEntry2] : Problem: trauma: \par Objective and goal: psychoed, symptom management in context of critical health issues, many losses, Patient will become more aware of her symptoms and how she manages them\par \par Problem: Depression and anxiety\par Objective and Goal: decrease symptoms, build skills to cope effectively, use mindfulness and insight to feel less overwhelmed and unmotivated \par \par Problem: Isolation\par Goal: more activity with less depressive symptoms [Cognitive and/or Behavior Therapy] : Cognitive and/or Behavior Therapy  [Psychoeducation] : Psychoeducation  [Skills training (all types)] : Skills training (all types)  [Supportive Therapy] : Supportive Therapy

## 2023-01-04 NOTE — PHYSICAL EXAM
[Cooperative] : cooperative [Full] : full [Clear] : clear [Linear/Goal Directed] : linear/goal directed [WNL] : within normal limits [de-identified] : always well engaged [FreeTextEntry8] : improved compared to last week

## 2023-01-04 NOTE — REASON FOR VISIT
[Telehealth (audio & video) - Individual/Group] : This visit was provided via telehealth using real-time 2-way audio visual technology. [Medical Office: ([facility])___] : The provider was located at the medical office in [unfilled]. [Home] : The patient, [unfilled], was located at home, [unfilled], at the time of the visit. [Verbal consent obtained from patient/other participant(s)] : Verbal consent for telehealth/telephonic services obtained from patient/other participant(s) [Teletherapy Service Provided] : The services provided in this session were delivered via tele-therapy [FreeTextEntry1] : chronic depression and anxiety exacerbated by significant health issues and halfway [Patient] : patient, [unfilled] is a ~age~ year old ~male/female~ being seen for a follow-up visit  [Duration of Psychotherapy Visit (minutes spent in synchronous communication): ____] : Duration of Psychotherapy Visit (minutes spent in synchronous communication): [unfilled] [Individual Psychotherapy for 53+ minutes] : Individual Psychotherapy for 53+ minutes  [FreeTextEntry3] : [facility] [FreeTextEntry5] : [facility]

## 2023-01-04 NOTE — REASON FOR VISIT
[Patient] : patient, [unfilled] is a ~age~ year old ~male/female~ being seen for a follow-up visit  [Duration of Psychotherapy Visit (minutes spent in synchronous communication): ____] : Duration of Psychotherapy Visit (minutes spent in synchronous communication): [unfilled] [Individual Psychotherapy for 53+ minutes] : Individual Psychotherapy for 53+ minutes  [FreeTextEntry1] : chronic depression and anxiety exacerbated by significant health issues and penitentiary [FreeTextEntry3] : [facility] [FreeTextEntry5] : [facility]

## 2023-01-04 NOTE — REASON FOR VISIT
[Telehealth (audio & video) - Individual/Group] : This visit was provided via telehealth using real-time 2-way audio visual technology. [Medical Office: ([facility])___] : The provider was located at the medical office in [unfilled]. [Home] : The patient, [unfilled], was located at home, [unfilled], at the time of the visit. [Verbal consent obtained from patient/other participant(s)] : Verbal consent for telehealth/telephonic services obtained from patient/other participant(s) [Teletherapy Service Provided] : The services provided in this session were delivered via tele-therapy [FreeTextEntry1] : chronic depression and anxiety exacerbated by significant health issues and [facility] [Patient] : patient, [unfilled] is a ~age~ year old ~male/female~ being seen for a follow-up visit  [Duration of Psychotherapy Visit (minutes spent in synchronous communication): ____] : Duration of Psychotherapy Visit (minutes spent in synchronous communication): [unfilled] [Individual Psychotherapy for 53+ minutes] : Individual Psychotherapy for 53+ minutes  [FreeTextEntry3] : [facility] [FreeTextEntry5] : [facility]

## 2023-01-11 ENCOUNTER — APPOINTMENT (OUTPATIENT)
Dept: PSYCHIATRY | Facility: CLINIC | Age: 68
End: 2023-01-11
Payer: MEDICARE

## 2023-01-11 PROCEDURE — 90837 PSYTX W PT 60 MINUTES: CPT

## 2023-01-11 NOTE — PLAN
[FreeTextEntry2] : Problem: trauma: \par Objective and goal: psychoed, symptom management in context of critical health issues, many losses, Patient will become more aware of her symptoms and how she manages them\par \par Problem: Depression and anxiety\par Objective and Goal: decrease symptoms, build skills to cope effectively, use mindfulness and insight to feel less overwhelmed and unmotivated \par \par Problem: Isolation\par Goal: more activity with less depressive symptoms [Cognitive and/or Behavior Therapy] : Cognitive and/or Behavior Therapy  [Psychoeducation] : Psychoeducation  [Skills training (all types)] : Skills training (all types)  [Supportive Therapy] : Supportive Therapy [de-identified] : Patient presents on time for her weekly session she was alert and oriented engages well although appears fatigued, she always seems to benefit from support and time for ventilation \par \par Content of the session was related to the past week and the amount of tooth pain she endured, she required follow up from dentist reports very poor sleep and feelings of being vulnerable requiring support and care of her . This event has resulted in her reflecting back on past times when she was overwhelmed with medical issues and  required much support within the home. Patient was able to recognize the connection between her current feelings and the past \par \par Additional stressors reviewed in being isolated at home, she has enjoyed activity outside of the home, explored stressors she at times feels towards her . \par \par No other needs or concerns noted at this time, will meet with patient again at 2:00 o'clock on the 16th \par \par   [FreeTextEntry1] : weekly therapy session in person to address depression, anxiety, isolation, and impact of medical issues on emotional health. 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

## 2023-01-11 NOTE — REASON FOR VISIT
[Patient] : patient, [unfilled] is a ~age~ year old ~male/female~ being seen for a follow-up visit  [Duration of Psychotherapy Visit (minutes spent in synchronous communication): ____] : Duration of Psychotherapy Visit (minutes spent in synchronous communication): [unfilled] [Individual Psychotherapy for 53+ minutes] : Individual Psychotherapy for 53+ minutes  [FreeTextEntry1] : chronic depression and anxiety exacerbated by significant health issues and detention [FreeTextEntry3] : [facility] [FreeTextEntry5] : [facility]

## 2023-01-11 NOTE — BEHAVIORAL HEALTH
[No changes since last visit] : No change since last visit. [No] : no [Cognitive and/or Behavior Therapy] : Cognitive and/or Behavior Therapy  [Psychoeducation] : Psychoeducation  [Skills training (all types)] : Skills training (all types)  [Supportive Therapy] : Supportive Therapy [FreeTextEntry2] : Problem: trauma: \par Objective and goal: psychoed, symptom management in context of critical health issues, many losses, Patient will become more aware of her symptoms and how she manages them\par \par Problem: Depression and anxiety\par Objective and Goal: decrease symptoms, build skills to cope effectively, use mindfulness and insight to feel less overwhelmed and unmotivated \par \par Problem: Isolation\par Goal: more activity with less depressive symptoms [de-identified] : Patient presents on time for her weekly session, she was alert and oriented, as always easily engaged as  and seems to benefit from support and time for ventilation \par \par Content of the session was related to her own worries about her daughter, this writer provided education as well as resources that can be obtained through the [facility] [place], patient reflects on how her 's  experience has affected her family \par \par Overall patient feeling well, other than possible sinus issues she reports continued improvement in her neuropathy. She has upcoming medical appointments  she will further make into goals for herself. No other needs or concerns noted at this time, will meet with patient again at 2:00 o'clock on 11/9  [FreeTextEntry1] : weekly therapy session in person to address depression, anxiety, isolation, and impact of medical issues on emotional health. 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

## 2023-01-11 NOTE — PLAN
[FreeTextEntry2] : Problem: trauma: \par Objective and goal: psychoed, symptom management in context of critical health issues, many losses, Patient will become more aware of her symptoms and how she manages them\par \par Problem: Depression and anxiety\par Objective and Goal: decrease symptoms, build skills to cope effectively, use mindfulness and insight to feel less overwhelmed and unmotivated \par \par Problem: Isolation\par Goal: more activity with less depressive symptoms [Cognitive and/or Behavior Therapy] : Cognitive and/or Behavior Therapy  [Psychoeducation] : Psychoeducation  [Skills training (all types)] : Skills training (all types)  [Supportive Therapy] : Supportive Therapy [de-identified] : Patient presents on time for her weekly in person visit. she's alert and oriented, in good spirits, engages well, calm, and cooperative. makes good eye contact and overall easy to engage in conversation and reflective on any stress management issues. \par \par  Patient reports she and her  are planning to buy a new bed, encouraged her to further communicate her own needs as she struggles with barriers within to split the bedroom. This related to his need for a TV and his own cpap and diabetic monitoring.  \par \par patient relieved that she will not have any appointments for the next two weeks, encouraged her to perform good self-care and to reflective on her levels of stress related issues. Looking forward to time with the family over the next few days, she reviews her overall stress related to her 's outlook on others, have encouraged her to further assess. No other needs or concerns, have arranged to meet with patient again on 11/30 @ 2  [FreeTextEntry1] : weekly therapy session in person to address depression, anxiety, isolation, and impact of medical issues on emotional health. 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

## 2023-01-11 NOTE — BEHAVIORAL HEALTH
[No changes since last visit] : No change since last visit. [No] : no [Cognitive and/or Behavior Therapy] : Cognitive and/or Behavior Therapy  [Psychoeducation] : Psychoeducation  [Skills training (all types)] : Skills training (all types)  [Supportive Therapy] : Supportive Therapy [de-identified] : Patient presents on time for her weekly session, she was alert and oriented, engages well with good eye contact, always seems to benefit from support and time for ventilation \par \par Patient calm however reports some sadness over the past week, she reflects on her own illness as well as many losses throughout her life, this also including a near death experience when her 's blood sugars became extremely elevated last year. She spends time processing those events and her emotional reaction \par \par Patient also spends time reviewing interactions with her children. Family is a significant part of her life, she finds much fulfillment in her connection to them \par \par No other needs or concerns noted at this time, have arranged to meet with patient again at 2:00 o'clock on the 23rd  [FreeTextEntry2] : Problem: trauma: \par Objective and goal: psychoed, symptom management in context of critical health issues, many losses, Patient will become more aware of her symptoms and how she manages them\par \par Problem: Depression and anxiety\par Objective and Goal: decrease symptoms, build skills to cope effectively, use mindfulness and insight to feel less overwhelmed and unmotivated \par \par Problem: Isolation\par Goal: more activity with less depressive symptoms [FreeTextEntry1] : weekly therapy session in person to address depression, anxiety, isolation, and impact of medical issues on emotional health. 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

## 2023-01-11 NOTE — PHYSICAL EXAM
[Cooperative] : cooperative [Full] : full [Clear] : clear [Linear/Goal Directed] : linear/goal directed [WNL] : within normal limits [de-identified] : always well engaged [FreeTextEntry8] : improved compared to last week

## 2023-01-11 NOTE — REASON FOR VISIT
[FreeTextEntry1] : chronic depression and anxiety exacerbated by significant health issues and skilled nursing [Patient] : patient, [unfilled] is a ~age~ year old ~male/female~ being seen for a follow-up visit  [Duration of Psychotherapy Visit (minutes spent in synchronous communication): ____] : Duration of Psychotherapy Visit (minutes spent in synchronous communication): [unfilled] [Individual Psychotherapy for 53+ minutes] : Individual Psychotherapy for 53+ minutes  [FreeTextEntry3] : [facility] [FreeTextEntry5] : [facility]

## 2023-01-11 NOTE — PHYSICAL EXAM
[Cooperative] : cooperative [Full] : full [Clear] : clear [Linear/Goal Directed] : linear/goal directed [WNL] : within normal limits [de-identified] : always well engaged [FreeTextEntry8] : improved compared to last week

## 2023-01-11 NOTE — PHYSICAL EXAM
[Cooperative] : cooperative [Full] : full [Clear] : clear [Linear/Goal Directed] : linear/goal directed [WNL] : within normal limits [de-identified] : always well engaged [FreeTextEntry8] : improved compared to last week

## 2023-01-11 NOTE — REASON FOR VISIT
[Telehealth (audio & video) - Individual/Group] : This visit was provided via telehealth using real-time 2-way audio visual technology. [Medical Office: ([facility])___] : The provider was located at the medical office in [unfilled]. [Home] : The patient, [unfilled], was located at home, [unfilled], at the time of the visit. [Verbal consent obtained from patient/other participant(s)] : Verbal consent for telehealth/telephonic services obtained from patient/other participant(s) [Patient] : patient, [unfilled] is a ~age~ year old ~male/female~ being seen for a follow-up visit  [Duration of Psychotherapy Visit (minutes spent in synchronous communication): ____] : Duration of Psychotherapy Visit (minutes spent in synchronous communication): [unfilled] [Individual Psychotherapy for 53+ minutes] : Individual Psychotherapy for 53+ minutes  [Teletherapy Service Provided] : The services provided in this session were delivered via tele-therapy [FreeTextEntry2] : 10/12/22 [FreeTextEntry1] : chronic depression and anxiety exacerbated by significant health issues and [facility] [FreeTextEntry3] : home [FreeTextEntry5] : CARMEN Gar[place]

## 2023-01-11 NOTE — PLAN
[FreeTextEntry2] : Problem: trauma: \par Objective and goal: psychoed, symptom management in context of critical health issues, many losses, Patient will become more aware of her symptoms and how she manages them\par \par Problem: Depression and anxiety\par Objective and Goal: decrease symptoms, build skills to cope effectively, use mindfulness and insight to feel less overwhelmed and unmotivated \par \par Problem: Isolation\par Goal: more activity with less depressive symptoms [Cognitive and/or Behavior Therapy] : Cognitive and/or Behavior Therapy  [Psychoeducation] : Psychoeducation  [Skills training (all types)] : Skills training (all types)  [Supportive Therapy] : Supportive Therapy [de-identified] :  Patient reports that she's been journaling her activity outside of the home, she has shown significant improvement in her own independence as well as acknowledgement of the benefits of regularly scheduled activity, she continues to adjust well \par \par Patient also shares that she spent three evenings alone while her  attended services for a family member who , although she struggled by day three, she reports that she coped well, remained very active, and noted no significant anxiety above her baseline \par \par Patient's neuropathy continues to improve, she feels much hope in the  aspect of this health related issue which affects her overall life \par \par Patient somewhat stressed over upcoming appointments which she has yet to schedule, this writer encouraged her to set goals for appointments by using her journal as well as calendar, she agreed to do so. \par \par No other needs or concerns noted at this time, will meet with patient for in person visit at 2:00 o'clock on the   [FreeTextEntry1] : weekly therapy session in person to address depression, anxiety, isolation, and impact of medical issues on emotional health. 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

## 2023-01-11 NOTE — REASON FOR VISIT
[Patient] : patient, [unfilled] is a ~age~ year old ~male/female~ being seen for a follow-up visit  [Duration of Psychotherapy Visit (minutes spent in synchronous communication): ____] : Duration of Psychotherapy Visit (minutes spent in synchronous communication): [unfilled] [Individual Psychotherapy for 53+ minutes] : Individual Psychotherapy for 53+ minutes  [FreeTextEntry1] : chronic depression and anxiety exacerbated by significant health issues and [facility] [FreeTextEntry3] : [facility] [FreeTextEntry5] : [facility]

## 2023-01-11 NOTE — PHYSICAL EXAM
[Cooperative] : cooperative [Full] : full [Clear] : clear [Linear/Goal Directed] : linear/goal directed [WNL] : within normal limits [de-identified] : always well engaged [FreeTextEntry8] : improved compared to last week

## 2023-01-11 NOTE — PHYSICAL EXAM
[Cooperative] : cooperative [Full] : full [Clear] : clear [Linear/Goal Directed] : linear/goal directed [WNL] : within normal limits [de-identified] : always well engaged [FreeTextEntry8] : improved compared to last week

## 2023-01-11 NOTE — PLAN
[FreeTextEntry2] : Problem: trauma: \par Objective and goal: psychoed, symptom management in context of critical health issues, many losses, Patient will become more aware of her symptoms and how she manages them\par \par Problem: Depression and anxiety\par Objective and Goal: decrease symptoms, build skills to cope effectively, use mindfulness and insight to feel less overwhelmed and unmotivated \par \par Problem: Isolation\par Goal: more activity with less depressive symptoms [Cognitive and/or Behavior Therapy] : Cognitive and/or Behavior Therapy  [Psychoeducation] : Psychoeducation  [Skills training (all types)] : Skills training (all types)  [Supportive Therapy] : Supportive Therapy [de-identified] : Above named patient presents on time for her weekly session, she was alert and oriented and in good spirits, always seems to benefit from support and time for ventilation \par \par Content of the session was related to the new bed she purchased, she's pleased with the product and reports improved rest. Is pleased to be back in in her room and feels a sense of “normalcy”.  This writer continues to review her overall improvement. She remains active outside of the house however is aware of her own boundaries in order to deter increased fatigue \par \par Patient looking forward to the holidays ,will be spending them with family ,she's also anticipating her best friend traveling to [place] for the winter months therefore this is a significant social loss as they have been spending increased time together in activity outside of the home \par \par No other needs or concerns noted at this time, have arranged to meet with patient again on the 21st at 2:00 o'clock  [FreeTextEntry1] : weekly therapy session in person to address depression, anxiety, isolation, and impact of medical issues on emotional health. 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

## 2023-01-11 NOTE — PLAN
[FreeTextEntry2] : Problem: trauma: \par Objective and goal: psychoed, symptom management in context of critical health issues, many losses, Patient will become more aware of her symptoms and how she manages them\par \par Problem: Depression and anxiety\par Objective and Goal: decrease symptoms, build skills to cope effectively, use mindfulness and insight to feel less overwhelmed and unmotivated \par \par Problem: Isolation\par Goal: more activity with less depressive symptoms [Cognitive and/or Behavior Therapy] : Cognitive and/or Behavior Therapy  [Psychoeducation] : Psychoeducation  [Skills training (all types)] : Skills training (all types)  [Supportive Therapy] : Supportive Therapy [de-identified] : Patient presents on time for her weekly session, she was alert and oriented, as always engages well and seems to benefit from support and time for ventilation \par \par Content of the session was related to her satisfaction in purchasing a bed that will support her medical needs, she looks forward to more comfort as well as returning to her bedroom. This writer encouraged her to review her progress as this is continued movement forward she agrees. This writer reviewed her ability to be flexible and adjust to new demands. Encouraged patient to continue being active outside of the home as it helps her emotional health. She has had time at home the past few days and has felt increased tension between her and her . Patient spends time reviewing her progress over the past year reflecting on last Thanksgiving and her levels of pain \par \par no other needs or concerns noted at this time, arranged to meet with patient again in one week at 2:00 o'clock   [FreeTextEntry1] : weekly therapy session in person to address depression, anxiety, isolation, and impact of medical issues on emotional health. 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

## 2023-01-11 NOTE — PHYSICAL EXAM
[Cooperative] : cooperative [Full] : full [Clear] : clear [Linear/Goal Directed] : linear/goal directed [WNL] : within normal limits [de-identified] : always well engaged [FreeTextEntry8] : improved compared to last week

## 2023-01-11 NOTE — PLAN
[FreeTextEntry2] : Problem: trauma: \par Objective and goal: psychoed, symptom management in context of critical health issues, many losses, Patient will become more aware of her symptoms and how she manages them\par \par Problem: Depression and anxiety\par Objective and Goal: decrease symptoms, build skills to cope effectively, use mindfulness and insight to feel less overwhelmed and unmotivated \par \par Problem: Isolation\par Goal: more activity with less depressive symptoms [Cognitive and/or Behavior Therapy] : Cognitive and/or Behavior Therapy  [Psychoeducation] : Psychoeducation  [Skills training (all types)] : Skills training (all types)  [Supportive Therapy] : Supportive Therapy [de-identified] : Patient arrives on time for her weekly in-person session, she was alert and oriented, as always  easy to engage, however appears fatigued as well as uncomfortable as a result of physical therapy she received the day prior. Patient calm and cooperative  \par \par Content of the session was related to the events of the previous week ,patient continues to be active outside of the home which has always beneficial for her emotional as well as physical health. She becomes tearful when discussing the impact of her health on her ability to be active as she had in the past. She reflects on being to be spontaneous without having to plan ahead of time. Much  support and time for ventilation provided \par \par Patient also discusses her ’s plans to travel out of state in November, patient expresses anxiety as she has not spent a significant amount of time alone during the time frame of their marriage. This writer helped her reflect on her ability to remain independent, focusing on the past week and encouraging her to explore areas of need from her . Patient seems better focused on her ability to be successful during this time of independence, also encouraged her to plan ahead to ensure unnecessary stress \par \par No other needs or concerns noted at this time,  patient continues to be hopeful about her neuropathy as she has experienced improvement in her feet. She will be seen again on the 19th at 2:00 o'clock  [FreeTextEntry1] : weekly therapy session in person to address depression, anxiety, isolation, and impact of medical issues on emotional health. 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

## 2023-01-11 NOTE — PLAN
[FreeTextEntry2] : Problem: trauma: \par Objective and goal: psychoed, symptom management in context of critical health issues, many losses, Patient will become more aware of her symptoms and how she manages them\par \par Problem: Depression and anxiety\par Objective and Goal: decrease symptoms, build skills to cope effectively, use mindfulness and insight to feel less overwhelmed and unmotivated \par \par Problem: Isolation\par Goal: more activity with less depressive symptoms [Cognitive and/or Behavior Therapy] : Cognitive and/or Behavior Therapy  [Psychoeducation] : Psychoeducation  [Skills training (all types)] : Skills training (all types)  [Supportive Therapy] : Supportive Therapy [de-identified] : Patient presents on time for her weekly session, she was alert and oriented, as always easily engaged as  and seems to benefit from support and time for ventilation \par \par Content of the session was related to her own worries about her daughter, this writer provided education as well as resources that can be obtained through the [facility], patient reflects on how her 's  experience has affected her family \par \par Overall patient feeling well, other than possible sinus issues she reports continued improvement in her neuropathy. She has upcoming medical appointments  she will further make into goals for herself. No other needs or concerns noted at this time, will meet with patient again at 2:00 o'clock on 11/9  [FreeTextEntry1] : weekly therapy session in person to address depression, anxiety, isolation, and impact of medical issues on emotional health. 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

## 2023-01-11 NOTE — BEHAVIORAL HEALTH
[No changes since last visit] : No change since last visit. [No] : no [Cognitive and/or Behavior Therapy] : Cognitive and/or Behavior Therapy  [Psychoeducation] : Psychoeducation  [Skills training (all types)] : Skills training (all types)  [Supportive Therapy] : Supportive Therapy [FreeTextEntry2] : Problem: trauma: \par Objective and goal: psychoed, symptom management in context of critical health issues, many losses, Patient will become more aware of her symptoms and how she manages them\par \par Problem: Depression and anxiety\par Objective and Goal: decrease symptoms, build skills to cope effectively, use mindfulness and insight to feel less overwhelmed and unmotivated \par \par Problem: Isolation\par Goal: more activity with less depressive symptoms [FreeTextEntry1] : weekly therapy session in person to address depression, anxiety, isolation, and impact of medical issues on emotional health. 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

## 2023-01-11 NOTE — REASON FOR VISIT
[Telehealth (audio & video) - Individual/Group] : This visit was provided via telehealth using real-time 2-way audio visual technology. [Medical Office: ([facility])___] : The provider was located at the medical office in [unfilled]. [Home] : The patient, [unfilled], was located at home, [unfilled], at the time of the visit. [Verbal consent obtained from patient/other participant(s)] : Verbal consent for telehealth/telephonic services obtained from patient/other participant(s) [Patient] : patient, [unfilled] is a ~age~ year old ~male/female~ being seen for a follow-up visit  [Duration of Psychotherapy Visit (minutes spent in synchronous communication): ____] : Duration of Psychotherapy Visit (minutes spent in synchronous communication): [unfilled] [Individual Psychotherapy for 53+ minutes] : Individual Psychotherapy for 53+ minutes  [Teletherapy Service Provided] : The services provided in this session were delivered via tele-therapy [FreeTextEntry1] : chronic depression and anxiety exacerbated by significant health issues and correction [FreeTextEntry2] : 11/23/22 [FreeTextEntry3] : [facility] [FreeTextEntry5] : [facility]

## 2023-01-11 NOTE — PHYSICAL EXAM
[Cooperative] : cooperative [Full] : full [Clear] : clear [Linear/Goal Directed] : linear/goal directed [WNL] : within normal limits [de-identified] : always well engaged [FreeTextEntry8] : improved compared to last week

## 2023-01-11 NOTE — BEHAVIORAL HEALTH
[No changes since last visit] : No change since last visit. [No] : no [Cognitive and/or Behavior Therapy] : Cognitive and/or Behavior Therapy  [Psychoeducation] : Psychoeducation  [Skills training (all types)] : Skills training (all types)  [Supportive Therapy] : Supportive Therapy [de-identified] : Patient presents on time for her weekly in person visit. she's alert and oriented, in good spirits, engages well, calm, and cooperative. makes good eye contact and overall easy to engage in conversation and reflective on any stress management issues. \par \par  Patient reports she and her  are planning to buy a new bed, encouraged her to further communicate her own needs as she struggles with barriers within to split the bedroom. This related to his need for a TV and his own cpap and diabetic monitoring.  \par \par patient relieved that she will not have any appointments for the next two weeks, encouraged her to perform good self-care and to reflective on her levels of stress related issues. Looking forward to time with the family over the next few days, she reviews her overall stress related to her 's outlook on others, have encouraged her to further assess. No other needs or concerns, have arranged to meet with patient again on 11/30 @ 2  [FreeTextEntry2] : Problem: trauma: \par Objective and goal: psychoed, symptom management in context of critical health issues, many losses, Patient will become more aware of her symptoms and how she manages them\par \par Problem: Depression and anxiety\par Objective and Goal: decrease symptoms, build skills to cope effectively, use mindfulness and insight to feel less overwhelmed and unmotivated \par \par Problem: Isolation\par Goal: more activity with less depressive symptoms [FreeTextEntry1] : weekly therapy session in person to address depression, anxiety, isolation, and impact of medical issues on emotional health. 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

## 2023-01-11 NOTE — REASON FOR VISIT
[Patient] : patient, [unfilled] is a ~age~ year old ~male/female~ being seen for a follow-up visit  [Duration of Psychotherapy Visit (minutes spent in synchronous communication): ____] : Duration of Psychotherapy Visit (minutes spent in synchronous communication): [unfilled] [Individual Psychotherapy for 53+ minutes] : Individual Psychotherapy for 53+ minutes  [FreeTextEntry1] : chronic depression and anxiety exacerbated by significant health issues and senior living [FreeTextEntry3] : [facility] [FreeTextEntry5] : [facility]

## 2023-01-11 NOTE — PLAN
[FreeTextEntry2] : Problem: trauma: \par Objective and goal: psychoed, symptom management in context of critical health issues, many losses, Patient will become more aware of her symptoms and how she manages them\par \par Problem: Depression and anxiety\par Objective and Goal: decrease symptoms, build skills to cope effectively, use mindfulness and insight to feel less overwhelmed and unmotivated \par \par Problem: Isolation\par Goal: more activity with less depressive symptoms [Cognitive and/or Behavior Therapy] : Cognitive and/or Behavior Therapy  [Psychoeducation] : Psychoeducation  [Skills training (all types)] : Skills training (all types)  [Supportive Therapy] : Supportive Therapy [de-identified] : Patient arrives on time for her weekly session, she was alert and oriented, engages well with good eye contact, calm and cooperative, however reports a busy morning therefore short of breath when she arrives to this appointment \par \par Patient spends time reviewing stressful family dynamics ,this writer provided recommendations as well as guidance in the benefits of effective communication as well as assessing additional impacts on her family going forward \par \par Patient continues to engage in daily activity, she benefits greatly from being out of the home however also acknowledges changes in her ability to mobilize therefore is mindful of her limitations \par \par Patient looking forward to the holidays as she will spend time with family, no other needs or concerns noted at this time, have arranged to meet with patient again at 2:00 o'clock on the 14th  [FreeTextEntry1] : weekly therapy session in person to address depression, anxiety, isolation, and impact of medical issues on emotional health. 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

## 2023-01-11 NOTE — BEHAVIORAL HEALTH
[No changes since last visit] : No change since last visit. [No] : no [Cognitive and/or Behavior Therapy] : Cognitive and/or Behavior Therapy  [Psychoeducation] : Psychoeducation  [Skills training (all types)] : Skills training (all types)  [Supportive Therapy] : Supportive Therapy [de-identified] : Patient presents on time for her weekly session she was alert and oriented engages well although appears fatigued, she always seems to benefit from support and time for ventilation \par \par Content of the session was related to the past week and the amount of tooth pain she endured, she required follow up from dentist reports very poor sleep and feelings of being vulnerable requiring support and care of her . This event has resulted in her reflecting back on past times when she was overwhelmed with medical issues and  required much support within the home. Patient was able to recognize the connection between her current feelings and the past \par \par Additional stressors reviewed in being isolated at home, she has enjoyed activity outside of the home, explored stressors she at times feels towards her . \par \par No other needs or concerns noted at this time, will meet with patient again at 2:00 o'clock on the 16th \par \par   [FreeTextEntry2] : Problem: trauma: \par Objective and goal: psychoed, symptom management in context of critical health issues, many losses, Patient will become more aware of her symptoms and how she manages them\par \par Problem: Depression and anxiety\par Objective and Goal: decrease symptoms, build skills to cope effectively, use mindfulness and insight to feel less overwhelmed and unmotivated \par \par Problem: Isolation\par Goal: more activity with less depressive symptoms [FreeTextEntry1] : weekly therapy session in person to address depression, anxiety, isolation, and impact of medical issues on emotional health. 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

## 2023-01-11 NOTE — BEHAVIORAL HEALTH
[No changes since last visit] : No change since last visit. [No] : no [Cognitive and/or Behavior Therapy] : Cognitive and/or Behavior Therapy  [Psychoeducation] : Psychoeducation  [Skills training (all types)] : Skills training (all types)  [Supportive Therapy] : Supportive Therapy [de-identified] : Patient arrives on time for her weekly in person session. She was alert and oriented, engages well, always seems to benefit from support and time for ventilation. She was calm and cooperative during  session , in much better spirits compared to previous weeks. Reports an increase in her amount of activity outside the home as well as continued improvement in her neuropathy in her feet \par \par Content of session focused on her overall improvement , she reviewed her week which was consistent with activity outside of the home. This writer encouraged her to reflect on the overall benefit of ensuring this activity continues going forward, patient agrees \par \par Patient pleased that she feels her medical issue of neuropathy is showing improvement, although it is very time consuming, she is hopeful for continued improvement and hope of no longer requiring a cane. Patient admits she is very self-conscious if requiring an aid to ambulate \par \par Patient also spends time reviewing past struggles within her marriage, she seems to benefit from support and time for ventilation \par \par No other needs or concerns noted at this time, will meet with patient again on the 12th at 2:00 o'clock  [FreeTextEntry2] : Problem: trauma: \par Objective and goal: psychoed, symptom management in context of critical health issues, many losses, Patient will become more aware of her symptoms and how she manages them\par \par Problem: Depression and anxiety\par Objective and Goal: decrease symptoms, build skills to cope effectively, use mindfulness and insight to feel less overwhelmed and unmotivated \par \par Problem: Isolation\par Goal: more activity with less depressive symptoms [FreeTextEntry1] : weekly therapy session in person to address depression, anxiety, isolation, and impact of medical issues on emotional health. 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

## 2023-01-11 NOTE — BEHAVIORAL HEALTH
[No changes since last visit] : No change since last visit. [No] : no [Cognitive and/or Behavior Therapy] : Cognitive and/or Behavior Therapy  [Psychoeducation] : Psychoeducation  [Skills training (all types)] : Skills training (all types)  [Supportive Therapy] : Supportive Therapy [de-identified] : Patient arrives on time for her weekly in-person session, she was alert and oriented, as always  easy to engage, however appears fatigued as well as uncomfortable as a result of physical therapy she received the day prior. Patient calm and cooperative  \par \par Content of the session was related to the events of the previous week ,patient continues to be active outside of the home which has always beneficial for her emotional as well as physical health. She becomes tearful when discussing the impact of her health on her ability to be active as she had in the past. She reflects on being to be spontaneous without having to plan ahead of time. Much  support and time for ventilation provided \par \par Patient also discusses her ’s plans to travel out of state in November, patient expresses anxiety as she has not spent a significant amount of time alone during the time frame of their marriage. This writer helped her reflect on her ability to remain independent, focusing on the past week and encouraging her to explore areas of need from her . Patient seems better focused on her ability to be successful during this time of independence, also encouraged her to plan ahead to ensure unnecessary stress \par \par No other needs or concerns noted at this time,  patient continues to be hopeful about her neuropathy as she has experienced improvement in her feet. She will be seen again on the 19th at 2:00 o'clock  [FreeTextEntry2] : Problem: trauma: \par Objective and goal: psychoed, symptom management in context of critical health issues, many losses, Patient will become more aware of her symptoms and how she manages them\par \par Problem: Depression and anxiety\par Objective and Goal: decrease symptoms, build skills to cope effectively, use mindfulness and insight to feel less overwhelmed and unmotivated \par \par Problem: Isolation\par Goal: more activity with less depressive symptoms [FreeTextEntry1] : weekly therapy session in person to address depression, anxiety, isolation, and impact of medical issues on emotional health. 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

## 2023-01-11 NOTE — PLAN
[FreeTextEntry2] : Problem: trauma: \par Objective and goal: psychoed, symptom management in context of critical health issues, many losses, Patient will become more aware of her symptoms and how she manages them\par \par Problem: Depression and anxiety\par Objective and Goal: decrease symptoms, build skills to cope effectively, use mindfulness and insight to feel less overwhelmed and unmotivated \par \par Problem: Isolation\par Goal: more activity with less depressive symptoms [Cognitive and/or Behavior Therapy] : Cognitive and/or Behavior Therapy  [Psychoeducation] : Psychoeducation  [Skills training (all types)] : Skills training (all types)  [Supportive Therapy] : Supportive Therapy [de-identified] : Patient arrives on time for her weekly in person session. She was alert and oriented, engages well, always seems to benefit from support and time for ventilation. She was calm and cooperative during  session , in much better spirits compared to previous weeks. Reports an increase in her amount of activity outside the home as well as continued improvement in her neuropathy in her feet \par \par Content of session focused on her overall improvement , she reviewed her week which was consistent with activity outside of the home. This writer encouraged her to reflect on the overall benefit of ensuring this activity continues going forward, patient agrees \par \par Patient pleased that she feels her medical issue of neuropathy is showing improvement, although it is very time consuming, she is hopeful for continued improvement and hope of no longer requiring a cane. Patient admits she is very self-conscious if requiring an aid to ambulate \par \par Patient also spends time reviewing past struggles within her marriage, she seems to benefit from support and time for ventilation \par \par No other needs or concerns noted at this time, will meet with patient again on the 12th at 2:00 o'clock  [FreeTextEntry1] : weekly therapy session in person to address depression, anxiety, isolation, and impact of medical issues on emotional health. 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

## 2023-01-11 NOTE — REASON FOR VISIT
[Patient] : patient, [unfilled] is a ~age~ year old ~male/female~ being seen for a follow-up visit  [Duration of Psychotherapy Visit (minutes spent in synchronous communication): ____] : Duration of Psychotherapy Visit (minutes spent in synchronous communication): [unfilled] [Individual Psychotherapy for 53+ minutes] : Individual Psychotherapy for 53+ minutes  [FreeTextEntry1] : chronic depression and anxiety exacerbated by significant health issues and assisted [FreeTextEntry3] : [facility] [FreeTextEntry5] : [facility]

## 2023-01-11 NOTE — PHYSICAL EXAM
[Cooperative] : cooperative [Full] : full [Clear] : clear [Linear/Goal Directed] : linear/goal directed [WNL] : within normal limits [de-identified] : always well engaged [FreeTextEntry8] : improved compared to last week

## 2023-01-11 NOTE — BEHAVIORAL HEALTH
[No changes since last visit] : No change since last visit. [No] : no [FreeTextEntry2] : Problem: trauma: \par Objective and goal: psychoed, symptom management in context of critical health issues, many losses, Patient will become more aware of her symptoms and how she manages them\par \par Problem: Depression and anxiety\par Objective and Goal: decrease symptoms, build skills to cope effectively, use mindfulness and insight to feel less overwhelmed and unmotivated \par \par Problem: Isolation\par Goal: more activity with less depressive symptoms [Cognitive and/or Behavior Therapy] : Cognitive and/or Behavior Therapy  [Psychoeducation] : Psychoeducation  [Skills training (all types)] : Skills training (all types)  [Supportive Therapy] : Supportive Therapy

## 2023-01-11 NOTE — PHYSICAL EXAM
[Cooperative] : cooperative [Full] : full [Clear] : clear [Linear/Goal Directed] : linear/goal directed [WNL] : within normal limits [de-identified] : always well engaged [FreeTextEntry8] : improved compared to last week

## 2023-01-11 NOTE — REASON FOR VISIT
[Telehealth (audio & video) - Individual/Group] : This visit was provided via telehealth using real-time 2-way audio visual technology. [Medical Office: ([facility])___] : The provider was located at the medical office in [unfilled]. [Home] : The patient, [unfilled], was located at home, [unfilled], at the time of the visit. [Verbal consent obtained from patient/other participant(s)] : Verbal consent for telehealth/telephonic services obtained from patient/other participant(s) [Patient] : patient, [unfilled] is a ~age~ year old ~male/female~ being seen for a follow-up visit  [Duration of Psychotherapy Visit (minutes spent in synchronous communication): ____] : Duration of Psychotherapy Visit (minutes spent in synchronous communication): [unfilled] [Individual Psychotherapy for 53+ minutes] : Individual Psychotherapy for 53+ minutes  [Teletherapy Service Provided] : The services provided in this session were delivered via tele-therapy [FreeTextEntry1] : chronic depression and anxiety exacerbated by significant health issues and [facility] [FreeTextEntry2] : 10/12/22 [FreeTextEntry3] : home [FreeTextEntry5] : [facility]

## 2023-01-11 NOTE — PHYSICAL EXAM
[Cooperative] : cooperative [Full] : full [Clear] : clear [Linear/Goal Directed] : linear/goal directed [WNL] : within normal limits [de-identified] : always well engaged [FreeTextEntry8] : improved compared to last week

## 2023-01-11 NOTE — PLAN
[FreeTextEntry2] : Problem: trauma: \par Objective and goal: psychoed, symptom management in context of critical health issues, many losses, Patient will become more aware of her symptoms and how she manages them\par \par Problem: Depression and anxiety\par Objective and Goal: decrease symptoms, build skills to cope effectively, use mindfulness and insight to feel less overwhelmed and unmotivated \par \par Problem: Isolation\par Goal: more activity with less depressive symptoms [Cognitive and/or Behavior Therapy] : Cognitive and/or Behavior Therapy  [Psychoeducation] : Psychoeducation  [Skills training (all types)] : Skills training (all types)  [Supportive Therapy] : Supportive Therapy [de-identified] : The above named patient presents on time for her weekly session, she's alert and oriented, engages well and always seems to benefit from support and time for ventilation, patient in good spirits on this day however emotional as the session progresses \par \par Patient spends time reviewing recent death of a friend, this was a younger gentleman who appeared to be in good health. This has brought up issues of patients own mortality, she's tearful when discussing, this writer was able to explore her emotions and correlate them to her past medical issues with her own fear of death. Patient also spent time reviewing  stressors within her family over the past weekend. Encouraged effective communication, she agreed to do so. Patient also found a box where she has kept important sentimental items of those who have passed away ,this was as expected, emotional for her. This writer reviewed the benefits of having these items, but also validated her emotional response \par \par Encouraged patient to focus on her own medical goals including making appointments for her eyes and follow up regarding her neuropathy ,she agreed to do so \par \par Patient reviews her 's inquiry about joining one of her sessions, she's resistant at this time but will further explore his request \par \par Next appointment scheduled for the 18th at 2:00 o'clock  [FreeTextEntry1] : weekly therapy session in person to address depression, anxiety, isolation, and impact of medical issues on emotional health. 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

## 2023-01-11 NOTE — PLAN
[FreeTextEntry2] : Problem: trauma: \par Objective and goal: psychoed, symptom management in context of critical health issues, many losses, Patient will become more aware of her symptoms and how she manages them\par \par Problem: Depression and anxiety\par Objective and Goal: decrease symptoms, build skills to cope effectively, use mindfulness and insight to feel less overwhelmed and unmotivated \par \par Problem: Isolation\par Goal: more activity with less depressive symptoms [Cognitive and/or Behavior Therapy] : Cognitive and/or Behavior Therapy  [Psychoeducation] : Psychoeducation  [Skills training (all types)] : Skills training (all types)  [Supportive Therapy] : Supportive Therapy [de-identified] : Patient presents on time for her weekly session, she was alert and oriented, engages well with good eye contact, always seems to benefit from support and time for ventilation \par \par Patient calm however reports some sadness over the past week, she reflects on her own illness as well as many losses throughout her life, this also including a near death experience when her 's blood sugars became extremely elevated last year. She spends time processing those events and her emotional reaction \par \par Patient also spends time reviewing interactions with her children. Family is a significant part of her life, she finds much fulfillment in her connection to them \par \par No other needs or concerns noted at this time, have arranged to meet with patient again at 2:00 o'clock on the 23rd  [FreeTextEntry1] : weekly therapy session in person to address depression, anxiety, isolation, and impact of medical issues on emotional health. 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

## 2023-01-11 NOTE — REASON FOR VISIT
[Telehealth (audio & video) - Individual/Group] : This visit was provided via telehealth using real-time 2-way audio visual technology. [Medical Office: ([facility])___] : The provider was located at the medical office in [unfilled]. [Home] : The patient, [unfilled], was located at home, [unfilled], at the time of the visit. [Verbal consent obtained from patient/other participant(s)] : Verbal consent for telehealth/telephonic services obtained from patient/other participant(s) [Patient] : patient, [unfilled] is a ~age~ year old ~male/female~ being seen for a follow-up visit  [Duration of Psychotherapy Visit (minutes spent in synchronous communication): ____] : Duration of Psychotherapy Visit (minutes spent in synchronous communication): [unfilled] [Individual Psychotherapy for 53+ minutes] : Individual Psychotherapy for 53+ minutes  [Teletherapy Service Provided] : The services provided in this session were delivered via tele-therapy [FreeTextEntry1] : chronic depression and anxiety exacerbated by significant health issues and custodial [FreeTextEntry2] : 11/23/22 [FreeTextEntry3] : home [FreeTextEntry5] : [facility]

## 2023-01-11 NOTE — BEHAVIORAL HEALTH
[No changes since last visit] : No change since last visit. [No] : no [Cognitive and/or Behavior Therapy] : Cognitive and/or Behavior Therapy  [Psychoeducation] : Psychoeducation  [Skills training (all types)] : Skills training (all types)  [Supportive Therapy] : Supportive Therapy [de-identified] :  Patient reports that she's been journaling her activity outside of the home, she has shown significant improvement in her own independence as well as acknowledgement of the benefits of regularly scheduled activity, she continues to adjust well \par \par Patient also shares that she spent three evenings alone while her  attended services for a family member who , although she struggled by day three, she reports that she coped well, remained very active, and noted no significant anxiety above her baseline \par \par Patient's neuropathy continues to improve, she feels much hope in the  aspect of this health related issue which affects her overall life \par \par Patient somewhat stressed over upcoming appointments which she has yet to schedule, this writer encouraged her to set goals for appointments by using her journal as well as calendar, she agreed to do so. \par \par No other needs or concerns noted at this time, will meet with patient for in person visit at 2:00 o'clock on the   [FreeTextEntry2] : Problem: trauma: \par Objective and goal: psychoed, symptom management in context of critical health issues, many losses, Patient will become more aware of her symptoms and how she manages them\par \par Problem: Depression and anxiety\par Objective and Goal: decrease symptoms, build skills to cope effectively, use mindfulness and insight to feel less overwhelmed and unmotivated \par \par Problem: Isolation\par Goal: more activity with less depressive symptoms [FreeTextEntry1] : weekly therapy session in person to address depression, anxiety, isolation, and impact of medical issues on emotional health. 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

## 2023-01-18 ENCOUNTER — APPOINTMENT (OUTPATIENT)
Dept: PSYCHIATRY | Facility: CLINIC | Age: 68
End: 2023-01-18
Payer: MEDICARE

## 2023-01-18 PROCEDURE — 90837 PSYTX W PT 60 MINUTES: CPT

## 2023-01-18 NOTE — REASON FOR VISIT
[FreeTextEntry1] : chronic depression and anxiety exacerbated by significant health issues and snf [Patient] : patient, [unfilled] is a ~age~ year old ~male/female~ being seen for a follow-up visit  [Duration of Psychotherapy Visit (minutes spent in synchronous communication): ____] : Duration of Psychotherapy Visit (minutes spent in synchronous communication): [unfilled] [Individual Psychotherapy for 53+ minutes] : Individual Psychotherapy for 53+ minutes  [FreeTextEntry3] : [facility] [FreeTextEntry5] : [facility]

## 2023-01-25 ENCOUNTER — APPOINTMENT (OUTPATIENT)
Dept: PSYCHIATRY | Facility: CLINIC | Age: 68
End: 2023-01-25
Payer: MEDICARE

## 2023-01-25 PROCEDURE — 90837 PSYTX W PT 60 MINUTES: CPT | Mod: 95

## 2023-01-25 NOTE — REASON FOR VISIT
[Patient preference] : as per patient preference [Telehealth (audio & video) - Individual/Group] : This visit was provided via telehealth using real-time 2-way audio visual technology. [Medical Office: ([facility])___] : The provider was located at the medical office in [unfilled]. [Home] : The patient, [unfilled], was located at home, [unfilled], at the time of the visit. [Verbal consent obtained from patient/other participant(s)] : Verbal consent for telehealth/telephonic services obtained from patient/other participant(s) [FreeTextEntry4] : 2:01 [FreeTextEntry2] : 1/18/2023 [FreeTextEntry1] : chronic depression and anxiety exacerbated by significant health issues and [facility] [Patient] : patient, [unfilled] is a ~age~ year old ~male/female~ being seen for a follow-up visit  [Duration of Psychotherapy Visit (minutes spent in synchronous communication): ____] : Duration of Psychotherapy Visit (minutes spent in synchronous communication): [unfilled] [Individual Psychotherapy for 53+ minutes] : Individual Psychotherapy for 53+ minutes  [Teletherapy Service Provided] : The services provided in this session were delivered via tele-therapy [FreeTextEntry3] : home [FreeTextEntry5] : [facility]

## 2023-01-25 NOTE — PLAN
[FreeTextEntry2] : Problem: trauma: \par Objective and goal: psychoed, symptom management in context of critical health issues, many losses, Patient will become more aware of her symptoms and how she manages them\par \par Problem: Depression and anxiety\par Objective and Goal: decrease symptoms, build skills to cope effectively, use mindfulness and insight to feel less overwhelmed and unmotivated \par \par Problem: Isolation\par Goal: more activity with less depressive symptoms [Cognitive and/or Behavior Therapy] : Cognitive and/or Behavior Therapy  [Psychoeducation] : Psychoeducation  [Skills training (all types)] : Skills training (all types)  [Supportive Therapy] : Supportive Therapy [de-identified] :  The above named patient presents on time for her weekly session, she was alert and oriented, engaged well with good eye contact, always seems to benefit from support and time for ventilation \par \par Patient spends time reviewing her own anxiety as she had to visit a new office and worried about parking and distance from the parking lot to the office. This writer reviewed how uncertainty increases her anxiety and normalized this stress reaction from most people \par \par Patient reports feeling satisfied with time at home, however understands the benefits of regularly scheduled activity. This writer reviewed her level of anxiety due to past health needs and how her home provides safety as well as familiarity. Patient reflects on her own desire to travel however fears being away from home and requiring medical follow up .This writer engaged patient in review of “possibility versus probability” patient understood when reflecting on the past few years of stability \par \par Reviewed future goals including setting priorities for making appointments for her eyes and finances as she is stressed regarding end of her benefits in May \par \par No other needs or concerns noted at this time, have arranged to meet with patient again on the 1st at 2:00 o'clock  [FreeTextEntry1] : weekly therapy session in person to address depression, anxiety, isolation, and impact of medical issues on emotional health. 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

## 2023-01-31 ENCOUNTER — APPOINTMENT (OUTPATIENT)
Dept: BARIATRICS | Facility: CLINIC | Age: 68
End: 2023-01-31

## 2023-02-01 ENCOUNTER — APPOINTMENT (OUTPATIENT)
Dept: PSYCHIATRY | Facility: CLINIC | Age: 68
End: 2023-02-01
Payer: MEDICARE

## 2023-02-01 ENCOUNTER — APPOINTMENT (OUTPATIENT)
Dept: BARIATRICS/WEIGHT MGMT | Facility: CLINIC | Age: 68
End: 2023-02-01

## 2023-02-01 PROCEDURE — 90837 PSYTX W PT 60 MINUTES: CPT

## 2023-02-01 NOTE — REASON FOR VISIT
[FreeTextEntry1] : chronic depression and anxiety exacerbated by significant health issues and custodial [Patient] : patient, [unfilled] is a ~age~ year old ~male/female~ being seen for a follow-up visit  [Duration of Psychotherapy Visit (minutes spent in synchronous communication): ____] : Duration of Psychotherapy Visit (minutes spent in synchronous communication): [unfilled] [Individual Psychotherapy for 53+ minutes] : Individual Psychotherapy for 53+ minutes  [FreeTextEntry3] : [facility] [FreeTextEntry5] : [facility]

## 2023-02-01 NOTE — PLAN
[FreeTextEntry2] : Problem: trauma: \par Objective and goal: psychoed, symptom management in context of critical health issues, many losses, Patient will become more aware of her symptoms and how she manages them\par \par Problem: Depression and anxiety\par Objective and Goal: decrease symptoms, build skills to cope effectively, use mindfulness and insight to feel less overwhelmed and unmotivated \par \par Problem: Isolation\par Goal: more activity with less depressive symptoms [Cognitive and/or Behavior Therapy] : Cognitive and/or Behavior Therapy  [Psychoeducation] : Psychoeducation  [Skills training (all types)] : Skills training (all types)  [Supportive Therapy] : Supportive Therapy [de-identified] : Patient presents on time for her weekly session ,she's alert and oriented ,engages well with good eye contact, her thought process always clear and insightful and she's always open to exploring her own stress responses \par \par Patient presents with a list of tasks she completed over the past week ,she appears to be appropriately satisfied with her progress, many of these topics have been discussed for numerous months \par \par Patient reviews needing to meet with an elder care  due to financial difficulties ,this writer reviewed possible anger and sadness response as she reviews the cause of her requiring these appointments \par \par Patient with plans to travel in March to [place]. Although she expresses some appropriate anxiety regarding her mobilization, she is goal oriented and  insightful in ways she may manage her needs and stress \par \par No other needs noted at this time, have arranged to meet with patient again on the 8th at 2:00 o'clock  [FreeTextEntry1] : weekly therapy session in person to address depression, anxiety, isolation, and impact of medical issues on emotional health. 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

## 2023-02-08 ENCOUNTER — APPOINTMENT (OUTPATIENT)
Dept: PSYCHIATRY | Facility: CLINIC | Age: 68
End: 2023-02-08
Payer: MEDICARE

## 2023-02-08 PROCEDURE — 90837 PSYTX W PT 60 MINUTES: CPT

## 2023-02-08 NOTE — REASON FOR VISIT
[FreeTextEntry1] : chronic depression and anxiety exacerbated by significant health issues and [facility] [Patient] : patient, [unfilled] is a ~age~ year old ~male/female~ being seen for a follow-up visit  [Duration of Psychotherapy Visit (minutes spent in synchronous communication): ____] : Duration of Psychotherapy Visit (minutes spent in synchronous communication): [unfilled] [Individual Psychotherapy for 53+ minutes] : Individual Psychotherapy for 53+ minutes  [FreeTextEntry3] : [facility] [FreeTextEntry5] : [facility]

## 2023-02-08 NOTE — PLAN
[FreeTextEntry2] : Problem: trauma: \par Objective and goal: psychoed, symptom management in context of critical health issues, many losses, Patient will become more aware of her symptoms and how she manages them\par \par Problem: Depression and anxiety\par Objective and Goal: decrease symptoms, build skills to cope effectively, use mindfulness and insight to feel less overwhelmed and unmotivated \par \par Problem: Isolation\par Goal: more activity with less depressive symptoms [Cognitive and/or Behavior Therapy] : Cognitive and/or Behavior Therapy  [Psychoeducation] : Psychoeducation  [Skills training (all types)] : Skills training (all types)  [Supportive Therapy] : Supportive Therapy [de-identified] :  The above named patient presents on time for her weekly session ,she's alert and oriented, engages well with good eye contact, always seems to benefit from support and time for ventilation, good affect on this day, always calm and cooperative, friendly and insightful \par \par Patient spends time reviewing her past week, she reports lack of activity as her  has had many appointments and she has not had access to the car. Patient reviews levels of anxiety, also reviews her upcoming 68th birthday and how that has affected her emotional health \par \par This writer reviewed many areas that cause her worry, her own health, and worry about family, as well finances. \par \par Reviewed progress compared to last year when she was struggling with much isolation and increased anxiety. \par \par No other needs or concerns noted at this time, will see patient again on the 15th at 2:00 o'clock  [FreeTextEntry1] : weekly therapy session in person to address depression, anxiety, isolation, and impact of medical issues on emotional health. 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

## 2023-02-15 ENCOUNTER — APPOINTMENT (OUTPATIENT)
Dept: PSYCHIATRY | Facility: CLINIC | Age: 68
End: 2023-02-15
Payer: MEDICARE

## 2023-02-15 PROCEDURE — 90837 PSYTX W PT 60 MINUTES: CPT | Mod: 95

## 2023-02-15 NOTE — REASON FOR VISIT
[Telehealth (audio & video) - Individual/Group] : This visit was provided via telehealth using real-time 2-way audio visual technology. [Medical Office: ([facility])___] : The provider was located at the medical office in [unfilled]. [Home] : The patient, [unfilled], was located at home, [unfilled], at the time of the visit. [Verbal consent obtained from patient/other participant(s)] : Verbal consent for telehealth/telephonic services obtained from patient/other participant(s) [FreeTextEntry4] : 2:00 [FreeTextEntry2] : 2/8/2022 [FreeTextEntry1] : chronic depression and anxiety exacerbated by significant health issues and [facility] [Patient] : patient, [unfilled] is a ~age~ year old ~male/female~ being seen for a follow-up visit  [Duration of Psychotherapy Visit (minutes spent in synchronous communication): ____] : Duration of Psychotherapy Visit (minutes spent in synchronous communication): [unfilled] [Individual Psychotherapy for 53+ minutes] : Individual Psychotherapy for 53+ minutes  [Teletherapy Service Provided] : The services provided in this session were delivered via tele-therapy [FreeTextEntry3] : home [FreeTextEntry5] : [facility]

## 2023-02-15 NOTE — PLAN
[FreeTextEntry2] : Problem: trauma: \par Objective and goal: psychoed, symptom management in context of critical health issues, many losses, Patient will become more aware of her symptoms and how she manages them\par \par Problem: Depression and anxiety\par Objective and Goal: decrease symptoms, build skills to cope effectively, use mindfulness and insight to feel less overwhelmed and unmotivated \par \par Problem: Isolation\par Goal: more activity with less depressive symptoms [Cognitive and/or Behavior Therapy] : Cognitive and/or Behavior Therapy  [Psychoeducation] : Psychoeducation  [Skills training (all types)] : Skills training (all types)  [Supportive Therapy] : Supportive Therapy [de-identified] :  Patient presents on time for her weekly session, she required to have this session virtually as her  needed to use the car. This lack of car creates additional frustration as patients acknowledges the benefits of being able to leave the home. Patient is alert and oriented, engages well with good insight ,good eye contact, and always seems to benefit from support and time for ventilation \par \par Patient continues to address goals, noting she completed making appointments for medical concerns and has seen an eldercare . This meeting has induced additional anxiety and her  dealing with depression as a result of financial mismanagement \par \par Patient looking forward to a trip to [place] in March, however she expresses some anxiety .She also reviews this past week and being reminded about her change in mobility as well as socialization. Much support and type from ventilation provided \par \par Patient expresses worry about her health, reviews physical responses. This writer also reviewed the level of anxiety which can affect her breathing and how increase in shortness of breath can then induce heightened anxiety, patient agreed \par \par No other needs or concerns noted at this time, have arranged to meet with patient again on the 22nd at 2:00 o'clock  [FreeTextEntry1] : weekly therapy session in person to address depression, anxiety, isolation, and impact of medical issues on emotional health. 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

## 2023-02-22 ENCOUNTER — APPOINTMENT (OUTPATIENT)
Dept: PSYCHIATRY | Facility: CLINIC | Age: 68
End: 2023-02-22
Payer: MEDICARE

## 2023-02-22 ENCOUNTER — APPOINTMENT (OUTPATIENT)
Dept: BARIATRICS/WEIGHT MGMT | Facility: CLINIC | Age: 68
End: 2023-02-22

## 2023-02-22 PROCEDURE — 90837 PSYTX W PT 60 MINUTES: CPT

## 2023-02-22 RX ORDER — FUROSEMIDE 20 MG/1
20 TABLET ORAL DAILY
Qty: 90 | Refills: 0 | Status: ACTIVE | COMMUNITY
Start: 2022-07-05 | End: 1900-01-01

## 2023-02-23 ENCOUNTER — INPATIENT (INPATIENT)
Facility: HOSPITAL | Age: 68
LOS: 3 days | Discharge: HOME | DRG: 189 | End: 2023-02-27
Admitting: HOSPITALIST
Payer: MEDICARE

## 2023-02-23 VITALS
HEIGHT: 65 IN | WEIGHT: 270.07 LBS | SYSTOLIC BLOOD PRESSURE: 176 MMHG | TEMPERATURE: 100 F | RESPIRATION RATE: 24 BRPM | HEART RATE: 100 BPM | DIASTOLIC BLOOD PRESSURE: 101 MMHG | OXYGEN SATURATION: 90 %

## 2023-02-23 DIAGNOSIS — Z96.641 PRESENCE OF RIGHT ARTIFICIAL HIP JOINT: Chronic | ICD-10-CM

## 2023-02-23 DIAGNOSIS — Z95.828 PRESENCE OF OTHER VASCULAR IMPLANTS AND GRAFTS: Chronic | ICD-10-CM

## 2023-02-23 DIAGNOSIS — Z90.49 ACQUIRED ABSENCE OF OTHER SPECIFIED PARTS OF DIGESTIVE TRACT: Chronic | ICD-10-CM

## 2023-02-23 DIAGNOSIS — Z98.890 OTHER SPECIFIED POSTPROCEDURAL STATES: Chronic | ICD-10-CM

## 2023-02-23 DIAGNOSIS — J96.01 ACUTE RESPIRATORY FAILURE WITH HYPOXIA: ICD-10-CM

## 2023-02-23 LAB
-  BLOOD PCR PANEL: SIGNIFICANT CHANGE UP
ALBUMIN SERPL ELPH-MCNC: 3.8 G/DL — SIGNIFICANT CHANGE UP (ref 3.3–5.2)
ALP SERPL-CCNC: 71 U/L — SIGNIFICANT CHANGE UP (ref 40–120)
ALT FLD-CCNC: 15 U/L — SIGNIFICANT CHANGE UP
ANION GAP SERPL CALC-SCNC: 12 MMOL/L — SIGNIFICANT CHANGE UP (ref 5–17)
ANISOCYTOSIS BLD QL: SLIGHT — SIGNIFICANT CHANGE UP
APPEARANCE UR: ABNORMAL
AST SERPL-CCNC: 25 U/L — SIGNIFICANT CHANGE UP
BACTERIA # UR AUTO: NEGATIVE — SIGNIFICANT CHANGE UP
BASOPHILS # BLD AUTO: 0.09 K/UL — SIGNIFICANT CHANGE UP (ref 0–0.2)
BASOPHILS NFR BLD AUTO: 0.9 % — SIGNIFICANT CHANGE UP (ref 0–2)
BILIRUB SERPL-MCNC: 0.3 MG/DL — LOW (ref 0.4–2)
BILIRUB UR-MCNC: NEGATIVE — SIGNIFICANT CHANGE UP
BUN SERPL-MCNC: 19 MG/DL — SIGNIFICANT CHANGE UP (ref 8–20)
CALCIUM SERPL-MCNC: 8.5 MG/DL — SIGNIFICANT CHANGE UP (ref 8.4–10.5)
CHLORIDE SERPL-SCNC: 101 MMOL/L — SIGNIFICANT CHANGE UP (ref 96–108)
CO2 SERPL-SCNC: 26 MMOL/L — SIGNIFICANT CHANGE UP (ref 22–29)
COLOR SPEC: YELLOW — SIGNIFICANT CHANGE UP
CREAT SERPL-MCNC: 0.97 MG/DL — SIGNIFICANT CHANGE UP (ref 0.5–1.3)
DIFF PNL FLD: ABNORMAL
EGFR: 63 ML/MIN/1.73M2 — SIGNIFICANT CHANGE UP
EOSINOPHIL # BLD AUTO: 0 K/UL — SIGNIFICANT CHANGE UP (ref 0–0.5)
EOSINOPHIL NFR BLD AUTO: 0 % — SIGNIFICANT CHANGE UP (ref 0–6)
EPI CELLS # UR: SIGNIFICANT CHANGE UP
FLUAV AG NPH QL: SIGNIFICANT CHANGE UP
FLUBV AG NPH QL: SIGNIFICANT CHANGE UP
GIANT PLATELETS BLD QL SMEAR: PRESENT — SIGNIFICANT CHANGE UP
GLUCOSE SERPL-MCNC: 88 MG/DL — SIGNIFICANT CHANGE UP (ref 70–99)
GLUCOSE UR QL: NEGATIVE MG/DL — SIGNIFICANT CHANGE UP
GRAM STN FLD: SIGNIFICANT CHANGE UP
GRAM STN FLD: SIGNIFICANT CHANGE UP
HCT VFR BLD CALC: 41.9 % — SIGNIFICANT CHANGE UP (ref 34.5–45)
HGB BLD-MCNC: 13.8 G/DL — SIGNIFICANT CHANGE UP (ref 11.5–15.5)
KETONES UR-MCNC: ABNORMAL
LACTATE BLDV-MCNC: 1.7 MMOL/L — SIGNIFICANT CHANGE UP (ref 0.5–2)
LACTATE BLDV-MCNC: 2.6 MMOL/L — HIGH (ref 0.5–2)
LACTATE SERPL-SCNC: 1.8 MMOL/L — SIGNIFICANT CHANGE UP (ref 0.5–2)
LACTATE SERPL-SCNC: 2.1 MMOL/L — HIGH (ref 0.5–2)
LACTATE SERPL-SCNC: 2.2 MMOL/L — HIGH (ref 0.5–2)
LACTATE SERPL-SCNC: 2.2 MMOL/L — HIGH (ref 0.5–2)
LEUKOCYTE ESTERASE UR-ACNC: ABNORMAL
LYMPHOCYTES # BLD AUTO: 0.44 K/UL — LOW (ref 1–3.3)
LYMPHOCYTES # BLD AUTO: 4.3 % — LOW (ref 13–44)
MACROCYTES BLD QL: SLIGHT — SIGNIFICANT CHANGE UP
MANUAL SMEAR VERIFICATION: SIGNIFICANT CHANGE UP
MCHC RBC-ENTMCNC: 32.5 PG — SIGNIFICANT CHANGE UP (ref 27–34)
MCHC RBC-ENTMCNC: 32.9 GM/DL — SIGNIFICANT CHANGE UP (ref 32–36)
MCV RBC AUTO: 98.8 FL — SIGNIFICANT CHANGE UP (ref 80–100)
METHOD TYPE: SIGNIFICANT CHANGE UP
MONOCYTES # BLD AUTO: 0.61 K/UL — SIGNIFICANT CHANGE UP (ref 0–0.9)
MONOCYTES NFR BLD AUTO: 6 % — SIGNIFICANT CHANGE UP (ref 2–14)
NEUTROPHILS # BLD AUTO: 9.09 K/UL — HIGH (ref 1.8–7.4)
NEUTROPHILS NFR BLD AUTO: 88.8 % — HIGH (ref 43–77)
NITRITE UR-MCNC: NEGATIVE — SIGNIFICANT CHANGE UP
NT-PROBNP SERPL-SCNC: 1092 PG/ML — HIGH (ref 0–300)
PH UR: 5 — SIGNIFICANT CHANGE UP (ref 5–8)
PLAT MORPH BLD: NORMAL — SIGNIFICANT CHANGE UP
PLATELET # BLD AUTO: 175 K/UL — SIGNIFICANT CHANGE UP (ref 150–400)
POLYCHROMASIA BLD QL SMEAR: SLIGHT — SIGNIFICANT CHANGE UP
POTASSIUM SERPL-MCNC: 4.1 MMOL/L — SIGNIFICANT CHANGE UP (ref 3.5–5.3)
POTASSIUM SERPL-SCNC: 4.1 MMOL/L — SIGNIFICANT CHANGE UP (ref 3.5–5.3)
PROT SERPL-MCNC: 7.5 G/DL — SIGNIFICANT CHANGE UP (ref 6.6–8.7)
PROT UR-MCNC: 15
RBC # BLD: 4.24 M/UL — SIGNIFICANT CHANGE UP (ref 3.8–5.2)
RBC # FLD: 14 % — SIGNIFICANT CHANGE UP (ref 10.3–14.5)
RBC BLD AUTO: ABNORMAL
RBC CASTS # UR COMP ASSIST: SIGNIFICANT CHANGE UP /HPF (ref 0–4)
RSV RNA NPH QL NAA+NON-PROBE: SIGNIFICANT CHANGE UP
SARS-COV-2 RNA SPEC QL NAA+PROBE: SIGNIFICANT CHANGE UP
SMUDGE CELLS # BLD: PRESENT — SIGNIFICANT CHANGE UP
SODIUM SERPL-SCNC: 139 MMOL/L — SIGNIFICANT CHANGE UP (ref 135–145)
SP GR SPEC: 1.02 — SIGNIFICANT CHANGE UP (ref 1.01–1.02)
SPECIMEN SOURCE: SIGNIFICANT CHANGE UP
SPECIMEN SOURCE: SIGNIFICANT CHANGE UP
TROPONIN T SERPL-MCNC: <0.01 NG/ML — SIGNIFICANT CHANGE UP (ref 0–0.06)
UROBILINOGEN FLD QL: NEGATIVE MG/DL — SIGNIFICANT CHANGE UP
WBC # BLD: 10.24 K/UL — SIGNIFICANT CHANGE UP (ref 3.8–10.5)
WBC # FLD AUTO: 10.24 K/UL — SIGNIFICANT CHANGE UP (ref 3.8–10.5)
WBC UR QL: SIGNIFICANT CHANGE UP /HPF (ref 0–5)

## 2023-02-23 PROCEDURE — 93010 ELECTROCARDIOGRAM REPORT: CPT | Mod: 76

## 2023-02-23 PROCEDURE — 99285 EMERGENCY DEPT VISIT HI MDM: CPT | Mod: CS

## 2023-02-23 PROCEDURE — 71275 CT ANGIOGRAPHY CHEST: CPT | Mod: 26,MA

## 2023-02-23 PROCEDURE — 71045 X-RAY EXAM CHEST 1 VIEW: CPT | Mod: 26

## 2023-02-23 PROCEDURE — 99223 1ST HOSP IP/OBS HIGH 75: CPT

## 2023-02-23 RX ORDER — SODIUM CHLORIDE 9 MG/ML
1750 INJECTION INTRAMUSCULAR; INTRAVENOUS; SUBCUTANEOUS ONCE
Refills: 0 | Status: COMPLETED | OUTPATIENT
Start: 2023-02-23 | End: 2023-02-23

## 2023-02-23 RX ORDER — IPRATROPIUM/ALBUTEROL SULFATE 18-103MCG
3 AEROSOL WITH ADAPTER (GRAM) INHALATION EVERY 6 HOURS
Refills: 0 | Status: DISCONTINUED | OUTPATIENT
Start: 2023-02-23 | End: 2023-02-27

## 2023-02-23 RX ORDER — PIPERACILLIN AND TAZOBACTAM 4; .5 G/20ML; G/20ML
3.38 INJECTION, POWDER, LYOPHILIZED, FOR SOLUTION INTRAVENOUS ONCE
Refills: 0 | Status: COMPLETED | OUTPATIENT
Start: 2023-02-24 | End: 2023-02-24

## 2023-02-23 RX ORDER — METOPROLOL TARTRATE 50 MG
1 TABLET ORAL
Qty: 0 | Refills: 0 | DISCHARGE

## 2023-02-23 RX ORDER — FUROSEMIDE 40 MG
1 TABLET ORAL
Qty: 0 | Refills: 0 | DISCHARGE

## 2023-02-23 RX ORDER — PIPERACILLIN AND TAZOBACTAM 4; .5 G/20ML; G/20ML
3.38 INJECTION, POWDER, LYOPHILIZED, FOR SOLUTION INTRAVENOUS ONCE
Refills: 0 | Status: COMPLETED | OUTPATIENT
Start: 2023-02-23 | End: 2023-02-23

## 2023-02-23 RX ORDER — ACETAMINOPHEN 500 MG
650 TABLET ORAL EVERY 6 HOURS
Refills: 0 | Status: DISCONTINUED | OUTPATIENT
Start: 2023-02-23 | End: 2023-02-27

## 2023-02-23 RX ORDER — METOPROLOL TARTRATE 50 MG
50 TABLET ORAL DAILY
Refills: 0 | Status: DISCONTINUED | OUTPATIENT
Start: 2023-02-23 | End: 2023-02-27

## 2023-02-23 RX ORDER — METOPROLOL TARTRATE 50 MG
25 TABLET ORAL AT BEDTIME
Refills: 0 | Status: DISCONTINUED | OUTPATIENT
Start: 2023-02-23 | End: 2023-02-27

## 2023-02-23 RX ORDER — BUPROPION HYDROCHLORIDE 150 MG/1
1 TABLET, EXTENDED RELEASE ORAL
Qty: 0 | Refills: 0 | DISCHARGE

## 2023-02-23 RX ORDER — FUROSEMIDE 40 MG
20 TABLET ORAL DAILY
Refills: 0 | Status: DISCONTINUED | OUTPATIENT
Start: 2023-02-23 | End: 2023-02-23

## 2023-02-23 RX ORDER — FUROSEMIDE 40 MG
40 TABLET ORAL
Refills: 0 | Status: DISCONTINUED | OUTPATIENT
Start: 2023-02-23 | End: 2023-02-24

## 2023-02-23 RX ORDER — APIXABAN 2.5 MG/1
5 TABLET, FILM COATED ORAL EVERY 12 HOURS
Refills: 0 | Status: DISCONTINUED | OUTPATIENT
Start: 2023-02-23 | End: 2023-02-27

## 2023-02-23 RX ORDER — PIPERACILLIN AND TAZOBACTAM 4; .5 G/20ML; G/20ML
3.38 INJECTION, POWDER, LYOPHILIZED, FOR SOLUTION INTRAVENOUS ONCE
Refills: 0 | Status: DISCONTINUED | OUTPATIENT
Start: 2023-02-24 | End: 2023-02-24

## 2023-02-23 RX ORDER — LEVOTHYROXINE SODIUM 125 MCG
150 TABLET ORAL DAILY
Refills: 0 | Status: DISCONTINUED | OUTPATIENT
Start: 2023-02-23 | End: 2023-02-27

## 2023-02-23 RX ORDER — FLECAINIDE ACETATE 50 MG
50 TABLET ORAL EVERY 12 HOURS
Refills: 0 | Status: DISCONTINUED | OUTPATIENT
Start: 2023-02-23 | End: 2023-02-27

## 2023-02-23 RX ORDER — FAMOTIDINE 10 MG/ML
1 INJECTION INTRAVENOUS
Qty: 0 | Refills: 0 | DISCHARGE

## 2023-02-23 RX ORDER — LANOLIN ALCOHOL/MO/W.PET/CERES
3 CREAM (GRAM) TOPICAL AT BEDTIME
Refills: 0 | Status: DISCONTINUED | OUTPATIENT
Start: 2023-02-23 | End: 2023-02-27

## 2023-02-23 RX ORDER — FLUTICASONE PROPIONATE 220 MCG
1 AEROSOL WITH ADAPTER (GRAM) INHALATION
Qty: 0 | Refills: 0 | DISCHARGE

## 2023-02-23 RX ORDER — ONDANSETRON 8 MG/1
4 TABLET, FILM COATED ORAL EVERY 8 HOURS
Refills: 0 | Status: DISCONTINUED | OUTPATIENT
Start: 2023-02-23 | End: 2023-02-27

## 2023-02-23 RX ORDER — METOPROLOL TARTRATE 50 MG
0 TABLET ORAL
Qty: 0 | Refills: 0 | DISCHARGE

## 2023-02-23 RX ORDER — CEFTRIAXONE 500 MG/1
1000 INJECTION, POWDER, FOR SOLUTION INTRAMUSCULAR; INTRAVENOUS ONCE
Refills: 0 | Status: COMPLETED | OUTPATIENT
Start: 2023-02-23 | End: 2023-02-23

## 2023-02-23 RX ORDER — PIPERACILLIN AND TAZOBACTAM 4; .5 G/20ML; G/20ML
3.38 INJECTION, POWDER, LYOPHILIZED, FOR SOLUTION INTRAVENOUS EVERY 8 HOURS
Refills: 0 | Status: DISCONTINUED | OUTPATIENT
Start: 2023-02-24 | End: 2023-02-27

## 2023-02-23 RX ORDER — HYDROXYCHLOROQUINE SULFATE 200 MG
2 TABLET ORAL
Qty: 0 | Refills: 0 | DISCHARGE

## 2023-02-23 RX ORDER — AZITHROMYCIN 500 MG/1
500 TABLET, FILM COATED ORAL ONCE
Refills: 0 | Status: COMPLETED | OUTPATIENT
Start: 2023-02-23 | End: 2023-02-23

## 2023-02-23 RX ORDER — FLECAINIDE ACETATE 50 MG
1 TABLET ORAL
Qty: 0 | Refills: 0 | DISCHARGE

## 2023-02-23 RX ORDER — METOPROLOL TARTRATE 50 MG
25 TABLET ORAL AT BEDTIME
Refills: 0 | Status: DISCONTINUED | OUTPATIENT
Start: 2023-02-23 | End: 2023-02-23

## 2023-02-23 RX ORDER — CEFTRIAXONE 500 MG/1
1000 INJECTION, POWDER, FOR SOLUTION INTRAMUSCULAR; INTRAVENOUS ONCE
Refills: 0 | Status: DISCONTINUED | OUTPATIENT
Start: 2023-02-23 | End: 2023-02-23

## 2023-02-23 RX ORDER — FAMOTIDINE 10 MG/ML
40 INJECTION INTRAVENOUS AT BEDTIME
Refills: 0 | Status: DISCONTINUED | OUTPATIENT
Start: 2023-02-23 | End: 2023-02-27

## 2023-02-23 RX ORDER — SODIUM CHLORIDE 9 MG/ML
3800 INJECTION, SOLUTION INTRAVENOUS ONCE
Refills: 0 | Status: DISCONTINUED | OUTPATIENT
Start: 2023-02-23 | End: 2023-02-23

## 2023-02-23 RX ORDER — ACETAMINOPHEN 500 MG
975 TABLET ORAL ONCE
Refills: 0 | Status: COMPLETED | OUTPATIENT
Start: 2023-02-23 | End: 2023-02-23

## 2023-02-23 RX ORDER — TIOTROPIUM BROMIDE 18 UG/1
2 CAPSULE ORAL; RESPIRATORY (INHALATION) DAILY
Refills: 0 | Status: DISCONTINUED | OUTPATIENT
Start: 2023-02-23 | End: 2023-02-27

## 2023-02-23 RX ORDER — BUDESONIDE AND FORMOTEROL FUMARATE DIHYDRATE 160; 4.5 UG/1; UG/1
2 AEROSOL RESPIRATORY (INHALATION)
Refills: 0 | Status: DISCONTINUED | OUTPATIENT
Start: 2023-02-23 | End: 2023-02-27

## 2023-02-23 RX ORDER — METOPROLOL TARTRATE 50 MG
50 TABLET ORAL DAILY
Refills: 0 | Status: DISCONTINUED | OUTPATIENT
Start: 2023-02-23 | End: 2023-02-23

## 2023-02-23 RX ADMIN — Medication 3 MILLIGRAM(S): at 22:40

## 2023-02-23 RX ADMIN — TIOTROPIUM BROMIDE 2 PUFF(S): 18 CAPSULE ORAL; RESPIRATORY (INHALATION) at 10:08

## 2023-02-23 RX ADMIN — Medication 975 MILLIGRAM(S): at 05:36

## 2023-02-23 RX ADMIN — Medication 50 MILLIGRAM(S): at 10:09

## 2023-02-23 RX ADMIN — Medication 40 MILLIGRAM(S): at 13:59

## 2023-02-23 RX ADMIN — FAMOTIDINE 40 MILLIGRAM(S): 10 INJECTION INTRAVENOUS at 22:41

## 2023-02-23 RX ADMIN — APIXABAN 5 MILLIGRAM(S): 2.5 TABLET, FILM COATED ORAL at 16:55

## 2023-02-23 RX ADMIN — Medication 650 MILLIGRAM(S): at 20:00

## 2023-02-23 RX ADMIN — Medication 975 MILLIGRAM(S): at 04:41

## 2023-02-23 RX ADMIN — CEFTRIAXONE 1000 MILLIGRAM(S): 500 INJECTION, POWDER, FOR SOLUTION INTRAMUSCULAR; INTRAVENOUS at 05:11

## 2023-02-23 RX ADMIN — AZITHROMYCIN 255 MILLIGRAM(S): 500 TABLET, FILM COATED ORAL at 05:11

## 2023-02-23 RX ADMIN — Medication 25 MILLIGRAM(S): at 22:40

## 2023-02-23 RX ADMIN — BUDESONIDE AND FORMOTEROL FUMARATE DIHYDRATE 2 PUFF(S): 160; 4.5 AEROSOL RESPIRATORY (INHALATION) at 20:01

## 2023-02-23 RX ADMIN — TIOTROPIUM BROMIDE 2 PUFF(S): 18 CAPSULE ORAL; RESPIRATORY (INHALATION) at 10:01

## 2023-02-23 RX ADMIN — Medication 50 MILLIGRAM(S): at 10:12

## 2023-02-23 RX ADMIN — PIPERACILLIN AND TAZOBACTAM 200 GRAM(S): 4; .5 INJECTION, POWDER, LYOPHILIZED, FOR SOLUTION INTRAVENOUS at 23:11

## 2023-02-23 RX ADMIN — Medication 50 MILLIGRAM(S): at 22:40

## 2023-02-23 RX ADMIN — SODIUM CHLORIDE 1750 MILLILITER(S): 9 INJECTION INTRAMUSCULAR; INTRAVENOUS; SUBCUTANEOUS at 05:11

## 2023-02-23 NOTE — PLAN
[FreeTextEntry2] : Problem: trauma: \par Objective and goal: psychoed, symptom management in context of critical health issues, many losses, Patient will become more aware of her symptoms and how she manages them\par \par Problem: Depression and anxiety\par Objective and Goal: decrease symptoms, build skills to cope effectively, use mindfulness and insight to feel less overwhelmed and unmotivated \par \par Problem: Isolation\par Goal: more activity with less depressive symptoms [Cognitive and/or Behavior Therapy] : Cognitive and/or Behavior Therapy  [Psychoeducation] : Psychoeducation  [Skills training (all types)] : Skills training (all types)  [Supportive Therapy] : Supportive Therapy [de-identified] :  Patient presents on time for her weekly session, she's alert and oriented, tearful and appears to be overwhelmed, reports increased anxiety as well as depression with much focus placed on her own health and irritability within interpersonal relationships \par \par Patient spends much time reviewing her own hx of illness ,the fear of her own mortality, as well as increased irritability within her relationship with her . Much support and time for ventilation provided. Patient recognizes the need to make some changes in her own goals and her own focus on increasing her activity level. She reports fear is a significant deterrent ,this writer engaged patient in a review of the past year and her progress, however patient feels she should be progressing at a more steady rate \par \par This writer also reviewed upcoming trip to [place] and how this may have increased her anxiety levels.  Patient agrees Also reviewed her level of dedication to her family in caregiving this is many times overwhelming within her household, encouraged her to review \par \par Patient spends some time reviewing the possibility of restarting medication for anxiety. She would like to further discuss with her cardiologist and will decide if appointment with [facility] provider is warranted \par \par No other needs or concerns noted at this time ,patient will be traveling next week on Wednesday, therefore secured a telehealth appointment for the 28th start at 1:00 o'clock  [FreeTextEntry1] : weekly therapy session in person to address depression, anxiety, isolation, and impact of medical issues on emotional health. 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

## 2023-02-23 NOTE — CHART NOTE - NSCHARTNOTEFT_GEN_A_CORE
Patient has own Cpap/ bipap equipment which was cleared through [employer] ( [employer])and waiver signed in the chart. No facial skin redness or breakdown noted. o2 at 2 lpm

## 2023-02-23 NOTE — ED PROVIDER NOTE - PHYSICAL EXAMINATION
VITAL SIGNS: I have reviewed vital signs 90% ORA at rest. 99% on 3LNC.   CONSTITUTIONAL:  in no acute distress.  SKIN: Warm, dry, no rash.  HEAD: Normocephalic, atraumatic.  EYES: PERRL, conjunctiva and sclera clear.  ENT: pink & moist mucosa, no pharyngeal erythema  NECK: Supple, non tender. No cervical lymphadenopathy.  CARD: +TACHYCARDIA, irregular, no murmurs.   RESP: No wheezes, rales or rhonchi.   ABD:  soft, non-distended, non-tender.   MSK:  Good ROM in upper/lower extremities w/o pain.   NEURO: Alert, oriented. Grossly unremarkable. No focal deficits.   PSYCH: Cooperative, alert & oriented x3

## 2023-02-23 NOTE — ED PROVIDER NOTE - OBJECTIVE STATEMENT
KIERAN WINTER is a 66yo Female with PMH h/o PEs on Eliquis, HTN, HLD, paroxysmal afib who presents c/o acute onset sob. States she woke up form her sleep around 2:3o and could not catch her breath.  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 She states she has been sob and w/ a cough for about a week. She denies any associated chest pain w/ her SOB tonight. Denies recent fevers, abdominal pain, n/v/d, urinary symptoms.

## 2023-02-23 NOTE — H&P ADULT - COMMENTS
REVIEW OF SYSTEMS  General: denies weakness, malaise  Skin/Breast: no new rash  Ophthalmologic: no change in vision  ENMT: no dysphagia, throat pain or change in hearing  Respiratory and Thorax:as per HPI  Cardiovascular: as per HPI  Gastrointestinal: as per HPI  Genitourinary: no difficulty urinating, no burning urination  Musculoskeletal: no myalgia/arthrlagia  Neurological: no weakness, numbness, change in gait  Psychiatric: no depression, anxiety  Hematology/Lymphatics: denies easy bruising or bleeding  Endocrine: no polyuria, polydipsia

## 2023-02-23 NOTE — PATIENT PROFILE ADULT - FALL HARM RISK - RISK INTERVENTIONS

## 2023-02-23 NOTE — ED ADULT NURSE NOTE - OBJECTIVE STATEMENT
AxOx4. Patient c/o shortness of breath that woke her up from sleep. Patient states she has been experiencing SOB this past week. Patient states she has a hx of saddle pulmonary embolism, and is currently taking Eliquis. Patient states her O2 saturation at home was low 70's - mid 80's. Patient anxious and concerned regarding her health, this RN provided emotional support to patient. Patient placed on cardiac monitor, Atrial Fib noted with HR ranging from . US guided IV placed, labs sent, medicated as per orders. MD at bedside. Denies any sick contacts or recent travels.

## 2023-02-23 NOTE — ED ADULT NURSE NOTE - NSSEPSISSUSPECTED_ED_A_ED
Take   Extra  Dose  Of  Lasix  40   Mg    For   2  Days    Only  Elevate   Legs  And  Use  Compression  Stockings   Strict  Low  Salt  Diet    No

## 2023-02-23 NOTE — ED ADULT NURSE NOTE - NSIMPLEMENTINTERV_GEN_ALL_ED
Implemented All Fall Risk Interventions:  [place] to call system. Call bell, personal items and telephone within reach. Instruct patient to call for assistance. Room bathroom lighting operational. Non-slip footwear when patient is off stretcher. Physically safe environment: no spills, clutter or unnecessary equipment. Stretcher in lowest position, wheels locked, appropriate side rails in place. Provide visual cue, wrist band, yellow gown, etc. Monitor gait and stability. Monitor for mental status changes and reorient to person, place, and time. Review medications for side effects contributing to fall risk. Reinforce activity limits and safety measures with patient and family.

## 2023-02-23 NOTE — ED PROVIDER NOTE - CLINICAL SUMMARY MEDICAL DECISION MAKING FREE TEXT BOX
ASSESSMENT:   KIERAN WINTER is a 66yo F who presented with SHORTNESS OF BREATH and hypoxia. H/o large hiatal hernia and now w/ low grade fever. Physical exam non-contributory.     Concerning for viral infection, hypoxic respiratory failure.     PLAN: Support oxygenation. Screen for PE. Eval for bacterial infection. Likely admission given age and hypoxia. ASSESSMENT:   KIERAN WINTER is a 66yo F who presented with SHORTNESS OF BREATH and hypoxia. H/o large hiatal hernia and now w/ low grade fever. Physical exam non-contributory.     Concerning for viral infection, hypoxic respiratory failure.     PLAN: Support oxygenation. Screen for PE. Eval for bacterial infection. Likely admission given age and hypoxia.    KATIE Regan: 67F presenting for evaluation of SOB, has hypoxia here, has hx of CPAP @ night, still feeling SOB despite being compliant, given c/o SOB with hypoxia will likely requite admit until improving, will start assessment for eval of etiology, ddx does include PNA, ptx, amongst other etiologies, will check labs, imaging, follow up studies, reassess, dispo tba

## 2023-02-23 NOTE — H&P ADULT - ASSESSMENT
68 yo F with hx of pafib on eliquis, PE, Sjogrens, RA, hiatal hernia and POOL who presented to the ED with hypoxia.     Acute hypoxic resp failure possibly from chf vs large hiatal hernia with atelectasis vs aspiration  CT chest No pulmonary embolus. Stable large hiatal hernia with an intrathoracic stomach.  elevated BNP  plan:  - Check TTE  - Incentive spirometer  - nebs  - esophogram to eval aspiration with hiatal hernia  - SLP eval  68 yo F with hx of pafib on eliquis, PE, Sjogrens, RA, hiatal hernia and POOL who presented to the ED with hypoxia.     Acute hypoxic resp failure possibly from chf vs large hiatal hernia with atelectasis vs aspiration  CT chest No pulmonary embolus. Stable large hiatal hernia with an intrathoracic stomach.  elevated BNP  plan:  - Check TTE  - Incentive spirometer  - nebs  - esophogram to eval aspiration with hiatal hernia  - SLP eval  - [facility] consulted  - check porcal in am  - hold off on additional antibiotics    pafib  - continue Eliquis   - continue flecainide  - continue metoprolol succ 50mg qam and 25mg qpm    POOL   - CPAP at night    dvt ppx: Eliquis  dispo: admit to tele bed    68 yo F with hx of pafib on eliquis, PE, Sjogrens, RA, hiatal hernia and POOL who presented to the ED with hypoxia.     Acute hypoxic resp failure possibly from chf vs large hiatal hernia with atelectasis vs aspiration  CT chest No pulmonary embolus. Stable large hiatal hernia with an intrathoracic stomach.  elevated BNP  plan:  - Check TTE  - Incentive spirometer  - nebs  - esophogram to eval aspiration with hiatal hernia  - SLP eval  - aspiration precautions  - [facility] consulted  - Lasix 20mg daily po. Will discuss IV lasix with cardiology.   - Daily weights and strict is and os  - check porcal in am  - hold off on additional antibiotics    pafib  - continue Eliquis   - continue flecainide  - continue metoprolol succ 50mg qam and 25mg qpm    POOL   - CPAP at night    GERD with lasrge Hiatal hernia  Pepcid 40mg at bedtime    Hypothyroid  - levothyroxine 150mcgs     dvt ppx: Eliquis  dispo: admit to tele bed

## 2023-02-23 NOTE — ED ADULT NURSE REASSESSMENT NOTE - NS ED NURSE REASSESS COMMENT FT1
Assumed care of pt from previous RN. Pt A&Ox4, NAD. Breathing even and unlabored. Offering no complaints at this time.

## 2023-02-23 NOTE — ED ADULT TRIAGE NOTE - CHIEF COMPLAINT QUOTE
ambulatory to triage c/o shortness of breath that woke her up from sleep. hx of saddle pulmonary embolism, currently takes eliquis.  took O2sat at home with 70s.

## 2023-02-23 NOTE — H&P ADULT - NSHPPHYSICALEXAM_GEN_ALL_CORE
PHYSICAL EXAM:  Constitutional: No acute distress, alert and oriented by 3  HEENT: AT/NC, EOMI, PERRLA, Normal conjunctiva, no pharyngeal erythema, moist oral mucosa  Respiratory: decreased breath sounds in the bases, no crackles or wheezing  Cardiovascular: RRR, trace LE edema  Gastrointestinal: soft, Non-tender, Non-distended + Bowel sounds, no rebound or guarding  Genitourinary: no dunaway  Musculoskeletal: No joint edema  Neurological: CN 2-12 grossly intact, no focal deficits  Skin: warm, dry and intact  Psychiatric: normal mood and affect

## 2023-02-23 NOTE — REASON FOR VISIT
[FreeTextEntry1] : chronic depression and anxiety exacerbated by significant health issues and [facility] [Patient] : patient, [unfilled] is a ~age~ year old ~male/female~ being seen for a follow-up visit  [Duration of Psychotherapy Visit (minutes spent in synchronous communication): ____] : Duration of Psychotherapy Visit (minutes spent in synchronous communication): [unfilled] [Individual Psychotherapy for 53+ minutes] : Individual Psychotherapy for 53+ minutes  [Teletherapy Service Provided] : The services provided in this session were delivered via tele-therapy [FreeTextEntry3] : [facility] [FreeTextEntry5] : [facility]

## 2023-02-23 NOTE — ED PROVIDER NOTE - NSICDXPASTMEDICALHX_GEN_ALL_CORE_FT
PAST MEDICAL HISTORY:  Anemia     Atrial fibrillation     DVT (deep venous thrombosis)     Hiatal hernia     Hip fx     HTN (hypertension)     Hypothyroid     Osteoarthritis     Osteopenia     Pulmonary embolism Saddle Embolus    Pulmonary hypertension     Rheumatoid arthritis     Sjogren's disease     Sleep apnea cpap

## 2023-02-23 NOTE — ED PROVIDER NOTE - NS ED ROS FT
Review of Systems  CONSTITUTIONAL: afebrile w/no diaphoresis or weight changes  SKIN: warm, dry w/ no rash or bleeding  EYES: no changes to vision  ENT: no changes in hearing, no sore throat  RESPIRATORY: +COUGH, +SOB  CARDIAC: no chest pain & no palpitations  GI: no abd pain, nausea, vomiting, diarrhea, constipation, or blood in stool/simi blood  GENITO-URINARY: no discharge, dysuria or hematuria,   MUSCULOSKELETAL:  no joint pain, swelling or redness  NEUROLOGIC: no weakness, headache, anesthesia or paresthesias  PSYCH: no anxiety, non suicidal, non homicidal, without hallucinations or depression

## 2023-02-23 NOTE — H&P ADULT - HISTORY OF PRESENT ILLNESS
Patient is a 66 yo F with hx of pafib on eliquis, PE, Sjogrens, RA and POOL who presented to the ED with hypoxia. Patient woke up this am feeling more short of breath with shaking chills.  checked her pulse ox and was noted to be hypoxic to the mid 80s so they came to the ED. Patient reports a cough for the last few weeks, no sputum production. No sick contacts or recent travel. She needs to sleep in a recliner type bed as she cannot lay flat. She reports less leg swelling and has been compliant with her lasix of 20mg daily. She has noted feeling more short of breath with exertion over the last 1 week. Denies chest pain, palpitations, abdominal pain, nausea and vomiting. Patient is a 66 yo F with hx of pafib on eliquis, PE, Sjogrens, RA, hiatal hernia and POOL who presented to the ED with hypoxia. Patient woke up this am feeling more short of breath with shaking chills.  checked her pulse ox and was noted to be hypoxic to the mid 80s so they came to the ED. Patient reports a cough for the last few weeks, no sputum production. No sick contacts or recent travel. She needs to sleep in a recliner type bed as she cannot lay flat. She reports less leg swelling and has been compliant with her lasix of 20mg daily. She has noted feeling more short of breath with exertion over the last 1 week. Denies chest pain, palpitations, abdominal pain, nausea and vomiting.  In the ED patient was febrile to 100.4 and oxygen saturation was 90% on room air. She had ab elevated BNP and CT chest showed no PE and no infiltrate, it did show large hiatal hernia and atelectasis. She is being admitted to medicine for further work up,  Patient is a 66 yo F with hx of pafib on eliquis, PE, Sjogrens, RA, hiatal hernia and POOL who presented to the ED with hypoxia. Patient woke up this am feeling more short of breath with shaking chills.  checked her pulse ox and was noted to be hypoxic to the mid 80s so they came to the ED. Patient reports a cough for the last few weeks, no sputum production. No sick contacts or recent travel. She needs to sleep in a recliner type bed as she cannot lay flat. She reports less leg swelling and has been compliant with her lasix of 20mg daily. She has noted feeling more short of breath with exertion over the last 1 week. Denies chest pain, palpitations, abdominal pain, nausea and vomiting.  In the ED patient was febrile to 100.4 and oxygen saturation was 90% on room air. She had ab elevated BNP and CT chest showed no PE and no infiltrate, it did show large hiatal hernia and atelectasis. She was given antibiotics  She is being admitted to medicine for further work up,

## 2023-02-23 NOTE — ED PROVIDER NOTE - NSICDXPASTSURGICALHX_GEN_ALL_CORE_FT
PAST SURGICAL HISTORY:  H/O cardiac radiofrequency ablation     History of cholecystectomy     History of dilation and curettage     History of ear surgery     History of loop recorder     History of total hip replacement, right     Presence of IVC filter

## 2023-02-23 NOTE — ED PROVIDER NOTE - ATTENDING CONTRIBUTION TO CARE
KATIE Regan: see mdm    I have personally performed a face to face diagnostic evaluation on this patient.  I have reviewed the resident's note and agree with the history, exam, and plan of care, except as noted.   My medical decision making and observations are found above.

## 2023-02-23 NOTE — ED PROVIDER NOTE - INTERNATIONAL TRAVEL
86 YEAR OLD MALE WHO PRESENTS TODAY IN FOLLOW UP OF HTN, TACHYCARDIA, AND INITIATION OF BETA BLOCKER. PATIENT REPORTS HE CONTINUES WITH SOA AND FATIGUE BUT THAT THIS IS STABLE- NO WORSENING AND NO NEW SYMPTOMS. NO CP/PALPITATIONS. BLOOD PRESSURE WAS ABNORMAL AND HEART RATE SEEMED TO BE IN TRIGEMINY (WITH PAUSE AFTER 3 BEATS) THEN IRREGULARLY IRREGULAR THEN BACK TO SINUS RHYTHM. EKG AND RHYTHM STRIP REVEALS NEW 1ST DEGREE AV BLOCK AND FREQUENT PVC EVERY 2-4 BEATS THEN NSR THEN RECURRENCE. I CONSULTED DR COPELAND REGARDING CHANGES SINCE ER VISIT. HE WOULD LIKE TO CONTINUE MEDICATION AT CURRENT DOSE. TO ER ASAP IF DEVELOPS SYMPTOMS AT ANY POINT. IF ANY CONCERN BUT NO SIGNIFICANT SYMPTOMS, TO CALL HIS OFFICE AND HE WILL SEE SOONER. KEEP FOLLOW UP WITH ME NEXT WEEK. PATIENT'S SON WAS INFORMED.   
No

## 2023-02-23 NOTE — PATIENT PROFILE ADULT - OVER THE PAST TWO WEEKS, HAVE YOU FELT LITTLE INTEREST OR PLEASURE IN DOING THINGS?
Assessment:   89yFemalePatient is a 89y old  Female who presents with a chief complaint of AHRF (24 Nov 2021 09:36). Pt visited. Pt is On vent via Trach. TF  Jevity 1.5 infusing Via PEG TF  @ 30 Ml/hr. Pt With chest Tube. D/W RN Tf tolerated. Labs Noted .  Continue with  prosource 1 PKt / day via TF to provide additional 15 gms of Protein, 60 Kcal).       Factors impacting intake: [ ] none [ ] nausea  [ ] vomiting [ ] diarrhea [ ] constipation  [ ]chewing problems x[ ] swallowing issues  [ ] other:     Diet Prescription: Diet, NPO with Tube Feed:   Tube Feeding Modality: Gastrostomy  Jevity 1.5 Bg  Total Volume for 24 Hours (mL): 720  Continuous  Starting Tube Feed Rate {mL per Hour}: 30  Until Goal Tube Feed Rate (mL per Hour): 30  Tube Feed Duration (in Hours): 24  Tube Feed Start Time: 15:00  No Carb Pro source (1pkg = 15gms Protein)     Qty per Day:  1 (11-19-21 @ 15:08)    Intake: NPO w TF     Current Weight:   % Weight Change    Pertinent Medications: MEDICATIONS  (STANDING):  aspirin  chewable 81 milliGRAM(s) Oral daily  chlorhexidine 0.12% Liquid 15 milliLiter(s) Oral Mucosa every 12 hours  enoxaparin Injectable 40 milliGRAM(s) SubCutaneous daily  pantoprazole   Suspension 40 milliGRAM(s) Oral daily    MEDICATIONS  (PRN):  acetaminophen    Suspension .. 650 milliGRAM(s) Oral every 6 hours PRN Temp greater or equal to 38C (100.4F), Mild Pain (1 - 3), Moderate Pain (4 - 6)  morphine  - Injectable 1 milliGRAM(s) IV Push every 4 hours PRN Respiratory distress  sodium chloride 0.9% lock flush 10 milliLiter(s) IV Push every 1 hour PRN Pre/post blood products, medications, blood draw, and to maintain line patency    Pertinent Labs: 11-24 Na135 mmol/L Glu 150 mg/dL<H> K+ 4.6 mmol/L Cr  0.53 mg/dL BUN 18 mg/dL 11-24 Phos 3.6 mg/dL 11-24 Alb 1.6 g/dL<L> 11-07 Chol --    LDL --    HDL --    Trig 118 mg/dL     CAPILLARY BLOOD GLUCOSE      POCT Blood Glucose.: 158 mg/dL (24 Nov 2021 11:51)  POCT Blood Glucose.: 156 mg/dL (24 Nov 2021 04:55)  POCT Blood Glucose.: 133 mg/dL (24 Nov 2021 00:00)  POCT Blood Glucose.: 149 mg/dL (23 Nov 2021 17:14)    Skin:     Estimated Needs:   [ ] no change since previous assessment  [ ] recalculated:     Previous Nutrition Diagnosis:   [ ] Inadequate Energy Intake [ ]Inadequate Oral Intake [ ] Excessive Energy Intake   [ ] Underweight [ ] Increased Nutrient Needs [ ] Overweight/Obesity   [ ] Altered GI Function [ ] Unintended Weight Loss [ ] Food & Nutrition Related Knowledge Deficit [x ] Malnutrition Severe    Nutrition Diagnosis is [x ] ongoing  [ ] resolved [ ] not applicable     New Nutrition Diagnosis: [ ] not applicable       Interventions:   Recommend  [ ] Change Diet To:  [ ] Nutrition Supplement  [x ] Nutrition Support    Continue with Jevity 1.5 @  30 ml /hr x 24 hr as tolerated +Prosource 1 PKt / day   (x)   [ ] Other:     Monitoring and Evaluation:   [ ] PO intake [ x ] Tolerance to diet prescription [ x ] weights [ x ] labs[ x ] follow up per protocol  [ ] other: no

## 2023-02-23 NOTE — PATIENT PROFILE ADULT - NSPROHMSYMPCOND_GEN_A_NUR
Pre op teaching surgical scrub pain management instructions given to pt    Covid swab to be done Nov 7/none

## 2023-02-23 NOTE — CONSULT NOTE ADULT - SUBJECTIVE AND OBJECTIVE BOX
[facility], [facility]                                   [address] [place] [address]                                                      PHONE: [phone]                                                         FAX: [phone]  [email or web address]  ---------------------------------------------------------------------------------------------------------------------------------    HPI:  KIERAN WINTER is an 67y Female with a hx of afib, PE on eliquis, RA, POOL, pulmonary htn, diastolic dysfunction presents with hypoxia and shortness of breath.  The patient reports that she started to feel dyspneic 2 weeks ago.  It was progressively worsening.  She had THEODORE and worsening dyspnea with laying down.  At baseline, she sleeps on an incline due to back pain.  No LE edema or pain.  Last night, the breathing got much worse.  She also noted that her apple watch informed her she had an irregular rhythm.  She also noted rigors.  She checked her O2 at home and it was in the 80s.  She reports not missing any doses of medicine.  She was also trying to cut down on her lasix to 20mg daily over the last 4-5 months.     +chills, + fevers, no nausea, vomiting or diarrhea    PAST MEDICAL & SURGICAL HISTORY:  HTN (hypertension)      Hypothyroid      Atrial fibrillation      Hip fx      Sleep apnea  cpap      Anemia      Osteopenia      Osteoarthritis      DVT (deep venous thrombosis)      Pulmonary embolism  Saddle Embolus      Hiatal hernia      Pulmonary hypertension      Rheumatoid arthritis      Sjogren&#x27;s disease      History of cholecystectomy      History of dilation and curettage      Presence of IVC filter      History of ear surgery      History of total hip replacement, right      H/O cardiac radiofrequency ablation      History of loop recorder          No Known Allergies      MEDICATIONS  (STANDING):  apixaban 5 milliGRAM(s) Oral every 12 hours  budesonide  80 MICROgram(s)/formoterol 4.5 MICROgram(s) Inhaler 2 Puff(s) Inhalation two times a day  famotidine    Tablet 40 milliGRAM(s) Oral at bedtime  flecainide 50 milliGRAM(s) Oral every 12 hours  furosemide    Tablet 20 milliGRAM(s) Oral daily  levothyroxine 150 MICROGram(s) Oral daily  metoprolol succinate ER 50 milliGRAM(s) Oral daily  metoprolol succinate ER 25 milliGRAM(s) Oral at bedtime  tiotropium 2.5 MICROgram(s) Inhaler 2 Puff(s) Inhalation daily    MEDICATIONS  (PRN):  acetaminophen     Tablet .. 650 milliGRAM(s) Oral every 6 hours PRN Temp greater or equal to 38C (100.4F), Mild Pain (1 - 3)  albuterol/ipratropium for Nebulization 3 milliLiter(s) Nebulizer every 6 hours PRN Shortness of Breath and/or Wheezing  aluminum hydroxide/magnesium hydroxide/simethicone Suspension 30 milliLiter(s) Oral every 4 hours PRN Dyspepsia  melatonin 3 milliGRAM(s) Oral at bedtime PRN Insomnia  ondansetron Injectable 4 milliGRAM(s) IV Push every 8 hours PRN Nausea and/or Vomiting      Family History: Pt denies hx of early cad, SCD, or congenital heart disease.      Social History:  Cigarettes:           no         Alchohol:   no              Illicit Drug Abuse:  no    ROS:    Extensively Reviewed with pertinents as per HPI the remainder were negative.      Vital Signs Last 24 Hrs  T(C): 36.8 (23 Feb 2023 06:13), Max: 38 (23 Feb 2023 03:29)  T(F): 98.2 (23 Feb 2023 06:13), Max: 100.4 (23 Feb 2023 03:29)  HR: 78 (23 Feb 2023 06:13) (78 - 100)  BP: 106/73 (23 Feb 2023 06:13) (106/73 - 176/101)  BP(mean): --  RR: 18 (23 Feb 2023 06:13) (18 - 24)  SpO2: 97% (23 Feb 2023 06:13) (90% - 97%)    Parameters below as of 23 Feb 2023 06:13  Patient On (Oxygen Delivery Method): nasal cannula  O2 Flow (L/min): 2    ICU Vital Signs Last 24 Hrs  KIERAN WINTER  I&O's Detail    I&O's Summary    Drug Dosing Weight  KIERAN WINTER      PHYSICAL EXAM:  General: Appears well developed, alert and cooperative, morbid obesity  HEENT: Head; normocephalic, atraumatic.  Eyes: Pupils reactive, cornea wnl.  Neck: Supple, no nodes adenopathy, no JVD, no carotid bruit  CARDIOVASCULAR: Normal S1 and S2, No murmur, rub, gallop or lift.   LUNGS: No rales, rhonchi or wheeze. Normal breath sounds bilaterally.  ABDOMEN: Soft, nontender, nondistended  EXTREMITIES: No clubbing or edema. Distal pulses wnl.   SKIN: warm and dry with normal turgor.  NEURO: Alert/oriented x 3/normal motor exam.   PSYCH: normal affect.        LABS:                        13.8   10.24 )-----------( 175      ( 23 Feb 2023 04:30 )             41.9     02-23    139  |  101  |  19.0  ----------------------------<  88  4.1   |  26.0  |  0.97    Ca    8.5      23 Feb 2023 05:25    TPro  7.5  /  Alb  3.8  /  TBili  0.3<L>  /  DBili  x   /  AST  25  /  ALT  15  /  AlkPhos  71  02-23    KIERAN WINTER  CARDIAC MARKERS ( 23 Feb 2023 05:25 )  x     / <0.01 ng/mL / x     / x     / x                RADIOLOGY & ADDITIONAL STUDIES:    INTERPRETATION OF TELEMETRY (personally reviewed): I reviewed CT scan- no pulmonary embolism, normal cardiac size, hiatal hernia    ECG: NSR, first degree av block, left axis deviation, old anterior infarct, old inferior infarct    ECHO: pending    STRESS TEST:    CARDIAC CATHETERIZATION:    Assessment and Plan:  In summary, KIERAN WINTER is an  67y Female with a hx of afib, PE on eliquis, RA, POOL, pulmonary htn, diastolic dysfunction presents with hypoxia and shortness of breath, fever and chills.    Shortness of breath/hypoxia- unclear etiology.  Differential includes decompensated HFpEF, pulmonary htn.  PNA and PE not seen on CT scan.  Would continue supplemental O2.  BNP elevated.  Would repeat echocardiogram to evaluate LV function and pulmonary pressures.  Can start diuresis with 40mg IV lasix BID.    Hx of PE- continue eliquis.  No PE on Ct     Afib- patient seems to have gotten more short of breath last night.  NSR on EKGS here but patient noted irregular heart rates at home.  Would continue antiarrhythmic therapy with flecainide and AC with eliquis.        Thank you for allowing [facility] to participate in the care of this patient.  Please feel free to call with any questions or concerns.

## 2023-02-24 LAB
ALBUMIN SERPL ELPH-MCNC: 3.1 G/DL — LOW (ref 3.3–5.2)
ALP SERPL-CCNC: 55 U/L — SIGNIFICANT CHANGE UP (ref 40–120)
ALT FLD-CCNC: 10 U/L — SIGNIFICANT CHANGE UP
ANION GAP SERPL CALC-SCNC: 12 MMOL/L — SIGNIFICANT CHANGE UP (ref 5–17)
AST SERPL-CCNC: 14 U/L — SIGNIFICANT CHANGE UP
BASOPHILS # BLD AUTO: 0.03 K/UL — SIGNIFICANT CHANGE UP (ref 0–0.2)
BASOPHILS NFR BLD AUTO: 0.2 % — SIGNIFICANT CHANGE UP (ref 0–2)
BILIRUB SERPL-MCNC: 0.8 MG/DL — SIGNIFICANT CHANGE UP (ref 0.4–2)
BUN SERPL-MCNC: 25.8 MG/DL — HIGH (ref 8–20)
CALCIUM SERPL-MCNC: 7.4 MG/DL — LOW (ref 8.4–10.5)
CHLORIDE SERPL-SCNC: 100 MMOL/L — SIGNIFICANT CHANGE UP (ref 96–108)
CO2 SERPL-SCNC: 25 MMOL/L — SIGNIFICANT CHANGE UP (ref 22–29)
CREAT SERPL-MCNC: 1.14 MG/DL — SIGNIFICANT CHANGE UP (ref 0.5–1.3)
CULTURE RESULTS: SIGNIFICANT CHANGE UP
EGFR: 52 ML/MIN/1.73M2 — LOW
EOSINOPHIL # BLD AUTO: 0.11 K/UL — SIGNIFICANT CHANGE UP (ref 0–0.5)
EOSINOPHIL NFR BLD AUTO: 0.8 % — SIGNIFICANT CHANGE UP (ref 0–6)
GLUCOSE SERPL-MCNC: 79 MG/DL — SIGNIFICANT CHANGE UP (ref 70–99)
GRAM STN FLD: SIGNIFICANT CHANGE UP
HCT VFR BLD CALC: 35.8 % — SIGNIFICANT CHANGE UP (ref 34.5–45)
HCV AB S/CO SERPL IA: 0.07 S/CO — SIGNIFICANT CHANGE UP (ref 0–0.99)
HCV AB SERPL-IMP: SIGNIFICANT CHANGE UP
HGB BLD-MCNC: 11.4 G/DL — LOW (ref 11.5–15.5)
IMM GRANULOCYTES NFR BLD AUTO: 0.8 % — SIGNIFICANT CHANGE UP (ref 0–0.9)
LYMPHOCYTES # BLD AUTO: 1.21 K/UL — SIGNIFICANT CHANGE UP (ref 1–3.3)
LYMPHOCYTES # BLD AUTO: 9.2 % — LOW (ref 13–44)
MAGNESIUM SERPL-MCNC: 1.5 MG/DL — LOW (ref 1.6–2.6)
MCHC RBC-ENTMCNC: 31.8 GM/DL — LOW (ref 32–36)
MCHC RBC-ENTMCNC: 32.7 PG — SIGNIFICANT CHANGE UP (ref 27–34)
MCV RBC AUTO: 102.6 FL — HIGH (ref 80–100)
MONOCYTES # BLD AUTO: 1.17 K/UL — HIGH (ref 0–0.9)
MONOCYTES NFR BLD AUTO: 8.9 % — SIGNIFICANT CHANGE UP (ref 2–14)
NEUTROPHILS # BLD AUTO: 10.59 K/UL — HIGH (ref 1.8–7.4)
NEUTROPHILS NFR BLD AUTO: 80.1 % — HIGH (ref 43–77)
ORGANISM # SPEC MICROSCOPIC CNT: SIGNIFICANT CHANGE UP
ORGANISM # SPEC MICROSCOPIC CNT: SIGNIFICANT CHANGE UP
PLATELET # BLD AUTO: 157 K/UL — SIGNIFICANT CHANGE UP (ref 150–400)
POTASSIUM SERPL-MCNC: 4.1 MMOL/L — SIGNIFICANT CHANGE UP (ref 3.5–5.3)
POTASSIUM SERPL-SCNC: 4.1 MMOL/L — SIGNIFICANT CHANGE UP (ref 3.5–5.3)
PROCALCITONIN SERPL-MCNC: 1.55 NG/ML — HIGH (ref 0.02–0.1)
PROT SERPL-MCNC: 6.2 G/DL — LOW (ref 6.6–8.7)
RBC # BLD: 3.49 M/UL — LOW (ref 3.8–5.2)
RBC # FLD: 14.5 % — SIGNIFICANT CHANGE UP (ref 10.3–14.5)
SODIUM SERPL-SCNC: 137 MMOL/L — SIGNIFICANT CHANGE UP (ref 135–145)
SPECIMEN SOURCE: SIGNIFICANT CHANGE UP
WBC # BLD: 13.21 K/UL — HIGH (ref 3.8–10.5)
WBC # FLD AUTO: 13.21 K/UL — HIGH (ref 3.8–10.5)

## 2023-02-24 PROCEDURE — 99233 SBSQ HOSP IP/OBS HIGH 50: CPT

## 2023-02-24 PROCEDURE — 99223 1ST HOSP IP/OBS HIGH 75: CPT

## 2023-02-24 PROCEDURE — 74177 CT ABD & PELVIS W/CONTRAST: CPT | Mod: 26

## 2023-02-24 PROCEDURE — 93306 TTE W/DOPPLER COMPLETE: CPT | Mod: 26

## 2023-02-24 RX ORDER — FUROSEMIDE 40 MG
40 TABLET ORAL DAILY
Refills: 0 | Status: DISCONTINUED | OUTPATIENT
Start: 2023-02-25 | End: 2023-02-26

## 2023-02-24 RX ADMIN — Medication 40 MILLIGRAM(S): at 05:02

## 2023-02-24 RX ADMIN — PIPERACILLIN AND TAZOBACTAM 25 GRAM(S): 4; .5 INJECTION, POWDER, LYOPHILIZED, FOR SOLUTION INTRAVENOUS at 05:02

## 2023-02-24 RX ADMIN — APIXABAN 5 MILLIGRAM(S): 2.5 TABLET, FILM COATED ORAL at 17:26

## 2023-02-24 RX ADMIN — Medication 25 MILLIGRAM(S): at 21:25

## 2023-02-24 RX ADMIN — APIXABAN 5 MILLIGRAM(S): 2.5 TABLET, FILM COATED ORAL at 05:02

## 2023-02-24 RX ADMIN — PIPERACILLIN AND TAZOBACTAM 25 GRAM(S): 4; .5 INJECTION, POWDER, LYOPHILIZED, FOR SOLUTION INTRAVENOUS at 15:50

## 2023-02-24 RX ADMIN — Medication 650 MILLIGRAM(S): at 22:55

## 2023-02-24 RX ADMIN — Medication 50 MILLIGRAM(S): at 12:51

## 2023-02-24 RX ADMIN — Medication 150 MICROGRAM(S): at 05:02

## 2023-02-24 RX ADMIN — Medication 650 MILLIGRAM(S): at 13:20

## 2023-02-24 RX ADMIN — FAMOTIDINE 40 MILLIGRAM(S): 10 INJECTION INTRAVENOUS at 21:26

## 2023-02-24 RX ADMIN — Medication 650 MILLIGRAM(S): at 12:52

## 2023-02-24 RX ADMIN — Medication 3 MILLIGRAM(S): at 22:55

## 2023-02-24 RX ADMIN — PIPERACILLIN AND TAZOBACTAM 25 GRAM(S): 4; .5 INJECTION, POWDER, LYOPHILIZED, FOR SOLUTION INTRAVENOUS at 21:25

## 2023-02-24 RX ADMIN — Medication 50 MILLIGRAM(S): at 21:25

## 2023-02-24 NOTE — CHART NOTE - NSCHARTNOTEFT_GEN_A_CORE
Patient used their Home CPAP Unit for nocturnal use. Pt. stayed on their home equipment  through out the night. Patient tolerated the settings well with no sign of any respiratory distress noted. o2 @2lpm  added via bleed in

## 2023-02-24 NOTE — CONSULT NOTE ADULT - SUBJECTIVE AND OBJECTIVE BOX
[facility]                                                INFECTIOUS DISEASES  =======================================================                               Dario Vasquez MD#    Yeni Merino MD*                           Emerald Daly MD*   Ester Ortega MD*            Diplomates American Board of Internal Medicine & Infectious Diseases                  # [place] Office - Appt - Tel  [phone] Fax [phone]                * [place] Office - Appt - Tel [phone] Fax [phone]                                  Hospital Consult line:  [phone]  =======================================================      [id number]  KIERAN WINTER   HPI:  Patient is a 68 yo F with hx of pafib on eliquis, PE, Sjogrens, RA, hiatal hernia and POOL who presented to the ED with hypoxia. Patient woke up this am feeling more short of breath with shaking chills.  checked her pulse ox and was noted to be hypoxic to the mid 80s so they came to the ED. Patient reports a cough for the last few weeks, no sputum production. No sick contacts or recent travel. She needs to sleep in a recliner type bed as she cannot lay flat. She reports less leg swelling and has been compliant with her lasix of 20mg daily. She has noted feeling more short of breath with exertion over the last 1 week. Denies chest pain, palpitations, abdominal pain, nausea and vomiting.  In the ED patient was febrile to 100.4 and oxygen saturation was 90% on room air. She had ab elevated BNP and CT chest showed no PE and no infiltrate, it did show large hiatal hernia and atelectasis. She was given antibiotics  She is being admitted to medicine for further work up,  (23 Feb 2023 08:43)          I have personally reviewed the labs and data; pertinent labs and data are listed in this note; please see below.   =======================================================  Past Medical & Surgical Hx:  =====================  PAST MEDICAL & SURGICAL HISTORY:  HTN (hypertension)      Hypothyroid      Atrial fibrillation      Hip fx      Sleep apnea  cpap      Anemia      Osteopenia      Osteoarthritis      DVT (deep venous thrombosis)      Pulmonary embolism  Saddle Embolus      Hiatal hernia      Pulmonary hypertension      Rheumatoid arthritis      Sjogren&#x27;s disease      History of cholecystectomy      History of dilation and curettage      Presence of IVC filter      History of ear surgery      History of total hip replacement, right      H/O cardiac radiofrequency ablation      History of loop recorder        Problem List:  ==========  HEALTH ISSUES - PROBLEM Dx:        Social Hx:  =======  no toxic habits currently    FAMILY HISTORY:  Family history of pulmonary hypertension (Mother)    Family history of CHF (congestive heart failure) (Mother)    Family history of esophageal cancer (Father)    Family history of lung cancer (Father)    Family history of rheumatoid arthritis (Father)    no significant family history of immunosuppressive disorders in mother or father   =======================================================    REVIEW OF SYSTEMS:  CONSTITUTIONAL:  No Fever or chills  HEENT:  No diplopia or blurred vision.  No earache, sore throat or runny nose.  CARDIOVASCULAR:  No pressure, squeezing, strangling, tightness, heaviness or aching about the chest, neck, axilla or epigastrium.  RESPIRATORY:  No cough, + shortness of breath  GASTROINTESTINAL:  No nausea, vomiting or diarrhea.  GENITOURINARY:  No dysuria, frequency or urgency. No Blood in urine  MUSCULOSKELETAL:  no joint aches, no muscle pain  SKIN:  No change in skin, hair or nails.  NEUROLOGIC:  No Headaches, seizures or weakness.  PSYCHIATRIC:  No disorder of thought or mood.  ENDOCRINE:  No heat or cold intolerance  HEMATOLOGICAL:  No easy bruising or bleeding.    =======================================================  Allergies    No Known Allergies    Intolerances    Antibiotics:  piperacillin/tazobactam IVPB.. 3.375 Gram(s) IV Intermittent every 8 hours    Other medications:  apixaban 5 milliGRAM(s) Oral every 12 hours  budesonide  80 MICROgram(s)/formoterol 4.5 MICROgram(s) Inhaler 2 Puff(s) Inhalation two times a day  famotidine    Tablet 40 milliGRAM(s) Oral at bedtime  flecainide 50 milliGRAM(s) Oral every 12 hours  levothyroxine 150 MICROGram(s) Oral daily  metoprolol succinate ER 50 milliGRAM(s) Oral daily  metoprolol succinate ER 25 milliGRAM(s) Oral at bedtime  tiotropium 2.5 MICROgram(s) Inhaler 2 Puff(s) Inhalation daily     azithromycin  IVPB   255 mL/Hr IV Intermittent (02-23-23 @ 05:11)    cefTRIAXone Injectable.   1000 milliGRAM(s) IV Push (02-23-23 @ 05:11)    piperacillin/tazobactam IVPB.   200 mL/Hr IV Intermittent (02-23-23 @ 23:11)    piperacillin/tazobactam IVPB.-   25 mL/Hr IV Intermittent (02-24-23 @ 05:02)    piperacillin/tazobactam IVPB..   25 mL/Hr IV Intermittent (02-24-23 @ 15:50)      ======================================================  Physical Exam:  ============  T(F): 97.5 (24 Feb 2023 12:08), Max: 98.6 (23 Feb 2023 20:28)  HR: 65 (24 Feb 2023 12:08)  BP: 121/78 (24 Feb 2023 12:08)  RR: 18 (24 Feb 2023 12:08)  SpO2: 99% (24 Feb 2023 12:08) (99% - 100%)  temp max in last 48H T(F): , Max: 100.4 (02-23-23 @ 03:29)    General:  No acute distress. on o2  Eye:  Normal conjunctiva.  Neck: Supple, No lymphadenopathy.  Respiratory: Lungs are clear to auscultation, Respirations are non-labored.  Cardiovascular: Normal rate, Regular rhythm, s1 + s2  Gastrointestinal: Soft, Non-tender, Non-distended, Normal bowel sounds.  Genitourinary: No costovertebral angle tenderness.  Lymphatics: No lymphadenopathy neck,   Musculoskeletal: Normal range of motion, Normal strength.  Integumentary: +b/l Le edema  Neurologic: Alert, Oriented, No focal deficits  Psychiatric: Appropriate mood & affect.    =======================================================  Labs:                        11.4   13.21 )-----------( 157      ( 24 Feb 2023 05:30 )             35.8     02-24    137  |  100  |  25.8<H>  ----------------------------<  79  4.1   |  25.0  |  1.14    Ca    7.4<L>      24 Feb 2023 05:30  Mg     1.5     02-24    TPro  6.2<L>  /  Alb  3.1<L>  /  TBili  0.8  /  DBili  x   /  AST  14  /  ALT  10  /  AlkPhos  55  02-24      Culture - Urine (collected 02-23-23 @ 08:30)  Source: Clean Catch Clean Catch (Midstream)  Final Report (02-24-23 @ 13:25):    <10,000 CFU/mL Normal Urogenital [place]    Culture - Blood (collected 02-23-23 @ 04:40)  Source: .Blood Blood-Peripheral  Gram Stain (02-24-23 @ 02:02):    Growth in aerobic bottle: Gram Negative Rods    Growth in anaerobic bottle: Gram Negative Rods  Organism: Blood Culture PCR (02-23-23 @ 22:23)  Organism: Blood Culture PCR (02-23-23 @ 22:23)    Sensitivities:      -  Blood PCR Panel: NEG      Method Type: PCR    Culture - Blood (collected 02-23-23 @ 04:30)  Source: .Blood Blood-Peripheral  Gram Stain (02-23-23 @ 20:55):    Growth in aerobic bottle: Gram Negative Rods       SARS-CoV-2 Result: NotDetec (02-23-23 @ 04:30)

## 2023-02-24 NOTE — PROGRESS NOTE ADULT - SUBJECTIVE AND OBJECTIVE BOX
seen for SOB, bacteremia    feels better  LE edema improved  ros negative   denies gu/gi complaints.    MEDICATIONS  (STANDING):  apixaban 5 milliGRAM(s) Oral every 12 hours  budesonide  80 MICROgram(s)/formoterol 4.5 MICROgram(s) Inhaler 2 Puff(s) Inhalation two times a day  famotidine    Tablet 40 milliGRAM(s) Oral at bedtime  flecainide 50 milliGRAM(s) Oral every 12 hours  levothyroxine 150 MICROGram(s) Oral daily  metoprolol succinate ER 50 milliGRAM(s) Oral daily  metoprolol succinate ER 25 milliGRAM(s) Oral at bedtime  piperacillin/tazobactam IVPB.. 3.375 Gram(s) IV Intermittent every 8 hours  tiotropium 2.5 MICROgram(s) Inhaler 2 Puff(s) Inhalation daily    MEDICATIONS  (PRN):  acetaminophen     Tablet .. 650 milliGRAM(s) Oral every 6 hours PRN Temp greater or equal to 38C (100.4F), Mild Pain (1 - 3)  albuterol/ipratropium for Nebulization 3 milliLiter(s) Nebulizer every 6 hours PRN Shortness of Breath and/or Wheezing  aluminum hydroxide/magnesium hydroxide/simethicone Suspension 30 milliLiter(s) Oral every 4 hours PRN Dyspepsia  melatonin 3 milliGRAM(s) Oral at bedtime PRN Insomnia  ondansetron Injectable 4 milliGRAM(s) IV Push every 8 hours PRN Nausea and/or Vomiting      Allergies    No Known Allergies        Vital Signs Last 24 Hrs  T(C): 36.4 (2023 12:08), Max: 37 (2023 20:28)  T(F): 97.5 (2023 12:08), Max: 98.6 (2023 20:28)  HR: 65 (2023 12:08) (58 - 72)  BP: 121/78 (2023 12:08) (118/61 - 136/68)  BP(mean): --  RR: 18 (2023 12:08) (18 - 20)  SpO2: 99% (2023 12:08) (99% - 100%)    Parameters below as of 2023 09:22  Patient On (Oxygen Delivery Method): nasal cannula,2L        PHYSICAL EXAM:    GENERAL: NAD obese   CHEST/LUNG: Clear to ausculation bilaterally;  HEART: Regular rate and rhythm; S1 S2  ABDOMEN: Soft,  Bowel sounds present  EXTREMITIES:  no edema   NERVOUS SYSTEM:  Alert & Oriented X3, Motor Strength 5/5 B/L upper and lower extremities  PSYCH: normal mood, appropriate response.    LABS:                        11.4   13.21 )-----------( 157      ( 2023 05:30 )             35.8     02    137  |  100  |  25.8<H>  ----------------------------<  79  4.1   |  25.0  |  1.14    Ca    7.4<L>      2023 05:30  Mg     1.5         TPro  6.2<L>  /  Alb  3.1<L>  /  TBili  0.8  /  DBili  x   /  AST  14  /  ALT  10  /  AlkPhos  55        Urinalysis Basic - ( 2023 08:30 )    Color: Yellow / Appearance: Slightly Turbid / S.020 / pH: x  Gluc: x / Ketone: Trace  / Bili: Negative / Urobili: Negative mg/dL   Blood: x / Protein: 15 / Nitrite: Negative   Leuk Esterase: Trace / RBC: 0-2 /HPF / WBC 3-5 /HPF   Sq Epi: x / Non Sq Epi: Few / Bacteria: Negative        CAPILLARY BLOOD GLUCOSE            RADIOLOGY & ADDITIONAL TESTS:

## 2023-02-24 NOTE — PROGRESS NOTE ADULT - SUBJECTIVE AND OBJECTIVE BOX
[facility], [facility]ART [facility]                              [address] [place] [address]                                                 PHONE: [phone]                                                 FAX: [phone]  -----------------------------------------------------------------------------------------------  Pt seen and examined. FU for  shortness of breath     Overnight events/Complaints: Pt reports improvement in her symptoms. remains on O2.    Vital Signs Last 24 Hrs  T(C): 36.7 (24 Feb 2023 04:19), Max: 37 (23 Feb 2023 20:28)  T(F): 98.1 (24 Feb 2023 04:19), Max: 98.6 (23 Feb 2023 20:28)  HR: 58 (24 Feb 2023 04:19) (58 - 72)  BP: 118/61 (24 Feb 2023 04:19) (118/61 - 136/68)  BP(mean): --  RR: 18 (24 Feb 2023 04:19) (18 - 20)  SpO2: 99% (24 Feb 2023 04:19) (99% - 100%)    Parameters below as of 24 Feb 2023 04:19  Patient On (Oxygen Delivery Method): nasal cannula      I&O's Summary    23 Feb 2023 07:01  -  24 Feb 2023 07:00  --------------------------------------------------------  IN: 240 mL / OUT: 900 mL / NET: -660 mL      PHYSICAL EXAM:  Constitutional: Appears well; alert and co-operative  Eyes: Conjunctiva normal, No scleral icterus  Neck: Supple, No JVD  Cardiovascular: regular S1, S2  Respiratory: Lungs clear to auscultation; no crepitations, no wheeze  Gastrointestinal:  Soft, Non-tender, + BS	  Musculoskeletal: No edema        LABS:                        11.4   13.21 )-----------( 157      ( 24 Feb 2023 05:30 )             35.8     02-24    137  |  100  |  25.8<H>  ----------------------------<  79  4.1   |  25.0  |  1.14    Ca    7.4<L>      24 Feb 2023 05:30  Mg     1.5     02-24    TPro  6.2<L>  /  Alb  3.1<L>  /  TBili  0.8  /  DBili  x   /  AST  14  /  ALT  10  /  AlkPhos  55  02-24    CARDIAC MARKERS ( 23 Feb 2023 05:25 )  x     / <0.01 ng/mL / x     / x     / x              RADIOLOGY & ADDITIONAL STUDIES: (reviewed)  CXR was independently visualized/reviewed  and demonstrated: cardiomegaly    CT scan- no pulmonary embolism, normal cardiac size, hiatal hernia    CARDIOLOGY TESTING:(reviewed)     12 lead EKG independently visualized/reviewed  and demonstrated NSR, first degree av block, left axis deviation, old anterior infarct, old inferior infarct    ECHOCARDIOGRAM: pending    MEDICATIONS:(reviewed)  MEDICATIONS  (STANDING):  apixaban 5 milliGRAM(s) Oral every 12 hours  budesonide  80 MICROgram(s)/formoterol 4.5 MICROgram(s) Inhaler 2 Puff(s) Inhalation two times a day  famotidine    Tablet 40 milliGRAM(s) Oral at bedtime  flecainide 50 milliGRAM(s) Oral every 12 hours  levothyroxine 150 MICROGram(s) Oral daily  metoprolol succinate ER 50 milliGRAM(s) Oral daily  metoprolol succinate ER 25 milliGRAM(s) Oral at bedtime  piperacillin/tazobactam IVPB.. 3.375 Gram(s) IV Intermittent every 8 hours  tiotropium 2.5 MICROgram(s) Inhaler 2 Puff(s) Inhalation daily      ASSESSMENT AND PLAN:    67y Female with a hx of afib, PE on eliquis, RA, POOL, pulmonary htn, diastolic dysfunction presents with hypoxia and shortness of breath, fever and chills.    Shortness of breath/hypoxia- continue lasix. Improving    Hx of PE- continue Eliquis  No PE on Ct     Afib- continue flecainide and Eliquis    Plan discussed with Medicine team

## 2023-02-25 LAB
ANION GAP SERPL CALC-SCNC: 14 MMOL/L — SIGNIFICANT CHANGE UP (ref 5–17)
BASOPHILS # BLD AUTO: 0.03 K/UL — SIGNIFICANT CHANGE UP (ref 0–0.2)
BASOPHILS NFR BLD AUTO: 0.3 % — SIGNIFICANT CHANGE UP (ref 0–2)
BUN SERPL-MCNC: 24.4 MG/DL — HIGH (ref 8–20)
CALCIUM SERPL-MCNC: 7.8 MG/DL — LOW (ref 8.4–10.5)
CHLORIDE SERPL-SCNC: 98 MMOL/L — SIGNIFICANT CHANGE UP (ref 96–108)
CO2 SERPL-SCNC: 24 MMOL/L — SIGNIFICANT CHANGE UP (ref 22–29)
CREAT SERPL-MCNC: 1.13 MG/DL — SIGNIFICANT CHANGE UP (ref 0.5–1.3)
EGFR: 53 ML/MIN/1.73M2 — LOW
EOSINOPHIL # BLD AUTO: 0.2 K/UL — SIGNIFICANT CHANGE UP (ref 0–0.5)
EOSINOPHIL NFR BLD AUTO: 2 % — SIGNIFICANT CHANGE UP (ref 0–6)
GLUCOSE SERPL-MCNC: 89 MG/DL — SIGNIFICANT CHANGE UP (ref 70–99)
HCT VFR BLD CALC: 36.2 % — SIGNIFICANT CHANGE UP (ref 34.5–45)
HGB BLD-MCNC: 11.4 G/DL — LOW (ref 11.5–15.5)
IMM GRANULOCYTES NFR BLD AUTO: 0.5 % — SIGNIFICANT CHANGE UP (ref 0–0.9)
LYMPHOCYTES # BLD AUTO: 1.04 K/UL — SIGNIFICANT CHANGE UP (ref 1–3.3)
LYMPHOCYTES # BLD AUTO: 10.4 % — LOW (ref 13–44)
MAGNESIUM SERPL-MCNC: 1.8 MG/DL — SIGNIFICANT CHANGE UP (ref 1.6–2.6)
MCHC RBC-ENTMCNC: 31.5 GM/DL — LOW (ref 32–36)
MCHC RBC-ENTMCNC: 32.6 PG — SIGNIFICANT CHANGE UP (ref 27–34)
MCV RBC AUTO: 103.4 FL — HIGH (ref 80–100)
MONOCYTES # BLD AUTO: 1.03 K/UL — HIGH (ref 0–0.9)
MONOCYTES NFR BLD AUTO: 10.3 % — SIGNIFICANT CHANGE UP (ref 2–14)
NEUTROPHILS # BLD AUTO: 7.69 K/UL — HIGH (ref 1.8–7.4)
NEUTROPHILS NFR BLD AUTO: 76.5 % — SIGNIFICANT CHANGE UP (ref 43–77)
PHOSPHATE SERPL-MCNC: 3.1 MG/DL — SIGNIFICANT CHANGE UP (ref 2.4–4.7)
PLATELET # BLD AUTO: 157 K/UL — SIGNIFICANT CHANGE UP (ref 150–400)
POTASSIUM SERPL-MCNC: 4 MMOL/L — SIGNIFICANT CHANGE UP (ref 3.5–5.3)
POTASSIUM SERPL-SCNC: 4 MMOL/L — SIGNIFICANT CHANGE UP (ref 3.5–5.3)
RBC # BLD: 3.5 M/UL — LOW (ref 3.8–5.2)
RBC # FLD: 14.3 % — SIGNIFICANT CHANGE UP (ref 10.3–14.5)
SODIUM SERPL-SCNC: 136 MMOL/L — SIGNIFICANT CHANGE UP (ref 135–145)
WBC # BLD: 10.04 K/UL — SIGNIFICANT CHANGE UP (ref 3.8–10.5)
WBC # FLD AUTO: 10.04 K/UL — SIGNIFICANT CHANGE UP (ref 3.8–10.5)

## 2023-02-25 PROCEDURE — 99233 SBSQ HOSP IP/OBS HIGH 50: CPT

## 2023-02-25 RX ADMIN — APIXABAN 5 MILLIGRAM(S): 2.5 TABLET, FILM COATED ORAL at 05:11

## 2023-02-25 RX ADMIN — Medication 50 MILLIGRAM(S): at 21:53

## 2023-02-25 RX ADMIN — Medication 25 MILLIGRAM(S): at 21:53

## 2023-02-25 RX ADMIN — BUDESONIDE AND FORMOTEROL FUMARATE DIHYDRATE 2 PUFF(S): 160; 4.5 AEROSOL RESPIRATORY (INHALATION) at 11:09

## 2023-02-25 RX ADMIN — Medication 50 MILLIGRAM(S): at 05:11

## 2023-02-25 RX ADMIN — PIPERACILLIN AND TAZOBACTAM 25 GRAM(S): 4; .5 INJECTION, POWDER, LYOPHILIZED, FOR SOLUTION INTRAVENOUS at 13:19

## 2023-02-25 RX ADMIN — PIPERACILLIN AND TAZOBACTAM 25 GRAM(S): 4; .5 INJECTION, POWDER, LYOPHILIZED, FOR SOLUTION INTRAVENOUS at 21:50

## 2023-02-25 RX ADMIN — APIXABAN 5 MILLIGRAM(S): 2.5 TABLET, FILM COATED ORAL at 17:04

## 2023-02-25 RX ADMIN — Medication 650 MILLIGRAM(S): at 20:42

## 2023-02-25 RX ADMIN — Medication 650 MILLIGRAM(S): at 20:12

## 2023-02-25 RX ADMIN — Medication 650 MILLIGRAM(S): at 05:20

## 2023-02-25 RX ADMIN — PIPERACILLIN AND TAZOBACTAM 25 GRAM(S): 4; .5 INJECTION, POWDER, LYOPHILIZED, FOR SOLUTION INTRAVENOUS at 05:11

## 2023-02-25 RX ADMIN — Medication 50 MILLIGRAM(S): at 11:07

## 2023-02-25 RX ADMIN — TIOTROPIUM BROMIDE 2 PUFF(S): 18 CAPSULE ORAL; RESPIRATORY (INHALATION) at 11:09

## 2023-02-25 RX ADMIN — Medication 40 MILLIGRAM(S): at 05:11

## 2023-02-25 RX ADMIN — Medication 650 MILLIGRAM(S): at 06:30

## 2023-02-25 RX ADMIN — Medication 650 MILLIGRAM(S): at 00:00

## 2023-02-25 RX ADMIN — FAMOTIDINE 40 MILLIGRAM(S): 10 INJECTION INTRAVENOUS at 21:53

## 2023-02-25 RX ADMIN — Medication 150 MICROGRAM(S): at 05:11

## 2023-02-25 RX ADMIN — BUDESONIDE AND FORMOTEROL FUMARATE DIHYDRATE 2 PUFF(S): 160; 4.5 AEROSOL RESPIRATORY (INHALATION) at 21:51

## 2023-02-25 NOTE — PROGRESS NOTE ADULT - SUBJECTIVE AND OBJECTIVE BOX
[facility], [facility]                              [address] [place] [address]                                                 PHONE: [phone]                                                 FAX: [phone]  -----------------------------------------------------------------------------------------------  Pt seen and examined. FU for  shortness of breath     Overnight events/Complaints: Pt reports improvement in her symptoms. remains on O2.    Vital Signs Last 24 Hrs  T(C): 36.7 (25 Feb 2023 04:20), Max: 37 (24 Feb 2023 18:18)  T(F): 98.1 (25 Feb 2023 04:20), Max: 98.6 (24 Feb 2023 18:18)  HR: 63 (25 Feb 2023 04:20) (63 - 69)  BP: 129/63 (25 Feb 2023 04:20) (118/55 - 129/63)  BP(mean): 88 (24 Feb 2023 21:19) (88 - 88)  RR: 18 (25 Feb 2023 04:20) (18 - 18)  SpO2: 100% (25 Feb 2023 04:20) (99% - 100%)    Parameters below as of 25 Feb 2023 08:09  Patient On (Oxygen Delivery Method): nasal cannula      I&O's Summary    24 Feb 2023 07:01  -  25 Feb 2023 07:00  --------------------------------------------------------  IN: 240 mL / OUT: 900 mL / NET: -660 mL    25 Feb 2023 07:01  -  25 Feb 2023 10:29  --------------------------------------------------------  IN: 150 mL / OUT: 650 mL / NET: -500 mL    PHYSICAL EXAM:  Constitutional: Appears well; alert and co-operative  Eyes: Conjunctiva normal, No scleral icterus  Neck: Supple, No JVD  Cardiovascular: regular S1, S2  Respiratory: Lungs clear to auscultation; no crepitations, no wheeze  Gastrointestinal:  Soft, Non-tender, + BS	  Musculoskeletal: Mild edema        LABS:                        11.4   10.04 )-----------( 157      ( 25 Feb 2023 05:50 )             36.2     02-25    136  |  98  |  24.4<H>  ----------------------------<  89  4.0   |  24.0  |  1.13    Ca    7.8<L>      25 Feb 2023 05:50  Phos  3.1     02-25  Mg     1.8     02-25    TPro  6.2<L>  /  Alb  3.1<L>  /  TBili  0.8  /  DBili  x   /  AST  14  /  ALT  10  /  AlkPhos  55  02-24      RADIOLOGY & ADDITIONAL STUDIES: (reviewed)  CXR was independently visualized/reviewed  and demonstrated: cardiomegaly    CT scan- no pulmonary embolism, normal cardiac size, hiatal hernia    CARDIOLOGY TESTING:(reviewed)     12 lead EKG independently visualized/reviewed  and demonstrated NSR, first degree av block, left axis deviation, old anterior infarct, old inferior infarct    ECHOCARDIOGRAM  independently visualized/reviewed  and demonstrated:  LVEF 60-65%; dilated RV, moderate pHTN: 50 mmHg    MEDICATIONS:(reviewed)  MEDICATIONS  (STANDING):  apixaban 5 milliGRAM(s) Oral every 12 hours  budesonide  80 MICROgram(s)/formoterol 4.5 MICROgram(s) Inhaler 2 Puff(s) Inhalation two times a day  famotidine    Tablet 40 milliGRAM(s) Oral at bedtime  flecainide 50 milliGRAM(s) Oral every 12 hours  furosemide   Injectable 40 milliGRAM(s) IV Push daily  levothyroxine 150 MICROGram(s) Oral daily  metoprolol succinate ER 50 milliGRAM(s) Oral daily  metoprolol succinate ER 25 milliGRAM(s) Oral at bedtime  piperacillin/tazobactam IVPB.. 3.375 Gram(s) IV Intermittent every 8 hours  tiotropium 2.5 MICROgram(s) Inhaler 2 Puff(s) Inhalation daily    ASSESSMENT AND PLAN:    67y Female with a hx of afib, PE on eliquis, RA, POOL, pulmonary htn, diastolic dysfunction presents with hypoxia and shortness of breath, fever and chills.    Shortness of breath/hypoxia- resume Lasix 40 mg daily on discharge    Hx of PE- continue Eliquis      Afib- continue flecainide, toprol and Eliquis    On antibiotics                                                  [facility], [facility]                              [address] [place] [address]                                                 PHONE: [phone]                                                 FAX: [phone]  -----------------------------------------------------------------------------------------------  Pt seen and examined. FU for  shortness of breath     Overnight events/Complaints: Pt reports improvement in her symptoms. remains on O2.    Vital Signs Last 24 Hrs  T(C): 36.7 (25 Feb 2023 04:20), Max: 37 (24 Feb 2023 18:18)  T(F): 98.1 (25 Feb 2023 04:20), Max: 98.6 (24 Feb 2023 18:18)  HR: 63 (25 Feb 2023 04:20) (63 - 69)  BP: 129/63 (25 Feb 2023 04:20) (118/55 - 129/63)  BP(mean): 88 (24 Feb 2023 21:19) (88 - 88)  RR: 18 (25 Feb 2023 04:20) (18 - 18)  SpO2: 100% (25 Feb 2023 04:20) (99% - 100%)    Parameters below as of 25 Feb 2023 08:09  Patient On (Oxygen Delivery Method): nasal cannula      I&O's Summary    24 Feb 2023 07:01  -  25 Feb 2023 07:00  --------------------------------------------------------  IN: 240 mL / OUT: 900 mL / NET: -660 mL    25 Feb 2023 07:01  -  25 Feb 2023 10:29  --------------------------------------------------------  IN: 150 mL / OUT: 650 mL / NET: -500 mL    PHYSICAL EXAM:  Constitutional: Appears well; alert and co-operative  Eyes: Conjunctiva normal, No scleral icterus  Neck: Supple, No JVD  Cardiovascular: regular S1, S2  Respiratory: Lungs clear to auscultation; no crepitations, no wheeze  Gastrointestinal:  Soft, Non-tender, + BS	  Musculoskeletal: Mild edema        LABS:                        11.4   10.04 )-----------( 157      ( 25 Feb 2023 05:50 )             36.2     02-25    136  |  98  |  24.4<H>  ----------------------------<  89  4.0   |  24.0  |  1.13    Ca    7.8<L>      25 Feb 2023 05:50  Phos  3.1     02-25  Mg     1.8     02-25    TPro  6.2<L>  /  Alb  3.1<L>  /  TBili  0.8  /  DBili  x   /  AST  14  /  ALT  10  /  AlkPhos  55  02-24      RADIOLOGY & ADDITIONAL STUDIES: (reviewed)  CXR was independently visualized/reviewed  and demonstrated: cardiomegaly    CT scan- no pulmonary embolism, normal cardiac size, hiatal hernia    CARDIOLOGY TESTING:(reviewed)     12 lead EKG independently visualized/reviewed  and demonstrated NSR, first degree av block, left axis deviation, old anterior infarct, old inferior infarct    ECHOCARDIOGRAM  independently visualized/reviewed  and demonstrated:  LVEF 60-65%; dilated RV, moderate pHTN: 50 mmHg    MEDICATIONS:(reviewed)  MEDICATIONS  (STANDING):  apixaban 5 milliGRAM(s) Oral every 12 hours  budesonide  80 MICROgram(s)/formoterol 4.5 MICROgram(s) Inhaler 2 Puff(s) Inhalation two times a day  famotidine    Tablet 40 milliGRAM(s) Oral at bedtime  flecainide 50 milliGRAM(s) Oral every 12 hours  furosemide   Injectable 40 milliGRAM(s) IV Push daily  levothyroxine 150 MICROGram(s) Oral daily  metoprolol succinate ER 50 milliGRAM(s) Oral daily  metoprolol succinate ER 25 milliGRAM(s) Oral at bedtime  piperacillin/tazobactam IVPB.. 3.375 Gram(s) IV Intermittent every 8 hours  tiotropium 2.5 MICROgram(s) Inhaler 2 Puff(s) Inhalation daily    ASSESSMENT AND PLAN:    67y Female with a hx of afib, PE on eliquis, RA, POOL, pulmonary htn, diastolic dysfunction presents with hypoxia and shortness of breath, fever and chills.  Echo with LVEF 60-65%; dilated RV, moderate pHTN: 50 mmHg    Shortness of breath/hypoxia- resume Lasix 40 mg daily on discharge    Hx of PE- continue Eliquis      Afib- continue flecainide, toprol and Eliquis    On antibiotics

## 2023-02-25 NOTE — PROGRESS NOTE ADULT - SUBJECTIVE AND OBJECTIVE BOX
INTERVAL HPI/OVERNIGHT EVENTS:    CC: acute hypoxic respiratory failure, Pasturella bacteremia, POOL, RA, atrial fibrillation      Chart and course reviewed.  No overnight events  feels short of breath with some hypoxia on exertion.    Vital Signs Last 24 Hrs  T(C): 36.6 (25 Feb 2023 11:29), Max: 37 (24 Feb 2023 18:18)  T(F): 97.8 (25 Feb 2023 11:29), Max: 98.6 (24 Feb 2023 18:18)  HR: 55 (25 Feb 2023 11:29) (55 - 69)  BP: 116/68 (25 Feb 2023 11:29) (116/68 - 129/63)  BP(mean): 88 (24 Feb 2023 21:19) (88 - 88)  RR: 18 (25 Feb 2023 11:29) (18 - 18)  SpO2: 100% (25 Feb 2023 11:29) (100% - 100%)    Parameters below as of 25 Feb 2023 11:29  Patient On (Oxygen Delivery Method): nasal cannula        PHYSICAL EXAM:    GENERAL: alert, obese, not in distress  CHEST/LUNG: b/l air entry  HEART: reg  ABDOMEN: soft, bs+  EXTREMITIES:  1+edema, worse on right, non tender    MEDICATIONS  (STANDING):  apixaban 5 milliGRAM(s) Oral every 12 hours  budesonide  80 MICROgram(s)/formoterol 4.5 MICROgram(s) Inhaler 2 Puff(s) Inhalation two times a day  famotidine    Tablet 40 milliGRAM(s) Oral at bedtime  flecainide 50 milliGRAM(s) Oral every 12 hours  furosemide   Injectable 40 milliGRAM(s) IV Push daily  levothyroxine 150 MICROGram(s) Oral daily  metoprolol succinate ER 50 milliGRAM(s) Oral daily  metoprolol succinate ER 25 milliGRAM(s) Oral at bedtime  piperacillin/tazobactam IVPB.. 3.375 Gram(s) IV Intermittent every 8 hours  tiotropium 2.5 MICROgram(s) Inhaler 2 Puff(s) Inhalation daily    MEDICATIONS  (PRN):  acetaminophen     Tablet .. 650 milliGRAM(s) Oral every 6 hours PRN Temp greater or equal to 38C (100.4F), Mild Pain (1 - 3)  albuterol/ipratropium for Nebulization 3 milliLiter(s) Nebulizer every 6 hours PRN Shortness of Breath and/or Wheezing  aluminum hydroxide/magnesium hydroxide/simethicone Suspension 30 milliLiter(s) Oral every 4 hours PRN Dyspepsia  melatonin 3 milliGRAM(s) Oral at bedtime PRN Insomnia  ondansetron Injectable 4 milliGRAM(s) IV Push every 8 hours PRN Nausea and/or Vomiting      Allergies    No Known Allergies    Intolerances          LABS:                          11.4   10.04 )-----------( 157      ( 25 Feb 2023 05:50 )             36.2     02-25    136  |  98  |  24.4<H>  ----------------------------<  89  4.0   |  24.0  |  1.13    Ca    7.8<L>      25 Feb 2023 05:50  Phos  3.1     02-25  Mg     1.8     02-25    TPro  6.2<L>  /  Alb  3.1<L>  /  TBili  0.8  /  DBili  x   /  AST  14  /  ALT  10  /  AlkPhos  55  02-24          RADIOLOGY & ADDITIONAL TESTS:

## 2023-02-26 LAB
ANION GAP SERPL CALC-SCNC: 10 MMOL/L — SIGNIFICANT CHANGE UP (ref 5–17)
BASOPHILS # BLD AUTO: 0.02 K/UL — SIGNIFICANT CHANGE UP (ref 0–0.2)
BASOPHILS NFR BLD AUTO: 0.3 % — SIGNIFICANT CHANGE UP (ref 0–2)
BUN SERPL-MCNC: 21 MG/DL — HIGH (ref 8–20)
CALCIUM SERPL-MCNC: 7.4 MG/DL — LOW (ref 8.4–10.5)
CHLORIDE SERPL-SCNC: 101 MMOL/L — SIGNIFICANT CHANGE UP (ref 96–108)
CO2 SERPL-SCNC: 27 MMOL/L — SIGNIFICANT CHANGE UP (ref 22–29)
CREAT SERPL-MCNC: 1.04 MG/DL — SIGNIFICANT CHANGE UP (ref 0.5–1.3)
EGFR: 58 ML/MIN/1.73M2 — LOW
EOSINOPHIL # BLD AUTO: 0.13 K/UL — SIGNIFICANT CHANGE UP (ref 0–0.5)
EOSINOPHIL NFR BLD AUTO: 2.2 % — SIGNIFICANT CHANGE UP (ref 0–6)
GLUCOSE SERPL-MCNC: 84 MG/DL — SIGNIFICANT CHANGE UP (ref 70–99)
HCT VFR BLD CALC: 35.2 % — SIGNIFICANT CHANGE UP (ref 34.5–45)
HGB BLD-MCNC: 11.1 G/DL — LOW (ref 11.5–15.5)
IMM GRANULOCYTES NFR BLD AUTO: 0.7 % — SIGNIFICANT CHANGE UP (ref 0–0.9)
LYMPHOCYTES # BLD AUTO: 0.94 K/UL — LOW (ref 1–3.3)
LYMPHOCYTES # BLD AUTO: 16.1 % — SIGNIFICANT CHANGE UP (ref 13–44)
MAGNESIUM SERPL-MCNC: 1.9 MG/DL — SIGNIFICANT CHANGE UP (ref 1.6–2.6)
MCHC RBC-ENTMCNC: 31.5 GM/DL — LOW (ref 32–36)
MCHC RBC-ENTMCNC: 32.3 PG — SIGNIFICANT CHANGE UP (ref 27–34)
MCV RBC AUTO: 102.3 FL — HIGH (ref 80–100)
MONOCYTES # BLD AUTO: 0.74 K/UL — SIGNIFICANT CHANGE UP (ref 0–0.9)
MONOCYTES NFR BLD AUTO: 12.7 % — SIGNIFICANT CHANGE UP (ref 2–14)
NEUTROPHILS # BLD AUTO: 3.97 K/UL — SIGNIFICANT CHANGE UP (ref 1.8–7.4)
NEUTROPHILS NFR BLD AUTO: 68 % — SIGNIFICANT CHANGE UP (ref 43–77)
PHOSPHATE SERPL-MCNC: 2.9 MG/DL — SIGNIFICANT CHANGE UP (ref 2.4–4.7)
PLATELET # BLD AUTO: 160 K/UL — SIGNIFICANT CHANGE UP (ref 150–400)
POTASSIUM SERPL-MCNC: 3.8 MMOL/L — SIGNIFICANT CHANGE UP (ref 3.5–5.3)
POTASSIUM SERPL-SCNC: 3.8 MMOL/L — SIGNIFICANT CHANGE UP (ref 3.5–5.3)
RBC # BLD: 3.44 M/UL — LOW (ref 3.8–5.2)
RBC # FLD: 13.9 % — SIGNIFICANT CHANGE UP (ref 10.3–14.5)
SODIUM SERPL-SCNC: 138 MMOL/L — SIGNIFICANT CHANGE UP (ref 135–145)
WBC # BLD: 5.84 K/UL — SIGNIFICANT CHANGE UP (ref 3.8–10.5)
WBC # FLD AUTO: 5.84 K/UL — SIGNIFICANT CHANGE UP (ref 3.8–10.5)

## 2023-02-26 PROCEDURE — 99233 SBSQ HOSP IP/OBS HIGH 50: CPT

## 2023-02-26 PROCEDURE — 99223 1ST HOSP IP/OBS HIGH 75: CPT

## 2023-02-26 PROCEDURE — 99232 SBSQ HOSP IP/OBS MODERATE 35: CPT

## 2023-02-26 RX ORDER — FUROSEMIDE 40 MG
40 TABLET ORAL DAILY
Refills: 0 | Status: DISCONTINUED | OUTPATIENT
Start: 2023-02-27 | End: 2023-02-27

## 2023-02-26 RX ADMIN — Medication 25 MILLIGRAM(S): at 21:56

## 2023-02-26 RX ADMIN — Medication 150 MICROGRAM(S): at 05:43

## 2023-02-26 RX ADMIN — PIPERACILLIN AND TAZOBACTAM 25 GRAM(S): 4; .5 INJECTION, POWDER, LYOPHILIZED, FOR SOLUTION INTRAVENOUS at 13:17

## 2023-02-26 RX ADMIN — Medication 40 MILLIGRAM(S): at 05:44

## 2023-02-26 RX ADMIN — Medication 50 MILLIGRAM(S): at 05:44

## 2023-02-26 RX ADMIN — PIPERACILLIN AND TAZOBACTAM 25 GRAM(S): 4; .5 INJECTION, POWDER, LYOPHILIZED, FOR SOLUTION INTRAVENOUS at 05:45

## 2023-02-26 RX ADMIN — PIPERACILLIN AND TAZOBACTAM 25 GRAM(S): 4; .5 INJECTION, POWDER, LYOPHILIZED, FOR SOLUTION INTRAVENOUS at 21:56

## 2023-02-26 RX ADMIN — APIXABAN 5 MILLIGRAM(S): 2.5 TABLET, FILM COATED ORAL at 05:44

## 2023-02-26 RX ADMIN — BUDESONIDE AND FORMOTEROL FUMARATE DIHYDRATE 2 PUFF(S): 160; 4.5 AEROSOL RESPIRATORY (INHALATION) at 08:43

## 2023-02-26 RX ADMIN — TIOTROPIUM BROMIDE 2 PUFF(S): 18 CAPSULE ORAL; RESPIRATORY (INHALATION) at 08:44

## 2023-02-26 RX ADMIN — APIXABAN 5 MILLIGRAM(S): 2.5 TABLET, FILM COATED ORAL at 17:05

## 2023-02-26 RX ADMIN — FAMOTIDINE 40 MILLIGRAM(S): 10 INJECTION INTRAVENOUS at 21:56

## 2023-02-26 RX ADMIN — Medication 650 MILLIGRAM(S): at 22:40

## 2023-02-26 RX ADMIN — BUDESONIDE AND FORMOTEROL FUMARATE DIHYDRATE 2 PUFF(S): 160; 4.5 AEROSOL RESPIRATORY (INHALATION) at 20:44

## 2023-02-26 RX ADMIN — Medication 650 MILLIGRAM(S): at 21:58

## 2023-02-26 RX ADMIN — Medication 50 MILLIGRAM(S): at 21:56

## 2023-02-26 RX ADMIN — Medication 50 MILLIGRAM(S): at 10:05

## 2023-02-26 NOTE — CONSULT NOTE ADULT - ASSESSMENT
67F with hx of submassive PE 2016 s/p EKOS thrombolysis on Eliquis and s/p IVC filter, asthma, Afib s/p ablation, POLO on CPAP, hx of RA/Sjogren's previously on methotrexate/Humira/Otezla, hypothyroidism, pulmonary HTN admitted with sepsis secondary to pasteurella bacteremia with dyspnea/hypoxia     Acute hypoxic respiratory failure in the setting of HFpEF with pulmonary HTN, likely group II/III with interval worsening of RVSP  c/w diuresis   pulm HTN likely secondary to group II/III in the setting of HFpEF/POOL/possible OHS   will obtain ABG in AM   CTA chest neg for PE and no significant parenchymal disease or evidence of PNA   given prior extensive PE, could potentially have CTEPH; can check VQ scan for further evaluation if persistent symptoms despite diuresis   pt also with hx of autoimmune disease that may be contributory and has been poorly controlled due to inability to tolerate therapies   c/w CPAP for POOL   if euvolemic and remains dyspneic/hypoxic, may need repeat RHC   c/w O2 supplementation for goal sat >88-92%  c/w symbicort in place of arnuity      
68 yo F with hx of pafib on eliquis, PE, Sjogrens, RA, hiatal hernia and POOL who presented to the ED with hypoxia. Patient woke up this am feeling more short of breath with shaking chills.  checked her pulse ox and was noted to be hypoxic to the mid 80s so they came to the ED. Patient reports a cough for the last few weeks, no sputum production. No sick contacts or recent travel. She needs to sleep in a recliner type bed as she cannot lay flat. She reports less leg swelling and has been compliant with her lasix of 20mg daily. She has noted feeling more short of breath with exertion over the last 1 week. Denies chest pain, palpitations, abdominal pain, nausea and vomiting.  In the ED patient was febrile to 100.4 and oxygen saturation was 90% on room air. She had ab elevated BNP and CT chest showed no PE and no infiltrate, it did show large hiatal hernia and atelectasis. She was given antibiotics  She is being admitted to medicine for further work up, found to have GNR in blood. Ua (-), source unclear    - Blood cultures +GNR ID+S pending  - Repeat blood cultures  x2 in am  - agree with cta/p  - Continue zosyn  - Trend Fever  - Trend Leukocytosis, elevated at 13      Will Follow

## 2023-02-26 NOTE — PROGRESS NOTE ADULT - SUBJECTIVE AND OBJECTIVE BOX
INTERVAL HPI/OVERNIGHT EVENTS:    CC:  acute hypoxic respiratory failure, Pasturella bacteremia, POOL, RA, atrial fibrillation    No overnight events  feels better.    Vital Signs Last 24 Hrs  T(C): 36.7 (26 Feb 2023 11:00), Max: 36.7 (26 Feb 2023 11:00)  T(F): 98 (26 Feb 2023 11:00), Max: 98 (26 Feb 2023 11:00)  HR: 58 (26 Feb 2023 11:00) (56 - 64)  BP: 152/88 (26 Feb 2023 11:00) (125/55 - 152/88)  BP(mean): --  RR: 18 (26 Feb 2023 11:00) (18 - 18)  SpO2: 90% (26 Feb 2023 11:00) (90% - 100%)    Parameters below as of 26 Feb 2023 11:00  Patient On (Oxygen Delivery Method): nasal cannula        PHYSICAL EXAM:    GENERAL: alert, not in distress  CHEST/LUNG: b/l air entry  HEART: reg  ABDOMEN: soft, bs+  EXTREMITIES:  no edema, tenderness    MEDICATIONS  (STANDING):  apixaban 5 milliGRAM(s) Oral every 12 hours  budesonide  80 MICROgram(s)/formoterol 4.5 MICROgram(s) Inhaler 2 Puff(s) Inhalation two times a day  famotidine    Tablet 40 milliGRAM(s) Oral at bedtime  flecainide 50 milliGRAM(s) Oral every 12 hours  furosemide    Tablet 40 milliGRAM(s) Oral daily  levothyroxine 150 MICROGram(s) Oral daily  metoprolol succinate ER 50 milliGRAM(s) Oral daily  metoprolol succinate ER 25 milliGRAM(s) Oral at bedtime  piperacillin/tazobactam IVPB.. 3.375 Gram(s) IV Intermittent every 8 hours  tiotropium 2.5 MICROgram(s) Inhaler 2 Puff(s) Inhalation daily    MEDICATIONS  (PRN):  acetaminophen     Tablet .. 650 milliGRAM(s) Oral every 6 hours PRN Temp greater or equal to 38C (100.4F), Mild Pain (1 - 3)  albuterol/ipratropium for Nebulization 3 milliLiter(s) Nebulizer every 6 hours PRN Shortness of Breath and/or Wheezing  aluminum hydroxide/magnesium hydroxide/simethicone Suspension 30 milliLiter(s) Oral every 4 hours PRN Dyspepsia  melatonin 3 milliGRAM(s) Oral at bedtime PRN Insomnia  ondansetron Injectable 4 milliGRAM(s) IV Push every 8 hours PRN Nausea and/or Vomiting      Allergies    No Known Allergies    Intolerances          LABS:                          11.1   5.84  )-----------( 160      ( 26 Feb 2023 05:00 )             35.2     02-26    138  |  101  |  21.0<H>  ----------------------------<  84  3.8   |  27.0  |  1.04    Ca    7.4<L>      26 Feb 2023 05:00  Phos  2.9     02-26  Mg     1.9     02-26            RADIOLOGY & ADDITIONAL TESTS:

## 2023-02-26 NOTE — PROGRESS NOTE ADULT - SUBJECTIVE AND OBJECTIVE BOX
[facility]                                                INFECTIOUS DISEASES  =======================================================                               Dario Vasquez MD#    Yeni Merino MD*                           Emerald Daly MD*   Ester Ortega MD*            Diplomates American Board of Internal Medicine & Infectious Diseases                  # [place] Office - Appt - Tel  [phone] Fax [phone]                * [place] Office - Appt - Tel [phone] Fax [phone]                                  Hospital Consult line:  [phone]  =======================================================      [facility]-[id number]  [facility]   follow up for: bacteremia  feeling better  off o2 but reports o2 drops with ambulation  has joint pain at baseline  has chronic back pain but no worsening  patient seen and examined.       I have personally reviewed the labs and data; pertinent labs and data are listed in this note; please see below.   ===================================================  REVIEW OF SYSTEMS:  CONSTITUTIONAL:  No Fever or chills  HEENT:  No diplopia or blurred vision.  No earache, sore throat or runny nose.  CARDIOVASCULAR:  No pressure, squeezing, strangling, tightness, heaviness or aching about the chest, neck, axilla or epigastrium.  RESPIRATORY:  No cough, shortness of breath  GASTROINTESTINAL:  No nausea, vomiting or diarrhea.  GENITOURINARY:  No dysuria, frequency or urgency. No Blood in urine  MUSCULOSKELETAL:  no joint aches, no muscle pain  SKIN:  No change in skin, hair or nails.  NEUROLOGIC:  No Headaches, seizures or weakness.  PSYCHIATRIC:  No disorder of thought or mood.  ENDOCRINE:  No heat or cold intolerance  HEMATOLOGICAL:  No easy bruising or bleeding.    =======================================================  Allergies    No Known Allergies    Intolerances    Antibiotics:  piperacillin/tazobactam IVPB.. 3.375 Gram(s) IV Intermittent every 8 hours    Other medications:  apixaban 5 milliGRAM(s) Oral every 12 hours  budesonide  80 MICROgram(s)/formoterol 4.5 MICROgram(s) Inhaler 2 Puff(s) Inhalation two times a day  famotidine    Tablet 40 milliGRAM(s) Oral at bedtime  flecainide 50 milliGRAM(s) Oral every 12 hours  furosemide    Tablet 40 milliGRAM(s) Oral daily  levothyroxine 150 MICROGram(s) Oral daily  metoprolol succinate ER 50 milliGRAM(s) Oral daily  metoprolol succinate ER 25 milliGRAM(s) Oral at bedtime  tiotropium 2.5 MICROgram(s) Inhaler 2 Puff(s) Inhalation daily    ======================================================  Physical Exam:  ============  T(F): 98 (26 Feb 2023 11:00), Max: 98 (26 Feb 2023 11:00)  HR: 58 (26 Feb 2023 11:00)  BP: 152/88 (26 Feb 2023 11:00)  RR: 18 (26 Feb 2023 11:00)  SpO2: 90% (26 Feb 2023 11:00) (90% - 100%)  temp max in last 48H T(F): , Max: 98.6 (02-24-23 @ 18:18)    General:  No acute distress.  Eye: no conjunctival pallor, no scleral icterus  Respiratory: Lungs are clear to auscultation, Respirations are non-labored.  Cardiovascular: Normal rate, Regular rhythm,  s1+s2  Gastrointestinal: Soft, Non-tender, Non-distended, Normal bowel sounds.  Genitourinary: No costovertebral angle tenderness.  Lymphatics: No lymphadenopathy neck  Musculoskeletal: Normal range of motion, Normal strength.  Integumentary: + b/l Le swelling, rt anterior calf discoloration, left heel scab, ecchymoses and small skin tear rue  Neurologic: Alert, Oriented, No focal deficits  Psychiatric: Appropriate mood & affect.  =======================================================  Labs:                        11.1   5.84  )-----------( 160      ( 26 Feb 2023 05:00 )             35.2     02-26    138  |  101  |  21.0<H>  ----------------------------<  84  3.8   |  27.0  |  1.04    Ca    7.4<L>      26 Feb 2023 05:00  Phos  2.9     02-26  Mg     1.9     02-26        Culture - Blood (collected 02-25-23 @ 05:55)  Source: .Blood Blood-Peripheral    Culture - Blood (collected 02-25-23 @ 05:30)  Source: .Blood Blood-Venous    Culture - Urine (collected 02-23-23 @ 08:30)  Source: Clean Catch Clean Catch (Midstream)  Final Report (02-24-23 @ 13:25):    <10,000 CFU/mL Normal Urogenital [place]    Culture - Blood (collected 02-23-23 @ 04:40)  Source: .Blood Blood-Peripheral  Gram Stain (02-24-23 @ 02:02):    Growth in aerobic bottle: Gram Negative Rods    Growth in anaerobic bottle: Gram Negative Rods  Final Report (02-24-23 @ 17:06):    Growth in aerobic and anaerobic bottles: Pasteurella multocida    "Susceptibilities not performed"    ***Blood Panel PCR results on this specimen are available    approximately 3 hours after the Gram stain result.***    Gram stain, PCR, and/or culture results may not always    correspond due to difference in methodologies.    ************************************************************    This PCR assay was performed by multiplex PCR. This    Assay tests for 66 bacterial and resistance gene targets.    Please refer to the [facility] test directory    at [email or web address] for details.  Organism: Blood Culture PCR (02-24-23 @ 17:06)  Organism: Blood Culture PCR (02-24-23 @ 17:06)    Sensitivities:      -  Blood PCR Panel: NEG      Method Type: PCR    Culture - Blood (collected 02-23-23 @ 04:30)  Source: .Blood Blood-Peripheral  Gram Stain (02-23-23 @ 20:55):    Growth in aerobic bottle: Gram Negative Rods  Final Report (02-24-23 @ 17:08):    Growth in aerobic bottle: Pasteurella multocida "Susceptibilities not    performed"  < from: CT Angio Chest PE Protocol w/ IV Cont (02.23.23 @ 06:39) >  FINDINGS:    LUNGS AND AIRWAYS: Patent central airways.  Passive atelectasis in the   right and left lower lobes adjacent to a large hiatal hernia..  PLEURA: No pleural effusion.  MEDIASTINUM AND BRI: Large hiatal hernia with an intrathoracic stomach.   No lymphadenopathy.  VESSELS: No pulmonary embolus..  HEART: Heart size is normal. No pericardial effusion.  CHEST WALL AND LOWER NECK: Within normal limits.  VISUALIZED UPPER ABDOMEN: Within normal limits.  BONES: Degenerative changes of the spine with increased thoracic   kyphosis..    IMPRESSION:  No pulmonary embolus.    Stable large hiatal hernia with an intrathoracic stomach.      < end of copied text >        < from: CT Abdomen and Pelvis w/ Oral Cont and w/ IV Cont (02.24.23 @ 15:38) >  IMPRESSION:    No intra-abdominal loculated collection or clear source of infection.    Moderate height loss of T9 vertebral body increased since 2021. Mild   multilevel lumbar spine height loss as well. Diffuse osseous   demineralization.    Partially imaged main pulmonary artery is enlarged measuring 4 cm. This   likely reflects pulmonary arterial hypertension. Coronary artery   calcifications.    Large hiatal hernia with intrathoracic stomach redemonstrated. Oral   contrast opacifies distal small bowel and colon.    --- End of Report ---        < end of copied text >

## 2023-02-26 NOTE — CONSULT NOTE ADULT - SUBJECTIVE AND OBJECTIVE BOX
Patient is a 67y old  Female who presents with a chief complaint of hypoxia (26 Feb 2023 14:47)    HPI:   67F with hx of submassive PE 2016 s/p EKOS thrombolysis on Eliquis and s/p IVC filter, asthma, Afib s/p ablation, POOL on CPAP, hx of RA/Sjogren's previously on methotrexate/Humira/Otezla, hypothyroidism, pulmonary HTN presented 2/23 with shortness of breath/chills and LE swelling. Pt was found to be febrile and hypoxic on presentation. CT chest showed enlarged PA with large hiatal hernia with basilar atelectasis with no evidence of PE. Pt found to be bacteremic with Pasteurella. Pt found to be hypoxic and endorsed dyspnea worse compared to baseline. Pt reports she was only transiently on oxygen immediately following her PE 2016 but since weaned off but did have recurrent hypoxia 2020 per notes. She was being followed for the pulmonary HTN by Dr. Houston that preceded the PE but worsened after. Echo 10/2022 EF 55-60%, diastolic dysfunction, normal RV function with RVSP 49. Follows with Dr. Meek and is on goviral outpatient. RHC 4/2018 showed mPA 40 with PCWP 23, CO 6.26, CI 2.69 with PVR 2.72 wood units. Repeat Echo here with EF 60-65% with mod enlarged RV with mildly reduced function with PASP 50.9.     Pt endorses dyspnea with exertion. She has been maintaining sats with rest but desats on ambulation. Pt denies any change in cough. Denies any chest pain. Reports stable weight but does note some LE swelling. Endorses occasional wheeze.          PAST MEDICAL & SURGICAL HISTORY:  HTN (hypertension)  Hypothyroid  Atrial fibrillation  Hip fx  Sleep apnea  cpap  Anemia  Osteopenia  Osteoarthritis  DVT (deep venous thrombosis)  Pulmonary embolism  Saddle Embolus  Hiatal hernia  Pulmonary hypertension  Rheumatoid arthritis  Sjogren&#x27;s disease  History of cholecystectomy  History of dilation and curettage  Presence of IVC filter  History of ear surgery  History of total hip replacement, right  H/O cardiac radiofrequency ablation  History of loop recorder    Medications:  piperacillin/tazobactam IVPB.. 3.375 Gram(s) IV Intermittent every 8 hours  flecainide 50 milliGRAM(s) Oral every 12 hours  furosemide    Tablet 40 milliGRAM(s) Oral daily  metoprolol succinate ER 50 milliGRAM(s) Oral daily  metoprolol succinate ER 25 milliGRAM(s) Oral at bedtime  albuterol/ipratropium for Nebulization 3 milliLiter(s) Nebulizer every 6 hours PRN  budesonide  80 MICROgram(s)/formoterol 4.5 MICROgram(s) Inhaler 2 Puff(s) Inhalation two times a day  tiotropium 2.5 MICROgram(s) Inhaler 2 Puff(s) Inhalation daily  acetaminophen     Tablet .. 650 milliGRAM(s) Oral every 6 hours PRN  melatonin 3 milliGRAM(s) Oral at bedtime PRN  ondansetron Injectable 4 milliGRAM(s) IV Push every 8 hours PRN  apixaban 5 milliGRAM(s) Oral every 12 hours  aluminum hydroxide/magnesium hydroxide/simethicone Suspension 30 milliLiter(s) Oral every 4 hours PRN  famotidine    Tablet 40 milliGRAM(s) Oral at bedtime  levothyroxine 150 MICROGram(s) Oral daily    Allergies: NKDA     Social: no significant smoking, denies EtOH, denies illicit drug use, worked in schools     ICU Vital Signs Last 24 Hrs  T(C): 37 (26 Feb 2023 16:44), Max: 37 (26 Feb 2023 16:44)  T(F): 98.6 (26 Feb 2023 16:44), Max: 98.6 (26 Feb 2023 16:44)  HR: 64 (26 Feb 2023 16:44) (58 - 64)  BP: 147/81 (26 Feb 2023 16:44) (125/60 - 152/88)  BP(mean): --  ABP: --  ABP(mean): --  RR: 18 (26 Feb 2023 16:44) (18 - 18)  SpO2: 97% (26 Feb 2023 16:44) (90% - 99%)    O2 Parameters below as of 26 Feb 2023 11:00  Patient On (Oxygen Delivery Method): nasal cannula    I&O's Detail    25 Feb 2023 07:01  -  26 Feb 2023 07:00  --------------------------------------------------------  IN:    Oral Fluid: 625 mL  Total IN: 625 mL    OUT:    Voided (mL): 2650 mL  Total OUT: 2650 mL    Total NET: -2025 mL      26 Feb 2023 07:01  -  26 Feb 2023 19:05  --------------------------------------------------------  IN:    Oral Fluid: 600 mL  Total IN: 600 mL    OUT:    Voided (mL): 975 mL  Total OUT: 975 mL    Total NET: -375 mL    LABS:                        11.1   5.84  )-----------( 160      ( 26 Feb 2023 05:00 )             35.2     02-26    138  |  101  |  21.0<H>  ----------------------------<  84  3.8   |  27.0  |  1.04    Ca    7.4<L>      26 Feb 2023 05:00  Phos  2.9     02-26  Mg     1.9     02-26    CAPILLARY BLOOD GLUCOSE    CULTURES:  Culture Results:   No growth to date. (02-25 @ 05:55)  Culture Results:   No growth to date. (02-25 @ 05:30)  Culture Results:   <10,000 CFU/mL Normal Urogenital [place] (02-23 @ 08:30)  Culture Results:   Growth in aerobic and anaerobic bottles: Pasteurella multocida  "Susceptibilities not performed"  ***Blood Panel PCR results on this specimen are available  approximately 3 hours after the Gram stain result.***  Gram stain, PCR, and/or culture results may not always  correspond due to difference in methodologies.  ************************************************************  This PCR assay was performed by multiplex PCR. This  Assay tests for 66 bacterial and resistance gene targets.  Please refer to the [facility] test directory  at [email or web address] for details. (02-23 @ 04:40)  Culture Results:   Growth in aerobic bottle: Pasteurella multocida "Susceptibilities not  performed" (02-23 @ 04:30)    Physical Examination:    General: Alert, in NAD    HEENT: NC/AT, anicteric, MMM     PULM: CTAB no w/r/r    NECK: Supple, no lymphadenopathy, trachea midline    CVS: S1S2, RRR    ABD: Soft, nondistended, nontender, normoactive bowel sounds    EXT: 1+ edema, nontender    SKIN: Warm and well perfused, no rashes noted    NEURO: Alert, oriented, interactive, nonfocal    RADIOLOGY:   CT chest angio:  FINDINGS:    LUNGS AND AIRWAYS: Patent central airways. Passive atelectasis in the right and left lower lobes adjacent to a large hiatal hernia..  PLEURA: No pleural effusion.  MEDIASTINUM AND BRI: Large hiatal hernia with an intrathoracic stomach. No lymphadenopathy.  VESSELS: No pulmonary embolus..  HEART: Heart size is normal. No pericardial effusion.  CHEST WALL AND LOWER NECK: Within normal limits.  VISUALIZED UPPER ABDOMEN: Within normal limits.  BONES: Degenerative changes of the spine with increased thoracic kyphosis..     Patient is a 67y old  Female who presents with a chief complaint of hypoxia (26 Feb 2023 14:47)    HPI:   67F with hx of submassive PE 2016 s/p EKOS thrombolysis on Eliquis and s/p IVC filter, asthma, Afib s/p ablation, POOL on CPAP, hx of RA/Sjogren's previously on methotrexate/Humira/Otezla, hypothyroidism, pulmonary HTN presented 2/23 with shortness of breath/chills and LE swelling. Pt was found to be febrile and hypoxic on presentation. CT chest showed enlarged PA with large hiatal hernia with basilar atelectasis with no evidence of PE. Pt found to be bacteremic with Pasteurella. Pt found to be hypoxic and endorsed dyspnea worse compared to baseline. Pt reports she was only transiently on oxygen immediately following her PE 2016 but since weaned off but did have recurrent hypoxia 2020 per notes. She was being followed for the pulmonary HTN by Dr. Houston that preceded the PE but worsened after. Echo 10/2022 EF 55-60%, diastolic dysfunction, normal RV function with RVSP 49. Follows with Dr. Meek and is on Manflu outpatient. RHC 4/2018 showed mPA 40 with PCWP 23, CO 6.26, CI 2.69 with PVR 2.72 wood units. Repeat Echo here with EF 60-65% with mod enlarged RV with mildly reduced function with PASP 50.9.     Pt endorses dyspnea with exertion. She has been maintaining sats with rest but desats on ambulation. Pt denies any change in cough. Denies any chest pain. Reports stable weight but does note some LE swelling. Endorses occasional wheeze.          PAST MEDICAL & SURGICAL HISTORY:  HTN (hypertension)  Hypothyroid  Atrial fibrillation  Hip fx  Sleep apnea  cpap  Anemia  Osteopenia  Osteoarthritis  DVT (deep venous thrombosis)  Pulmonary embolism  Saddle Embolus  Hiatal hernia  Pulmonary hypertension  Rheumatoid arthritis  Sjogren&#x27;s disease  History of cholecystectomy  History of dilation and curettage  Presence of IVC filter  History of ear surgery  History of total hip replacement, right  H/O cardiac radiofrequency ablation  History of loop recorder    Medications:  piperacillin/tazobactam IVPB.. 3.375 Gram(s) IV Intermittent every 8 hours  flecainide 50 milliGRAM(s) Oral every 12 hours  furosemide    Tablet 40 milliGRAM(s) Oral daily  metoprolol succinate ER 50 milliGRAM(s) Oral daily  metoprolol succinate ER 25 milliGRAM(s) Oral at bedtime  albuterol/ipratropium for Nebulization 3 milliLiter(s) Nebulizer every 6 hours PRN  budesonide  80 MICROgram(s)/formoterol 4.5 MICROgram(s) Inhaler 2 Puff(s) Inhalation two times a day  tiotropium 2.5 MICROgram(s) Inhaler 2 Puff(s) Inhalation daily  acetaminophen     Tablet .. 650 milliGRAM(s) Oral every 6 hours PRN  melatonin 3 milliGRAM(s) Oral at bedtime PRN  ondansetron Injectable 4 milliGRAM(s) IV Push every 8 hours PRN  apixaban 5 milliGRAM(s) Oral every 12 hours  aluminum hydroxide/magnesium hydroxide/simethicone Suspension 30 milliLiter(s) Oral every 4 hours PRN  famotidine    Tablet 40 milliGRAM(s) Oral at bedtime  levothyroxine 150 MICROGram(s) Oral daily    Allergies: NKDA     Social: no significant smoking, denies EtOH, denies illicit drug use, worked in schools     ICU Vital Signs Last 24 Hrs  T(C): 37 (26 Feb 2023 16:44), Max: 37 (26 Feb 2023 16:44)  T(F): 98.6 (26 Feb 2023 16:44), Max: 98.6 (26 Feb 2023 16:44)  HR: 64 (26 Feb 2023 16:44) (58 - 64)  BP: 147/81 (26 Feb 2023 16:44) (125/60 - 152/88)  BP(mean): --  ABP: --  ABP(mean): --  RR: 18 (26 Feb 2023 16:44) (18 - 18)  SpO2: 97% (26 Feb 2023 16:44) (90% - 99%)    O2 Parameters below as of 26 Feb 2023 11:00  Patient On (Oxygen Delivery Method): nasal cannula    I&O's Detail    25 Feb 2023 07:01  -  26 Feb 2023 07:00  --------------------------------------------------------  IN:    Oral Fluid: 625 mL  Total IN: 625 mL    OUT:    Voided (mL): 2650 mL  Total OUT: 2650 mL    Total NET: -2025 mL      26 Feb 2023 07:01  -  26 Feb 2023 19:05  --------------------------------------------------------  IN:    Oral Fluid: 600 mL  Total IN: 600 mL    OUT:    Voided (mL): 975 mL  Total OUT: 975 mL    Total NET: -375 mL    LABS:                        11.1   5.84  )-----------( 160      ( 26 Feb 2023 05:00 )             35.2     02-26    138  |  101  |  21.0<H>  ----------------------------<  84  3.8   |  27.0  |  1.04    Ca    7.4<L>      26 Feb 2023 05:00  Phos  2.9     02-26  Mg     1.9     02-26    CAPILLARY BLOOD GLUCOSE    CULTURES:  Culture Results:   No growth to date. (02-25 @ 05:55)  Culture Results:   No growth to date. (02-25 @ 05:30)  Culture Results:   <10,000 CFU/mL Normal Urogenital [place] (02-23 @ 08:30)  Culture Results:   Growth in aerobic and anaerobic bottles: Pasteurella multocida  "Susceptibilities not performed"  ***Blood Panel PCR results on this specimen are available  approximately 3 hours after the Gram stain result.***  Gram stain, PCR, and/or culture results may not always  correspond due to difference in methodologies.  ************************************************************  This PCR assay was performed by multiplex PCR. This  Assay tests for 66 bacterial and resistance gene targets.  Please refer to the [facility] Labs test directory  at [email or web address] for details. (02-23 @ 04:40)  Culture Results:   Growth in aerobic bottle: Pasteurella multocida "Susceptibilities not  performed" (02-23 @ 04:30)    Physical Examination:    General: Alert, in NAD    HEENT: NC/AT, anicteric, MMM     PULM: CTAB no w/r/r    NECK: Supple, no lymphadenopathy, trachea midline    CVS: S1S2, RRR    ABD: Soft, nondistended, nontender, normoactive bowel sounds    EXT: 1+ edema, nontender    SKIN: Warm and well perfused, no rashes noted    NEURO: Alert, oriented, interactive, nonfocal    ROS: negative except per HPI    RADIOLOGY:   CT chest angio:  FINDINGS:    LUNGS AND AIRWAYS: Patent central airways. Passive atelectasis in the right and left lower lobes adjacent to a large hiatal hernia..  PLEURA: No pleural effusion.  MEDIASTINUM AND BRI: Large hiatal hernia with an intrathoracic stomach. No lymphadenopathy.  VESSELS: No pulmonary embolus..  HEART: Heart size is normal. No pericardial effusion.  CHEST WALL AND LOWER NECK: Within normal limits.  VISUALIZED UPPER ABDOMEN: Within normal limits.  BONES: Degenerative changes of the spine with increased thoracic kyphosis..

## 2023-02-26 NOTE — PROGRESS NOTE ADULT - SUBJECTIVE AND OBJECTIVE BOX
[facility]                              [address] [place] [address]                                                 PHONE: [phone]                                                 FAX: [phone]  -----------------------------------------------------------------------------------------------  Pt seen and examined. FU for  shortness of breath     Overnight events/Complaints: Pt reports improvement in her symptoms. Off O2.  at bedside.    Vital Signs Last 24 Hrs  T(C): 36.7 (26 Feb 2023 11:00), Max: 36.7 (26 Feb 2023 11:00)  T(F): 98 (26 Feb 2023 11:00), Max: 98 (26 Feb 2023 11:00)  HR: 58 (26 Feb 2023 11:00) (56 - 64)  BP: 152/88 (26 Feb 2023 11:00) (125/55 - 152/88)  BP(mean): --  RR: 18 (26 Feb 2023 11:00) (18 - 18)  SpO2: 90% (26 Feb 2023 11:00) (90% - 100%)    Parameters below as of 26 Feb 2023 11:00  Patient On (Oxygen Delivery Method): nasal cannula      I&O's Summary    25 Feb 2023 07:01  -  26 Feb 2023 07:00  --------------------------------------------------------  IN: 625 mL / OUT: 2650 mL / NET: -2025 mL    26 Feb 2023 07:01  -  26 Feb 2023 11:33  --------------------------------------------------------  IN: 0 mL / OUT: 575 mL / NET: -575 mL    PHYSICAL EXAM:  Constitutional: Appears well; alert and co-operative  Eyes: Conjunctiva normal, No scleral icterus  Neck: Supple, No JVD  Cardiovascular: regular S1, S2  Respiratory: Lungs clear to auscultation; no crepitations, no wheeze  Gastrointestinal:  Soft, Non-tender, + BS	  Musculoskeletal: Mild edema        LABS:                        11.1   5.84  )-----------( 160      ( 26 Feb 2023 05:00 )             35.2     02-26    138  |  101  |  21.0<H>  ----------------------------<  84  3.8   |  27.0  |  1.04    Ca    7.4<L>      26 Feb 2023 05:00  Phos  2.9     02-26  Mg     1.9     02-26      RADIOLOGY & ADDITIONAL STUDIES: (reviewed)  CXR was independently visualized/reviewed  and demonstrated: cardiomegaly    CT scan- no pulmonary embolism, normal cardiac size, hiatal hernia    CARDIOLOGY TESTING:(reviewed)     12 lead EKG independently visualized/reviewed  and demonstrated NSR, first degree av block, left axis deviation, old anterior infarct, old inferior infarct    ECHOCARDIOGRAM  independently visualized/reviewed  and demonstrated:  LVEF 60-65%; dilated RV, moderate pHTN: 50 mmHg    MEDICATIONS:(reviewed)  MEDICATIONS  (STANDING):  apixaban 5 milliGRAM(s) Oral every 12 hours  budesonide  80 MICROgram(s)/formoterol 4.5 MICROgram(s) Inhaler 2 Puff(s) Inhalation two times a day  famotidine    Tablet 40 milliGRAM(s) Oral at bedtime  flecainide 50 milliGRAM(s) Oral every 12 hours  furosemide   Injectable 40 milliGRAM(s) IV Push daily  levothyroxine 150 MICROGram(s) Oral daily  metoprolol succinate ER 50 milliGRAM(s) Oral daily  metoprolol succinate ER 25 milliGRAM(s) Oral at bedtime  piperacillin/tazobactam IVPB.. 3.375 Gram(s) IV Intermittent every 8 hours  tiotropium 2.5 MICROgram(s) Inhaler 2 Puff(s) Inhalation daily    ASSESSMENT AND PLAN:    67y Female with a hx of afib, PE on eliquis, RA, POOL, pulmonary htn, diastolic dysfunction presents with hypoxia and shortness of breath, fever and chills.  Echo with LVEF 60-65%; dilated RV, moderate pHTN: 50 mmHg    Shortness of breath/hypoxia- resumed Lasix 40 mg daily po    Hx of PE- continue Eliquis      Afib- continue flecainide, toprol and Eliquis    On antibiotics for Pasturella    Additional history obtained and plan discussed with family at bedside

## 2023-02-27 ENCOUNTER — TRANSCRIPTION ENCOUNTER (OUTPATIENT)
Age: 68
End: 2023-02-27

## 2023-02-27 VITALS
SYSTOLIC BLOOD PRESSURE: 101 MMHG | TEMPERATURE: 98 F | DIASTOLIC BLOOD PRESSURE: 63 MMHG | RESPIRATION RATE: 18 BRPM | OXYGEN SATURATION: 94 % | HEART RATE: 62 BPM

## 2023-02-27 LAB
ANION GAP SERPL CALC-SCNC: 11 MMOL/L — SIGNIFICANT CHANGE UP (ref 5–17)
BASE EXCESS BLDA CALC-SCNC: 7.3 MMOL/L — HIGH (ref -2–3)
BASOPHILS # BLD AUTO: 0.03 K/UL — SIGNIFICANT CHANGE UP (ref 0–0.2)
BASOPHILS NFR BLD AUTO: 0.5 % — SIGNIFICANT CHANGE UP (ref 0–2)
BLOOD GAS COMMENTS ARTERIAL: SIGNIFICANT CHANGE UP
BUN SERPL-MCNC: 17.5 MG/DL — SIGNIFICANT CHANGE UP (ref 8–20)
CALCIUM SERPL-MCNC: 7.7 MG/DL — LOW (ref 8.4–10.5)
CHLORIDE SERPL-SCNC: 100 MMOL/L — SIGNIFICANT CHANGE UP (ref 96–108)
CO2 SERPL-SCNC: 28 MMOL/L — SIGNIFICANT CHANGE UP (ref 22–29)
CREAT SERPL-MCNC: 1.04 MG/DL — SIGNIFICANT CHANGE UP (ref 0.5–1.3)
EGFR: 58 ML/MIN/1.73M2 — LOW
EOSINOPHIL # BLD AUTO: 0.14 K/UL — SIGNIFICANT CHANGE UP (ref 0–0.5)
EOSINOPHIL NFR BLD AUTO: 2.4 % — SIGNIFICANT CHANGE UP (ref 0–6)
GAS PNL BLDA: SIGNIFICANT CHANGE UP
GLUCOSE SERPL-MCNC: 81 MG/DL — SIGNIFICANT CHANGE UP (ref 70–99)
HCO3 BLDA-SCNC: 30 MMOL/L — HIGH (ref 21–28)
HCT VFR BLD CALC: 34.8 % — SIGNIFICANT CHANGE UP (ref 34.5–45)
HGB BLD-MCNC: 11.3 G/DL — LOW (ref 11.5–15.5)
HOROWITZ INDEX BLDA+IHG-RTO: 2 — SIGNIFICANT CHANGE UP
IMM GRANULOCYTES NFR BLD AUTO: 0.9 % — SIGNIFICANT CHANGE UP (ref 0–0.9)
LYMPHOCYTES # BLD AUTO: 0.93 K/UL — LOW (ref 1–3.3)
LYMPHOCYTES # BLD AUTO: 16.3 % — SIGNIFICANT CHANGE UP (ref 13–44)
MAGNESIUM SERPL-MCNC: 1.9 MG/DL — SIGNIFICANT CHANGE UP (ref 1.8–2.6)
MCHC RBC-ENTMCNC: 32.5 GM/DL — SIGNIFICANT CHANGE UP (ref 32–36)
MCHC RBC-ENTMCNC: 32.6 PG — SIGNIFICANT CHANGE UP (ref 27–34)
MCV RBC AUTO: 100.3 FL — HIGH (ref 80–100)
MONOCYTES # BLD AUTO: 0.81 K/UL — SIGNIFICANT CHANGE UP (ref 0–0.9)
MONOCYTES NFR BLD AUTO: 14.2 % — HIGH (ref 2–14)
NEUTROPHILS # BLD AUTO: 3.76 K/UL — SIGNIFICANT CHANGE UP (ref 1.8–7.4)
NEUTROPHILS NFR BLD AUTO: 65.7 % — SIGNIFICANT CHANGE UP (ref 43–77)
PCO2 BLDA: 33 MMHG — SIGNIFICANT CHANGE UP (ref 32–45)
PH BLDA: 7.56 — HIGH (ref 7.35–7.45)
PHOSPHATE SERPL-MCNC: 2.9 MG/DL — SIGNIFICANT CHANGE UP (ref 2.4–4.7)
PLATELET # BLD AUTO: 183 K/UL — SIGNIFICANT CHANGE UP (ref 150–400)
PO2 BLDA: 121 MMHG — HIGH (ref 83–108)
POTASSIUM SERPL-MCNC: 3.7 MMOL/L — SIGNIFICANT CHANGE UP (ref 3.5–5.3)
POTASSIUM SERPL-SCNC: 3.7 MMOL/L — SIGNIFICANT CHANGE UP (ref 3.5–5.3)
RBC # BLD: 3.47 M/UL — LOW (ref 3.8–5.2)
RBC # FLD: 13.8 % — SIGNIFICANT CHANGE UP (ref 10.3–14.5)
SAO2 % BLDA: 100 % — HIGH (ref 94–98)
SODIUM SERPL-SCNC: 139 MMOL/L — SIGNIFICANT CHANGE UP (ref 135–145)
WBC # BLD: 5.72 K/UL — SIGNIFICANT CHANGE UP (ref 3.8–10.5)
WBC # FLD AUTO: 5.72 K/UL — SIGNIFICANT CHANGE UP (ref 3.8–10.5)

## 2023-02-27 PROCEDURE — 84100 ASSAY OF PHOSPHORUS: CPT

## 2023-02-27 PROCEDURE — 87150 DNA/RNA AMPLIFIED PROBE: CPT

## 2023-02-27 PROCEDURE — 94760 N-INVAS EAR/PLS OXIMETRY 1: CPT

## 2023-02-27 PROCEDURE — 99232 SBSQ HOSP IP/OBS MODERATE 35: CPT

## 2023-02-27 PROCEDURE — 84484 ASSAY OF TROPONIN QUANT: CPT

## 2023-02-27 PROCEDURE — 83735 ASSAY OF MAGNESIUM: CPT

## 2023-02-27 PROCEDURE — C8929: CPT

## 2023-02-27 PROCEDURE — 84145 PROCALCITONIN (PCT): CPT

## 2023-02-27 PROCEDURE — 83605 ASSAY OF LACTIC ACID: CPT

## 2023-02-27 PROCEDURE — 82803 BLOOD GASES ANY COMBINATION: CPT

## 2023-02-27 PROCEDURE — 83880 ASSAY OF NATRIURETIC PEPTIDE: CPT

## 2023-02-27 PROCEDURE — 96374 THER/PROPH/DIAG INJ IV PUSH: CPT

## 2023-02-27 PROCEDURE — 87637 SARSCOV2&INF A&B&RSV AMP PRB: CPT

## 2023-02-27 PROCEDURE — 99285 EMERGENCY DEPT VISIT HI MDM: CPT

## 2023-02-27 PROCEDURE — 80048 BASIC METABOLIC PNL TOTAL CA: CPT

## 2023-02-27 PROCEDURE — 96375 TX/PRO/DX INJ NEW DRUG ADDON: CPT

## 2023-02-27 PROCEDURE — 81001 URINALYSIS AUTO W/SCOPE: CPT

## 2023-02-27 PROCEDURE — 85025 COMPLETE CBC W/AUTO DIFF WBC: CPT

## 2023-02-27 PROCEDURE — 87040 BLOOD CULTURE FOR BACTERIA: CPT

## 2023-02-27 PROCEDURE — 86803 HEPATITIS C AB TEST: CPT

## 2023-02-27 PROCEDURE — 87086 URINE CULTURE/COLONY COUNT: CPT

## 2023-02-27 PROCEDURE — 99239 HOSP IP/OBS DSCHRG MGMT >30: CPT

## 2023-02-27 PROCEDURE — 94640 AIRWAY INHALATION TREATMENT: CPT

## 2023-02-27 PROCEDURE — 93005 ELECTROCARDIOGRAM TRACING: CPT

## 2023-02-27 PROCEDURE — 80053 COMPREHEN METABOLIC PANEL: CPT

## 2023-02-27 PROCEDURE — 71275 CT ANGIOGRAPHY CHEST: CPT | Mod: MA

## 2023-02-27 PROCEDURE — 36415 COLL VENOUS BLD VENIPUNCTURE: CPT

## 2023-02-27 PROCEDURE — 87077 CULTURE AEROBIC IDENTIFY: CPT

## 2023-02-27 PROCEDURE — 71045 X-RAY EXAM CHEST 1 VIEW: CPT

## 2023-02-27 PROCEDURE — 74177 CT ABD & PELVIS W/CONTRAST: CPT

## 2023-02-27 RX ORDER — ONDANSETRON 8 MG/1
1 TABLET, FILM COATED ORAL
Qty: 39 | Refills: 0
Start: 2023-02-27 | End: 2023-03-11

## 2023-02-27 RX ORDER — METOPROLOL TARTRATE 50 MG
1 TABLET ORAL
Qty: 0 | Refills: 0 | DISCHARGE

## 2023-02-27 RX ORDER — METOPROLOL TARTRATE 50 MG
1 TABLET ORAL
Qty: 0 | Refills: 0 | DISCHARGE
Start: 2023-02-27

## 2023-02-27 RX ORDER — ADALIMUMAB 40MG/0.8ML
0 KIT SUBCUTANEOUS
Qty: 0 | Refills: 0 | DISCHARGE

## 2023-02-27 RX ORDER — LACTOBACILLUS ACIDOPHILUS 100MM CELL
1 CAPSULE ORAL
Qty: 13 | Refills: 0
Start: 2023-02-27 | End: 2023-03-11

## 2023-02-27 RX ADMIN — Medication 650 MILLIGRAM(S): at 12:03

## 2023-02-27 RX ADMIN — Medication 150 MICROGRAM(S): at 05:24

## 2023-02-27 RX ADMIN — TIOTROPIUM BROMIDE 2 PUFF(S): 18 CAPSULE ORAL; RESPIRATORY (INHALATION) at 09:10

## 2023-02-27 RX ADMIN — APIXABAN 5 MILLIGRAM(S): 2.5 TABLET, FILM COATED ORAL at 05:24

## 2023-02-27 RX ADMIN — PIPERACILLIN AND TAZOBACTAM 25 GRAM(S): 4; .5 INJECTION, POWDER, LYOPHILIZED, FOR SOLUTION INTRAVENOUS at 05:24

## 2023-02-27 RX ADMIN — Medication 40 MILLIGRAM(S): at 05:24

## 2023-02-27 RX ADMIN — Medication 650 MILLIGRAM(S): at 12:33

## 2023-02-27 RX ADMIN — Medication 50 MILLIGRAM(S): at 10:12

## 2023-02-27 RX ADMIN — BUDESONIDE AND FORMOTEROL FUMARATE DIHYDRATE 2 PUFF(S): 160; 4.5 AEROSOL RESPIRATORY (INHALATION) at 09:11

## 2023-02-27 NOTE — DISCHARGE NOTE PROVIDER - HOSPITAL COURSE
67 yr old female with POOL on CPAP, paroxysmal atrial fibrillation on Eliquis, RA, hypothyroidism, pulmonary embolism presented with complaints of shortness of breath, hypoxia at home, leg swelling and chills. In ED, noted to have pulse ox 90s, CTA chest was done, negative for pulmonary embolism or pneumonia. Noted to have elevated BNP, IV Lasix was initiated. Cardiology consultation was requested, ECHO was ordered. Cultures sent on admission as she had low grade fever and chills. Blood cultures noted to have gram negative rods, later identified as Pasturella. ID was consulted. CT abdomen, pelvis was done, without acute pathology. Zosyn was given for bacteremia. Her repeat cultures were negative. Her ECHO revealed moderate pulmonary hypertension. Pulmonary was consulted, recommended VQ scan and possible RHC. Given ongoing infection and that patient is already on Eliquis, plan to pursue to further work up for pulmonary hypertension as an outpatient. Her repeat blood cultures were negative, ID advised to complete 2 weeks of Augmentin. She was evaluated for home oxygen, no hypoxia noted. She is medically stable for discharge home.

## 2023-02-27 NOTE — DISCHARGE NOTE PROVIDER - NSDCCPCAREPLAN_GEN_ALL_CORE_FT
PRINCIPAL DISCHARGE DIAGNOSIS  Diagnosis: Acute respiratory failure with hypoxia  Assessment and Plan of Treatment: resolved  resume home dose of Lasix  follow up with pulmonary and cardiology      SECONDARY DISCHARGE DIAGNOSES  Diagnosis: Moderate pulmonary hypertension  Assessment and Plan of Treatment: follow up with pulmonary and cardiology as outpatient for further work up    Diagnosis: Chronic atrial fibrillation  Assessment and Plan of Treatment: rate controlled  continue Eliquis.    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

## 2023-02-27 NOTE — PROGRESS NOTE ADULT - ASSESSMENT
66 yo F with hx of pafib on eliquis, PE, Sjogrens, RA, hiatal hernia and POOL who presented to the ED with hypoxia and found be bacteremic.     Acute hypoxic resp failure  acute on chronic diastolic heart failure     IV lasix 40 daily for 24-48hrs    on po lasix 20mg q48h    ct negative for PE    echo, cardiology following    bacteremia: UA not indicative of UTi    no PNA on ct    check ct a/p    ID consulted    am blood cultures    c/w zosyn     pafib  - continue Eliquis   - continue flecainide  - continue metoprolol succ 50mg qam and 25mg qpm    POOL   - CPAP at night    GERD with large Hiatal hernia (known)   Pepcid 40mg at bedtime    Hypothyroid  - levothyroxine 150mcgs     
66 yo F with hx of pafib on eliquis, PE, Sjogrens, RA, hiatal hernia and POOL who presented to the ED with hypoxia. Patient woke up this am feeling more short of breath with shaking chills.  checked her pulse ox and was noted to be hypoxic to the mid 80s so they came to the ED. Patient reports a cough for the last few weeks, no sputum production. No sick contacts or recent travel. She needs to sleep in a recliner type bed as she cannot lay flat. She reports less leg swelling and has been compliant with her lasix of 20mg daily. She has noted feeling more short of breath with exertion over the last 1 week. Denies chest pain, palpitations, abdominal pain, nausea and vomiting.  In the ED patient was febrile to 100.4 and oxygen saturation was 90% on room air. She had ab elevated BNP and CT chest showed no PE and no infiltrate, it did show large hiatal hernia and atelectasis. She was given antibiotics  She is being admitted to medicine for further work up, found to have pasteurella in blood. Patient had been watching her daughters cats for the past 2 weeks. Does not recall cat bites or scratches. Has baseline joint pain and back pain but no recent worsening. Reports multiple skin tears and has been handling cats    Pasteurella bacteremia  Leukocytosis  CHF  Sjogrens  Hiatal hernia    - Blood cultures + pasteurella  - f/u blood cultures  x2 , s NEG   - ct c/ap no pneumonia or intraabdominal collection  - TTE did not report vegetations  -  OVERALL IMPROVED  OK FOR D/C ON ORAL ABX AUGMENTIN  875 BID  TOTAL 2 WEEKS  WOULD GIVE ANTI NAUSEA MED PRN  SPOKE TO  AST BEDSIDE SPOKE TO HOSPITALIST   
Assessment  Sepsis 2/2 pasturella bacteremia - FU BC neg on IV AB  Hypoxia in setting of sepsis improved  Long standing moderate pul HTN post submassive PE ( PAP 90 treated with EKOS)- no change in PAP compared to echo 10/22 in office  HFpEF clinically euvolemic  POOL on CPAP  PAF s/p RFA in SR on flecainide  Normal cors normal EF  Large hiatal hernia seen on CT chest      Rec  Transition to oral AB  Cont outpt meds incl low dose lasix 20 mg day  Lifelong AC  Discussed role of Right heart cath with vasoreactive testing in future as outpt after antibiotic course completed   WIll FU in office 2 - 3 weeks
67 yr old female with POOL on CPAP, paroxysmal atrial fibrillation on Eliquis, RA, hypothyroidism, pulmonary embolism presented with complaints of shortness of breath, hypoxia at home, leg swelling and chills. In ED, noted to have pulse ox 90s, CTA chest was done, negative for pulmonary embolism or pneumonia. Noted to have elevated BNP, IV Lasix was initiated. Cardiology consultation was requested, ECHO was ordered. Cultures sent on admission as she had low grade fever and chills. Blood cultures noted to have gram negative rods, later identified as Pasturella. ID was consulted. CT abdomen, pelvis was done, without acute pathology.    1. Pasturella multocida bacteremia:  On Zosyn  ID following  repeat cultures and sensitivities pending  patient has been watching her daughter's cats at home for the past 2 weeks    2. Acute hypoxic respiratory failure, sec diastolic CHF vs pulmonary hypertension:  Cardiology following  on IV Lasix  pulmonary evaluation  I/O  ECHO pending    3. Paroxysmal atrial fibrillation:  Continue Eliquis and Metoprolol  on Flecainide    4. POOL:  Continue CPAP.    5. DVT ppx:  On Eliquis    Discussed with patient and spouse at bedside.   
67 yr old female with POOL on CPAP, paroxysmal atrial fibrillation on Eliquis, RA, hypothyroidism, pulmonary embolism presented with complaints of shortness of breath, hypoxia at home, leg swelling and chills. In ED, noted to have pulse ox 90s, CTA chest was done, negative for pulmonary embolism or pneumonia. Noted to have elevated BNP, IV Lasix was initiated. Cardiology consultation was requested, ECHO was ordered. Cultures sent on admission as she had low grade fever and chills. Blood cultures noted to have gram negative rods, later identified as Pasturella. ID was consulted. CT abdomen, pelvis was done, without acute pathology.    1. Pasturella multocida bacteremia:  On Zosyn  ID following  repeat cultures negative.  patient has been watching her daughter's cats at home for the past 2 weeks    2. Acute hypoxic respiratory failure, sec diastolic CHF vs pulmonary hypertension:  Cardiology following  transition to PO Lasix  pulmonary evaluation for pulmonary hypertension  I/O monitoring    3. Paroxysmal atrial fibrillation:  Continue Eliquis and Metoprolol  on Flecainide    4. POOL:  Continue CPAP.    5. DVT ppx:  On Eliquis    6. Rheumatoid arthritis:  On Prolia as outpatient, last dose in January 2023.    Discussed with patient and spouse at bedside.   
66 yo F with hx of pafib on eliquis, PE, Sjogrens, RA, hiatal hernia and POOL who presented to the ED with hypoxia. Patient woke up this am feeling more short of breath with shaking chills.  checked her pulse ox and was noted to be hypoxic to the mid 80s so they came to the ED. Patient reports a cough for the last few weeks, no sputum production. No sick contacts or recent travel. She needs to sleep in a recliner type bed as she cannot lay flat. She reports less leg swelling and has been compliant with her lasix of 20mg daily. She has noted feeling more short of breath with exertion over the last 1 week. Denies chest pain, palpitations, abdominal pain, nausea and vomiting.  In the ED patient was febrile to 100.4 and oxygen saturation was 90% on room air. She had ab elevated BNP and CT chest showed no PE and no infiltrate, it did show large hiatal hernia and atelectasis. She was given antibiotics  She is being admitted to medicine for further work up, found to have pasteurella in blood. Patient had been watching her daughters cats for the past 2 weeks. Does not recall cat bites or scratches. Has baseline joint pain and back pain but no recent worsening. Reports multiple skin tears and has been handling cats    Pasteurella bacteremia  Leukocytosis  CHF  Sjogrens  Hiatal hernia    - Blood cultures + pasteurella  - f/u blood cultures  x2 , so far ngtd  - ct c/ap no pneumonia or intraabdominal collection  - TTE did not report vegetations  - Continue zosyn  - Trend Fever  - Trend Leukocytosis      Will Follow  
67 yr old female with POOL on CPAP, paroxysmal atrial fibrillation on Eliquis, RA, hypothyroidism, pulmonary embolism presented with complaints of shortness of breath, hypoxia at home, leg swelling and chills. In ED, noted to have pulse ox 90s, CTA chest was done, negative for pulmonary embolism or pneumonia. Noted to have elevated BNP, IV Lasix was initiated. Cardiology consultation was requested, ECHO was ordered. Cultures sent on admission as she had low grade fever and chills. Blood cultures noted to have gram negative rods, later identified as Pasturella. ID was consulted. CT abdomen, pelvis was done, without acute pathology.    1. Pasturella multocida bacteremia:  On Zosyn, will transition to oral antibiotics.  ID following  repeat cultures negative.  patient has been watching her daughter's cats at home for the past 2 weeks    2. Acute hypoxic respiratory failure, sec diastolic CHF vs pulmonary hypertension:  cardiology and pulmonary follow up noted  ABG noted  she is on Eliquis  VQ scan and RHC to be pursued in outpatient  eval for home oxygen.    3. Paroxysmal atrial fibrillation:  Continue Eliquis and Metoprolol  on Flecainide    4. POOL:  Continue CPAP.    5. DVT ppx:  On Eliquis    6. Rheumatoid arthritis:  On Prolia as outpatient, last dose in January 2023.    Discussed with patient and spouse at bedside.     Discussed with cardiology.  discharge planning pending oxygen eval.

## 2023-02-27 NOTE — PROGRESS NOTE ADULT - SUBJECTIVE AND OBJECTIVE BOX
[facility]                                   [address] [place] [address]                                                      PHONE: [phone]                                                         FAX: [phone]  [email or web address]  ---------------------------------------------------------------------------------------------------------------------------------    Overnight events/patient complaints: tele NSR no AF      No Known Allergies    MEDICATIONS  (STANDING):  apixaban 5 milliGRAM(s) Oral every 12 hours  budesonide  80 MICROgram(s)/formoterol 4.5 MICROgram(s) Inhaler 2 Puff(s) Inhalation two times a day  famotidine    Tablet 40 milliGRAM(s) Oral at bedtime  flecainide 50 milliGRAM(s) Oral every 12 hours  furosemide    Tablet 40 milliGRAM(s) Oral daily  levothyroxine 150 MICROGram(s) Oral daily  metoprolol succinate ER 50 milliGRAM(s) Oral daily  metoprolol succinate ER 25 milliGRAM(s) Oral at bedtime  piperacillin/tazobactam IVPB.. 3.375 Gram(s) IV Intermittent every 8 hours  tiotropium 2.5 MICROgram(s) Inhaler 2 Puff(s) Inhalation daily    MEDICATIONS  (PRN):  acetaminophen     Tablet .. 650 milliGRAM(s) Oral every 6 hours PRN Temp greater or equal to 38C (100.4F), Mild Pain (1 - 3)  albuterol/ipratropium for Nebulization 3 milliLiter(s) Nebulizer every 6 hours PRN Shortness of Breath and/or Wheezing  aluminum hydroxide/magnesium hydroxide/simethicone Suspension 30 milliLiter(s) Oral every 4 hours PRN Dyspepsia  melatonin 3 milliGRAM(s) Oral at bedtime PRN Insomnia  ondansetron Injectable 4 milliGRAM(s) IV Push every 8 hours PRN Nausea and/or Vomiting      Vital Signs Last 24 Hrs  T(C): 36.8 (2023 04:41), Max: 37 (2023 16:44)  T(F): 98.2 (2023 04:41), Max: 98.6 (2023 16:44)  HR: 55 (2023 04:41) (55 - 67)  BP: 139/79 (2023 04:41) (139/79 - 152/88)  BP(mean): --  RR: 18 (2023 04:41) (18 - 18)  SpO2: 98% (2023 04:41) (90% - 98%)    Parameters below as of 2023 21:49  Patient On (Oxygen Delivery Method): room air      Daily     Daily   ICU Vital Signs Last 24 Hrs  KIERAN WINTER  I&O's Detail    2023 07:01  -  2023 07:00  --------------------------------------------------------  IN:    Oral Fluid: 675 mL  Total IN: 675 mL    OUT:    Voided (mL): 1525 mL  Total OUT: 1525 mL    Total NET: -850 mL        I&O's Summary    2023 07:01  -  2023 07:00  --------------------------------------------------------  IN: 675 mL / OUT: 1525 mL / NET: -850 mL      Drug Dosing Weight  KIERAN WINTER      PHYSICAL EXAM:  General: Appears alert and cooperative.  HEENT: Head; normocephalic, atraumatic.  Eyes: Pupils reactive, cornea wnl.  Neck: Supple, no nodes adenopathy, no NVD or carotid bruit or thyromegaly.  CARDIOVASCULAR: Normal S1 and S2, No murmur, rub, gallop or lift.   LUNGS: No rales, rhonchi or wheeze. Normal breath sounds bilaterally.  ABDOMEN: Soft, nontender without mass or organomegaly. bowel sounds normoactive.  EXTREMITIES: No clubbing, cyanosis or edema. Distal pulses wnl.   SKIN: warm and dry with normal turgor.  NEURO: Alert/oriented x 3/normal motor exam. No pathologic reflexes.    PSYCH: normal affect.        LABS:                        11.3   5.72  )-----------( 183      ( 2023 06:10 )             34.8         139  |  100  |  17.5  ----------------------------<  81  3.7   |  28.0  |  1.04    Ca    7.7<L>      2023 06:10  Phos  2.9       Mg     1.9           KIERAN MAGGY            RADIOLOGY & ADDITIONAL STUDIES:    INTERPRETATION OF TELEMETRY (personally reviewed):    ECG:< from: 12 Lead ECG (23 @ 06:12) >  Diagnosis Line Sinus rhythm [id number] degree A-V block  Left axis deviation  Left ventricular hypertrophy with repolarization abnormality  Inferior infarct , age undetermined  Anterior infarct , age undetermined  Abnormal ECG    Confirmed by ED MARTINEZ ([id number]) on 2023 3:10:12 PM     < end of copied text >      ECHO:< from: TTE Echo Complete w/ Contrast w/ Doppler (23 @ 20:44) >    ACC: [id number] EXAM:  ECHO TTE WITH CON COMP W DOPP                          PROCEDURE DATE:  2023          INTERPRETATION:  TRANSTHORACIC ECHOCARDIOGRAM REPORT        Patient Name:   KIERAN WINTER Patient Location: Inpatient  Medical Rec #:[id number]      Accession #:      [id number]  Account #:                     Height:           65.0 in 165.1 cm  YOB: 1955      Weight:           270.1 lb 122.50 kg  Patient Age:    67 years       BSA:              2.25 m²  Patient Gender: F              BP:               118/61 mmHg      Date of Exam:        2023 8:44:34 PM  Sonographer:         Rin Dewitt  Referring Physician: Pily Lambert    Procedure:   2D Echo/Doppler/Color Doppler Complete and Echocardiogram   with               Definity Contrast.  Indications: I42.9 - Cardiomyopathy, unspecified  Diagnosis:   I42.9 - Cardiomyopathy, unspecified        2D AND M-MODE MEASUREMENTS (normal ranges within parentheses):  Left                 Normal   Aorta/Left      Normal  Ventricle:                    Atrium:  IVSd (2D):    0.78  (0.7-1.1) Aortic Root    3.30  (2.4-3.7)                 cm             (2D):           cm  LVPWd (2D):   0.73  (0.7-1.1) Left Atrium    2.20  (1.9-4.0)                 cm      (2D):           cm  LVIDd (2D):   4.07  (3.4-5.7) LA Volume      39.1                 cm             Index         ml/m²  LVIDs (2D):   2.74            Right Ventricle:                 cm             TAPSE:           2.08 cm  LV FS (2D):   32.7  (>25%)                  %  Relative Wall 0.36   (<0.42)  Thickness    LV DIASTOLIC FUNCTION:  MV Peak E: 1.05 m/s e', MV Aditi: 0.10 m/s  MV Peak A: 0.33 m/s E/e' Ratio: 11.05  E/A Ratio: 3.15     Decel Time: 222 msec    SPECTRAL DOPPLER ANALYSIS (where applicable):  Mitral Valve:  MV P1/2 Time: 64.38 msec  MV Area, PHT: 3.42 cm²    Aortic Valve: AoV Max Rizwan: 1.34 m/s AoV Peak P.2 mmHg AoV Mean P.0 mmHg    LVOT Vmax: 0.99 m/s LVOT VTI: 0.174 m LVOT Diameter: 2.10 cm    AoV Area, Vmax: 2.56 cm² AoV Area, VTI: 2.21 cm² AoV Area, Vmn: 2.61 cm²  Ao VTI: 0.273  Tricuspid Valve and PA/RV Systolic Pressure: TR Max Velocity: 3.46 m/s RA   Pressure: 3 mmHg RVSP/PASP: 50.9 mmHg      PHYSICIAN INTERPRETATION:  Left Ventricle: The left ventricular internal cavity size is normal. Left   ventricular wall thickness is normal.  Global LV systolic function was normal. Left ventricular ejection   fraction, by visual estimation, is 60 to 65%. Spectral Doppler shows   normal pattern of LV diastolicfilling.  Right Ventricle: The right ventricular size is moderately enlarged. RV   systolic function is mildly reduced.  Left Atrium: Mildly enlarged left atrium.  Right Atrium: Moderate to severe right atrial enlargement.  Pericardium: There is no evidence of pericardial effusion.  Mitral Valve: Trace mitral valve regurgitation is seen.  Tricuspid Valve: The tricuspid valve is normal in structure.   Mild-moderate tricuspid regurgitation is visualized. Estimated pulmonary   artery systolic pressure is 50.9 mmHg assuming a right atrial pressure of   3 mmHg, which is consistent with moderate pulmonary hypertension.  Aortic Valve: The aortic valve is trileaflet. Sclerotic aortic valve with   normal opening. No evidence of aortic valve regurgitation is seen.  Pulmonic Valve: The pulmonic valve is normal. Trace pulmonic valve   regurgitation.  Aorta: The aortic root and ascending aorta are structurally normal, with   no evidence of dilitation.  Pulmonary Artery: The pulmonary artery is not well seen.  Venous: The inferior vena cava was normal sized, with respiratory size   variation greater than 50%.      Summary:   1. Left ventricular ejection fraction, by visual estimation, is 60 to   65%.   2. Normal global left ventricular systolic function.   3. Moderate to severe right atrial enlargement.   4. Mildly reduced RV systolic function.   5. Moderately enlarged right ventricle.   6. Mildly enlarged left atrium.   7. Trace mitral valve regurgitation.   8. Mild-moderate tricuspid regurgitation.   9. Sclerotic aortic valve with normal opening.  10. Estimated pulmonary artery systolic pressure is 50.9 mmHg assuming a   right atrial pressure of 3 mmHg, which is consistent with moderate   pulmonary hypertension.    MD Yoli Electronically signed on 2023 at 11:36:39 AM            *** Final ***    < end of copied text >

## 2023-02-27 NOTE — DISCHARGE NOTE PROVIDER - NSDCMRMEDTOKEN_GEN_ALL_CORE_FT
Arnuity Ellipta furoate 50 mcg inhalation powder: 1 puff(s) inhaled every 24 hours  ELIQUIS 5 MG TABLET: 5 milligram(s) orally 2 times a day resume on 11/16  flecainide 50 mg oral tablet: 1 tab(s) orally every 12 hours  HUMIRA(CF) 40 MG/0.4 ML SYRING:   Lasix 20 mg oral tablet: 1 tab(s) orally once a day  metoprolol succinate 25 mg oral tablet, extended release: 1 tab(s) orally once a day  metoprolol succinate 50 mg oral tablet, extended release: 1 tab(s) orally once a day  Pepcid 40 mg oral tablet: 1 tab(s) orally once a day (at bedtime)  Synthroid 150 mcg (0.15 mg) oral tablet: 1 tab(s) orally once a day

## 2023-02-27 NOTE — DISCHARGE NOTE NURSING/CASE MANAGEMENT/SOCIAL WORK - PATIENT PORTAL LINK FT
You can access the FollowMyHealth Patient Portal offered by [facility] by registering at the following website: [email or web address]. By joining [facility]’s FollowMyHealth portal, you will also be able to view your health information using other applications (apps) compatible with our system.

## 2023-02-27 NOTE — PROGRESS NOTE ADULT - SUBJECTIVE AND OBJECTIVE BOX
INTERVAL HPI/OVERNIGHT EVENTS:    CC: acute hypoxic respiratory failure, Pasturella bacteremia, POOL, RA, atrial fibrillation    No overnight events  feels better  no fever    Vital Signs Last 24 Hrs  T(C): 36.8 (27 Feb 2023 04:41), Max: 37 (26 Feb 2023 16:44)  T(F): 98.2 (27 Feb 2023 04:41), Max: 98.6 (26 Feb 2023 16:44)  HR: 55 (27 Feb 2023 04:41) (55 - 67)  BP: 139/79 (27 Feb 2023 04:41) (139/79 - 152/88)  BP(mean): --  RR: 18 (27 Feb 2023 04:41) (18 - 18)  SpO2: 98% (27 Feb 2023 04:41) (90% - 98%)    Parameters below as of 26 Feb 2023 21:49  Patient On (Oxygen Delivery Method): room air        PHYSICAL EXAM:    GENERAL: alert, not in distress  CHEST/LUNG: b/l air entry  HEART: reg  ABDOMEN: soft, bs+  EXTREMITIES:  no edema, tenderness    MEDICATIONS  (STANDING):  apixaban 5 milliGRAM(s) Oral every 12 hours  budesonide  80 MICROgram(s)/formoterol 4.5 MICROgram(s) Inhaler 2 Puff(s) Inhalation two times a day  famotidine    Tablet 40 milliGRAM(s) Oral at bedtime  flecainide 50 milliGRAM(s) Oral every 12 hours  furosemide    Tablet 40 milliGRAM(s) Oral daily  levothyroxine 150 MICROGram(s) Oral daily  metoprolol succinate ER 50 milliGRAM(s) Oral daily  metoprolol succinate ER 25 milliGRAM(s) Oral at bedtime  piperacillin/tazobactam IVPB.. 3.375 Gram(s) IV Intermittent every 8 hours  tiotropium 2.5 MICROgram(s) Inhaler 2 Puff(s) Inhalation daily    MEDICATIONS  (PRN):  acetaminophen     Tablet .. 650 milliGRAM(s) Oral every 6 hours PRN Temp greater or equal to 38C (100.4F), Mild Pain (1 - 3)  albuterol/ipratropium for Nebulization 3 milliLiter(s) Nebulizer every 6 hours PRN Shortness of Breath and/or Wheezing  aluminum hydroxide/magnesium hydroxide/simethicone Suspension 30 milliLiter(s) Oral every 4 hours PRN Dyspepsia  melatonin 3 milliGRAM(s) Oral at bedtime PRN Insomnia  ondansetron Injectable 4 milliGRAM(s) IV Push every 8 hours PRN Nausea and/or Vomiting      Allergies    No Known Allergies    Intolerances          LABS:                          11.3   5.72  )-----------( 183      ( 27 Feb 2023 06:10 )             34.8     02-27    139  |  100  |  17.5  ----------------------------<  81  3.7   |  28.0  |  1.04    Ca    7.7<L>      27 Feb 2023 06:10  Phos  2.9     02-27  Mg     1.9     02-27            RADIOLOGY & ADDITIONAL TESTS:

## 2023-02-27 NOTE — DISCHARGE NOTE PROVIDER - CARE PROVIDERS DIRECT ADDRESSES
,[email or web address]

## 2023-02-27 NOTE — PROGRESS NOTE ADULT - SUBJECTIVE AND OBJECTIVE BOX
[facility]                                                INFECTIOUS DISEASES  =======================================================                               Dario Vasquez MD#    Yeni Merino MD*                           Emerald Daly MD*   Ester Ortega MD*            Diplomates American Board of Internal Medicine & Infectious Diseases                  # [place] Office - Appt - Tel  [phone] Fax [phone]                * [place] Office - Appt - Tel [phone] Fax [phone]                                  Hospital Consult line:  [phone]  =======================================================      [facility]-[id number]  [facility]   follow up for:  PASTUERELLA  bacteremia  feeling better  off o2   has joint pain at baseline  has chronic back pain but no worsening  patient seen and examined.       I have personally reviewed the labs and data; pertinent labs and data are listed in this note; please see below.   ===================================================  REVIEW OF SYSTEMS:  CONSTITUTIONAL:  No Fever or chills  HEENT:  No diplopia or blurred vision.  No earache, sore throat or runny nose.  CARDIOVASCULAR:  No pressure, squeezing, strangling, tightness, heaviness or aching about the chest, neck, axilla or epigastrium.  RESPIRATORY:  No cough, shortness of breath  GASTROINTESTINAL:  No nausea, vomiting or diarrhea.  GENITOURINARY:  No dysuria, frequency or urgency. No Blood in urine  MUSCULOSKELETAL:  no joint aches, no muscle pain  SKIN:  No change in skin, hair or nails.  NEUROLOGIC:  No Headaches, seizures or weakness.  PSYCHIATRIC:  No disorder of thought or mood.  ENDOCRINE:  No heat or cold intolerance  HEMATOLOGICAL:  No easy bruising or bleeding.    =======================================================  Allergies    No Known Allergies    Intolerances    Antibiotics:  piperacillin/tazobactam IVPB.. 3.375 Gram(s) IV Intermittent every 8 hours    Other medications:  apixaban 5 milliGRAM(s) Oral every 12 hours  budesonide  80 MICROgram(s)/formoterol 4.5 MICROgram(s) Inhaler 2 Puff(s) Inhalation two times a day  famotidine    Tablet 40 milliGRAM(s) Oral at bedtime  flecainide 50 milliGRAM(s) Oral every 12 hours  furosemide    Tablet 40 milliGRAM(s) Oral daily  levothyroxine 150 MICROGram(s) Oral daily  metoprolol succinate ER 50 milliGRAM(s) Oral daily  metoprolol succinate ER 25 milliGRAM(s) Oral at bedtime  tiotropium 2.5 MICROgram(s) Inhaler 2 Puff(s) Inhalation daily    ======================================================  Physical Exam:  = Vital Signs Last 24 Hrs  T(C): 36.7 (27 Feb 2023 11:36), Max: 37 (26 Feb 2023 16:44)  T(F): 98.1 (27 Feb 2023 11:36), Max: 98.6 (26 Feb 2023 16:44)  HR: 62 (27 Feb 2023 11:36) (55 - 67)  BP: 101/63 (27 Feb 2023 11:36) (101/63 - 151/78)  BP(mean): --  RR: 18 (27 Feb 2023 11:36) (18 - 18)  SpO2: 94% (27 Feb 2023 11:36) (92% - 98%)    Parameters below as of 26 Feb 2023 21:49  Patient On (Oxygen Delivery Method): room air        General:  No acute distress.  Eye: no conjunctival pallor, no scleral icterus  Respiratory: Lungs are clear to auscultation, Respirations are non-labored.  Cardiovascular: Normal rate, Regular rhythm,  s1+s2  Gastrointestinal: Soft, Non-tender, Non-distended, Normal bowel sounds.  Genitourinary: No costovertebral angle tenderness.  Lymphatics: No lymphadenopathy neck  Musculoskeletal: Normal range of motion, Normal strength.  Integumentary: + b/l Le swelling, rt anterior calf discoloration, left heel scab, ecchymoses and small skin tear rue  Neurologic: Alert, Oriented, No focal deficits  Psychiatric: Appropriate mood & affect.  =======================================================  Labs:                                           11.3   5.72  )-----------( 183      ( 27 Feb 2023 06:10 )             34.8   02-27    139  |  100  |  17.5  ----------------------------<  81  3.7   |  28.0  |  1.04    Ca    7.7<L>      27 Feb 2023 06:10  Phos  2.9     02-27  Mg     1.9     02-27          Culture - Blood (collected 02-25-23 @ 05:55)  Source: .Blood Blood-Peripheral    Culture - Blood (collected 02-25-23 @ 05:30)  Source: .Blood Blood-Venous    Culture - Urine (collected 02-23-23 @ 08:30)  Source: Clean Catch Clean Catch (Midstream)  Final Report (02-24-23 @ 13:25):    <10,000 CFU/mL Normal Urogenital [place]    Culture - Blood (collected 02-23-23 @ 04:40)  Source: .Blood Blood-Peripheral  Gram Stain (02-24-23 @ 02:02):    Growth in aerobic bottle: Gram Negative Rods    Growth in anaerobic bottle: Gram Negative Rods  Final Report (02-24-23 @ 17:06):    Growth in aerobic and anaerobic bottles: Pasteurella multocida    "Susceptibilities not performed"    ***Blood Panel PCR results on this specimen are available    approximately 3 hours after the Gram stain result.***    Gram stain, PCR, and/or culture results may not always    correspond due to difference in methodologies.    ************************************************************    This PCR assay was performed by multiplex PCR. This    Assay tests for 66 bacterial and resistance gene targets.    Please refer to the [facility] Labs test directory    at [email or web address] for details.  Organism: Blood Culture PCR (02-24-23 @ 17:06)  Organism: Blood Culture PCR (02-24-23 @ 17:06)    Sensitivities:      -  Blood PCR Panel: NEG      Method Type: PCR    Culture - Blood (collected 02-23-23 @ 04:30)  Source: .Blood Blood-Peripheral  Gram Stain (02-23-23 @ 20:55):    Growth in aerobic bottle: Gram Negative Rods  Final Report (02-24-23 @ 17:08):    Growth in aerobic bottle: Pasteurella multocida "Susceptibilities not    performed"  < from: CT Angio Chest PE Protocol w/ IV Cont (02.23.23 @ 06:39) >  FINDINGS:    LUNGS AND AIRWAYS: Patent central airways.  Passive atelectasis in the   right and left lower lobes adjacent to a large hiatal hernia..  PLEURA: No pleural effusion.  MEDIASTINUM AND BRI: Large hiatal hernia with an intrathoracic stomach.   No lymphadenopathy.  VESSELS: No pulmonary embolus..  HEART: Heart size is normal. No pericardial effusion.  CHEST WALL AND LOWER NECK: Within normal limits.  VISUALIZED UPPER ABDOMEN: Within normal limits.  BONES: Degenerative changes of the spine with increased thoracic   kyphosis..    IMPRESSION:  No pulmonary embolus.    Stable large hiatal hernia with an intrathoracic stomach.      < end of copied text >        < from: CT Abdomen and Pelvis w/ Oral Cont and w/ IV Cont (02.24.23 @ 15:38) >  IMPRESSION:    No intra-abdominal loculated collection or clear source of infection.    Moderate height loss of T9 vertebral body increased since 2021. Mild   multilevel lumbar spine height loss as well. Diffuse osseous   demineralization.    Partially imaged main pulmonary artery is enlarged measuring 4 cm. This   likely reflects pulmonary arterial hypertension. Coronary artery   calcifications.    Large hiatal hernia with intrathoracic stomach redemonstrated. Oral   contrast opacifies distal small bowel and colon.    --- End of Report ---        < end of copied text >

## 2023-02-27 NOTE — HOME OXYGEN EVALUATION NOTE - NSHOMEOXYEVALO2_O2AMBULATING_GEN_ALL_CORE
----- Message from Trunog Newby MD sent at 9/20/2022 10:02 AM CDT -----  Lab results were reviewed with no concerns except for room for improvement on cholesterol.  Bad cholesterol LDL is at 136.  Recommend focus on diet low in carbs and saturated fats along with exercise on a regular basis.  Please check with patient that he is taking the atorvastatin 10 mg daily, if that is the case we may need to increase atorvastatin to 20 mg daily from current 10 mg.  If he has not been consistent with taking the atorvastatin to make sure he takes it on a regular basis.    No other concerns about any abnormalities.    Lab Services on 09/19/2022   Component Date Value Ref Range Status   • Fasting Status 09/19/2022 12  0 - 999 Hours Final   • Sodium 09/19/2022 141  135 - 145 mmol/L Final   • Potassium 09/19/2022 4.6  3.4 - 5.1 mmol/L Final   • Chloride 09/19/2022 107  97 - 110 mmol/L Final   • Carbon Dioxide 09/19/2022 27  21 - 32 mmol/L Final   • Anion Gap 09/19/2022 12  7 - 19 mmol/L Final   • Glucose 09/19/2022 91  70 - 99 mg/dL Final   • BUN 09/19/2022 17  6 - 20 mg/dL Final   • Creatinine 09/19/2022 0.97  0.67 - 1.17 mg/dL Final   • Glomerular Filtration Rate 09/19/2022 87  >=60 Final    eGFR results = or >60 mL/min/1.73m2 = Normal kidney function. Estimated GFR calculated using the CKD-EPI-R (2021) equation that does not include race in the creatinine calculation.   • BUN/ Creatinine Ratio 09/19/2022 18  7 - 25 Final   • Calcium 09/19/2022 9.3  8.4 - 10.2 mg/dL Final   • Bilirubin, Total 09/19/2022 0.6  0.2 - 1.0 mg/dL Final   • GOT/AST 09/19/2022 24  <=37 Units/L Final   • GPT/ALT 09/19/2022 36  <64 Units/L Final   • Alkaline Phosphatase 09/19/2022 53  45 - 117 Units/L Final   • Albumin 09/19/2022 3.9  3.6 - 5.1 g/dL Final   • Protein, Total 09/19/2022 7.3  6.4 - 8.2 g/dL Final   • Globulin 09/19/2022 3.4  2.0 - 4.0 g/dL Final   • A/G Ratio 09/19/2022 1.1  1.0 - 2.4 Final   • Hemoglobin A1C 09/19/2022 5.4  4.5 - 5.6 %  92 Final      Diabetic Screening  Non Diabetic:             <5.7%  Increased Risk:           5.7-6.4%  Diagnostic For Diabetes:  >6.4%    Diabetic Control  A1C%       eAG mg/dL  6.0            126  6.5            140  7.0            154  7.5            169  8.0            183  8.5            197  9.0            212  9.5            226  10.0           240   • Fasting Status 09/19/2022 12  0 - 999 Hours Final   • Cholesterol 09/19/2022 208 (A) <=199 mg/dL Final    Desirable         <200  Borderline High   200 to 239  High              >=240   • Triglycerides 09/19/2022 107  <=149 mg/dL Final    Normal            <150  Borderline High   150 to 199  High              200 to 499  Very High         >=500   • HDL 09/19/2022 51  >=40 mg/dL Final    Low              <40  Borderline Low   40 to 49  Near Optimal     50 to 59  Optimal          >=60   • LDL 09/19/2022 136 (A) <=129 mg/dL Final    OPTIMAL           <100  NEAR OPTIMAL      100 to 129  BORDERLINE HIGH   130 to 159  HIGH              160 to 189  VERY HIGH         >=190   • Non-HDL Cholesterol 09/19/2022 157  mg/dL Final    Therapeutic Target:  CHD and risk equivalents  <130  Multiple risk factors     <160  0 to 1 risk factor        <190   • Cholesterol/ HDL Ratio 09/19/2022 4.1  <=4.4 Final   • TSH 09/19/2022 1.139  0.350 - 5.000 mcUnits/mL Final    Findings most consistent with euthyroid state, no additional testing suggested. TSH may be normal in patients with thyroid dysfunction and pituitary disease. Clinical correlation recommended.    (Reflex TSH algorithm is not recommended in hospitalized patients. A variety of drugs, as well as serious acute and chronic illnesses may alter thyroid function tests. Commonly implicated drugs include glucocorticoids, dopamine, carbamazepine, iodine, amiodarone, lithium and heparin.)   • WBC 09/19/2022 5.5  4.2 - 11.0 K/mcL Final   • RBC 09/19/2022 4.89  4.50 - 5.90 mil/mcL Final   • HGB 09/19/2022 15.5  13.0 - 17.0 g/dL Final   •  HCT 09/19/2022 45.5  39.0 - 51.0 % Final   • MCV 09/19/2022 93.0  78.0 - 100.0 fl Final   • MCH 09/19/2022 31.7  26.0 - 34.0 pg Final   • MCHC 09/19/2022 34.1  32.0 - 36.5 g/dL Final   • RDW-CV 09/19/2022 13.2  11.0 - 15.0 % Final   • RDW-SD 09/19/2022 45.1  39.0 - 50.0 fL Final   • PLT 09/19/2022 241  140 - 450 K/mcL Final   • NRBC 09/19/2022 0  <=0 /100 WBC Final   • Neutrophil, Percent 09/19/2022 50  % Final   • Lymphocytes, Percent 09/19/2022 30  % Final   • Mono, Percent 09/19/2022 8  % Final   • Eosinophils, Percent 09/19/2022 11  % Final   • Basophils, Percent 09/19/2022 1  % Final   • Immature Granulocytes 09/19/2022 0  % Final   • Absolute Neutrophils 09/19/2022 2.7  1.8 - 7.7 K/mcL Final   • Absolute Lymphocytes 09/19/2022 1.6  1.0 - 4.0 K/mcL Final   • Absolute Monocytes 09/19/2022 0.5  0.3 - 0.9 K/mcL Final   • Absolute Eosinophils  09/19/2022 0.6 (A) 0.0 - 0.5 K/mcL Final   • Absolute Basophils 09/19/2022 0.1  0.0 - 0.3 K/mcL Final   • Absolute Immmature Granulocytes 09/19/2022 0.0  0.0 - 0.2 K/mcL Final

## 2023-02-27 NOTE — PROGRESS NOTE ADULT - PROVIDER SPECIALTY LIST ADULT
Cardiology
Hospitalist
Infectious Disease
Cardiology
Cardiology
Infectious Disease
Internal Medicine
Cardiology
Hospitalist
Hospitalist

## 2023-02-27 NOTE — DISCHARGE NOTE PROVIDER - CARE PROVIDER_API CALL
Lucretia Houston)  Internal Medicine  [address] [place] [id number]  Phone: [phone]  Fax: [phone]  Follow Up Time:     Dario Vasquez)  Infectious Disease; Internal Medicine  [address] [place] [id number]  Phone: [phone]  Fax: [phone]  Follow Up Time:

## 2023-02-28 ENCOUNTER — NON-APPOINTMENT (OUTPATIENT)
Age: 68
End: 2023-02-28

## 2023-02-28 ENCOUNTER — APPOINTMENT (OUTPATIENT)
Dept: PSYCHIATRY | Facility: CLINIC | Age: 68
End: 2023-02-28
Payer: MEDICARE

## 2023-02-28 PROCEDURE — 90837 PSYTX W PT 60 MINUTES: CPT | Mod: 95

## 2023-02-28 NOTE — REASON FOR VISIT
[Other:___] : due to [unfilled] [Telehealth (audio & video) - Individual/Group] : This visit was provided via telehealth using real-time 2-way audio visual technology. [Other Location: e.g. Home (Enter Location, City,State)___] : The provider was located at [unfilled]. [Home] : The patient, [unfilled], was located at home, [unfilled], at the time of the visit. [Verbal consent obtained from patient/other participant(s)] : Verbal consent for telehealth/telephonic services obtained from patient/other participant(s) [FreeTextEntry4] : 1:00 [FreeTextEntry2] : 2/22/2023 [FreeTextEntry1] : chronic depression and anxiety exacerbated by significant health issues and half-way [Patient] : patient, [unfilled] is a ~age~ year old ~male/female~ being seen for a follow-up visit  [Duration of Psychotherapy Visit (minutes spent in synchronous communication): ____] : Duration of Psychotherapy Visit (minutes spent in synchronous communication): [unfilled] [Individual Psychotherapy for 53+ minutes] : Individual Psychotherapy for 53+ minutes  [Teletherapy Service Provided] : The services provided in this session were delivered via tele-therapy [FreeTextEntry3] : home [FreeTextEntry5] : home

## 2023-02-28 NOTE — PLAN
[FreeTextEntry2] : Problem: trauma: \par Objective and goal: psycho-ed, symptom management in context of critical health issues, many losses, Patient will become more aware of her symptoms and how she manages them\par \par Problem: Depression and anxiety\par Objective and Goal: decrease symptoms, build skills to cope effectively, use mindfulness and insight to feel less overwhelmed and unmotivated \par \par Problem: Isolation\par Goal: more activity with less depressive symptoms [Cognitive and/or Behavior Therapy] : Cognitive and/or Behavior Therapy  [Psychoeducation] : Psychoeducation  [Skills training (all types)] : Skills training (all types)  [Supportive Therapy] : Supportive Therapy [de-identified] : Patient presents on time for her weekly session, she's alert and oriented, engages well, good eye contact although very fatigued as she has just been discharged from the hospital after requiring medical admission. Patient benefits from support and time for ventilation \par \par Patient spends time reviewing need for her most recent hospital admission, she reports increased shortness of breath and sepsis. This writer reviewed her anxiety levels, patient is coping well and keeping focus on the fact that this was related to a virus she contracted and not any inclination of decline in her overall health.  \par \par Patient focused and seems relieved to be home, she looks forward to engaging in increased activity although her recovery is slow as she is still undergoing treatment \par \par No other needs or concerns noted at this time, patient is hopeful to be seen in person next week on the 8th at 2:00 o'clock, however is aware that she can switch to telehealth as needed  [FreeTextEntry1] : weekly therapy session in person to address depression, anxiety, isolation, and impact of medical issues on emotional health. 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

## 2023-03-02 LAB
CULTURE RESULTS: SIGNIFICANT CHANGE UP
CULTURE RESULTS: SIGNIFICANT CHANGE UP
SPECIMEN SOURCE: SIGNIFICANT CHANGE UP
SPECIMEN SOURCE: SIGNIFICANT CHANGE UP

## 2023-03-08 ENCOUNTER — APPOINTMENT (OUTPATIENT)
Dept: PSYCHIATRY | Facility: CLINIC | Age: 68
End: 2023-03-08
Payer: MEDICARE

## 2023-03-08 PROCEDURE — 90837 PSYTX W PT 60 MINUTES: CPT

## 2023-03-08 NOTE — PLAN
[FreeTextEntry2] : Problem: trauma: \par Objective and goal: psycho-ed, symptom management in context of critical health issues, many losses, Patient will become more aware of her symptoms and how she manages them\par \par Problem: Depression and anxiety\par Objective and Goal: decrease symptoms, build skills to cope effectively, use mindfulness and insight to feel less overwhelmed and unmotivated \par \par Problem: Isolation\par Goal: more activity with less depressive symptoms [Cognitive and/or Behavior Therapy] : Cognitive and/or Behavior Therapy  [Psychoeducation] : Psychoeducation  [Skills training (all types)] : Skills training (all types)  [Supportive Therapy] : Supportive Therapy [de-identified] :  The above named patient presents on time for her weekly session, she's alert and oriented, engages well with good eye contact ,flat affect, seems to be fatigued, at times tearful however expresses herself well, is candid in her conversation regarding her current stress response and anxiety, seems to benefit from support and time for ventilation \par \par Patient continues to struggle with recent hospitalization, she reports fatigue as well as anxiety, this writer provided education regarding re-exposure to hospitalization and uncertainty of her health. Focused patient on the cause of her requiring hospitalization and the  factors that contributed to recent need for admission. Patient agrees ,however struggling in moving forward. This writer also provided education regarding her compromised immune system and encouraged her to assess if this may be a factor contributing to her fatigue, education provided regarding impact of emotional distress on the physical body, patient agrees \par \par Reminded patient of the benefits of activity outside the home which she has not recently participated in, encouraged exposure to time outside of her home in a slow but consistent manner, she agreed to do so \par \par No other needs or concerns noted at this time ,have arranged to meet with patient again on the 15th at 2:00 o'clock  [FreeTextEntry1] : weekly therapy session in person to address depression, anxiety, isolation, and impact of medical issues on emotional health. 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

## 2023-03-08 NOTE — REASON FOR VISIT
[FreeTextEntry1] : chronic depression and anxiety exacerbated by significant health issues and longterm [Patient] : patient, [unfilled] is a ~age~ year old ~male/female~ being seen for a follow-up visit  [Duration of Psychotherapy Visit (minutes spent in synchronous communication): ____] : Duration of Psychotherapy Visit (minutes spent in synchronous communication): [unfilled] [Individual Psychotherapy for 53+ minutes] : Individual Psychotherapy for 53+ minutes  [Teletherapy Service Provided] : The services provided in this session were delivered via tele-therapy [FreeTextEntry3] : [facility] [FreeTextEntry5] : [facility]

## 2023-03-13 ENCOUNTER — NON-APPOINTMENT (OUTPATIENT)
Age: 68
End: 2023-03-13

## 2023-03-15 ENCOUNTER — APPOINTMENT (OUTPATIENT)
Dept: INTERNAL MEDICINE | Facility: CLINIC | Age: 68
End: 2023-03-15
Payer: MEDICARE

## 2023-03-15 ENCOUNTER — APPOINTMENT (OUTPATIENT)
Dept: PSYCHIATRY | Facility: CLINIC | Age: 68
End: 2023-03-15
Payer: MEDICARE

## 2023-03-15 VITALS
HEART RATE: 64 BPM | SYSTOLIC BLOOD PRESSURE: 128 MMHG | DIASTOLIC BLOOD PRESSURE: 80 MMHG | BODY MASS INDEX: 43.99 KG/M2 | HEIGHT: 65 IN | OXYGEN SATURATION: 96 % | WEIGHT: 264 LBS

## 2023-03-15 DIAGNOSIS — A28.0 PASTEURELLOSIS: ICD-10-CM

## 2023-03-15 PROCEDURE — 90837 PSYTX W PT 60 MINUTES: CPT

## 2023-03-15 PROCEDURE — 36415 COLL VENOUS BLD VENIPUNCTURE: CPT

## 2023-03-15 PROCEDURE — 99215 OFFICE O/P EST HI 40 MIN: CPT | Mod: 25

## 2023-03-15 NOTE — HISTORY OF PRESENT ILLNESS
[FreeTextEntry1] : 68YO FEMALE PMH OF AFIB RA HIATAL HERNIA AND POOL ADMITTED WITH  [facility]EVERS AND AFIB BLOOD CX WERE POSITIVE FOR PASTUERELLA  PT WAS BABY SITITNG HER DUAGHTERS CATS AND HAD SCRATCH\par PT WSA PLACED ON IV ABX AND BLOOD CX CLEARED QUICKLY\par PT WAS DISCHARGED ON ORAL ABX\par AND  JUST COMPLETD THEM\par HERE FOR FOLLOWUP

## 2023-03-15 NOTE — PHYSICAL EXAM
[General Appearance - Alert] : alert [General Appearance - In No Acute Distress] : in no acute distress [Sclera] : the sclera and conjunctiva were normal [PERRL With Normal Accommodation] : pupils were equal in size, round, reactive to light [Extraocular Movements] : extraocular movements were intact [Outer Ear] : the ears and nose were normal in appearance [Neck Appearance] : the appearance of the neck was normal [Oropharynx] : the oropharynx was normal with no thrush [Neck Cervical Mass (___cm)] : no neck mass was observed [Jugular Venous Distention Increased] : there was no jugular-venous distention [Thyroid Diffuse Enlargement] : the thyroid was not enlarged [Auscultation Breath Sounds / Voice Sounds] : lungs were clear to auscultation bilaterally [Heart Rate And Rhythm] : heart rate was normal and rhythm regular [Heart Sounds] : normal S1 and S2 [Heart Sounds Gallop] : no gallops [Murmurs] : no murmurs [Heart Sounds Pericardial Friction Rub] : no pericardial rub [Full Pulse] : the pedal pulses are present [Bowel Sounds] : normal bowel sounds [Edema] : there was no peripheral edema [Abdomen Soft] : soft [Abdomen Tenderness] : non-tender [Abdomen Mass (___ Cm)] : no abdominal mass palpated [Costovertebral Angle Tenderness] : no CVA tenderness [No Palpable Adenopathy] : no palpable adenopathy [Skin Color & Pigmentation] : normal skin color and pigmentation [] : no rash [Deep Tendon Reflexes (DTR)] : deep tendon reflexes were 2+ and symmetric [Sensation] : the sensory exam was normal to light touch and pinprick [No Focal Deficits] : no focal deficits

## 2023-03-15 NOTE — ASSESSMENT
[FreeTextEntry1] : 68YO FEMALE PMH OF AFIB RA HIATAL HERNIA AND POOL ADMITTED WITH  FEVERS AND AFIB BLOOD CX WERE POSITIVE FOR PASTUERELLA  PT WAS BABY SITITNG HER DUAGHTERS CATS AND HAD SCRATCH\par PT WSA PLACED ON IV ABX AND BLOOD CX CLEARED QUICKLY\par PT WAS DISCHARGED ON ORAL ABX\par AND  JUST COMPLETD THEM\par NO FEVERS OR CHILLS FEELS MUCH BETTER \par MILD ANEMIA IN THE HOSPITAL\par WILL CHECK LABS HERE WILL FOLLOWUP \par OVERALL IMPROVED

## 2023-03-16 ENCOUNTER — NON-APPOINTMENT (OUTPATIENT)
Age: 68
End: 2023-03-16

## 2023-03-16 LAB
ALBUMIN SERPL ELPH-MCNC: 4.1 G/DL
ALP BLD-CCNC: 89 U/L
ALT SERPL-CCNC: 13 U/L
ANION GAP SERPL CALC-SCNC: 13 MMOL/L
AST SERPL-CCNC: 19 U/L
BASOPHILS # BLD AUTO: 0.05 K/UL
BASOPHILS NFR BLD AUTO: 0.7 %
BILIRUB SERPL-MCNC: 0.4 MG/DL
BUN SERPL-MCNC: 21 MG/DL
CALCIUM SERPL-MCNC: 9.3 MG/DL
CHLORIDE SERPL-SCNC: 98 MMOL/L
CO2 SERPL-SCNC: 28 MMOL/L
CREAT SERPL-MCNC: 1.03 MG/DL
EGFR: 59 ML/MIN/1.73M2
EOSINOPHIL # BLD AUTO: 0.17 K/UL
EOSINOPHIL NFR BLD AUTO: 2.2 %
GLUCOSE SERPL-MCNC: 111 MG/DL
HCT VFR BLD CALC: 45.4 %
HGB BLD-MCNC: 14 G/DL
IMM GRANULOCYTES NFR BLD AUTO: 0.5 %
LYMPHOCYTES # BLD AUTO: 1.12 K/UL
LYMPHOCYTES NFR BLD AUTO: 14.7 %
MAN DIFF?: NORMAL
MCHC RBC-ENTMCNC: 30.8 GM/DL
MCHC RBC-ENTMCNC: 31.5 PG
MCV RBC AUTO: 102 FL
MONOCYTES # BLD AUTO: 0.59 K/UL
MONOCYTES NFR BLD AUTO: 7.7 %
NEUTROPHILS # BLD AUTO: 5.65 K/UL
NEUTROPHILS NFR BLD AUTO: 74.2 %
PLATELET # BLD AUTO: 297 K/UL
POTASSIUM SERPL-SCNC: 4.7 MMOL/L
PROT SERPL-MCNC: 7.6 G/DL
RBC # BLD: 4.45 M/UL
RBC # FLD: 12.8 %
SODIUM SERPL-SCNC: 139 MMOL/L
WBC # FLD AUTO: 7.62 K/UL

## 2023-03-16 NOTE — REASON FOR VISIT
[Patient] : patient, [unfilled] is a ~age~ year old ~male/female~ being seen for a follow-up visit  [Duration of Psychotherapy Visit (minutes spent in synchronous communication): ____] : Duration of Psychotherapy Visit (minutes spent in synchronous communication): [unfilled] [Individual Psychotherapy for 53+ minutes] : Individual Psychotherapy for 53+ minutes  [Teletherapy Service Provided] : The services provided in this session were delivered via tele-therapy [FreeTextEntry1] : chronic depression and anxiety exacerbated by significant health issues and [facility] [FreeTextEntry5] : [facility] [FreeTextEntry3] : [facility]

## 2023-03-16 NOTE — PLAN
[Cognitive and/or Behavior Therapy] : Cognitive and/or Behavior Therapy  [Psychoeducation] : Psychoeducation  [Skills training (all types)] : Skills training (all types)  [Supportive Therapy] : Supportive Therapy [FreeTextEntry2] : Problem: trauma: \par Objective and goal: psycho-ed, symptom management in context of critical health issues, many losses, Patient will become more aware of her symptoms and how she manages them\par \par Problem: Depression and anxiety\par Objective and Goal: decrease symptoms, build skills to cope effectively, use mindfulness and insight to feel less overwhelmed and unmotivated \par \par Problem: Isolation\par Goal: more activity with less depressive symptoms [de-identified] :  The above named patient presents on time for her weekly session, she's alert and oriented, engages well ,appears fatigued, she admits she's had a busy day and compares it to the past few weeks having had limited activity, she seems to benefit from support and education as well as review of her anxiety and contributing factors \par \par Patient spends time reviewing her own anxiety related to her health, much support and education provided she also expresses worry about her daughter's upcoming travels out of the country, this writer provided education regarding vulnerability and how it can impact other areas of one's life, she agreed \par \par Reviewed progress over the past week through a strengths perspective, she has been more active outside of the home, reports improved sleep because of new bed, focused on upcoming weeks with increased socialization \par \par No other needs or concerns noted at this time, will meet with patient again on the 22nd at 2:00 o'clock  [FreeTextEntry1] : weekly therapy session in person to address depression, anxiety, isolation, and impact of medical issues on emotional health. 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

## 2023-03-22 ENCOUNTER — APPOINTMENT (OUTPATIENT)
Dept: PSYCHIATRY | Facility: CLINIC | Age: 68
End: 2023-03-22
Payer: MEDICARE

## 2023-03-22 ENCOUNTER — NON-APPOINTMENT (OUTPATIENT)
Age: 68
End: 2023-03-22

## 2023-03-22 ENCOUNTER — APPOINTMENT (OUTPATIENT)
Dept: BARIATRICS/WEIGHT MGMT | Facility: CLINIC | Age: 68
End: 2023-03-22

## 2023-03-22 PROCEDURE — 90837 PSYTX W PT 60 MINUTES: CPT | Mod: 95

## 2023-03-22 NOTE — PLAN
[FreeTextEntry2] : Problem: trauma: \par Objective and goal: psycho-ed, symptom management in context of critical health issues, many losses, Patient will become more aware of her symptoms and how she manages them\par \par Problem: Depression and anxiety\par Objective and Goal: decrease symptoms, build skills to cope effectively, use mindfulness and insight to feel less overwhelmed and unmotivated \par \par Problem: Isolation\par Goal: more activity with less depressive symptoms [Cognitive and/or Behavior Therapy] : Cognitive and/or Behavior Therapy  [Psychoeducation] : Psychoeducation  [Skills training (all types)] : Skills training (all types)  [Supportive Therapy] : Supportive Therapy [de-identified] : The above named patient presents on time for her weekly session, she's alert and oriented, engages well, reports feeling overwhelmed with an increased level of anxiety especially considering a difficult weekend with her . Patient benefits from support and time for ventilation, she makes good eye contact, remains calm and cooperative and can explore her own stress response and impact of the past on her current stressors \par \par Content of the session was related to stress over the past weekend, this regarding her 's behavior, this writer provided support as well as education regarding addiction and how it can affect overall relationship and interactions. Encouraged patient to effectively communicate her upset with her  as she is also anxious about upcoming events that she feels he may mismanage \par \par Patient spends time reviewing a situation involving her daughter and how it has significantly impacted her emotional health and stress response. This writer reviewed the connection she feels in similarity of emotional response of her daughter and how the situation may have evoked her own self reflection of the past and change. \par \par No other needs or concerns noted at this time, have arranged to meet with patient again at 2:00 o'clock on the 29th  [FreeTextEntry1] : weekly therapy session in person to address depression, anxiety, isolation, and impact of medical issues on emotional health. 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

## 2023-03-22 NOTE — REASON FOR VISIT
[Patient preference] : as per patient preference [Telehealth (audio & video) - Individual/Group] : This visit was provided via telehealth using real-time 2-way audio visual technology. [Medical Office: ([facility])___] : The provider was located at the medical office in [unfilled]. [Home] : The patient, [unfilled], was located at home, [unfilled], at the time of the visit. [Verbal consent obtained from patient/other participant(s)] : Verbal consent for telehealth/telephonic services obtained from patient/other participant(s) [FreeTextEntry4] : 2 [FreeTextEntry2] : 2 weeks ago [FreeTextEntry1] : chronic depression and anxiety exacerbated by significant health issues and group home [Patient] : patient, [unfilled] is a ~age~ year old ~male/female~ being seen for a follow-up visit  [Duration of Psychotherapy Visit (minutes spent in synchronous communication): ____] : Duration of Psychotherapy Visit (minutes spent in synchronous communication): [unfilled] [Individual Psychotherapy for 53+ minutes] : Individual Psychotherapy for 53+ minutes  [Teletherapy Service Provided] : The services provided in this session were delivered via tele-therapy [FreeTextEntry3] : providers residence [FreeTextEntry5] : [facility]

## 2023-03-23 ENCOUNTER — APPOINTMENT (OUTPATIENT)
Dept: FAMILY MEDICINE | Facility: CLINIC | Age: 68
End: 2023-03-23
Payer: MEDICARE

## 2023-03-23 VITALS
DIASTOLIC BLOOD PRESSURE: 84 MMHG | BODY MASS INDEX: 44.15 KG/M2 | HEART RATE: 74 BPM | OXYGEN SATURATION: 96 % | SYSTOLIC BLOOD PRESSURE: 126 MMHG | HEIGHT: 65 IN | WEIGHT: 265 LBS

## 2023-03-23 VITALS — OXYGEN SATURATION: 97 %

## 2023-03-23 PROCEDURE — 36415 COLL VENOUS BLD VENIPUNCTURE: CPT

## 2023-03-23 PROCEDURE — 99214 OFFICE O/P EST MOD 30 MIN: CPT | Mod: 25

## 2023-03-24 ENCOUNTER — RX RENEWAL (OUTPATIENT)
Age: 68
End: 2023-03-24

## 2023-03-25 NOTE — PHYSICAL EXAM
[No Acute Distress] : no acute distress [Well-Appearing] : well-appearing [No JVD] : no jugular venous distention [Regular Rhythm] : with a regular rhythm [de-identified] : appears anxious as well today  [de-identified] : NSR in office today

## 2023-03-25 NOTE — ADDENDUM
[FreeTextEntry1] : Medical record entries made by the scribe today, were at my direction and personally dictated to them by me, Dr. Erika Espinoza on 03/23/2023.  I have reviewed the chart and agree that the record accurately reflects my personal performance of the history, physical exam, assessment and plan.\par \par Phill CABRERA acting as scribe for Dr. Erika Espinoza MD on 03/23/2023 at 2:40 PM.\par \par \par \par

## 2023-03-25 NOTE — HEALTH RISK ASSESSMENT
[No falls in past year] : Patient reported no falls in the past year [Assistive Device] : Patient uses an assistive device [de-identified] : cane

## 2023-03-25 NOTE — HISTORY OF PRESENT ILLNESS
[FreeTextEntry1] : Last seen in office October 2022 for same, encounter reviewed. \par \par Recent consults reviewed and noted for:\par ID (Pasteurella infection, Cat bite f/u,     March 2023)\par  [de-identified] : In recent weeks KIERAN endorses:\par \par Pt states she was hospitalized at [facility] from 2/23-2/27/2023.  Pt states O2 sat was 80% on home pulse oximeter, presented to hospital and was admitted for 3 days.  While in hospital, pt states that a culture came back positive for Pasteurella, states she stepped on her daughter's cat while walking to the bathroom at night, was bitten thereafter.  Pt states she did not know she was bitten due to lack of sensation re neuropathy.  Is now recovering well, O2 sat at rest in office: 97%.  \par   \par Is continuing to follow with specialist appropriately.  Is also anticipating cardiac monitoring, scheduled for 4/5/2023.  \par \par Did not appreciate Otezla medication, states heightened depression.  Has d/c'd.  \par \par States her back has improved, states that she is no longer sleeping on a recliner, very appreciative of new mattress, states improved edema since sleeping in bed.  \par \par No longer in PT.

## 2023-03-25 NOTE — ASSESSMENT
[FreeTextEntry1] : FUV for 67 year old WF with PMH as stated in HPI / active list. \par \par Management : \par \par See HPI and Plan.\par \par Labs in office today, will advise. \par \par \par For every day you are in the hospital, 2-3 days of recovery are required to return to independent life activities. \par \par PT order provided, advised to re-engage, follow up as needed. \par \par At her request BNP ordered and drawn. \par \par Continue to follow up with appropriate specialists. \par \par Best wishes offered! \par

## 2023-03-29 ENCOUNTER — APPOINTMENT (OUTPATIENT)
Dept: PSYCHIATRY | Facility: CLINIC | Age: 68
End: 2023-03-29
Payer: MEDICARE

## 2023-03-29 PROCEDURE — 90837 PSYTX W PT 60 MINUTES: CPT

## 2023-03-29 NOTE — PLAN
[FreeTextEntry2] : Problem: trauma: \par Objective and goal: psycho-ed, symptom management in context of critical health issues, many losses, Patient will become more aware of her symptoms and how she manages them\par \par Problem: Depression and anxiety\par Objective and Goal: decrease symptoms, build skills to cope effectively, use mindfulness and insight to feel less overwhelmed and unmotivated \par \par Problem: Isolation\par Goal: more activity with less depressive symptoms [Cognitive and/or Behavior Therapy] : Cognitive and/or Behavior Therapy  [Skills training (all types)] : Skills training (all types)  [Psychoeducation] : Psychoeducation  [Supportive Therapy] : Supportive Therapy [de-identified] :  Patient presents on time for her weekly session, she's alert and oriented, engages well ,affect somewhat flat, she appears at times tearful and reports increased anxiety over the past week \par \par Patient spends time revealing her symptoms, reports increase stress related to upcoming invasive medical intervention, this writer encouraged her to formulate a plan in order to manage her stress including increase in communication with her providers and abstaining from activity that she has noted to increase anxiety levels (namely reviewing portal notes and labs from the past five years). I have provided education regarding her current medical issues and how they have been a reminder of her past struggles and fear of mortality. Patient also experiencing increased reminders about her mother's death which she feels is aligned with her current medical issues. This writer provided tools in order to remain focused ,encouraged patient to problem solve and to examine the causes of her anxiety as well as tools to decrease symptoms \par \par No other needs or concerns noted at this time ,have arranged with patient again on the 4th at 11:00 o'clock  [FreeTextEntry1] : weekly therapy session in person to address depression, anxiety, isolation, and impact of medical issues on emotional health. 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

## 2023-03-29 NOTE — REASON FOR VISIT
[FreeTextEntry1] : chronic depression and anxiety exacerbated by significant health issues and intermediate [Patient] : patient, [unfilled] is a ~age~ year old ~male/female~ being seen for a follow-up visit  [Duration of Psychotherapy Visit (minutes spent in synchronous communication): ____] : Duration of Psychotherapy Visit (minutes spent in synchronous communication): [unfilled] [Individual Psychotherapy for 53+ minutes] : Individual Psychotherapy for 53+ minutes  [Teletherapy Service Provided] : The services provided in this session were delivered via tele-therapy [FreeTextEntry3] : [facility] [FreeTextEntry5] : [facility]

## 2023-04-03 LAB — NT-PROBNP SERPL-MCNC: 1163 PG/ML

## 2023-04-04 ENCOUNTER — APPOINTMENT (OUTPATIENT)
Dept: PSYCHIATRY | Facility: CLINIC | Age: 68
End: 2023-04-04
Payer: MEDICARE

## 2023-04-04 PROCEDURE — 90837 PSYTX W PT 60 MINUTES: CPT

## 2023-04-05 NOTE — REASON FOR VISIT
[FreeTextEntry1] : chronic depression and anxiety exacerbated by significant health issues and correction [Patient] : patient, [unfilled] is a ~age~ year old ~male/female~ being seen for a follow-up visit  [Duration of Psychotherapy Visit (minutes spent in synchronous communication): ____] : Duration of Psychotherapy Visit (minutes spent in synchronous communication): [unfilled] [Individual Psychotherapy for 53+ minutes] : Individual Psychotherapy for 53+ minutes  [Teletherapy Service Provided] : The services provided in this session were delivered via tele-therapy [FreeTextEntry3] : [facility] [FreeTextEntry5] : [facility]

## 2023-04-05 NOTE — PLAN
[FreeTextEntry2] : Problem: trauma: \par Objective and goal: psycho-ed, symptom management in context of critical health issues, many losses, Patient will become more aware of her symptoms and how she manages them\par \par Problem: Depression and anxiety\par Objective and Goal: decrease symptoms, build skills to cope effectively, use mindfulness and insight to feel less overwhelmed and unmotivated \par \par Problem: Isolation\par Goal: more activity with less depressive symptoms [Cognitive and/or Behavior Therapy] : Cognitive and/or Behavior Therapy  [Psychoeducation] : Psychoeducation  [Skills training (all types)] : Skills training (all types)  [Supportive Therapy] : Supportive Therapy [de-identified] : patient presents on time for her weekly session, shes alert and oriented, overwhelmed with planned cardiac procedure scheduled for 4/5, much support and time for ventilation provided. Focused patient on her confidence in her cardiologist, she agreed, shes also anxious about her daughters both traveling. Reviewed the importance of trying to best manage situation that are out of her control.\par \par patient also spends time reviewing past weekend, she was pleased with her time outside of the home. No other needs or concerns noted at this time, patient appropriately anxious about her health as she has experience increased health issues over of the past few years, next appt scheudled for 4/12 @ 2 [FreeTextEntry1] : weekly therapy session in person to address depression, anxiety, isolation, and impact of medical issues on emotional health. 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

## 2023-04-12 ENCOUNTER — APPOINTMENT (OUTPATIENT)
Dept: PSYCHIATRY | Facility: CLINIC | Age: 68
End: 2023-04-12
Payer: MEDICARE

## 2023-04-12 PROCEDURE — 90837 PSYTX W PT 60 MINUTES: CPT

## 2023-04-12 NOTE — PLAN
[FreeTextEntry2] : Problem: trauma: \par Objective and goal: psycho-ed, symptom management in context of critical health issues, many losses, Patient will become more aware of her symptoms and how she manages them\par \par Problem: Depression and anxiety\par Objective and Goal: decrease symptoms, build skills to cope effectively, use mindfulness and insight to feel less overwhelmed and unmotivated \par \par Problem: Isolation\par Goal: more activity with less depressive symptoms [Cognitive and/or Behavior Therapy] : Cognitive and/or Behavior Therapy  [Psychoeducation] : Psychoeducation  [Skills training (all types)] : Skills training (all types)  [Supportive Therapy] : Supportive Therapy [de-identified] : The above named patient presents on time for her weekly session, she's alert and oriented, engages well ,appears very fatigued with a flat affect however seems to benefit from support and time for ventilation \par \par Patient spends the majority of the session reviewing medical procedure she had completed last week; this procedure has now resulted in additional medical concerns. Much support and type for ventilation provided , re-focused patient on the importance of good stress management skills as well as distractive activity preferably outside of the home, she agrees. Also refocused patient on the level of trust she has with her cardiologist and the benefits of formulating a list of questions she can ask of her doctor during her appointment on the 17th \par \par Patient spends time reviewing her own reaction to her emotional response , this writer validated her stress reaction and the intensity of medical issues over the past few years inducing increased anxiety. \par \par No other needs or concerns noted at this time, will meet with patient again on the 19th at 2:00 o'clock  [FreeTextEntry1] : weekly therapy session in person to address depression, anxiety, isolation, and impact of medical issues on emotional health. 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

## 2023-04-19 ENCOUNTER — APPOINTMENT (OUTPATIENT)
Dept: PSYCHIATRY | Facility: CLINIC | Age: 68
End: 2023-04-19
Payer: MEDICARE

## 2023-04-19 PROCEDURE — 90837 PSYTX W PT 60 MINUTES: CPT

## 2023-04-19 NOTE — REASON FOR VISIT
[FreeTextEntry1] : chronic depression and anxiety exacerbated by significant health issues and pen[facility] [Patient] : patient, [unfilled] is a ~age~ year old ~male/female~ being seen for a follow-up visit  [Duration of Psychotherapy Visit (minutes spent in synchronous communication): ____] : Duration of Psychotherapy Visit (minutes spent in synchronous communication): [unfilled] [Individual Psychotherapy for 53+ minutes] : Individual Psychotherapy for 53+ minutes  [Teletherapy Service Provided] : The services provided in this session were delivered via tele-therapy [FreeTextEntry3] : [facility] [FreeTextEntry5] : [facility]

## 2023-04-19 NOTE — PLAN
[FreeTextEntry2] : Problem: trauma: \par Objective and goal: psycho-ed, symptom management in context of critical health issues, many losses, Patient will become more aware of her symptoms and how she manages them\par \par Problem: Depression and anxiety\par Objective and Goal: decrease symptoms, build skills to cope effectively, use mindfulness and insight to feel less overwhelmed and unmotivated \par \par Problem: Isolation\par Goal: more activity with less depressive symptoms [Cognitive and/or Behavior Therapy] : Cognitive and/or Behavior Therapy  [Psychoeducation] : Psychoeducation  [Skills training (all types)] : Skills training (all types)  [Supportive Therapy] : Supportive Therapy [de-identified] :  Patient presents on time for her weekly session, she's alert and oriented, engages well ,in better spirits compared to last week as she has met with her doctors who have expressed satisfaction with her health. Patient is calm and cooperative, this writer notes increased shortness of breath when patient is feeling anxious, however she remains focused  and always seems to benefit from supporting time for ventilation \par \par Content of the session was regarding her health ,her level of anxiety, and this writer engaged her in review of good stress management skills. Reviewed the availability of apps that may decrease symptoms, also the reviewed the importance of activity outside of the home \par \par Patient spends time reflecting on her health as well as the overall impact on continuous ailments over the past 2 years, she also discusses additional stressors which have been at times overwhelming (financial, worry about her daughters, taxes) \par \par No other needs or concerns noted at this time, have arranged to meet with patient again on the 26th at 2:00 o'clock  [FreeTextEntry1] : weekly therapy session in person to address depression, anxiety, isolation, and impact of medical issues on emotional health. 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

## 2023-04-25 ENCOUNTER — OFFICE (OUTPATIENT)
Dept: URBAN - METROPOLITAN AREA CLINIC 104 | Facility: CLINIC | Age: 68
Setting detail: OPHTHALMOLOGY
End: 2023-04-25
Payer: MEDICARE

## 2023-04-25 DIAGNOSIS — H25.11: ICD-10-CM

## 2023-04-25 DIAGNOSIS — H35.40: ICD-10-CM

## 2023-04-25 DIAGNOSIS — H25.13: ICD-10-CM

## 2023-04-25 PROBLEM — H25.12 CATARACT SENILE NUCLEAR SCLEROSIS; RIGHT EYE, LEFT EYE, BOTH EYES: Status: ACTIVE | Noted: 2023-04-25

## 2023-04-25 PROCEDURE — 99204 OFFICE O/P NEW MOD 45 MIN: CPT | Performed by: SPECIALIST

## 2023-04-25 PROCEDURE — 92136 OPHTHALMIC BIOMETRY: CPT | Performed by: SPECIALIST

## 2023-04-25 ASSESSMENT — KERATOMETRY
OS_AXISANGLE2_DEGREES: 67
OS_CYLAXISANGLE_DEGREES: 67
OD_CYLPOWER_DEGREES: 0.27
OS_AXISANGLE_DEGREES: 157
OS_CYLPOWER_DEGREES: 1.53
OD_AXISANGLE_DEGREES: 103
OD_AXISANGLE_DEGREES: 13
OS_K2POWER_DIOPTERS: 43.66
OS_K1POWER_DIOPTERS: 42.13
OD_K2POWER_DIOPTERS: 42.35
OS_K2POWER_DIOPTERS: 43.66
OS_K1POWER_DIOPTERS: 42.13
OD_CYLAXISANGLE_DEGREES: 103
OD_K2POWER_DIOPTERS: 42.35
OD_K1K2_AVERAGE: 42.215
OD_K1POWER_DIOPTERS: 42.08
OS_AXISANGLE_DEGREES: 67
OD_AXISANGLE2_DEGREES: 103
OD_K1POWER_DIOPTERS: 42.08
OS_K1K2_AVERAGE: 42.9

## 2023-04-25 ASSESSMENT — SPHEQUIV_DERIVED
OD_SPHEQUIV: -2.875
OS_SPHEQUIV: -1.125
OS_SPHEQUIV: -1.375
OD_SPHEQUIV: -4.25

## 2023-04-25 ASSESSMENT — REFRACTION_AUTOREFRACTION
OD_SPHERE: -3.75
OS_SPHERE: -0.75
OD_AXIS: 94
OD_CYLINDER: -1.00
OS_AXIS: 132
OS_CYLINDER: -0.75

## 2023-04-25 ASSESSMENT — REFRACTION_MANIFEST
OD_AXIS: 30
OD_SPHERE: -2.50
OS_VA1: 20/150
OS_SPHERE: -1.00
OS_CYLINDER: -0.75
OS_AXIS: 135
OD_CYLINDER: -0.75
OD_VA1: 20/150

## 2023-04-25 ASSESSMENT — REFRACTION_CURRENTRX
OD_SPHERE: -1.25
OS_SPHERE: +0.50
OS_AXIS: 105
OD_OVR_VA: 20/
OD_AXIS: 30
OD_CYLINDER: -0.75
OS_OVR_VA: 20/
OS_CYLINDER: -1.00

## 2023-04-25 ASSESSMENT — AXIALLENGTH_DERIVED
OD_AL: 25.93
OS_AL: 24.375
OS_AL: 24.27
OD_AL: 25.3

## 2023-04-25 ASSESSMENT — CONFRONTATIONAL VISUAL FIELD TEST (CVF)
OD_FINDINGS: FULL
OS_FINDINGS: FULL

## 2023-04-25 ASSESSMENT — VISUAL ACUITY
OS_BCVA: 20/150
OD_BCVA: 20/150

## 2023-04-25 ASSESSMENT — TONOMETRY
OD_IOP_MMHG: 16
OS_IOP_MMHG: 16

## 2023-04-26 ENCOUNTER — APPOINTMENT (OUTPATIENT)
Dept: PSYCHIATRY | Facility: CLINIC | Age: 68
End: 2023-04-26
Payer: MEDICARE

## 2023-04-26 PROCEDURE — 90837 PSYTX W PT 60 MINUTES: CPT | Mod: 95

## 2023-04-26 NOTE — PLAN
[FreeTextEntry2] : Problem: trauma: \par Objective and goal: psycho-ed, symptom management in context of critical health issues, many losses, Patient will become more aware of her symptoms and how she manages them\par \par Problem: Depression and anxiety\par Objective and Goal: decrease symptoms, build skills to cope effectively, use mindfulness and insight to feel less overwhelmed and unmotivated \par \par Problem: Isolation\par Goal: more activity with less depressive symptoms [Cognitive and/or Behavior Therapy] : Cognitive and/or Behavior Therapy  [Psychoeducation] : Psychoeducation  [Skills training (all types)] : Skills training (all types)  [Supportive Therapy] : Supportive Therapy [de-identified] : Patient presents on time for her weekly session. She's alert and oriented, engages well, in good spirits compared to past weeks. Expresses herself well, insightful, and seems to benefit from support and time for ventilation as well as review of past week and her own stress response and coping skills. \par \par Patient reports improved time outside of the home. This is a significant benefit to her. Although she endorses anxiety related to her health she can remain focused. This writer reviewed the importance of communicating effectively when she's feeling anxious. This includes asking questions and making lists.  \par \par Patient looking forward to time with her family over the next few weeks. This writer encouraged her to evaluate her improvement since her last hospitalization as well as her ability to complete tasks.  \par \par No other needs or concerns noted at this time. Will meet the patient @ 2, o'clock on the third.  [FreeTextEntry1] : weekly therapy session in person to address depression, anxiety, isolation, and impact of medical issues on emotional health. 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

## 2023-04-26 NOTE — REASON FOR VISIT
[Patient preference] : as per patient preference [Telehealth (audio & video) - Individual/Group] : This visit was provided via telehealth using real-time 2-way audio visual technology. [Home] : The patient, [unfilled], was located at home, [unfilled], at the time of the visit. [Medical Office: ([facility])___] : The provider was located at the medical office in [unfilled]. [Verbal consent obtained from patient/other participant(s)] : Verbal consent for telehealth/telephonic services obtained from patient/other participant(s) [FreeTextEntry4] : 2 [FreeTextEntry2] : 4/19/23 [FreeTextEntry1] : chronic depression and anxiety exacerbated by significant health issues and [facility] [Patient] : patient, [unfilled] is a ~age~ year old ~male/female~ being seen for a follow-up visit  [Duration of Psychotherapy Visit (minutes spent in synchronous communication): ____] : Duration of Psychotherapy Visit (minutes spent in synchronous communication): [unfilled] [Individual Psychotherapy for 53+ minutes] : Individual Psychotherapy for 53+ minutes  [Teletherapy Service Provided] : The services provided in this session were delivered via tele-therapy [FreeTextEntry5] : [facility] [FreeTextEntry3] : patient's residence

## 2023-05-03 ENCOUNTER — APPOINTMENT (OUTPATIENT)
Dept: PSYCHIATRY | Facility: CLINIC | Age: 68
End: 2023-05-03
Payer: MEDICARE

## 2023-05-03 PROCEDURE — 90837 PSYTX W PT 60 MINUTES: CPT | Mod: 95

## 2023-05-03 NOTE — PLAN
[FreeTextEntry2] : Problem: trauma: \par Objective and goal: psycho-ed, symptom management in context of critical health issues, many losses, Patient will become more aware of her symptoms and how she manages them\par \par Problem: Depression and anxiety\par Objective and Goal: decrease symptoms, build skills to cope effectively, use mindfulness and insight to feel less overwhelmed and unmotivated \par \par Problem: Isolation\par Goal: more activity with less depressive symptoms [Cognitive and/or Behavior Therapy] : Cognitive and/or Behavior Therapy  [Psychoeducation] : Psychoeducation  [Skills training (all types)] : Skills training (all types)  [Supportive Therapy] : Supportive Therapy [de-identified] : Above named patient presents on time for her weekly session. She's alert and oriented, compliant with treatment, calm and cooperative and seems to benefit from support and time for ventilation. She is able to reflect on her stress response and impact of stress on her life. \par \par Patient spends time reviewing recent loss within their friend group. This has resulted in her own stress response as the situation felt familiar, therefore has appropriately increased her anxiety levels. Much support and time for ventilation provided, reviewed the importance of good stress management skills as well as increased activity as this has always been helpful for patient. \par \par Patient spends time reflecting on her health as well as the impact of her health on her  . No other needs or concerns noted at this time. We'll meet the patient again on the 10th at 2:00 o'clock.  [FreeTextEntry1] : weekly therapy session in person to address depression, anxiety, isolation, and impact of medical issues on emotional health. 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

## 2023-05-03 NOTE — REASON FOR VISIT
[Patient preference] : as per patient preference [Telehealth (audio & video) - Individual/Group] : This visit was provided via telehealth using real-time 2-way audio visual technology. [Medical Office: ([facility])___] : The provider was located at the medical office in [unfilled]. [Home] : The patient, [unfilled], was located at home, [unfilled], at the time of the visit. [Verbal consent obtained from patient/other participant(s)] : Verbal consent for telehealth/telephonic services obtained from patient/other participant(s) [FreeTextEntry4] : 2 [FreeTextEntry2] : 4/19/23 [Patient] : patient, [unfilled] is a ~age~ year old ~male/female~ being seen for a follow-up visit  [FreeTextEntry1] : chronic depression and anxiety exacerbated by significant health issues and snf [Duration of Psychotherapy Visit (minutes spent in synchronous communication): ____] : Duration of Psychotherapy Visit (minutes spent in synchronous communication): [unfilled] [Individual Psychotherapy for 53+ minutes] : Individual Psychotherapy for 53+ minutes  [Teletherapy Service Provided] : The services provided in this session were delivered via tele-therapy [FreeTextEntry3] : patient's residence [FreeTextEntry5] : [facility]

## 2023-05-10 ENCOUNTER — APPOINTMENT (OUTPATIENT)
Dept: PSYCHIATRY | Facility: CLINIC | Age: 68
End: 2023-05-10
Payer: MEDICARE

## 2023-05-10 PROCEDURE — 90837 PSYTX W PT 60 MINUTES: CPT | Mod: 95

## 2023-05-10 NOTE — PLAN
[FreeTextEntry2] : Problem: trauma: \par Objective and goal: psycho-ed, symptom management in context of critical health issues, many losses, Patient will become more aware of her symptoms and how she manages them\par \par Problem: Depression and anxiety\par Objective and Goal: decrease symptoms, build skills to cope effectively, use mindfulness and insight to feel less overwhelmed and unmotivated \par \par Problem: Isolation\par Goal: more activity with less depressive symptoms [Cognitive and/or Behavior Therapy] : Cognitive and/or Behavior Therapy  [Psychoeducation] : Psychoeducation  [Skills training (all types)] : Skills training (all types)  [Supportive Therapy] : Supportive Therapy [FreeTextEntry1] : weekly therapy session in person to address depression, anxiety, isolation, and impact of medical issues on emotional health. 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 She's alert and oriented engages well. Good eye contact, always seems to benefit from support and time for ventilation. \par \par Patient spends time reviewing the past week, she attended  services for her 's friend's wife. She managed well, was supportive of her  and was pleased not to be over involved in the events. Patient continues to process her own health and expresses chronic levels of anxiety. Seems to benefit from activity outside of the home and weekly supportive therapy. Patient looks forward to the weekend and she'll be celebrating her birthday with family. She'll also be celebrating her anniversary. She looks forward to completing numerous medical appointments and is planning a trip in the summer. No other needs or concerns noted at this time. Will meet with patient again on the  at 2:00 o'clock.

## 2023-05-10 NOTE — REASON FOR VISIT
[Patient preference] : as per patient preference [Telehealth (audio & video) - Individual/Group] : This visit was provided via telehealth using real-time 2-way audio visual technology. [Medical Office: ([facility])___] : The provider was located at the medical office in [unfilled]. [Home] : The patient, [unfilled], was located at home, [unfilled], at the time of the visit. [Verbal consent obtained from patient/other participant(s)] : Verbal consent for telehealth/telephonic services obtained from patient/other participant(s) [FreeTextEntry4] : 2 [FreeTextEntry2] : 4/19/23 [FreeTextEntry1] : chronic depression and anxiety exacerbated by significant health issues and custodial [Patient] : patient, [unfilled] is a ~age~ year old ~male/female~ being seen for a follow-up visit  [Duration of Psychotherapy Visit (minutes spent in synchronous communication): ____] : Duration of Psychotherapy Visit (minutes spent in synchronous communication): [unfilled] [Individual Psychotherapy for 53+ minutes] : Individual Psychotherapy for 53+ minutes  [Teletherapy Service Provided] : The services provided in this session were delivered via tele-therapy [FreeTextEntry3] : patient's residence [FreeTextEntry5] : [facility]

## 2023-05-15 ENCOUNTER — APPOINTMENT (OUTPATIENT)
Dept: FAMILY MEDICINE | Facility: CLINIC | Age: 68
End: 2023-05-15
Payer: MEDICARE

## 2023-05-15 VITALS
DIASTOLIC BLOOD PRESSURE: 76 MMHG | SYSTOLIC BLOOD PRESSURE: 130 MMHG | BODY MASS INDEX: 43.82 KG/M2 | OXYGEN SATURATION: 94 % | HEART RATE: 92 BPM | HEIGHT: 65 IN | WEIGHT: 263 LBS

## 2023-05-15 DIAGNOSIS — J45.909 UNSPECIFIED ASTHMA, UNCOMPLICATED: ICD-10-CM

## 2023-05-15 DIAGNOSIS — I10 ESSENTIAL (PRIMARY) HYPERTENSION: ICD-10-CM

## 2023-05-15 DIAGNOSIS — M62.830 MUSCLE SPASM OF BACK: ICD-10-CM

## 2023-05-15 DIAGNOSIS — H26.9 UNSPECIFIED CATARACT: ICD-10-CM
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
## 2023-05-15 DIAGNOSIS — Z01.818 ENCOUNTER FOR OTHER PREPROCEDURAL EXAMINATION: ICD-10-CM

## 2023-05-15 DIAGNOSIS — M54.9 DORSALGIA, UNSPECIFIED: ICD-10-CM

## 2023-05-15 DIAGNOSIS — Z96.652 PRESENCE OF LEFT ARTIFICIAL KNEE JOINT: ICD-10-CM

## 2023-05-15 DIAGNOSIS — I27.20 PULMONARY HYPERTENSION, UNSPECIFIED: ICD-10-CM

## 2023-05-15 DIAGNOSIS — E03.9 HYPOTHYROIDISM, UNSPECIFIED: ICD-10-CM

## 2023-05-15 DIAGNOSIS — Z09 ENCOUNTER FOR FOLLOW-UP EXAMINATION AFTER COMPLETED TREATMENT FOR CONDITIONS OTHER THAN MALIGNANT NEOPLASM: ICD-10-CM

## 2023-05-15 PROCEDURE — 99215 OFFICE O/P EST HI 40 MIN: CPT

## 2023-05-17 ENCOUNTER — APPOINTMENT (OUTPATIENT)
Dept: PSYCHIATRY | Facility: CLINIC | Age: 68
End: 2023-05-17
Payer: MEDICARE

## 2023-05-17 PROCEDURE — 90837 PSYTX W PT 60 MINUTES: CPT

## 2023-05-17 NOTE — PLAN
[FreeTextEntry2] : Problem: trauma: \par Objective and goal: psycho-ed, symptom management in context of critical health issues, many losses, Patient will become more aware of her symptoms and how she manages them\par \par Problem: Depression and anxiety\par Objective and Goal: decrease symptoms, build skills to cope effectively, use mindfulness and insight to feel less overwhelmed and unmotivated \par \par Problem: Isolation\par Goal: more activity with less depressive symptoms [Cognitive and/or Behavior Therapy] : Cognitive and/or Behavior Therapy  [Psychoeducation] : Psychoeducation  [Skills training (all types)] : Skills training (all types)  [Supportive Therapy] : Supportive Therapy [de-identified] : The above named patient presents on time for her weekly session. She's alert and oriented. Engages well, always seem to benefit from support and time for ventilation. Calm, cooperative, reflective and insightful. \par \par Patient spends time reviewing her weekend, she spent much time with her family. She benefits from this level of socialization  and feels it was a nice respite from the daily anxiety of her health and doctor's appointments. Patient expresses worry about a hiatal hernia, she may require surgical intervention . This writer reviewed the importance of adhering to doctor's recommendations, especially in light of the level of discomfort and possible impact on other health concerns, patient agrees. That has become a major focus of her daily worry \par \par Patient also spends time reviewing her 's sleep disturbances. This writer reviewed research study that he may benefit from. She spends time reviewing the impact of his service time on his life  \par \par No other needs or concerns noted at this time. Will meet with patient again at 2:00, o'clock on 24.  [FreeTextEntry1] : weekly therapy session in person to address depression, anxiety, isolation, and impact of medical issues on emotional health. 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

## 2023-05-18 PROBLEM — Z01.818 PREOPERATIVE CLEARANCE: Status: ACTIVE | Noted: 2017-04-20

## 2023-05-18 PROBLEM — M54.9 MID-BACK PAIN, ACUTE: Status: RESOLVED | Noted: 2021-07-15 | Resolved: 2023-05-18
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
## 2023-05-18 PROBLEM — M62.830 BACK SPASM: Status: RESOLVED | Noted: 2021-03-08 | Resolved: 2023-05-18

## 2023-05-18 PROBLEM — H26.9 ACQUIRED CATARACT: Status: ACTIVE | Noted: 2023-05-18

## 2023-05-18 PROBLEM — J45.909 ASTHMATIC BRONCHITIS: Status: RESOLVED | Noted: 2019-02-06 | Resolved: 2023-05-18

## 2023-05-18 PROBLEM — Z96.652 STATUS POST LEFT KNEE REPLACEMENT: Status: RESOLVED | Noted: 2020-12-02 | Resolved: 2023-05-18

## 2023-05-18 NOTE — ASSESSMENT
[No Further Testing Recommended] : no further testing recommended [Patient Optimized for Surgery] : Patient optimized for surgery [FreeTextEntry4] :  At this time, I see no absolute  contraindication for proposed procedure.  COVID test results as per surgery. \par Reviewed, and advised no aspirin, NSAIDs, fish oil vitamin E. one week prior to procedure.\par If you take BP medication, take the AM of surgery with small sip of water. \par Advised to ensure normal BM day prior to surgery.\par Strategies to achieve reviewed. \par \par Best wishes offered.\par

## 2023-05-18 NOTE — HISTORY OF PRESENT ILLNESS
[Atrial Fibrillation] : atrial fibrillation [No Adverse Anesthesia Reaction] : no adverse anesthesia reaction in self or family member [Implantable Device/Pacemaker] : implantable device/pacemaker [Aortic Stenosis] : no aortic stenosis [Coronary Artery Disease] : no coronary artery disease [Recent Myocardial Infarction] : no recent myocardial infarction [Sleep Apnea] : no sleep apnea [Chronic Anticoagulation] : no chronic anticoagulation [Chronic Kidney Disease] : no chronic kidney disease [Diabetes] : no diabetes [FreeTextEntry1] : Cataract Extraction with IOL [FreeTextEntry2] : 05/26/2023 (right)  & 06/09/2023 (left) [FreeTextEntry3] :  Dr. Jeff Gary [FreeTextEntry4] : KIERAN WINTER is a 68 year old F who presents today for pre-operative evaluation regarding Cataract Extraction with IOL scheduled for  05/26/2023 (right)  and 06/09/2023 (left) at [facility] with Dr. Jeff Gary. Pt has no questions or concerns regarding the procedure at this time.\par \par fax: [phone]\par \par Follows with cardiology for A FIb and P HTN \par  [FreeTextEntry5] : [facility]

## 2023-05-18 NOTE — PLAN
[FreeTextEntry1] : \par Labs from 5/9/2023 reviewed in office with pt, all results unremarkable.  \par \par Fu with cardiology as planned for [facility].

## 2023-05-24 ENCOUNTER — APPOINTMENT (OUTPATIENT)
Dept: PSYCHIATRY | Facility: CLINIC | Age: 68
End: 2023-05-24
Payer: MEDICARE

## 2023-05-24 PROCEDURE — 90837 PSYTX W PT 60 MINUTES: CPT

## 2023-05-24 NOTE — PLAN
[FreeTextEntry2] : Problem: trauma: \par Objective and goal: psycho-ed, symptom management in context of critical health issues, many losses, Patient will become more aware of her symptoms and how she manages them\par \par Problem: Depression and anxiety\par Objective and Goal: decrease symptoms, build skills to cope effectively, use mindfulness and insight to feel less overwhelmed and unmotivated \par \par Problem: Isolation\par Goal: more activity with less depressive symptoms [Cognitive and/or Behavior Therapy] : Cognitive and/or Behavior Therapy  [Psychoeducation] : Psychoeducation  [Skills training (all types)] : Skills training (all types)  [Supportive Therapy] : Supportive Therapy [de-identified] : The above named patient presents on time for a weekly session. She's alert and oriented. Engages well. Good eye contact, reports that she was engaged in activity prior to this appointment. She always seems to benefit from support and type for ventilation. \par \par Patient reports she'll be having her eye surgery on Friday. She has a normal level of anxiety. This writer encouraged her to challenge her thoughts when feeling anxious, as well as reflect on the nature of surgery and the high level of surgical procedures that are done like hers daily. \par \par Patient expresses relief that she received good news from her cardiologist. This writer encouraged her to focus on the positivity. She often searches the internet and ruminates on her past symptoms, which many times increases her stress response. \par \par Patient also spends time reviewing the continued grieving process regarding the loss of her 's friend’s wife, encouraged her to set boundaries as exposure to the sadness of the sudden death can produce countertransference issues and increase symptoms. No other needs or concerns noted that this time, will meet with patient again on the 31st at 2:00 o'clock.  [FreeTextEntry1] : weekly therapy session in person to address depression, anxiety, isolation, and impact of medical issues on emotional health. 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

## 2023-05-24 NOTE — REASON FOR VISIT
[FreeTextEntry1] : chronic depression and anxiety exacerbated by significant health issues and halfway [Patient] : patient, [unfilled] is a ~age~ year old ~male/female~ being seen for a follow-up visit  [Duration of Psychotherapy Visit (minutes spent in synchronous communication): ____] : Duration of Psychotherapy Visit (minutes spent in synchronous communication): [unfilled] [Individual Psychotherapy for 53+ minutes] : Individual Psychotherapy for 53+ minutes  [Teletherapy Service Provided] : The services provided in this session were delivered via tele-therapy [FreeTextEntry3] : [facility] [FreeTextEntry5] : [facility]

## 2023-05-25 ENCOUNTER — RX ONLY (RX ONLY)
Age: 68
End: 2023-05-25

## 2023-05-26 ENCOUNTER — RX ONLY (RX ONLY)
Age: 68
End: 2023-05-26

## 2023-05-26 ENCOUNTER — ASC (OUTPATIENT)
Dept: URBAN - METROPOLITAN AREA SURGERY 8 | Facility: SURGERY | Age: 68
Setting detail: OPHTHALMOLOGY
End: 2023-05-26
Payer: MEDICARE

## 2023-05-26 DIAGNOSIS — H52.211: ICD-10-CM

## 2023-05-26 DIAGNOSIS — H25.11: ICD-10-CM

## 2023-05-26 PROCEDURE — FEMTO PRECISION LASER CATARACT SURGERY: Performed by: SPECIALIST

## 2023-05-26 PROCEDURE — 66984 XCAPSL CTRC RMVL W/O ECP: CPT | Performed by: SPECIALIST

## 2023-05-26 PROCEDURE — A9270 NON-COVERED ITEM OR SERVICE: HCPCS | Performed by: SPECIALIST

## 2023-05-26 PROCEDURE — 68841 INSJ RX ELUT IMPLT LAC CANAL: CPT | Performed by: SPECIALIST

## 2023-05-27 ENCOUNTER — OFFICE (OUTPATIENT)
Dept: URBAN - METROPOLITAN AREA CLINIC 104 | Facility: CLINIC | Age: 68
Setting detail: OPHTHALMOLOGY
End: 2023-05-27
Payer: MEDICARE

## 2023-05-27 DIAGNOSIS — Z96.1: ICD-10-CM

## 2023-05-27 PROCEDURE — 99024 POSTOP FOLLOW-UP VISIT: CPT | Performed by: OPTOMETRIST

## 2023-05-27 ASSESSMENT — AXIALLENGTH_DERIVED
OD_AL: 25.93
OD_AL: 25.3
OS_AL: 24.27
OS_AL: 24.375

## 2023-05-27 ASSESSMENT — KERATOMETRY
OS_K2POWER_DIOPTERS: 43.66
OS_K1POWER_DIOPTERS: 42.13
OS_AXISANGLE_DEGREES: 67
OD_K2POWER_DIOPTERS: 42.35
OD_K1POWER_DIOPTERS: 42.08
OD_AXISANGLE_DEGREES: 103

## 2023-05-27 ASSESSMENT — REFRACTION_AUTOREFRACTION
OD_SPHERE: -3.75
OD_AXIS: 94
OS_AXIS: 132
OD_CYLINDER: -1.00
OS_SPHERE: -0.75
OS_CYLINDER: -0.75

## 2023-05-27 ASSESSMENT — TONOMETRY: OD_IOP_MMHG: 18

## 2023-05-27 ASSESSMENT — VISUAL ACUITY
OS_BCVA: 20/100
OD_BCVA: 20/150

## 2023-05-27 ASSESSMENT — SPHEQUIV_DERIVED
OD_SPHEQUIV: -4.25
OS_SPHEQUIV: -1.375
OS_SPHEQUIV: -1.125
OD_SPHEQUIV: -2.875

## 2023-05-27 ASSESSMENT — REFRACTION_CURRENTRX
OD_AXIS: 30
OD_OVR_VA: 20/
OS_AXIS: 105
OS_SPHERE: +0.50
OD_SPHERE: -1.25
OS_OVR_VA: 20/
OD_CYLINDER: -0.75
OS_CYLINDER: -1.00

## 2023-05-27 ASSESSMENT — REFRACTION_MANIFEST
OD_CYLINDER: -0.75
OS_SPHERE: -1.00
OS_AXIS: 135
OD_AXIS: 30
OD_VA1: 20/150
OS_VA1: 20/150
OD_SPHERE: -2.50
OS_CYLINDER: -0.75

## 2023-05-27 ASSESSMENT — CONFRONTATIONAL VISUAL FIELD TEST (CVF)
OD_FINDINGS: FULL
OS_FINDINGS: FULL

## 2023-05-31 ENCOUNTER — APPOINTMENT (OUTPATIENT)
Dept: PSYCHIATRY | Facility: CLINIC | Age: 68
End: 2023-05-31
Payer: MEDICARE

## 2023-05-31 PROCEDURE — 90837 PSYTX W PT 60 MINUTES: CPT

## 2023-05-31 NOTE — PLAN
[FreeTextEntry2] : Problem: trauma: \par Objective and goal: psycho-ed, symptom management in context of critical health issues, many losses, Patient will become more aware of her symptoms and how she manages them\par \par Problem: Depression and anxiety\par Objective and Goal: decrease symptoms, build skills to cope effectively, use mindfulness and insight to feel less overwhelmed and unmotivated \par \par Problem: Isolation\par Goal: more activity with less depressive symptoms [Cognitive and/or Behavior Therapy] : Cognitive and/or Behavior Therapy  [Psychoeducation] : Psychoeducation  [Skills training (all types)] : Skills training (all types)  [Supportive Therapy] : Supportive Therapy [de-identified] : The above named patient presents on time for her weekly session. She's alert and oriented, somewhat overwhelmed as she's having trouble with her eyes after her surgery, she also spends time reviewing the impact of the surgical procedure and having to lay flat in  back pain. She's now experiencing. Patient always seems to benefit from support and time for ventilation. \par \par Patient endorses increased stress and depression over the past few days, this writer validated her stress response as she's re-experiencing medical issues and feelings of vulnerability. Patient agrees. She's also spent more time at home as she's unable to drive and her  has required the car. Reviewed the benefits of activity outside of the home, which has been limited, patient agrees. \par \par Much time we spent reflecting on past medical issues and how they continue. She looks forward to completing her eye surgery next week and will advocate needs to her surgeon based on her back and pain issues. This writer also encouraged her to follow up with specialists should her symptoms not resolve. She agrees. \par \par No other needs or concerns noted at this time. Reviewed the appropriateness of increased anxiety considering the past few days of medical issues and pain, also reviewed increased tension that may occur between her, and her  as patient admits to feeling increased irritability due to frustration and pain. Next appointment scheduled for the 7th at 2:00 o'clock  [FreeTextEntry1] : weekly therapy session in person to address depression, anxiety, isolation, and impact of medical issues on emotional health. 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

## 2023-05-31 NOTE — REASON FOR VISIT
[Patient preference] : as per patient preference [Telehealth (audio & video) - Individual/Group] : This visit was provided via telehealth using real-time 2-way audio visual technology. [Medical Office: ([facility])___] : The provider was located at the medical office in [unfilled]. [Home] : The patient, [unfilled], was located at home, [unfilled], at the time of the visit. [Verbal consent obtained from patient/other participant(s)] : Verbal consent for telehealth/telephonic services obtained from patient/other participant(s) [FreeTextEntry4] : 2 [FreeTextEntry2] : 5/24/2023 [FreeTextEntry1] : chronic depression and anxiety exacerbated by significant health issues and senior care [Patient] : patient, [unfilled] is a ~age~ year old ~male/female~ being seen for a follow-up visit  [Duration of Psychotherapy Visit (minutes spent in synchronous communication): ____] : Duration of Psychotherapy Visit (minutes spent in synchronous communication): [unfilled] [Individual Psychotherapy for 53+ minutes] : Individual Psychotherapy for 53+ minutes  [Teletherapy Service Provided] : The services provided in this session were delivered via tele-therapy [FreeTextEntry3] : home [FreeTextEntry5] : [facility]

## 2023-06-01 ENCOUNTER — OFFICE (OUTPATIENT)
Dept: URBAN - METROPOLITAN AREA CLINIC 104 | Facility: CLINIC | Age: 68
Setting detail: OPHTHALMOLOGY
End: 2023-06-01
Payer: MEDICARE

## 2023-06-01 DIAGNOSIS — H25.12: ICD-10-CM

## 2023-06-01 DIAGNOSIS — Z96.1: ICD-10-CM

## 2023-06-01 PROCEDURE — 92136 OPHTHALMIC BIOMETRY: CPT | Performed by: SPECIALIST

## 2023-06-01 PROCEDURE — 99024 POSTOP FOLLOW-UP VISIT: CPT | Performed by: SPECIALIST

## 2023-06-01 ASSESSMENT — REFRACTION_MANIFEST
OS_CYLINDER: -0.75
OS_AXIS: 135
OD_AXIS: 30
OD_CYLINDER: -0.75
OD_VA1: 20/150
OS_SPHERE: -1.00
OS_VA1: 20/150
OD_SPHERE: -2.50

## 2023-06-01 ASSESSMENT — REFRACTION_AUTOREFRACTION
OD_CYLINDER: -1.50
OS_AXIS: 132
OS_SPHERE: -0.75
OS_CYLINDER: -0.75
OD_AXIS: 068
OD_SPHERE: 0.00

## 2023-06-01 ASSESSMENT — KERATOMETRY
OS_AXISANGLE_DEGREES: 67
OD_AXISANGLE_DEGREES: 013
OD_K1POWER_DIOPTERS: 42.29
OS_K2POWER_DIOPTERS: 43.66
OD_K2POWER_DIOPTERS: 42.45
OS_K1POWER_DIOPTERS: 42.13

## 2023-06-01 ASSESSMENT — REFRACTION_CURRENTRX
OS_AXIS: 105
OD_OVR_VA: 20/
OD_CYLINDER: -0.75
OS_CYLINDER: -1.00
OD_AXIS: 30
OS_SPHERE: +0.50
OS_OVR_VA: 20/
OD_SPHERE: -1.25

## 2023-06-01 ASSESSMENT — VISUAL ACUITY
OS_BCVA: 20/80
OD_BCVA: 20/150

## 2023-06-01 ASSESSMENT — AXIALLENGTH_DERIVED
OD_AL: 25.23
OS_AL: 24.27
OS_AL: 24.375
OD_AL: 24.32

## 2023-06-01 ASSESSMENT — SPHEQUIV_DERIVED
OD_SPHEQUIV: -0.75
OS_SPHEQUIV: -1.125
OS_SPHEQUIV: -1.375
OD_SPHEQUIV: -2.875

## 2023-06-01 ASSESSMENT — CONFRONTATIONAL VISUAL FIELD TEST (CVF)
OD_FINDINGS: FULL
OS_FINDINGS: FULL

## 2023-06-01 ASSESSMENT — SUPERFICIAL PUNCTATE KERATITIS (SPK): OD_SPK: 2+

## 2023-06-01 ASSESSMENT — TONOMETRY: OD_IOP_MMHG: 17

## 2023-06-07 ENCOUNTER — APPOINTMENT (OUTPATIENT)
Dept: PSYCHIATRY | Facility: CLINIC | Age: 68
End: 2023-06-07
Payer: MEDICARE

## 2023-06-07 PROCEDURE — 90837 PSYTX W PT 60 MINUTES: CPT | Mod: 95

## 2023-06-07 NOTE — PLAN
[FreeTextEntry2] : Problem: trauma: \par Objective and goal: psycho-ed, symptom management in context of critical health issues, many losses, Patient will become more aware of her symptoms and how she manages them\par \par Problem: Depression and anxiety\par Objective and Goal: decrease symptoms, build skills to cope effectively, use mindfulness and insight to feel less overwhelmed and unmotivated \par \par Problem: Isolation\par Goal: more activity with less depressive symptoms [Cognitive and/or Behavior Therapy] : Cognitive and/or Behavior Therapy  [Psychoeducation] : Psychoeducation  [Skills training (all types)] : Skills training (all types)  [Supportive Therapy] : Supportive Therapy [de-identified] :  \par \par The above named patient presents on time for her weekly session. She's alert and oriented, always engages well with good eye contact, seems to benefit from support and time for ventilation. She's always calm and cooperative. She reports an overall improvement in her anxiety related to anticipated eye surgery. She's been able to advocate her anxieties to her surgeon, who has procedurally put her mind at ease. She also reports an improvement in her back, therefore did not feel she required follow-up as she feels that the procedure may have put strain on areas that were previously injured. Patient also spends time reviewing loss of her 's best friend's wife and how this is impacted her household. Time was spent reviewing the grieving process and the benefits of the level of support therapeutically her   has been providing. She agrees. She also endorses that her  continues to struggle with night terrors. Encouraged her to have him follow up regarding the Transcendental Meditation study through [facility]. No other needs or concerns noted at this time, will meet with patient on the 14th at 2:00 o'clock  [FreeTextEntry1] : weekly therapy session in person to address depression, anxiety, isolation, and impact of medical issues on emotional health. 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

## 2023-06-07 NOTE — REASON FOR VISIT
[Patient preference] : as per patient preference [Telehealth (audio & video) - Individual/Group] : This visit was provided via telehealth using real-time 2-way audio visual technology. [Medical Office: ([facility])___] : The provider was located at the medical office in [unfilled]. [Home] : The patient, [unfilled], was located at home, [unfilled], at the time of the visit. [Verbal consent obtained from patient/other participant(s)] : Verbal consent for telehealth/telephonic services obtained from patient/other participant(s) [FreeTextEntry4] : 2 [FreeTextEntry2] : 6/1/2023 [FreeTextEntry1] : chronic depression and anxiety exacerbated by significant health issues and [facility] [Patient] : patient, [unfilled] is a ~age~ year old ~male/female~ being seen for a follow-up visit  [Duration of Psychotherapy Visit (minutes spent in synchronous communication): ____] : Duration of Psychotherapy Visit (minutes spent in synchronous communication): [unfilled] [Individual Psychotherapy for 53+ minutes] : Individual Psychotherapy for 53+ minutes  [Teletherapy Service Provided] : The services provided in this session were delivered via tele-therapy [FreeTextEntry3] : home [FreeTextEntry5] : [facility]

## 2023-06-09 ENCOUNTER — RX ONLY (RX ONLY)
Age: 68
End: 2023-06-09

## 2023-06-09 ENCOUNTER — ASC (OUTPATIENT)
Dept: URBAN - METROPOLITAN AREA SURGERY 8 | Facility: SURGERY | Age: 68
Setting detail: OPHTHALMOLOGY
End: 2023-06-09
Payer: MEDICARE

## 2023-06-09 DIAGNOSIS — H52.212: ICD-10-CM

## 2023-06-09 DIAGNOSIS — H25.12: ICD-10-CM

## 2023-06-09 PROCEDURE — 68841 INSJ RX ELUT IMPLT LAC CANAL: CPT | Performed by: SPECIALIST

## 2023-06-09 PROCEDURE — A9270 NON-COVERED ITEM OR SERVICE: HCPCS | Performed by: SPECIALIST

## 2023-06-09 PROCEDURE — FEMTO FEMTOSECOND LASER: Performed by: SPECIALIST

## 2023-06-09 PROCEDURE — 66984 XCAPSL CTRC RMVL W/O ECP: CPT | Performed by: SPECIALIST

## 2023-06-09 RX ORDER — CYCLOBENZAPRINE HYDROCHLORIDE 5 MG/1
5 TABLET, FILM COATED ORAL 3 TIMES DAILY
Qty: 20 | Refills: 0 | Status: ACTIVE | COMMUNITY
Start: 2023-06-09 | End: 1900-01-01

## 2023-06-09 ASSESSMENT — SUPERFICIAL PUNCTATE KERATITIS (SPK): OD_SPK: 2+

## 2023-06-10 ENCOUNTER — OFFICE (OUTPATIENT)
Dept: URBAN - METROPOLITAN AREA CLINIC 104 | Facility: CLINIC | Age: 68
Setting detail: OPHTHALMOLOGY
End: 2023-06-10
Payer: MEDICARE

## 2023-06-10 DIAGNOSIS — Z96.1: ICD-10-CM

## 2023-06-10 PROCEDURE — 99024 POSTOP FOLLOW-UP VISIT: CPT | Performed by: OPTOMETRIST

## 2023-06-10 ASSESSMENT — REFRACTION_CURRENTRX
OS_AXIS: 105
OD_CYLINDER: -0.75
OD_OVR_VA: 20/
OS_CYLINDER: -1.00
OS_SPHERE: +0.50
OS_OVR_VA: 20/
OD_AXIS: 30
OD_SPHERE: -1.25

## 2023-06-10 ASSESSMENT — KERATOMETRY
OS_AXISANGLE_DEGREES: 087
OD_K2POWER_DIOPTERS: 43.89
OD_K1POWER_DIOPTERS: 42.24
OS_K2POWER_DIOPTERS: 42.83
OS_K1POWER_DIOPTERS: 41.36
OD_AXISANGLE_DEGREES: 066

## 2023-06-10 ASSESSMENT — REFRACTION_AUTOREFRACTION
OD_AXIS: 144
OS_AXIS: 011
OD_SPHERE: -0.75
OS_CYLINDER: -1.25
OD_CYLINDER: -1.00
OS_SPHERE: PL

## 2023-06-10 ASSESSMENT — SPHEQUIV_DERIVED
OD_SPHEQUIV: -1.25
OD_SPHEQUIV: -2.875

## 2023-06-10 ASSESSMENT — VISUAL ACUITY
OS_BCVA: 20/50
OD_BCVA: 20/70+1

## 2023-06-10 ASSESSMENT — REFRACTION_MANIFEST
OD_SPHERE: -2.50
OD_CYLINDER: -0.75
OD_AXIS: 30
OD_VA1: 20/150

## 2023-06-10 ASSESSMENT — TONOMETRY: OS_IOP_MMHG: 12

## 2023-06-10 ASSESSMENT — CONFRONTATIONAL VISUAL FIELD TEST (CVF)
OD_FINDINGS: FULL
OS_FINDINGS: FULL

## 2023-06-10 ASSESSMENT — AXIALLENGTH_DERIVED
OD_AL: 24.95
OD_AL: 24.26

## 2023-06-14 ENCOUNTER — APPOINTMENT (OUTPATIENT)
Dept: PSYCHIATRY | Facility: CLINIC | Age: 68
End: 2023-06-14
Payer: MEDICARE

## 2023-06-14 PROCEDURE — 90837 PSYTX W PT 60 MINUTES: CPT

## 2023-06-14 NOTE — PLAN
[FreeTextEntry2] : Problem: trauma: \par Objective and goal: psycho-ed, symptom management in context of critical health issues, many losses, Patient will become more aware of her symptoms and how she manages them\par \par Problem: Depression and anxiety\par Objective and Goal: decrease symptoms, build skills to cope effectively, use mindfulness and insight to feel less overwhelmed and unmotivated \par \par Problem: Isolation\par Goal: more activity with less depressive symptoms [Cognitive and/or Behavior Therapy] : Cognitive and/or Behavior Therapy  [Psychoeducation] : Psychoeducation  [Skills training (all types)] : Skills training (all types)  [Supportive Therapy] : Supportive Therapy [de-identified] : The above named patient presents on time for her weekly session. She's alert and oriented. Flat affect, many times tearful during the session as she's experienced increase in medical issues. She reports a high level of back discomfort as well as worry about cause of back pain. Encouraged her to reflect on past levels of pain and how they resolved with time. She agrees, however, was encouraged to be proactive in discussing with her doctor . This writer engaged patient in a full review of the history of her anxiety as well as anxiety within her family and during her childhood. Patient has experienced a high level of loss as well as tragic, untimely deaths. Patient’s parents both experienced anxiety and life altering medical issues. Reviewed the importance of good stress management skills as well as positive thinking, with emphasis placed on mindfulness of her past progress and strength. In the past. Validated patient’s frustration and also reviewed how emotional reaction and anxiety can impact physical health. No other needs or concerns noted at this time. Patient greatly benefits from processing her stressors and anxiety as well as guilt related to how her medical issues have affected her .  next appointment scheduled for the 21st at 2:00 o'clock  [FreeTextEntry1] : weekly therapy session in person to address depression, anxiety, isolation, and impact of medical issues on emotional health. 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

## 2023-06-14 NOTE — REASON FOR VISIT
[FreeTextEntry1] : chronic depression and anxiety exacerbated by significant health issues and skilled nursing [Patient] : patient, [unfilled] is a ~age~ year old ~male/female~ being seen for a follow-up visit  [Duration of Psychotherapy Visit (minutes spent in synchronous communication): ____] : Duration of Psychotherapy Visit (minutes spent in synchronous communication): [unfilled] [Individual Psychotherapy for 53+ minutes] : Individual Psychotherapy for 53+ minutes  [Teletherapy Service Provided] : The services provided in this session were delivered via tele-therapy [FreeTextEntry3] : [facility] [FreeTextEntry5] : [facility]

## 2023-06-16 ENCOUNTER — OFFICE (OUTPATIENT)
Dept: URBAN - METROPOLITAN AREA CLINIC 104 | Facility: CLINIC | Age: 68
Setting detail: OPHTHALMOLOGY
End: 2023-06-16
Payer: MEDICARE

## 2023-06-16 ENCOUNTER — TRANSCRIPTION ENCOUNTER (OUTPATIENT)
Age: 68
End: 2023-06-16

## 2023-06-16 ENCOUNTER — RX ONLY (RX ONLY)
Age: 68
End: 2023-06-16

## 2023-06-16 DIAGNOSIS — Z96.1: ICD-10-CM

## 2023-06-16 PROCEDURE — 99024 POSTOP FOLLOW-UP VISIT: CPT | Performed by: OPTOMETRIST

## 2023-06-16 ASSESSMENT — REFRACTION_AUTOREFRACTION
OS_AXIS: 178
OD_CYLINDER: -1.00
OD_AXIS: 094
OD_SPHERE: +0.75
OS_CYLINDER: -0.50
OS_SPHERE: -2.50

## 2023-06-16 ASSESSMENT — SPHEQUIV_DERIVED
OD_SPHEQUIV: 0.25
OS_SPHEQUIV: -2.75

## 2023-06-16 ASSESSMENT — AXIALLENGTH_DERIVED
OS_AL: 24.77
OD_AL: 23.982

## 2023-06-16 ASSESSMENT — CONFRONTATIONAL VISUAL FIELD TEST (CVF)
OS_FINDINGS: FULL
OD_FINDINGS: FULL

## 2023-06-16 ASSESSMENT — KERATOMETRY
OD_K1POWER_DIOPTERS: 42.03
OS_AXISANGLE_DEGREES: 016
OS_K2POWER_DIOPTERS: 43.83
OD_K2POWER_DIOPTERS: 42.35
OS_K1POWER_DIOPTERS: 42.88
OD_AXISANGLE_DEGREES: 143

## 2023-06-16 ASSESSMENT — VISUAL ACUITY
OD_BCVA: 20/70+1
OS_BCVA: 20/30

## 2023-06-16 ASSESSMENT — SUPERFICIAL PUNCTATE KERATITIS (SPK)
OD_SPK: 2+
OS_SPK: 3+ 4+

## 2023-06-17 ENCOUNTER — APPOINTMENT (OUTPATIENT)
Dept: RADIOLOGY | Facility: CLINIC | Age: 68
End: 2023-06-17
Payer: MEDICARE

## 2023-06-17 ENCOUNTER — OUTPATIENT (OUTPATIENT)
Dept: OUTPATIENT SERVICES | Facility: HOSPITAL | Age: 68
LOS: 1 days | End: 2023-06-17
Payer: MEDICARE

## 2023-06-17 DIAGNOSIS — Z98.890 OTHER SPECIFIED POSTPROCEDURAL STATES: Chronic | ICD-10-CM

## 2023-06-17 DIAGNOSIS — M54.50 LOW BACK PAIN, UNSPECIFIED: ICD-10-CM

## 2023-06-17 DIAGNOSIS — Z90.49 ACQUIRED ABSENCE OF OTHER SPECIFIED PARTS OF DIGESTIVE TRACT: Chronic | ICD-10-CM

## 2023-06-17 DIAGNOSIS — Z95.828 PRESENCE OF OTHER VASCULAR IMPLANTS AND GRAFTS: Chronic | ICD-10-CM

## 2023-06-17 DIAGNOSIS — Z96.641 PRESENCE OF RIGHT ARTIFICIAL HIP JOINT: Chronic | ICD-10-CM

## 2023-06-17 PROCEDURE — 72070 X-RAY EXAM THORAC SPINE 2VWS: CPT | Mod: 26

## 2023-06-17 PROCEDURE — 72070 X-RAY EXAM THORAC SPINE 2VWS: CPT

## 2023-06-17 PROCEDURE — 72100 X-RAY EXAM L-S SPINE 2/3 VWS: CPT

## 2023-06-17 PROCEDURE — 72100 X-RAY EXAM L-S SPINE 2/3 VWS: CPT | Mod: 26

## 2023-06-19 ENCOUNTER — TRANSCRIPTION ENCOUNTER (OUTPATIENT)
Age: 68
End: 2023-06-19

## 2023-06-21 ENCOUNTER — APPOINTMENT (OUTPATIENT)
Dept: PSYCHIATRY | Facility: CLINIC | Age: 68
End: 2023-06-21
Payer: MEDICARE

## 2023-06-21 PROCEDURE — 90837 PSYTX W PT 60 MINUTES: CPT | Mod: 95

## 2023-06-21 NOTE — REASON FOR VISIT
[Patient preference] : as per patient preference [Telehealth (audio & video) - Individual/Group] : This visit was provided via telehealth using real-time 2-way audio visual technology. [Other Location: e.g. Home (Enter Location, City,State)___] : The provider was located at [unfilled]. [Home] : The patient, [unfilled], was located at home, [unfilled], at the time of the visit. [Verbal consent obtained from patient/other participant(s)] : Verbal consent for telehealth/telephonic services obtained from patient/other participant(s) [FreeTextEntry4] : 2p [FreeTextEntry2] : 6/14/23 [FreeTextEntry1] : chronic depression and anxiety exacerbated by significant health issues and [facility] [Patient] : patient, [unfilled] is a ~age~ year old ~male/female~ being seen for a follow-up visit  [Duration of Psychotherapy Visit (minutes spent in synchronous communication): ____] : Duration of Psychotherapy Visit (minutes spent in synchronous communication): [unfilled] [Individual Psychotherapy for 53+ minutes] : Individual Psychotherapy for 53+ minutes  [Teletherapy Service Provided] : The services provided in this session were delivered via tele-therapy [FreeTextEntry3] : home [FreeTextEntry5] : home

## 2023-06-21 NOTE — PLAN
[FreeTextEntry2] : Problem: trauma: \par Objective and goal: psycho-ed, symptom management in context of critical health issues, many losses, Patient will become more aware of her symptoms and how she manages them\par \par Problem: Depression and anxiety\par Objective and Goal: decrease symptoms, build skills to cope effectively, use mindfulness and insight to feel less overwhelmed and unmotivated \par \par Problem: Isolation\par Goal: more activity with less depressive symptoms [Cognitive and/or Behavior Therapy] : Cognitive and/or Behavior Therapy  [Psychoeducation] : Psychoeducation  [Skills training (all types)] : Skills training (all types)  [Supportive Therapy] : Supportive Therapy [de-identified] :  \par \par Patient presents on time for her weekly session. She's alert and oriented. Engages well. Brighter affect compared to past weeks, she seems to be recovering well from eye surgery, has increased activity outside of the home. Had imaging done of her back which revealed no significant findings and she looks forward to time with her family over the weekend. Patient will be seen for an acupuncture appointment as she reports her back although improving,  is still causing her a high level of discomfort, she's had poor sleep as a result. Reports increased anxiety during a social event  on the 20th. This writer provided support and education regarding her numerous medical issues and long hour on her feet , something she has not been regularly engaged in. Patient insightful into the emotional and physical fatigue she faces. No other needs or concerns noted at this time, will meet with patient again on the 27th at 3:00  [FreeTextEntry1] : weekly therapy session in person to address depression, anxiety, isolation, and impact of medical issues on emotional health. 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

## 2023-06-26 ENCOUNTER — TRANSCRIPTION ENCOUNTER (OUTPATIENT)
Age: 68
End: 2023-06-26

## 2023-06-27 ENCOUNTER — APPOINTMENT (OUTPATIENT)
Dept: PSYCHIATRY | Facility: CLINIC | Age: 68
End: 2023-06-27
Payer: MEDICARE

## 2023-06-27 PROCEDURE — 90837 PSYTX W PT 60 MINUTES: CPT | Mod: 95

## 2023-06-27 NOTE — REASON FOR VISIT
[Patient preference] : as per patient preference [Telehealth (audio & video) - Individual/Group] : This visit was provided via telehealth using real-time 2-way audio visual technology. [Medical Office: ([facility])___] : The provider was located at the medical office in [unfilled]. [Home] : The patient, [unfilled], was located at home, [unfilled], at the time of the visit. [Verbal consent obtained from patient/other participant(s)] : Verbal consent for telehealth/telephonic services obtained from patient/other participant(s) [FreeTextEntry4] : 3 [FreeTextEntry2] : 6/14/23 [FreeTextEntry1] : chronic depression and anxiety exacerbated by significant health issues and skilled nursing [Patient] : patient, [unfilled] is a ~age~ year old ~male/female~ being seen for a follow-up visit  [Duration of Psychotherapy Visit (minutes spent in synchronous communication): ____] : Duration of Psychotherapy Visit (minutes spent in synchronous communication): [unfilled] [Individual Psychotherapy for 53+ minutes] : Individual Psychotherapy for 53+ minutes  [Teletherapy Service Provided] : The services provided in this session were delivered via tele-therapy [FreeTextEntry3] : home [FreeTextEntry5] : [facility]

## 2023-06-27 NOTE — PLAN
[FreeTextEntry2] : Problem: trauma: \par Objective and goal: psycho-ed, symptom management in context of critical health issues, many losses, Patient will become more aware of her symptoms and how she manages them\par \par Problem: Depression and anxiety\par Objective and Goal: decrease symptoms, build skills to cope effectively, use mindfulness and insight to feel less overwhelmed and unmotivated \par \par Problem: Isolation\par Goal: more activity with less depressive symptoms [Cognitive and/or Behavior Therapy] : Cognitive and/or Behavior Therapy  [Psychoeducation] : Psychoeducation  [Skills training (all types)] : Skills training (all types)  [Supportive Therapy] : Supportive Therapy [de-identified] :  The above named patient presents on time for her weekly session. She's alert and oriented, engages well. Good eye contact always seems to benefit from support and time for ventilation. Reports she has been sleeping better. Her eyes continue to improve. She's attended an acupuncture appointment. Will have another appt today as well as massage tomorrow. She feels being proactive is beneficial for her overall emotional health. Patient notes an increase in her emotional response to sad situations. This writer reviewed how physical and emotional stress has fatigued her and how her emotional response to situations may feel exacerbated. She agrees. Patient also reports concern regarding her 's alcohol use. This writer provided education regarding the impact of the past on her present and encouraged her to engage in effective communication to further review her concerns. She agreed to do so. No other needs or concerns noted at this time. Will meet with patient again on the 5th at 2:00 o'clock.  [FreeTextEntry1] : weekly therapy session in person to address depression, anxiety, isolation, and impact of medical issues on emotional health. 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

## 2023-07-05 ENCOUNTER — APPOINTMENT (OUTPATIENT)
Dept: PSYCHIATRY | Facility: CLINIC | Age: 68
End: 2023-07-05
Payer: MEDICARE

## 2023-07-05 PROCEDURE — 90837 PSYTX W PT 60 MINUTES: CPT

## 2023-07-05 NOTE — PLAN
[Cognitive and/or Behavior Therapy] : Cognitive and/or Behavior Therapy  [FreeTextEntry2] : Problem: trauma: \par Objective and goal: psycho-ed, symptom management in context of critical health issues, many losses, Patient will become more aware of her symptoms and how she manages them\par \par Problem: Depression and anxiety\par Objective and Goal: decrease symptoms, build skills to cope effectively, use mindfulness and insight to feel less overwhelmed and unmotivated \par \par Problem: Isolation\par Goal: more activity with less depressive symptoms [Psychoeducation] : Psychoeducation  [Skills training (all types)] : Skills training (all types)  [Supportive Therapy] : Supportive Therapy [de-identified] : The above named patient presents on time for her weekly session. She's alert and oriented, engages well, always seems to benefit from support and time for ventilation, she's pleased that she's able to drive , has increased independence, which always positively affects her mental health. She spends time reviewing recent loss of a peer who she has a strong historical connection with. This also has increased some of her anxiety regarding her own medical issues. Much support and education provided regarding anxiety and the benefits of strong stress management tools, noting areas of control versus lack of control. Patient agrees. Patient also spends time reviewing her 's increased depression. She reflects on the aging process and his medical issues. Time was spent reviewing the importance of reflection on positivity as well as activity outside of the home. No other needs or concerns noted at this time, will meet with patient again on the 12th at 2:00  [FreeTextEntry1] : weekly therapy session in person to address depression, anxiety, isolation, and impact of medical issues on emotional health. 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

## 2023-07-05 NOTE — REASON FOR VISIT
[FreeTextEntry1] : chronic depression and anxiety exacerbated by significant health issues and half-way [Patient] : patient, [unfilled] is a ~age~ year old ~male/female~ being seen for a follow-up visit  [Duration of Psychotherapy Visit (minutes spent in synchronous communication): ____] : Duration of Psychotherapy Visit (minutes spent in synchronous communication): [unfilled] [Individual Psychotherapy for 53+ minutes] : Individual Psychotherapy for 53+ minutes  [Teletherapy Service Provided] : The services provided in this session were delivered via tele-therapy [FreeTextEntry3] : [facility] [FreeTextEntry5] : [facility]

## 2023-07-07 ENCOUNTER — OFFICE (OUTPATIENT)
Dept: URBAN - METROPOLITAN AREA CLINIC 104 | Facility: CLINIC | Age: 68
Setting detail: OPHTHALMOLOGY
End: 2023-07-07
Payer: MEDICARE

## 2023-07-07 DIAGNOSIS — Z96.1: ICD-10-CM

## 2023-07-07 PROBLEM — H16.223 DRY EYE SYNDROME K SICCA; BOTH EYES: Status: ACTIVE | Noted: 2023-06-16

## 2023-07-07 PROCEDURE — 99024 POSTOP FOLLOW-UP VISIT: CPT | Performed by: OPTOMETRIST

## 2023-07-07 ASSESSMENT — REFRACTION_AUTOREFRACTION
OS_SPHERE: +0.25
OS_CYLINDER: -0.50
OD_CYLINDER: -0.75
OD_SPHERE: +0.50
OD_AXIS: 123
OS_AXIS: 180

## 2023-07-07 ASSESSMENT — TONOMETRY
OD_IOP_MMHG: 14
OS_IOP_MMHG: 14

## 2023-07-07 ASSESSMENT — REFRACTION_CURRENTRX
OS_VPRISM_DIRECTION: SV
OD_VPRISM_DIRECTION: SV
OD_SPHERE: +2.50
OD_OVR_VA: 20/
OS_OVR_VA: 20/
OS_CYLINDER: SPH
OD_CYLINDER: SPH
OS_SPHERE: +2.50

## 2023-07-07 ASSESSMENT — VISUAL ACUITY
OS_BCVA: 20/25
OD_BCVA: 20/60

## 2023-07-07 ASSESSMENT — CONFRONTATIONAL VISUAL FIELD TEST (CVF)
OD_FINDINGS: FULL
OS_FINDINGS: FULL

## 2023-07-07 ASSESSMENT — SPHEQUIV_DERIVED
OD_SPHEQUIV: 0.125
OS_SPHEQUIV: 0
OS_SPHEQUIV: 0
OD_SPHEQUIV: 0.125

## 2023-07-07 ASSESSMENT — KERATOMETRY
OS_K1POWER_DIOPTERS: 41.72
OS_K2POWER_DIOPTERS: 42.78
OD_K2POWER_DIOPTERS: 42.13
OS_AXISANGLE_DEGREES: 052
OD_K1POWER_DIOPTERS: 41.98
OD_AXISANGLE_DEGREES: 135

## 2023-07-07 ASSESSMENT — REFRACTION_MANIFEST
OD_CYLINDER: -0.75
OS_VA1: 20/50
OD_VA1: 20/25-1
OS_SPHERE: +0.25
OS_CYLINDER: -0.50
OD_SPHERE: +0.50
OD_AXIS: 123
OS_AXIS: 180

## 2023-07-07 ASSESSMENT — AXIALLENGTH_DERIVED
OD_AL: 24.08
OD_AL: 24.08
OS_AL: 24.0602
OS_AL: 24.0602

## 2023-07-07 ASSESSMENT — SUPERFICIAL PUNCTATE KERATITIS (SPK)
OS_SPK: 3+ 4+
OD_SPK: 2+

## 2023-07-12 ENCOUNTER — APPOINTMENT (OUTPATIENT)
Dept: PSYCHIATRY | Facility: CLINIC | Age: 68
End: 2023-07-12
Payer: MEDICARE

## 2023-07-12 PROCEDURE — 90837 PSYTX W PT 60 MINUTES: CPT | Mod: 95

## 2023-07-12 NOTE — REASON FOR VISIT
[Patient preference] : as per patient preference [Telehealth (audio & video) - Individual/Group] : This visit was provided via telehealth using real-time 2-way audio visual technology. [Medical Office: ([facility])___] : The provider was located at the medical office in [unfilled]. [Home] : The patient, [unfilled], was located at home, [unfilled], at the time of the visit. [FreeTextEntry4] : 2 [FreeTextEntry2] : 7/12 [FreeTextEntry1] : chronic depression and anxiety exacerbated by significant health issues and residential [Patient] : patient, [unfilled] is a ~age~ year old ~male/female~ being seen for a follow-up visit  [Duration of Psychotherapy Visit (minutes spent in synchronous communication): ____] : Duration of Psychotherapy Visit (minutes spent in synchronous communication): [unfilled] [Individual Psychotherapy for 53+ minutes] : Individual Psychotherapy for 53+ minutes  [Teletherapy Service Provided] : The services provided in this session were delivered via tele-therapy [FreeTextEntry3] : home [FreeTextEntry5] : [facility]

## 2023-07-12 NOTE — PLAN
[FreeTextEntry2] : Problem: trauma: \par Objective and goal: psycho-ed, symptom management in context of critical health issues, many losses, Patient will become more aware of her symptoms and how she manages them\par \par Problem: Depression and anxiety\par Objective and Goal: decrease symptoms, build skills to cope effectively, use mindfulness and insight to feel less overwhelmed and unmotivated \par \par Problem: Isolation\par Goal: more activity with less depressive symptoms [Cognitive and/or Behavior Therapy] : Cognitive and/or Behavior Therapy  [Psychoeducation] : Psychoeducation  [Skills training (all types)] : Skills training (all types)  [Supportive Therapy] : Supportive Therapy [de-identified] : The above named patient presents on time for her weekly session. She's alert and oriented, engages well. Good eye contact always seems to benefit from support and time for ventilation. In good spirits, reports overall improvement in her health, noting benefits of acupuncture and having a very positive appointment with her cardiologist, she's focused on the connection between her mother's illness and her death and the fear of her own mortality based on her similar diagnosis. However, patient is paying attention on the present time noting importance of good stress management skills and her ability to Be proactive in her health needs. Patient pleased that she was able to food shop on her own. She has been able to increase her independence, reports good sleep and improvement in her back pain. Looks forward to the upcoming weeks with many social events, she does not feel overwhelmed in these plans as she feels they are within her capabilities, no other needs or concerns noted at this time, will see patient again on the 19th at 2:00  [FreeTextEntry1] : weekly therapy session in person to address depression, anxiety, isolation, and impact of medical issues on emotional health. 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

## 2023-07-19 ENCOUNTER — APPOINTMENT (OUTPATIENT)
Dept: PSYCHIATRY | Facility: CLINIC | Age: 68
End: 2023-07-19
Payer: MEDICARE

## 2023-07-19 PROCEDURE — 90837 PSYTX W PT 60 MINUTES: CPT

## 2023-07-19 NOTE — PLAN
[Cognitive and/or Behavior Therapy] : Cognitive and/or Behavior Therapy  [Psychoeducation] : Psychoeducation  [Skills training (all types)] : Skills training (all types)  [Supportive Therapy] : Supportive Therapy [FreeTextEntry2] : Problem: trauma: \par Objective and goal: psycho-ed, symptom management in context of critical health issues, many losses, Patient will become more aware of her symptoms and how she manages them\par \par Problem: Depression and anxiety\par Objective and Goal: decrease symptoms, build skills to cope effectively, use mindfulness and insight to feel less overwhelmed and unmotivated \par \par Problem: Isolation\par Goal: more activity with less depressive symptoms [de-identified] : The above named patient presents on time for her weekly session. She's alert and oriented, engages well. Good eye contact, always seems to benefit from support and time for ventilation. Calm, cooperative, makes good eye contact. Explores her past as well as current stressors and has had increased awareness and insight. Patient spends time reviewing the impact of visiting a friend over the weekend, which seemed to highlight change in her overall mobility and awareness of the need to adapt to environments. Much support and education provided regarding the level of discomfort she's experienced. She then explains a gathering with her 's friend which seemed much more comfortable, this writer knowing the background of the recent struggles of ’s friend was able to provide education on how she may have felt less in the “spotlight” with her changing health needs, as the family has been under a great deal of stress. Patient agrees. She has been very active, this writer validated her overall progress and how activity outside of the home is an overall benefit. She's looking forward to the end of August, and spending two nights in [place] with her , encouraged continued activity and reflection on her progress as she still focuses on similarities between her and her mother. No other needs or concerns noted at this time, will meet with patient again at 2 on the 26th  [FreeTextEntry1] : weekly therapy session in person to address depression, anxiety, isolation, and impact of medical issues on emotional health. 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

## 2023-07-21 ENCOUNTER — OFFICE (OUTPATIENT)
Dept: URBAN - METROPOLITAN AREA CLINIC 104 | Facility: CLINIC | Age: 68
Setting detail: OPHTHALMOLOGY
End: 2023-07-21
Payer: MEDICARE

## 2023-07-21 ENCOUNTER — RX ONLY (RX ONLY)
Age: 68
End: 2023-07-21

## 2023-07-21 DIAGNOSIS — Z96.1: ICD-10-CM

## 2023-07-21 DIAGNOSIS — H04.122: ICD-10-CM

## 2023-07-21 DIAGNOSIS — H01.004: ICD-10-CM

## 2023-07-21 DIAGNOSIS — H04.121: ICD-10-CM

## 2023-07-21 DIAGNOSIS — H35.40: ICD-10-CM

## 2023-07-21 DIAGNOSIS — H01.002: ICD-10-CM

## 2023-07-21 DIAGNOSIS — H01.001: ICD-10-CM

## 2023-07-21 DIAGNOSIS — H01.005: ICD-10-CM

## 2023-07-21 DIAGNOSIS — H16.223: ICD-10-CM

## 2023-07-21 PROCEDURE — 68761 CLOSE TEAR DUCT OPENING: CPT | Performed by: OPTOMETRIST

## 2023-07-21 PROCEDURE — 99024 POSTOP FOLLOW-UP VISIT: CPT | Performed by: OPTOMETRIST

## 2023-07-21 ASSESSMENT — VISUAL ACUITY
OS_BCVA: 20/20-1
OD_BCVA: 20/70

## 2023-07-21 ASSESSMENT — CONFRONTATIONAL VISUAL FIELD TEST (CVF)
OD_FINDINGS: FULL
OS_FINDINGS: FULL

## 2023-07-21 ASSESSMENT — SUPERFICIAL PUNCTATE KERATITIS (SPK)
OD_SPK: 2+ 3+
OS_SPK: 3+ 4+

## 2023-07-21 ASSESSMENT — LID EXAM ASSESSMENTS
OD_BLEPHARITIS: RLL RUL 3+ 4+
OS_BLEPHARITIS: LLL LUL 3+ 4+

## 2023-07-21 ASSESSMENT — TONOMETRY
OS_IOP_MMHG: 12
OD_IOP_MMHG: 12

## 2023-07-21 ASSESSMENT — PUNCTA - ASSESSMENT
OS_PUNCTA: 3MON PLUG
OD_PUNCTA: 3MON PLUG

## 2023-07-26 ENCOUNTER — APPOINTMENT (OUTPATIENT)
Dept: PSYCHIATRY | Facility: CLINIC | Age: 68
End: 2023-07-26
Payer: MEDICARE

## 2023-07-26 PROCEDURE — 90837 PSYTX W PT 60 MINUTES: CPT | Mod: 95

## 2023-07-26 NOTE — PLAN
[FreeTextEntry2] : Problem: trauma: \par Objective and goal: psycho-ed, symptom management in context of critical health issues, many losses, Patient will become more aware of her symptoms and how she manages them\par \par Problem: Depression and anxiety\par Objective and Goal: decrease symptoms, build skills to cope effectively, use mindfulness and insight to feel less overwhelmed and unmotivated \par \par Problem: Isolation\par Goal: more activity with less depressive symptoms [Cognitive and/or Behavior Therapy] : Cognitive and/or Behavior Therapy  [Psychoeducation] : Psychoeducation  [Skills training (all types)] : Skills training (all types)  [Supportive Therapy] : Supportive Therapy [de-identified] :  Patient presents on time for her weekly session. She's alert and oriented, engages well. Good eye contact, her affect bright. She reviews her level of activity over the past few days, noting how attention to regular activity is a benefit to her mental health. She spends time reflecting on her progress noting her desire to cook this evening as well as openness to having her  travel without her in the fall. This is a tremendous difference compared to the past where she had no desire to do so. Time was spent reviewing her 's relationship with his very close friend. She at times feels he places much emphasis on this relationship. Education provided regarding the  connectedness he may feel to his friend (they have recently been reunited). They share many common interests and he may feel understood. Patient agrees, however reports at times feeling as if she must share her  with his best friend, this writer encouraged her to look at the positive aspect of him having regular socialization, as there are times when patient struggles with having extensive amount of time at home with her . Patient continues to experience anxiety, however, is showing increased awareness and use of stress management tools. No other needs or concerns noted at this time. Will meet  patient again at 2:00 o'clock on the second.  [FreeTextEntry1] : weekly therapy session in person to address depression, anxiety, isolation, and impact of medical issues on emotional health. 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

## 2023-07-26 NOTE — REASON FOR VISIT
[Patient preference] : as per patient preference [Telehealth (audio & video) - Individual/Group] : This visit was provided via telehealth using real-time 2-way audio visual technology. [Medical Office: ([facility])___] : The provider was located at the medical office in [unfilled]. [Home] : The patient, [unfilled], was located at home, [unfilled], at the time of the visit. [FreeTextEntry4] : 2 [FreeTextEntry2] : 7/19/2023 [FreeTextEntry1] : chronic depression and anxiety exacerbated by significant health issues and half-way [Patient] : patient, [unfilled] is a ~age~ year old ~male/female~ being seen for a follow-up visit  [Duration of Psychotherapy Visit (minutes spent in synchronous communication): ____] : Duration of Psychotherapy Visit (minutes spent in synchronous communication): [unfilled] [Individual Psychotherapy for 53+ minutes] : Individual Psychotherapy for 53+ minutes  [Teletherapy Service Provided] : The services provided in this session were delivered via tele-therapy [FreeTextEntry3] : home [FreeTextEntry5] : [facility]

## 2023-08-02 ENCOUNTER — APPOINTMENT (OUTPATIENT)
Dept: PSYCHIATRY | Facility: CLINIC | Age: 68
End: 2023-08-02
Payer: MEDICARE

## 2023-08-02 PROCEDURE — 90837 PSYTX W PT 60 MINUTES: CPT | Mod: 95

## 2023-08-02 NOTE — REASON FOR VISIT
[Patient preference] : as per patient preference [Telehealth (audio & video) - Individual/Group] : This visit was provided via telehealth using real-time 2-way audio visual technology. [Medical Office: ([facility])___] : The provider was located at the medical office in [unfilled]. [Home] : The patient, [unfilled], was located at home, [unfilled], at the time of the visit. [FreeTextEntry4] : 2 [FreeTextEntry2] : 7/19/2023 [FreeTextEntry1] : chronic depression and anxiety exacerbated by significant health issues and [facility] [Patient] : patient, [unfilled] is a ~age~ year old ~male/female~ being seen for a follow-up visit  [Duration of Psychotherapy Visit (minutes spent in synchronous communication): ____] : Duration of Psychotherapy Visit (minutes spent in synchronous communication): [unfilled] [Individual Psychotherapy for 53+ minutes] : Individual Psychotherapy for 53+ minutes  [Teletherapy Service Provided] : The services provided in this session were delivered via tele-therapy [FreeTextEntry3] : home [FreeTextEntry5] : [facility]

## 2023-08-02 NOTE — PLAN
[FreeTextEntry2] : Problem: trauma: \par  Objective and goal: psycho-ed, symptom management in context of critical health issues, many losses, Patient will become more aware of her symptoms and how she manages them\par  \par  Problem: Depression and anxiety\par  Objective and Goal: decrease symptoms, build skills to cope effectively, use mindfulness and insight to feel less overwhelmed and unmotivated \par  \par  Problem: Isolation\par  Goal: more activity with less depressive symptoms [Cognitive and/or Behavior Therapy] : Cognitive and/or Behavior Therapy  [Psychoeducation] : Psychoeducation  [Skills training (all types)] : Skills training (all types)  [Supportive Therapy] : Supportive Therapy [de-identified] : The above named patient presents on time for her weekly session. She's alert and oriented, engages well, always seems to benefit from support and time for ventilation, patient reports an overall improvement in her eyes. However, feeling defeated on this day as her neuropathy continues to place limitation on her ability to drive. She's fearful of not being able to differentiate between the gas and the brake, therefore now feels increased isolation in the home. This writer encouraged her to place importance of focus on adjustments that could be made in order to support her needs, including. adaptations to her car. Patient agrees. This writer also encouraged her to seek a second opinion regarding her neuropathy. She agreed to do so and will outreach to her cardiologist for a recommendation (she places much trust in her). Patient endorses increased depression as a result of having limited time outside of the home. This also makes her focus more on her own fear of mortality. She spent time reviewing the events of  August 2016 and suffering a pulmonary embolism, she feels a significant amount of focus and PTSD regarding this time frame and the events of those three weeks. Education provided regarding post traumatic stress and validated the level of trauma and fear of mortality she experienced. No other needs or concerns noted at this time, have arranged to meet with patient again on the 9th at 2:00 o'clock  [FreeTextEntry1] : weekly therapy session in person to address depression, anxiety, isolation, and impact of medical issues on emotional health. 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

## 2023-08-09 ENCOUNTER — APPOINTMENT (OUTPATIENT)
Dept: PSYCHIATRY | Facility: CLINIC | Age: 68
End: 2023-08-09
Payer: MEDICARE

## 2023-08-09 PROCEDURE — 90837 PSYTX W PT 60 MINUTES: CPT | Mod: 95

## 2023-08-09 NOTE — PLAN
[FreeTextEntry2] : Problem: trauma: \par  Objective and goal: psycho-ed, symptom management in context of critical health issues, many losses, Patient will become more aware of her symptoms and how she manages them\par  \par  Problem: Depression and anxiety\par  Objective and Goal: decrease symptoms, build skills to cope effectively, use mindfulness and insight to feel less overwhelmed and unmotivated \par  \par  Problem: Isolation\par  Goal: more activity with less depressive symptoms [Cognitive and/or Behavior Therapy] : Cognitive and/or Behavior Therapy  [Psychoeducation] : Psychoeducation  [Skills training (all types)] : Skills training (all types)  [Supportive Therapy] : Supportive Therapy [de-identified] : The above named patient presents on time for her weekly session. She's alert and oriented, engages well. Good eye contact ,always seems to benefit from support and time for ventilation, reports poor sleep. Feels some of it is due to her acid reflux. She has agreed to set a goal to meet with a GI specialist in September. She fears needing an endoscopy as this procedure is a reminder of a time in her life when her father required an endoscopy, and was diagnosed with cancer. This writer helped her evaluate his symptoms and his lifestyle and how her lifestyle varies greatly. She agrees. She's had an increase in lack of activity due to her worry about driving with neuropathy. She's also expresses sadness over the death of her 's friend's wife. This writer provided education regarding awareness of her own mortality. She agrees. Patient benefits from leaving the home, therefore this writer recommended our next session be at a time when her  can transport her to [facility], she agreed, scheduled for 10:00 on the 16th  [FreeTextEntry1] : weekly therapy session in person to address depression, anxiety, isolation, and impact of medical issues on emotional health. 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

## 2023-08-09 NOTE — REASON FOR VISIT
[Patient preference] : as per patient preference [Telehealth (audio & video) - Individual/Group] : This visit was provided via telehealth using real-time 2-way audio visual technology. [Medical Office: ([facility])___] : The provider was located at the medical office in [unfilled]. [Home] : The patient, [unfilled], was located at home, [unfilled], at the time of the visit. [FreeTextEntry4] : 2 [FreeTextEntry2] : 7/19/2023 [FreeTextEntry1] : chronic depression and anxiety exacerbated by significant health issues and residential [Patient] : patient, [unfilled] is a ~age~ year old ~male/female~ being seen for a follow-up visit  [Duration of Psychotherapy Visit (minutes spent in synchronous communication): ____] : Duration of Psychotherapy Visit (minutes spent in synchronous communication): [unfilled] [Individual Psychotherapy for 53+ minutes] : Individual Psychotherapy for 53+ minutes  [Teletherapy Service Provided] : The services provided in this session were delivered via tele-therapy [FreeTextEntry3] : home [FreeTextEntry5] : [facility]

## 2023-08-16 ENCOUNTER — APPOINTMENT (OUTPATIENT)
Dept: PSYCHIATRY | Facility: CLINIC | Age: 68
End: 2023-08-16
Payer: MEDICARE

## 2023-08-16 PROCEDURE — 90837 PSYTX W PT 60 MINUTES: CPT

## 2023-08-16 NOTE — PLAN
[FreeTextEntry2] : Problem: trauma: \par  Objective and goal: psycho-ed, symptom management in context of critical health issues, many losses, Patient will become more aware of her symptoms and how she manages them\par  \par  Problem: Depression and anxiety\par  Objective and Goal: decrease symptoms, build skills to cope effectively, use mindfulness and insight to feel less overwhelmed and unmotivated \par  \par  Problem: Isolation\par  Goal: more activity with less depressive symptoms [Cognitive and/or Behavior Therapy] : Cognitive and/or Behavior Therapy  [Psychoeducation] : Psychoeducation  [Skills training (all types)] : Skills training (all types)  [Supportive Therapy] : Supportive Therapy [de-identified] : The above named patient presents on time for her weekly session. She's alert and oriented , affect somewhat flat as she continues to deal with additional medical issues. She benefits from support and time for ventilation. She has found that engaging in home treatment for her neuropathy has been very helpful, this has also resulted in her being able to increase independence out of the house and being able to drive. She spends time reviewing concern about her  and his affect and disconnect when his daughter and her boyfriend are present. This writer reviewed the history of what patient has shared about boyfriend's interactions within the home, which seems to be very "comfortable" and lacks boundaries. Explored if it is possible that her  is having a reaction to this behavior, as her  has had a tendency over the years to avoid situations that should be otherwise addressed . She agreed. She also reports increased stress as a result of his hearing loss and his refusal to wear hearing aids. This writer reviewed how her concerns in the past about his memory could be correlated to hearing loss. She agrees. Topics of her own mortality continue to emerge, as well as changes she needs to make to adjust to a lifestyle that is much different from the past. No other needs or concerns noted at this time. Next appointment is scheduled for the 24th at 1:00.  [FreeTextEntry1] : weekly therapy session in person to address depression, anxiety, isolation, and impact of medical issues on emotional health. 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

## 2023-08-16 NOTE — REASON FOR VISIT
[FreeTextEntry1] : chronic depression and anxiety exacerbated by significant health issues and custodial [Patient] : patient, [unfilled] is a ~age~ year old ~male/female~ being seen for a follow-up visit  [Duration of Psychotherapy Visit (minutes spent in synchronous communication): ____] : Duration of Psychotherapy Visit (minutes spent in synchronous communication): [unfilled] [Individual Psychotherapy for 53+ minutes] : Individual Psychotherapy for 53+ minutes  [Teletherapy Service Provided] : The services provided in this session were delivered via tele-therapy [FreeTextEntry3] : [facility] [FreeTextEntry5] : [facility]

## 2023-08-24 ENCOUNTER — APPOINTMENT (OUTPATIENT)
Dept: PSYCHIATRY | Facility: CLINIC | Age: 68
End: 2023-08-24
Payer: MEDICARE

## 2023-08-24 PROCEDURE — 90837 PSYTX W PT 60 MINUTES: CPT | Mod: 95

## 2023-08-24 NOTE — PLAN
[FreeTextEntry2] : Problem: trauma: \par  Objective and goal: psycho-ed, symptom management in context of critical health issues, many losses, Patient will become more aware of her symptoms and how she manages them\par  \par  Problem: Depression and anxiety\par  Objective and Goal: decrease symptoms, build skills to cope effectively, use mindfulness and insight to feel less overwhelmed and unmotivated \par  \par  Problem: Isolation\par  Goal: more activity with less depressive symptoms [Cognitive and/or Behavior Therapy] : Cognitive and/or Behavior Therapy  [Psychoeducation] : Psychoeducation  [Skills training (all types)] : Skills training (all types)  [Supportive Therapy] : Supportive Therapy [de-identified] : The above named patient presents on time for her weekly session. She's alert and oriented, engages well, always seems to benefit from support and time for ventilation. She has a bright affect. Reports she enjoyed her trip to [place] with her  and her 's friend. This writer highlighted her overall improvement and her ability to adjust to needs based on any mobility concerns. She's able to reflect with positivity. She also spends time reviewing her health, she's paid attention to areas of concern and is proactive in her needs. Patient continues to benefit from time away from the home. She looks forward to upcoming time with family as well as time with her daughters when they return from their trip at the end of the month. No other needs or concerns noted at this time. Have arranged to meet with patient again on the 6th at 2:00.  [FreeTextEntry1] : weekly therapy session in person to address depression, anxiety, isolation, and impact of medical issues on emotional health. 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

## 2023-08-24 NOTE — REASON FOR VISIT
[Patient preference] : as per patient preference [Telehealth (audio & video) - Individual/Group] : This visit was provided via telehealth using real-time 2-way audio visual technology. [Other Location: e.g. Home (Enter Location, City,State)___] : The provider was located at [unfilled]. [Home] : The patient, [unfilled], was located at home, [unfilled], at the time of the visit. [FreeTextEntry4] : 1 [FreeTextEntry2] : last week [FreeTextEntry1] : chronic depression and anxiety exacerbated by significant health issues and long-term [Patient] : patient, [unfilled] is a ~age~ year old ~male/female~ being seen for a follow-up visit  [Duration of Psychotherapy Visit (minutes spent in synchronous communication): ____] : Duration of Psychotherapy Visit (minutes spent in synchronous communication): [unfilled] [Individual Psychotherapy for 53+ minutes] : Individual Psychotherapy for 53+ minutes  [Teletherapy Service Provided] : The services provided in this session were delivered via tele-therapy [FreeTextEntry3] : home [FreeTextEntry5] : home

## 2023-08-30 ENCOUNTER — APPOINTMENT (OUTPATIENT)
Dept: PULMONOLOGY | Facility: CLINIC | Age: 68
End: 2023-08-30

## 2023-09-01 ENCOUNTER — OFFICE (OUTPATIENT)
Dept: URBAN - METROPOLITAN AREA CLINIC 104 | Facility: CLINIC | Age: 68
Setting detail: OPHTHALMOLOGY
End: 2023-09-01
Payer: MEDICARE

## 2023-09-01 DIAGNOSIS — H01.001: ICD-10-CM

## 2023-09-01 DIAGNOSIS — H01.002: ICD-10-CM

## 2023-09-01 DIAGNOSIS — H04.121: ICD-10-CM

## 2023-09-01 DIAGNOSIS — H01.004: ICD-10-CM

## 2023-09-01 DIAGNOSIS — H04.122: ICD-10-CM

## 2023-09-01 DIAGNOSIS — H01.005: ICD-10-CM

## 2023-09-01 DIAGNOSIS — H35.40: ICD-10-CM

## 2023-09-01 PROCEDURE — 99213 OFFICE O/P EST LOW 20 MIN: CPT | Performed by: OPTOMETRIST

## 2023-09-01 ASSESSMENT — REFRACTION_AUTOREFRACTION
OD_SPHERE: +0.25
OS_CYLINDER: -1.00
OS_AXIS: 166
OS_SPHERE: +0.50
OS_SPHERE: +0.50
OD_SPHERE: +0.75
OD_AXIS: 48
OS_AXIS: 128
OD_CYLINDER: -1.00
OD_CYLINDER: -1.00
OD_AXIS: 081
OS_CYLINDER: -1.00

## 2023-09-01 ASSESSMENT — TONOMETRY
OD_IOP_MMHG: 18
OS_IOP_MMHG: 18

## 2023-09-01 ASSESSMENT — SPHEQUIV_DERIVED
OD_SPHEQUIV: -0.25
OS_SPHEQUIV: 0
OD_SPHEQUIV: 0.25
OS_SPHEQUIV: 0

## 2023-09-01 ASSESSMENT — PUNCTA - ASSESSMENT
OS_PUNCTA: 3MON PLUG
OD_PUNCTA: 3MON PLUG

## 2023-09-01 ASSESSMENT — KERATOMETRY
OS_K1POWER_DIOPTERS: 42.03
OD_K2POWER_DIOPTERS: 42.13
OS_K2POWER_DIOPTERS: 42.24
OS_AXISANGLE_DEGREES: 143
OD_AXISANGLE_DEGREES: 016
OD_K1POWER_DIOPTERS: 41.87

## 2023-09-01 ASSESSMENT — CONFRONTATIONAL VISUAL FIELD TEST (CVF)
OD_FINDINGS: FULL
OS_FINDINGS: FULL

## 2023-09-01 ASSESSMENT — SUPERFICIAL PUNCTATE KERATITIS (SPK)
OD_SPK: 2+ 3+
OS_SPK: 2+ 3+

## 2023-09-01 ASSESSMENT — VISUAL ACUITY
OD_BCVA: 20/25+2
OS_BCVA: 20/20

## 2023-09-01 ASSESSMENT — LID EXAM ASSESSMENTS
OD_BLEPHARITIS: RLL RUL 2+
OS_BLEPHARITIS: LLL LUL 2+

## 2023-09-01 ASSESSMENT — REFRACTION_CURRENTRX
OS_OVR_VA: 20/
OD_OVR_VA: 20/

## 2023-09-01 ASSESSMENT — AXIALLENGTH_DERIVED
OS_AL: 24.1042
OD_AL: 24.0544

## 2023-09-06 ENCOUNTER — APPOINTMENT (OUTPATIENT)
Dept: PSYCHIATRY | Facility: CLINIC | Age: 68
End: 2023-09-06
Payer: MEDICARE

## 2023-09-06 PROCEDURE — 90837 PSYTX W PT 60 MINUTES: CPT | Mod: 95

## 2023-09-06 NOTE — PLAN
[FreeTextEntry2] : Problem: trauma: \par  Objective and goal: psycho-ed, symptom management in context of critical health issues, many losses, Patient will become more aware of her symptoms and how she manages them\par  \par  Problem: Depression and anxiety\par  Objective and Goal: decrease symptoms, build skills to cope effectively, use mindfulness and insight to feel less overwhelmed and unmotivated \par  \par  Problem: Isolation\par  Goal: more activity with less depressive symptoms [Cognitive and/or Behavior Therapy] : Cognitive and/or Behavior Therapy  [Psychoeducation] : Psychoeducation  [Skills training (all types)] : Skills training (all types)  [Supportive Therapy] : Supportive Therapy [de-identified] : Patient presents on time for her weekly session. She's alert and oriented, engages well. Good eye contact in good spirits, although reports some fatigue as she struggled with sleep the evening before due to shoulder pain. Patient expresses excitement as her daughter was recently engaged. However, she's struggling with her daughter's lack of excitement and lack of emotional connectedness to the event. This writer encouraged her to explore varying responses to events and also reminded her of her daughter's historical avoidance of attention placed on herself, Patient agrees. Patient has had to spend time at home due to not having a car but also as a result of the heat. She understands the importance of activity outside of the home. Patient also spends time reviewing the loss of a musician she valued. This writer reviewed how loss significantly affects her, especially in light of this musician who seems to align with her own values and more carefree times in her life. No other needs or concerns noted at this time, will meet with patient on the 13th at 2:00  [FreeTextEntry1] : weekly therapy session in person to address depression, anxiety, isolation, and impact of medical issues on emotional health. 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

## 2023-09-06 NOTE — REASON FOR VISIT
[Patient preference] : as per patient preference [Telehealth (audio & video) - Individual/Group] : This visit was provided via telehealth using real-time 2-way audio visual technology. [Other Location: e.g. Home (Enter Location, City,State)___] : The provider was located at [unfilled]. [Home] : The patient, [unfilled], was located at home, [unfilled], at the time of the visit. [FreeTextEntry4] : 2 [FreeTextEntry2] : 2 week ago [FreeTextEntry1] : chronic depression and anxiety exacerbated by significant health issues and senior living [Patient] : patient, [unfilled] is a ~age~ year old ~male/female~ being seen for a follow-up visit  [Duration of Psychotherapy Visit (minutes spent in synchronous communication): ____] : Duration of Psychotherapy Visit (minutes spent in synchronous communication): [unfilled] [Individual Psychotherapy for 53+ minutes] : Individual Psychotherapy for 53+ minutes  [Teletherapy Service Provided] : The services provided in this session were delivered via tele-therapy [FreeTextEntry3] : home [FreeTextEntry5] : [facility]

## 2023-09-13 ENCOUNTER — APPOINTMENT (OUTPATIENT)
Dept: PSYCHIATRY | Facility: CLINIC | Age: 68
End: 2023-09-13
Payer: MEDICARE

## 2023-09-13 PROCEDURE — 90837 PSYTX W PT 60 MINUTES: CPT

## 2023-09-20 ENCOUNTER — APPOINTMENT (OUTPATIENT)
Dept: PSYCHIATRY | Facility: CLINIC | Age: 68
End: 2023-09-20
Payer: MEDICARE

## 2023-09-20 PROCEDURE — 90837 PSYTX W PT 60 MINUTES: CPT | Mod: GT

## 2023-09-27 ENCOUNTER — APPOINTMENT (OUTPATIENT)
Dept: PSYCHIATRY | Facility: CLINIC | Age: 68
End: 2023-09-27
Payer: MEDICARE

## 2023-09-27 PROCEDURE — 90837 PSYTX W PT 60 MINUTES: CPT

## 2023-10-02 ENCOUNTER — APPOINTMENT (OUTPATIENT)
Dept: FAMILY MEDICINE | Facility: CLINIC | Age: 68
End: 2023-10-02
Payer: MEDICARE

## 2023-10-02 VITALS
HEIGHT: 65 IN | DIASTOLIC BLOOD PRESSURE: 92 MMHG | OXYGEN SATURATION: 96 % | BODY MASS INDEX: 42.49 KG/M2 | WEIGHT: 255 LBS | HEART RATE: 69 BPM | SYSTOLIC BLOOD PRESSURE: 146 MMHG

## 2023-10-02 DIAGNOSIS — K21.9 GASTRO-ESOPHAGEAL REFLUX DISEASE W/OUT ESOPHAGITIS: ICD-10-CM

## 2023-10-02 DIAGNOSIS — Z12.31 ENCOUNTER FOR SCREENING MAMMOGRAM FOR MALIGNANT NEOPLASM OF BREAST: ICD-10-CM

## 2023-10-02 DIAGNOSIS — Z23 ENCOUNTER FOR IMMUNIZATION: ICD-10-CM

## 2023-10-02 DIAGNOSIS — Z47.1 AFTERCARE FOLLOWING JOINT REPLACEMENT SURGERY: ICD-10-CM

## 2023-10-02 DIAGNOSIS — Z96.652 AFTERCARE FOLLOWING JOINT REPLACEMENT SURGERY: ICD-10-CM

## 2023-10-02 DIAGNOSIS — Z12.39 ENCOUNTER FOR OTHER SCREENING FOR MALIGNANT NEOPLASM OF BREAST: ICD-10-CM

## 2023-10-02 PROCEDURE — G0008: CPT

## 2023-10-02 PROCEDURE — G0009: CPT

## 2023-10-02 PROCEDURE — 90677 PCV20 VACCINE IM: CPT

## 2023-10-02 PROCEDURE — 99214 OFFICE O/P EST MOD 30 MIN: CPT | Mod: 25

## 2023-10-02 PROCEDURE — 90662 IIV NO PRSV INCREASED AG IM: CPT

## 2023-10-04 ENCOUNTER — APPOINTMENT (OUTPATIENT)
Dept: PSYCHIATRY | Facility: CLINIC | Age: 68
End: 2023-10-04
Payer: MEDICARE

## 2023-10-04 PROBLEM — Z12.31 BREAST CANCER SCREENING BY MAMMOGRAM: Status: ACTIVE | Noted: 2023-10-04

## 2023-10-04 PROBLEM — Z23 ENCOUNTER FOR IMMUNIZATION: Status: ACTIVE | Noted: 2021-03-08

## 2023-10-04 PROBLEM — Z12.39 BREAST CANCER SCREENING: Status: ACTIVE | Noted: 2021-06-28

## 2023-10-04 PROCEDURE — 90837 PSYTX W PT 60 MINUTES: CPT | Mod: GT

## 2023-10-11 ENCOUNTER — APPOINTMENT (OUTPATIENT)
Dept: PSYCHIATRY | Facility: CLINIC | Age: 68
End: 2023-10-11
Payer: MEDICARE

## 2023-10-11 PROCEDURE — 90837 PSYTX W PT 60 MINUTES: CPT

## 2023-10-12 ENCOUNTER — APPOINTMENT (OUTPATIENT)
Dept: DERMATOLOGY | Facility: CLINIC | Age: 68
End: 2023-10-12
Payer: MEDICARE

## 2023-10-12 PROCEDURE — 99203 OFFICE O/P NEW LOW 30 MIN: CPT | Mod: 25

## 2023-10-12 PROCEDURE — 11102 TANGNTL BX SKIN SINGLE LES: CPT

## 2023-10-17 ENCOUNTER — RESULT REVIEW (OUTPATIENT)
Age: 68
End: 2023-10-17

## 2023-10-17 ENCOUNTER — APPOINTMENT (OUTPATIENT)
Dept: MAMMOGRAPHY | Facility: CLINIC | Age: 68
End: 2023-10-17
Payer: MEDICARE

## 2023-10-17 ENCOUNTER — OUTPATIENT (OUTPATIENT)
Dept: OUTPATIENT SERVICES | Facility: HOSPITAL | Age: 68
LOS: 1 days | End: 2023-10-17
Payer: MEDICARE

## 2023-10-17 DIAGNOSIS — Z98.890 OTHER SPECIFIED POSTPROCEDURAL STATES: Chronic | ICD-10-CM

## 2023-10-17 DIAGNOSIS — Z12.31 ENCOUNTER FOR SCREENING MAMMOGRAM FOR MALIGNANT NEOPLASM OF BREAST: ICD-10-CM

## 2023-10-17 DIAGNOSIS — Z90.49 ACQUIRED ABSENCE OF OTHER SPECIFIED PARTS OF DIGESTIVE TRACT: Chronic | ICD-10-CM

## 2023-10-17 DIAGNOSIS — Z96.641 PRESENCE OF RIGHT ARTIFICIAL HIP JOINT: Chronic | ICD-10-CM

## 2023-10-17 DIAGNOSIS — Z95.828 PRESENCE OF OTHER VASCULAR IMPLANTS AND GRAFTS: Chronic | ICD-10-CM

## 2023-10-17 PROCEDURE — 77067 SCR MAMMO BI INCL CAD: CPT | Mod: 26

## 2023-10-17 PROCEDURE — 77063 BREAST TOMOSYNTHESIS BI: CPT | Mod: 26

## 2023-10-17 PROCEDURE — 77063 BREAST TOMOSYNTHESIS BI: CPT

## 2023-10-17 PROCEDURE — 77067 SCR MAMMO BI INCL CAD: CPT

## 2023-10-18 ENCOUNTER — APPOINTMENT (OUTPATIENT)
Dept: PSYCHIATRY | Facility: CLINIC | Age: 68
End: 2023-10-18
Payer: MEDICARE

## 2023-10-18 PROCEDURE — 90837 PSYTX W PT 60 MINUTES: CPT

## 2023-10-23 LAB — TSH SERPL-ACNC: 3.8 UIU/ML

## 2023-10-25 ENCOUNTER — APPOINTMENT (OUTPATIENT)
Dept: PSYCHIATRY | Facility: CLINIC | Age: 68
End: 2023-10-25
Payer: MEDICARE

## 2023-10-25 PROCEDURE — 90837 PSYTX W PT 60 MINUTES: CPT | Mod: GT

## 2023-11-01 ENCOUNTER — APPOINTMENT (OUTPATIENT)
Dept: PSYCHIATRY | Facility: CLINIC | Age: 68
End: 2023-11-01
Payer: MEDICARE

## 2023-11-01 PROCEDURE — 90837 PSYTX W PT 60 MINUTES: CPT

## 2023-11-02 NOTE — OCCUPATIONAL THERAPY INITIAL EVALUATION ADULT - SHORT TERM MEMORY, REHAB EVAL
"                                                  Teclistamab Assessment  Patient is Alert & Oriented x4. There are signs of headache, muscle spasms or rigidity, or vision changes. No       ICANS score     Score:   4  Orientation: Orientation to year, month, city, hospital: 4 points (1 point each)   3  Identify: Name 3 objects that nurse points to (e.g. clock, pen, button): 3 points (1 point each) Pen, phone, glasses  1   Following Commands: (e.g. show me two fingers or close your eyes and stick out your tongue): 1 point   0  Writing: Ability to write a standard sentence (see writing page \"The nichols jumped over the log.\"): 1 point   0  Attention: Count backwards from 100 by 10s: 1 point counted 100, 9,8,7...    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 Severity varies from grade 1 to grade 4.      There are No current signs of CRS (pyrexia/fever, headache, nausea, hypotension, dyspnea, anxiety, increased liver enzymes, increased bilirubin).    **Will continue to monitor for s/sx of neurotoxicity and CRS.  If these occur, please call the Attending MD for recommendations and further instruction. CRS and neurotoxicity adverse reaction protocols/grading per package insert guidelines.   " intact

## 2023-11-08 ENCOUNTER — APPOINTMENT (OUTPATIENT)
Dept: PSYCHIATRY | Facility: CLINIC | Age: 68
End: 2023-11-08
Payer: MEDICARE

## 2023-11-08 PROCEDURE — 90837 PSYTX W PT 60 MINUTES: CPT

## 2023-11-15 ENCOUNTER — OFFICE (OUTPATIENT)
Dept: URBAN - METROPOLITAN AREA CLINIC 104 | Facility: CLINIC | Age: 68
Setting detail: OPHTHALMOLOGY
End: 2023-11-15
Payer: MEDICARE

## 2023-11-15 ENCOUNTER — APPOINTMENT (OUTPATIENT)
Dept: PSYCHIATRY | Facility: CLINIC | Age: 68
End: 2023-11-15
Payer: MEDICARE

## 2023-11-15 DIAGNOSIS — H35.40: ICD-10-CM

## 2023-11-15 DIAGNOSIS — H04.121: ICD-10-CM

## 2023-11-15 DIAGNOSIS — Z96.1: ICD-10-CM

## 2023-11-15 DIAGNOSIS — H04.123: ICD-10-CM

## 2023-11-15 DIAGNOSIS — H01.005: ICD-10-CM

## 2023-11-15 DIAGNOSIS — H01.001: ICD-10-CM

## 2023-11-15 DIAGNOSIS — H01.004: ICD-10-CM

## 2023-11-15 DIAGNOSIS — H04.122: ICD-10-CM

## 2023-11-15 DIAGNOSIS — H01.002: ICD-10-CM

## 2023-11-15 PROCEDURE — 92014 COMPRE OPH EXAM EST PT 1/>: CPT | Performed by: OPTOMETRIST

## 2023-11-15 PROCEDURE — 90837 PSYTX W PT 60 MINUTES: CPT

## 2023-11-15 PROCEDURE — 68761 CLOSE TEAR DUCT OPENING: CPT | Mod: 50 | Performed by: OPTOMETRIST

## 2023-11-15 ASSESSMENT — REFRACTION_AUTOREFRACTION
OD_AXIS: 118
OS_SPHERE: +0.75
OS_AXIS: 120
OD_SPHERE: 0.00
OS_CYLINDER: -1.00
OD_CYLINDER: -0.75

## 2023-11-15 ASSESSMENT — PUNCTA - ASSESSMENT
OS_PUNCTA: 3MON PLUG
OD_PUNCTA: 3MON PLUG

## 2023-11-15 ASSESSMENT — LID EXAM ASSESSMENTS
OS_BLEPHARITIS: LLL LUL 3+
OD_BLEPHARITIS: RLL RUL 3+

## 2023-11-15 ASSESSMENT — SPHEQUIV_DERIVED
OS_SPHEQUIV: 0.25
OD_SPHEQUIV: -0.375

## 2023-11-15 ASSESSMENT — CONFRONTATIONAL VISUAL FIELD TEST (CVF)
OD_FINDINGS: FULL
OS_FINDINGS: FULL

## 2023-11-15 ASSESSMENT — SUPERFICIAL PUNCTATE KERATITIS (SPK)
OD_SPK: 3+
OS_SPK: 3+

## 2023-11-22 ENCOUNTER — APPOINTMENT (OUTPATIENT)
Dept: PSYCHIATRY | Facility: CLINIC | Age: 68
End: 2023-11-22
Payer: MEDICARE

## 2023-11-22 PROCEDURE — 90837 PSYTX W PT 60 MINUTES: CPT

## 2023-11-29 ENCOUNTER — APPOINTMENT (OUTPATIENT)
Dept: PSYCHIATRY | Facility: CLINIC | Age: 68
End: 2023-11-29
Payer: MEDICARE

## 2023-11-29 ENCOUNTER — APPOINTMENT (OUTPATIENT)
Dept: DERMATOLOGY | Facility: CLINIC | Age: 68
End: 2023-11-29

## 2023-11-29 PROCEDURE — 90837 PSYTX W PT 60 MINUTES: CPT | Mod: GT

## 2023-12-05 ENCOUNTER — APPOINTMENT (OUTPATIENT)
Dept: PSYCHIATRY | Facility: CLINIC | Age: 68
End: 2023-12-05
Payer: MEDICARE

## 2023-12-05 PROCEDURE — 90837 PSYTX W PT 60 MINUTES: CPT | Mod: GT

## 2023-12-13 ENCOUNTER — APPOINTMENT (OUTPATIENT)
Dept: PSYCHIATRY | Facility: CLINIC | Age: 68
End: 2023-12-13
Payer: MEDICARE

## 2023-12-13 PROCEDURE — 90837 PSYTX W PT 60 MINUTES: CPT | Mod: GT

## 2023-12-13 NOTE — REASON FOR VISIT
[Patient preference] : as per patient preference [Telehealth (audio & video) - Individual/Group] : This visit was provided via telehealth using real-time 2-way audio visual technology. [Medical Office: ([facility])___] : The provider was located at the medical office in [unfilled]. [Home] : The patient, [unfilled], was located at home, [unfilled], at the time of the visit. [FreeTextEntry4] : 2 [FreeTextEntry2] : 11/29/23 [FreeTextEntry1] : chronic depression and anxiety exacerbated by significant health issues and USP [Patient] : patient, [unfilled] is a ~age~ year old ~male/female~ being seen for a follow-up visit  [Duration of Psychotherapy Visit (minutes spent in synchronous communication): ____] : Duration of Psychotherapy Visit (minutes spent in synchronous communication): [unfilled] [Individual Psychotherapy for 53+ minutes] : Individual Psychotherapy for 53+ minutes  [Teletherapy Service Provided] : The services provided in this session were delivered via tele-therapy [FreeTextEntry3] : home [FreeTextEntry5] : [facility]

## 2023-12-13 NOTE — PLAN
[FreeTextEntry2] : Problem: trauma: \par  Objective and goal: psycho-ed, symptom management in context of critical health issues, many losses, Patient will become more aware of her symptoms and how she manages them\par  \par  Problem: Depression and anxiety\par  Objective and Goal: decrease symptoms, build skills to cope effectively, use mindfulness and insight to feel less overwhelmed and unmotivated \par  \par  Problem: Isolation\par  Goal: more activity with less depressive symptoms [Cognitive and/or Behavior Therapy] : Cognitive and/or Behavior Therapy  [Psychoeducation] : Psychoeducation  [Skills training (all types)] : Skills training (all types)  [Supportive Therapy] : Supportive Therapy [de-identified] : The above named patient presents on time for her weekly session. She's alert and oriented, engages well, always seems to benefit from support time for ventilation, spends time reviewing health issues affecting a good friend and how this affects her own anxiety. She will be providing much support to her friend,  is thankful not to be working in order to dedicate this time. Patient looks forward to the holidays with her family. She denies any seasonal affective response or increased depression around the holidays. She's relieved due to some financial resolve. This will help support future goals. No other needs or concerns noted at this time, have arranged to meet with patient again at 2:00 on the 20th  [FreeTextEntry1] : weekly therapy session in person to address depression, anxiety, isolation, and impact of medical issues on emotional health. 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

## 2023-12-14 ENCOUNTER — RX RENEWAL (OUTPATIENT)
Age: 68
End: 2023-12-14

## 2023-12-14 ENCOUNTER — APPOINTMENT (OUTPATIENT)
Dept: FAMILY MEDICINE | Facility: CLINIC | Age: 68
End: 2023-12-14
Payer: MEDICARE

## 2023-12-14 DIAGNOSIS — J04.10 ACUTE TRACHEITIS W/OUT OBSTRUCTION: ICD-10-CM

## 2023-12-14 PROCEDURE — 99214 OFFICE O/P EST MOD 30 MIN: CPT | Mod: 95

## 2023-12-14 RX ORDER — PREDNISONE 20 MG/1
20 TABLET ORAL DAILY
Qty: 10 | Refills: 0 | Status: ACTIVE | COMMUNITY
Start: 2023-12-14 | End: 1900-01-01

## 2023-12-14 RX ORDER — BENZONATATE 200 MG/1
200 CAPSULE ORAL 3 TIMES DAILY
Qty: 1 | Refills: 0 | Status: ACTIVE | COMMUNITY
Start: 2023-12-14 | End: 1900-01-01

## 2023-12-20 ENCOUNTER — APPOINTMENT (OUTPATIENT)
Dept: PSYCHIATRY | Facility: CLINIC | Age: 68
End: 2023-12-20
Payer: MEDICARE

## 2023-12-20 PROCEDURE — 90837 PSYTX W PT 60 MINUTES: CPT

## 2023-12-20 NOTE — PLAN
Problem: At Risk for Falls  Goal: # Patient does not fall  Outcome: Outcome Met, Complete Goal  Goal: # Takes action to control fall-related risks  Outcome: Outcome Met, Complete Goal  Goal: # Verbalizes understanding of fall risk/precautions  Description: Document education using the patient education activity  Outcome: Outcome Met, Complete Goal     Problem: At Risk for Injury Due to Fall  Goal: # Patient does not fall  Outcome: Outcome Met, Complete Goal  Goal: # Takes action to control condition specific risks  Outcome: Outcome Met, Complete Goal  Goal: # Verbalizes understanding of fall-related injury personal risks  Description: Document education using the patient education activity  Outcome: Outcome Met, Complete Goal     Problem: Breathing Pattern Ineffective  Goal: Air exchange is effective, demonstrated by Sp02 sat of greater then or = 92% (or as ordered)  Outcome: Outcome Met, Complete Goal  Goal: Respiratory pattern is quiet and regular without report of SOB  Outcome: Outcome Met, Complete Goal  Goal: Breathing pattern demonstrates minimal apnea during sleep with appropriate use of airway pressure support devices  Outcome: Outcome Met, Complete Goal  Goal: Verbalizes/demonstrates effective breathing management strategies  Description: Document education using the patient education activity.   Outcome: Outcome Met, Complete Goal  Goal: Minimize respiratory effort related to dyspnea/shortness of breath (Hospice)  Outcome: Outcome Met, Complete Goal     Problem: Pneumonia  Goal: S/S of acute pneumonia are resolved  Description: If acute pneumonia is present, monitor for resolution of fever, cough, secretions and other test values based on presentation.  Outcome: Outcome Met, Complete Goal  Goal: Verbalizes understanding of pneumonia, treatment, and ongoing prevention  Description: Document on Patient Education Activity  Outcome: Outcome Met, Complete Goal     Problem: Artificial Airway Management  Goal:  # Maintains effective artificial airway  Outcome: Outcome Met, Complete Goal  Goal: # Maintains skin integrity around the airway  Outcome: Outcome Met, Complete Goal  Goal: # Tolerates Activity/ADL without s/s of intolerance  Description: Monitor patient tolerance of the artificial airway while they perform activities and ADLs include bathing, showering, shaving, and eating.  Outcome: Outcome Met, Complete Goal  Goal: Verbalizes understanding of artifical airway function and care  Description: Document on Patient Education Activity  Outcome: Outcome Met, Complete Goal  Goal: Demonstrates ability to manage Trach: site care, suctioning, trach harry care & inner cannula care if needed.  Description: Document on Patient Education Activity    Outcome: Outcome Met, Complete Goal  Goal: Demonstrates ability to perform Trach cuff maintenance  Description: Document on Patient Education Activity    Outcome: Outcome Met, Complete Goal  Goal: Demonstrates ability to manage Laryngectomy: stoma care, suctioning, and humidification  Description: Document on Patient Education Activity  Outcome: Outcome Met, Complete Goal  Goal: Demonstrates ability to manage communication (speaking valve or cork insertion)  Description: Document on Patient Education Activity  Outcome: Outcome Met, Complete Goal      [FreeTextEntry2] : Problem: trauma: \par  Objective and goal: psycho-ed, symptom management in context of critical health issues, many losses, Patient will become more aware of her symptoms and how she manages them\par  \par  Problem: Depression and anxiety\par  Objective and Goal: decrease symptoms, build skills to cope effectively, use mindfulness and insight to feel less overwhelmed and unmotivated \par  \par  Problem: Isolation\par  Goal: more activity with less depressive symptoms [Cognitive and/or Behavior Therapy] : Cognitive and/or Behavior Therapy  [Psychoeducation] : Psychoeducation  [Skills training (all types)] : Skills training (all types)  [Supportive Therapy] : Supportive Therapy [de-identified] : The above named patient presents on time for her weekly session. She's alert and oriented. Engages well, always seems to benefit from support and time for ventilation Although feeling ill, she is able to benefit from this week's session. Spends time reviewing the past week's busy schedule and the impact of her best friend's health issues on her own emotional health, at time becomes tearful. She reviews the history of her relationship with her friend and their significant bond. She's very supportive and remains an intricate part of her best friend's life. She's looking forward to the holidays with her family. although feeling overwhelmed is managing well, will meet with patient again at 2:00 on 1/3  [FreeTextEntry1] : weekly therapy session in person to address depression, anxiety, isolation, and impact of medical issues on emotional health. 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

## 2023-12-20 NOTE — REASON FOR VISIT
[FreeTextEntry1] : chronic depression and anxiety exacerbated by significant health issues and USP [Patient] : patient, [unfilled] is a ~age~ year old ~male/female~ being seen for a follow-up visit  [Duration of Psychotherapy Visit (minutes spent in synchronous communication): ____] : Duration of Psychotherapy Visit (minutes spent in synchronous communication): [unfilled] [Individual Psychotherapy for 53+ minutes] : Individual Psychotherapy for 53+ minutes  [Teletherapy Service Provided] : The services provided in this session were delivered via tele-therapy [FreeTextEntry3] : [facility] [FreeTextEntry5] : [facility]

## 2023-12-22 DIAGNOSIS — R05.9 COUGH, UNSPECIFIED: ICD-10-CM

## 2023-12-22 RX ORDER — AZITHROMYCIN 250 MG/1
250 TABLET, FILM COATED ORAL
Qty: 11 | Refills: 0 | Status: DISCONTINUED | COMMUNITY
Start: 2023-12-14 | End: 2023-12-22

## 2023-12-22 RX ORDER — AMOXICILLIN AND CLAVULANATE POTASSIUM 875; 125 MG/1; MG/1
875-125 TABLET, COATED ORAL
Qty: 14 | Refills: 0 | Status: ACTIVE | COMMUNITY
Start: 2023-12-22 | End: 1900-01-01

## 2023-12-24 NOTE — ASSESSMENT
[FreeTextEntry1] : Acute encounter for 68 year old WF with PMH as stated in HPI / active list.   Management :   See HPI and Plan  Azithromycin regimen to be initiated and completed if cough persists.    follow up as needed.  Best wishes offered!

## 2023-12-24 NOTE — REVIEW OF SYSTEMS
[Cough] : cough [Heartburn] : heartburn [Negative] : Psychiatric [Fever] : no fever [Chest Pain] : no chest pain [Palpitations] : no palpitations [Wheezing] : no wheezing [Nausea] : no nausea [Vomiting] : no vomiting [FreeTextEntry4] : nasal congestion

## 2023-12-24 NOTE — HISTORY OF PRESENT ILLNESS
[Home] : at home, [unfilled] , at the time of the visit. [Medical Office: ([facility])___] : at the medical office located in  [Verbal consent obtained from patient] : the patient, [unfilled] [FreeTextEntry8] :  KIERAN WINTER is a 68 year old F who presents today for persistent  cough onset 2 weeks.   Associated symptoms of nasal congestion and acid reflux. No fever.  "throat is burning and feels tight in recent days.   , who is an RN, listened to pt's lungs and were clear. Home COVID test was negative. Sleeps in semi-fowlers position. Hiatal hernia obstruction. Started taking Pantoprazole today that was prescribed in October. Will be going to the city this weekend to support her friend who has CA.

## 2023-12-24 NOTE — ADDENDUM
[FreeTextEntry1] : Medical record entries made by the scribe today, were at my direction and personally dictated to them by me, Dr. Erika Espinoza on 12/14/2023.  I have reviewed the chart and agree that the record accurately reflects my personal performance of the history, physical exam, assessment and plan.  I, Husam Brown  acting as scribe for Dr. Erika Espinoza MD on 12/14/2023 at 02:45 PM.

## 2023-12-24 NOTE — PHYSICAL EXAM
[No Acute Distress] : no acute distress [Normal Sclera/Conjunctiva] : normal sclera/conjunctiva [No Respiratory Distress] : no respiratory distress  [No Accessory Muscle Use] : no accessory muscle use [No Focal Deficits] : no focal deficits [Alert and Oriented x3] : oriented to person, place, and time [Normal Insight/Judgement] : insight and judgment were intact [de-identified] : non toxic appearing  [de-identified] : [place]

## 2024-01-01 NOTE — ED ADULT TRIAGE NOTE - AS TEMP SITE
oral
1. I was told the name of the doctor(s) who took care of my child while in the hospital.    2. I have been told about any important findings on my child's plan of care.    3. The doctor clearly explained my child's diagnosis and other possible diagnoses that were considered.    4. My child's doctor explained all the tests that were done and their results (if available). I understand that some of the test results may not be ready before we go home and I was told how I can get these results. I understand that a summary of my child's hospitalization and important test results will be shared with my child's outpatient doctor.    5. My child's doctor talked to me about what I need to do when we go home.    6. I understand what signs and symptoms to watch for. I understand what symptoms I would need to call my doctor for and/or return to the hospital.    7. I have the phone number to call the hospital for results and/or questions after I leave the hospital.

## 2024-01-03 ENCOUNTER — APPOINTMENT (OUTPATIENT)
Dept: PSYCHIATRY | Facility: CLINIC | Age: 69
End: 2024-01-03

## 2024-01-10 ENCOUNTER — APPOINTMENT (OUTPATIENT)
Dept: PSYCHIATRY | Facility: CLINIC | Age: 69
End: 2024-01-10
Payer: MEDICARE

## 2024-01-10 ENCOUNTER — NON-APPOINTMENT (OUTPATIENT)
Age: 69
End: 2024-01-10

## 2024-01-10 PROCEDURE — 90837 PSYTX W PT 60 MINUTES: CPT | Mod: 93

## 2024-01-10 NOTE — PLAN
[FreeTextEntry2] : Problem: trauma: \par  Objective and goal: psycho-ed, symptom management in context of critical health issues, many losses, Patient will become more aware of her symptoms and how she manages them\par  \par  Problem: Depression and anxiety\par  Objective and Goal: decrease symptoms, build skills to cope effectively, use mindfulness and insight to feel less overwhelmed and unmotivated \par  \par  Problem: Isolation\par  Goal: more activity with less depressive symptoms [Cognitive and/or Behavior Therapy] : Cognitive and/or Behavior Therapy  [Psychoeducation] : Psychoeducation  [Skills training (all types)] : Skills training (all types)  [Supportive Therapy] : Supportive Therapy [de-identified] : The above named patient presents on time for her weekly session. She's alert and oriented. Engages well always seems to benefit from support and time for ventilation. Patient spends time reviewing a medical visit she had on this day which resulted in very positive reports. This, as expected, has impacted her emotional health in a very positive way. She spends time reviewing the history of her health and the course of her illness, she's able to validate her improvement. Patient also spends time reviewing current stress as her  will be applying for service connection. Time was spent providing education as well as support. Patient is hopeful for an award which would improve their financial situation, no other needs or concerns noted at this time, will meet with patient again at 2:00 on the 17th  [FreeTextEntry1] : weekly therapy session in person to address depression, anxiety, isolation, and impact of medical issues on emotional health. 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

## 2024-01-10 NOTE — REASON FOR VISIT
[Patient preference] : as per patient preference [Telehealth (audio & video) - Individual/Group] : This visit was provided via telehealth using real-time 2-way audio visual technology. [Medical Office: ([facility])___] : The provider was located at the medical office in [unfilled]. [Home] : The patient, [unfilled], was located at home, [unfilled], at the time of the visit. [Verbal consent obtained from patient/other participant(s)] : Verbal consent for telehealth/telephonic services obtained from patient/other participant(s) [FreeTextEntry4] : 2 [FreeTextEntry2] : 3 weeks ago [FreeTextEntry1] : chronic depression and anxiety exacerbated by significant health issues and long-term [Patient] : patient, [unfilled] is a ~age~ year old ~male/female~ being seen for a follow-up visit  [Duration of Psychotherapy Visit (minutes spent in synchronous communication): ____] : Duration of Psychotherapy Visit (minutes spent in synchronous communication): [unfilled] [Individual Psychotherapy for 53+ minutes] : Individual Psychotherapy for 53+ minutes  [Teletherapy Service Provided] : The services provided in this session were delivered via tele-therapy [FreeTextEntry3] : home [FreeTextEntry5] : [facility]

## 2024-01-15 ENCOUNTER — NON-APPOINTMENT (OUTPATIENT)
Age: 69
End: 2024-01-15

## 2024-01-17 ENCOUNTER — NON-APPOINTMENT (OUTPATIENT)
Age: 69
End: 2024-01-17

## 2024-01-17 ENCOUNTER — APPOINTMENT (OUTPATIENT)
Dept: PSYCHIATRY | Facility: CLINIC | Age: 69
End: 2024-01-17

## 2024-01-20 ENCOUNTER — RX RENEWAL (OUTPATIENT)
Age: 69
End: 2024-01-20

## 2024-01-24 ENCOUNTER — APPOINTMENT (OUTPATIENT)
Dept: PSYCHIATRY | Facility: CLINIC | Age: 69
End: 2024-01-24
Payer: MEDICARE

## 2024-01-24 PROCEDURE — 90837 PSYTX W PT 60 MINUTES: CPT

## 2024-01-24 NOTE — PLAN
[Cognitive and/or Behavior Therapy] : Cognitive and/or Behavior Therapy  [FreeTextEntry2] : Problem: trauma: \par  Objective and goal: psycho-ed, symptom management in context of critical health issues, many losses, Patient will become more aware of her symptoms and how she manages them\par  \par  Problem: Depression and anxiety\par  Objective and Goal: decrease symptoms, build skills to cope effectively, use mindfulness and insight to feel less overwhelmed and unmotivated \par  \par  Problem: Isolation\par  Goal: more activity with less depressive symptoms [Skills training (all types)] : Skills training (all types)  [Psychoeducation] : Psychoeducation  [Supportive Therapy] : Supportive Therapy [de-identified] :  Patient presents on time for her weekly session. She's alert and oriented. Engages well. Reports she's feeling better, coped well with being sick over the past two weeks. She spends time reviewing her '[facility] . This writer provided education regarding  culture and  a sense of belonging. Patient seemed to benefit from this review. Also reviewed the importance of finding her own purpose . Patient understands, this writer reviewed and provided education regarding depression and how it can be impacted by isolation and not having regular meaningful, scheduled activity. Patient agrees. No other needs or concerns noted at this time, will meet with patient again on the 31st of 2:00  [FreeTextEntry1] : weekly therapy session in person to address depression, anxiety, isolation, and impact of medical issues on emotional health. 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

## 2024-01-24 NOTE — REASON FOR VISIT
[FreeTextEntry1] : chronic depression and anxiety exacerbated by significant health issues and senior living [Patient] : patient, [unfilled] is a ~age~ year old ~male/female~ being seen for a follow-up visit  [Duration of Psychotherapy Visit (minutes spent in synchronous communication): ____] : Duration of Psychotherapy Visit (minutes spent in synchronous communication): [unfilled] [Individual Psychotherapy for 53+ minutes] : Individual Psychotherapy for 53+ minutes  [Teletherapy Service Provided] : The services provided in this session were delivered via tele-therapy [FreeTextEntry3] : [facility] [FreeTextEntry5] : [facility]

## 2024-01-25 ENCOUNTER — TRANSCRIPTION ENCOUNTER (OUTPATIENT)
Age: 69
End: 2024-01-25

## 2024-01-31 ENCOUNTER — APPOINTMENT (OUTPATIENT)
Dept: PSYCHIATRY | Facility: CLINIC | Age: 69
End: 2024-01-31
Payer: MEDICARE

## 2024-01-31 PROCEDURE — 90837 PSYTX W PT 60 MINUTES: CPT

## 2024-01-31 NOTE — PLAN
[FreeTextEntry2] : Problem: trauma: \par  Objective and goal: psycho-ed, symptom management in context of critical health issues, many losses, Patient will become more aware of her symptoms and how she manages them\par  \par  Problem: Depression and anxiety\par  Objective and Goal: decrease symptoms, build skills to cope effectively, use mindfulness and insight to feel less overwhelmed and unmotivated \par  \par  Problem: Isolation\par  Goal: more activity with less depressive symptoms [Cognitive and/or Behavior Therapy] : Cognitive and/or Behavior Therapy  [Psychoeducation] : Psychoeducation  [Skills training (all types)] : Skills training (all types)  [Supportive Therapy] : Supportive Therapy [de-identified] :  The above-named patient presents on time for her weekly session. She's alert and oriented. Engages well, always seems to benefit from support and time for ventilation. She spends time reviewing her week, noting she's having increased difficulty driving. This writer reviewed this topic further to explore impact on her level of independence. Patient feels confident driving locally, therefore expresses feeling a sense of relief in making the decision not to have to drive far distances. Time is also spent reviewing stressors she feels with her , who she feels at time is not assertive in completion of tasks within the home .This writer encouraged her to explore the impact of him not following through and how it may impact her own response to tasks she was able to complete  prior to her medical issues, also reviewed the importance of awareness of differences in perceptions and standards noting that they each have different ways of approaching things. She agrees. No other needs or concerns noted at this time, will meet with patient again at 2:00 on the 7th  [FreeTextEntry1] : weekly therapy session in person to address depression, anxiety, isolation, and impact of medical issues on emotional health. 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

## 2024-02-07 ENCOUNTER — APPOINTMENT (OUTPATIENT)
Dept: PSYCHIATRY | Facility: CLINIC | Age: 69
End: 2024-02-07
Payer: MEDICARE

## 2024-02-07 PROCEDURE — 90837 PSYTX W PT 60 MINUTES: CPT

## 2024-02-07 NOTE — PLAN
[FreeTextEntry2] : Problem: trauma: \par  Objective and goal: psycho-ed, symptom management in context of critical health issues, many losses, Patient will become more aware of her symptoms and how she manages them\par  \par  Problem: Depression and anxiety\par  Objective and Goal: decrease symptoms, build skills to cope effectively, use mindfulness and insight to feel less overwhelmed and unmotivated \par  \par  Problem: Isolation\par  Goal: more activity with less depressive symptoms [Cognitive and/or Behavior Therapy] : Cognitive and/or Behavior Therapy  [Psychoeducation] : Psychoeducation  [Skills training (all types)] : Skills training (all types)  [Supportive Therapy] : Supportive Therapy [de-identified] :  The above named patient presents on time for her weekly session. She's alert and oriented, Always seems to benefit from support and time for ventilation. Reports that she's been feeling well. Will follow up with her GI doctor. Is reflective on her progress. Enjoyed time with her family over the weekend. Notes that she is contemplating writing a letter to address how she was treated at the end of her career. This continues to impact patient greatly. Now feeling a high level of anger versus sadness i. Reviewed how this is part of the grieving process and how writing letter may help her in moving through these emotions. She spends time reviewing her relationship with her sister and how after her recent medical issues she feels more connected to her .No other needs or concerns noted at this time ,will meet with patient again at 2:00 on the 14th  [FreeTextEntry1] : weekly therapy session in person to address depression, anxiety, isolation, and impact of medical issues on emotional health. 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

## 2024-02-07 NOTE — REASON FOR VISIT
[FreeTextEntry1] : chronic depression and anxiety exacerbated by significant health issues and [facility] [Patient] : patient, [unfilled] is a ~age~ year old ~male/female~ being seen for a follow-up visit  [Duration of Psychotherapy Visit (minutes spent in synchronous communication): ____] : Duration of Psychotherapy Visit (minutes spent in synchronous communication): [unfilled] [Individual Psychotherapy for 53+ minutes] : Individual Psychotherapy for 53+ minutes  [Teletherapy Service Provided] : The services provided in this session were delivered via tele-therapy [FreeTextEntry3] : [facility] [FreeTextEntry5] : [facility]

## 2024-02-14 ENCOUNTER — APPOINTMENT (OUTPATIENT)
Dept: PSYCHIATRY | Facility: CLINIC | Age: 69
End: 2024-02-14
Payer: MEDICARE

## 2024-02-14 PROCEDURE — 90837 PSYTX W PT 60 MINUTES: CPT

## 2024-02-14 NOTE — PLAN
[FreeTextEntry2] : Problem: trauma: \par  Objective and goal: psycho-ed, symptom management in context of critical health issues, many losses, Patient will become more aware of her symptoms and how she manages them\par  \par  Problem: Depression and anxiety\par  Objective and Goal: decrease symptoms, build skills to cope effectively, use mindfulness and insight to feel less overwhelmed and unmotivated \par  \par  Problem: Isolation\par  Goal: more activity with less depressive symptoms [Cognitive and/or Behavior Therapy] : Cognitive and/or Behavior Therapy  [Psychoeducation] : Psychoeducation  [Skills training (all types)] : Skills training (all types)  [Supportive Therapy] : Supportive Therapy [de-identified] : The above named patient presents on time for her weekly session. She's alert and oriented. Engages well, always seems to benefit from support and time for ventilation. She spends time reviewing heightened anxiety because of ice and her fear of falling. This writer validated her worry as she has experienced many medical issues and is currently stable. Patient also spends time reviewing the benefits of journaling. She spent time reviewing the history of her employment, her successes, and her upset in how her career ended. Encouraged her to write a letter to leadership that may express her emotional response and upset. Reviewed how this does not have to be sent, but can be used as a therapeutic endeavor for closing the loop in unfinished business . Patient agrees as she continues to be significantly impacted by how her career ended. No other needs or concerns noted at this time, will meet with patient on the 21st @ 2  [FreeTextEntry1] : weekly therapy session in person to address depression, anxiety, isolation, and impact of medical issues on emotional health. 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

## 2024-02-14 NOTE — REASON FOR VISIT
[FreeTextEntry1] : chronic depression and anxiety exacerbated by significant health issues and shelter [Patient] : patient, [unfilled] is a ~age~ year old ~male/female~ being seen for a follow-up visit  [Duration of Psychotherapy Visit (minutes spent in synchronous communication): ____] : Duration of Psychotherapy Visit (minutes spent in synchronous communication): [unfilled] [Individual Psychotherapy for 53+ minutes] : Individual Psychotherapy for 53+ minutes  [Teletherapy Service Provided] : The services provided in this session were delivered via tele-therapy [FreeTextEntry3] : [facility] [FreeTextEntry5] : [facility]

## 2024-02-21 ENCOUNTER — APPOINTMENT (OUTPATIENT)
Dept: PSYCHIATRY | Facility: CLINIC | Age: 69
End: 2024-02-21
Payer: MEDICARE

## 2024-02-21 PROCEDURE — 90837 PSYTX W PT 60 MINUTES: CPT

## 2024-02-23 NOTE — REASON FOR VISIT
[FreeTextEntry1] : chronic depression and anxiety exacerbated by significant health issues and group home [Patient] : patient, [unfilled] is a ~age~ year old ~male/female~ being seen for a follow-up visit  [Duration of Psychotherapy Visit (minutes spent in synchronous communication): ____] : Duration of Psychotherapy Visit (minutes spent in synchronous communication): [unfilled] [Teletherapy Service Provided] : The services provided in this session were delivered via tele-therapy [Individual Psychotherapy for 53+ minutes] : Individual Psychotherapy for 53+ minutes  [FreeTextEntry5] : [facility] [FreeTextEntry3] : [facility]

## 2024-02-23 NOTE — PLAN
[FreeTextEntry2] : Problem: trauma: \par  Objective and goal: psycho-ed, symptom management in context of critical health issues, many losses, Patient will become more aware of her symptoms and how she manages them\par  \par  Problem: Depression and anxiety\par  Objective and Goal: decrease symptoms, build skills to cope effectively, use mindfulness and insight to feel less overwhelmed and unmotivated \par  \par  Problem: Isolation\par  Goal: more activity with less depressive symptoms [Cognitive and/or Behavior Therapy] : Cognitive and/or Behavior Therapy  [Psychoeducation] : Psychoeducation  [Skills training (all types)] : Skills training (all types)  [Supportive Therapy] : Supportive Therapy [de-identified] :  The above named patient presents on time for her weekly session. She's alert and oriented engages well. This writer spends time reviewing her level of activity, which has significantly changed for the better. 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 Patient agrees. She's anticipating a trip over the weekend. She will properly plan to decrease her anxiety level. She's had positive time with her  and is pleased with recent renovations they've made within their home. no other needs or concerns noted at this time have arranged to meet with patient again on the 28th 2:00  [FreeTextEntry1] : weekly therapy session in person to address depression, anxiety, isolation, and impact of medical issues on emotional health. 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

## 2024-02-28 ENCOUNTER — APPOINTMENT (OUTPATIENT)
Dept: PSYCHIATRY | Facility: CLINIC | Age: 69
End: 2024-02-28
Payer: MEDICARE

## 2024-02-28 ENCOUNTER — OFFICE (OUTPATIENT)
Dept: URBAN - METROPOLITAN AREA CLINIC 104 | Facility: CLINIC | Age: 69
Setting detail: OPHTHALMOLOGY
End: 2024-02-28
Payer: MEDICARE

## 2024-02-28 DIAGNOSIS — H01.005: ICD-10-CM

## 2024-02-28 DIAGNOSIS — H04.123: ICD-10-CM

## 2024-02-28 DIAGNOSIS — B88.0: ICD-10-CM

## 2024-02-28 DIAGNOSIS — H01.004: ICD-10-CM

## 2024-02-28 DIAGNOSIS — Z96.1: ICD-10-CM

## 2024-02-28 DIAGNOSIS — M35.00: ICD-10-CM

## 2024-02-28 DIAGNOSIS — H35.40: ICD-10-CM

## 2024-02-28 DIAGNOSIS — H01.002: ICD-10-CM

## 2024-02-28 DIAGNOSIS — H01.001: ICD-10-CM

## 2024-02-28 DIAGNOSIS — H04.121: ICD-10-CM

## 2024-02-28 DIAGNOSIS — H04.122: ICD-10-CM

## 2024-02-28 PROCEDURE — 99213 OFFICE O/P EST LOW 20 MIN: CPT | Performed by: OPHTHALMOLOGY

## 2024-02-28 PROCEDURE — 90837 PSYTX W PT 60 MINUTES: CPT

## 2024-02-28 ASSESSMENT — LID EXAM ASSESSMENTS
OS_BLEPHARITIS: LLL LUL 3+
OD_BLEPHARITIS: RLL RUL 3+

## 2024-02-28 ASSESSMENT — SUPERFICIAL PUNCTATE KERATITIS (SPK)
OD_SPK: 3+
OS_SPK: 3+

## 2024-02-28 ASSESSMENT — CONFRONTATIONAL VISUAL FIELD TEST (CVF)
OS_FINDINGS: FULL
OD_FINDINGS: FULL

## 2024-02-28 ASSESSMENT — PUNCTA - ASSESSMENT
OD_PUNCTA: 3MON PLUG
OS_PUNCTA: 3MON PLUG

## 2024-02-28 ASSESSMENT — LID POSITION - COMMENTS
OD_COMMENTS: FLOPPY LIDS
OS_COMMENTS: FLOPPY LIDS

## 2024-02-28 NOTE — REASON FOR VISIT
[FreeTextEntry1] : chronic depression and anxiety exacerbated by significant health issues and [facility] [Patient] : patient, [unfilled] is a ~age~ year old ~male/female~ being seen for a follow-up visit  [Duration of Psychotherapy Visit (minutes spent in synchronous communication): ____] : Duration of Psychotherapy Visit (minutes spent in synchronous communication): [unfilled] [Teletherapy Service Provided] : The services provided in this session were delivered via tele-therapy [Individual Psychotherapy for 53+ minutes] : Individual Psychotherapy for 53+ minutes  [FreeTextEntry3] : [facility] [FreeTextEntry5] : [facility]

## 2024-02-28 NOTE — PLAN
[FreeTextEntry2] : Problem: trauma: \par  Objective and goal: psycho-ed, symptom management in context of critical health issues, many losses, Patient will become more aware of her symptoms and how she manages them\par  \par  Problem: Depression and anxiety\par  Objective and Goal: decrease symptoms, build skills to cope effectively, use mindfulness and insight to feel less overwhelmed and unmotivated \par  \par  Problem: Isolation\par  Goal: more activity with less depressive symptoms [Cognitive and/or Behavior Therapy] : Cognitive and/or Behavior Therapy  [Skills training (all types)] : Skills training (all types)  [Psychoeducation] : Psychoeducation  [Supportive Therapy] : Supportive Therapy [de-identified] :   Patient presents on time for her weekly session. She's alert and oriented. Engages well, always seems to benefit from support and time for ventilation. Patient spends time reviewing an increase in anxiety. This writer encouraged her to acknowledge her increase in activity and increased independence, which is exposing her to scenarios that have become foreign to her. This writer reviewed tools to utilize when experiencing anxiety, including the use of possibility versus probability and phrases to determine the cause of her stress reaction. Patient can acknowledge her progress over the past year, she remains focused on her health and time with family and will be seen again at 2:00 on the 6th  [FreeTextEntry1] : weekly therapy session in person to address depression, anxiety, isolation, and impact of medical issues on emotional health. 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

## 2024-03-13 ENCOUNTER — NON-APPOINTMENT (OUTPATIENT)
Age: 69
End: 2024-03-13

## 2024-03-13 ENCOUNTER — APPOINTMENT (OUTPATIENT)
Dept: PSYCHIATRY | Facility: CLINIC | Age: 69
End: 2024-03-13

## 2024-03-27 ENCOUNTER — APPOINTMENT (OUTPATIENT)
Dept: PSYCHIATRY | Facility: CLINIC | Age: 69
End: 2024-03-27
Payer: MEDICARE

## 2024-03-27 PROCEDURE — 90837 PSYTX W PT 60 MINUTES: CPT

## 2024-03-27 NOTE — PLAN
[Cognitive and/or Behavior Therapy] : Cognitive and/or Behavior Therapy  [Psychoeducation] : Psychoeducation  [Skills training (all types)] : Skills training (all types)  [Supportive Therapy] : Supportive Therapy [FreeTextEntry2] : Problem: trauma: \par  Objective and goal: psycho-ed, symptom management in context of critical health issues, many losses, Patient will become more aware of her symptoms and how she manages them\par  \par  Problem: Depression and anxiety\par  Objective and Goal: decrease symptoms, build skills to cope effectively, use mindfulness and insight to feel less overwhelmed and unmotivated \par  \par  Problem: Isolation\par  Goal: more activity with less depressive symptoms [de-identified] :   The above named patient , Madelyn, presents on time for her weekly session. She's alert and oriented. Engages well ,reports she recovered well from being ill, feels she has more energy and has been more active outside of the home. This writer spent time reviewing her progress,  reviewed the past two years and times when she felt very limited. Patient's affect much improved. She enjoys time outside of the home and benefits greatly from review of weekly stressors which she feels  and seems to be managing well. no other needs or concerns noted at this time, will meet with patient again at 2:00 pm on 4/3  [FreeTextEntry1] : weekly therapy session in person to address depression, anxiety, isolation, and impact of medical issues on emotional health. 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

## 2024-03-27 NOTE — REASON FOR VISIT
[Patient] : patient, [unfilled] is a ~age~ year old ~male/female~ being seen for a follow-up visit  [Duration of Psychotherapy Visit (minutes spent in synchronous communication): ____] : Duration of Psychotherapy Visit (minutes spent in synchronous communication): [unfilled] [Individual Psychotherapy for 53+ minutes] : Individual Psychotherapy for 53+ minutes  [Teletherapy Service Provided] : The services provided in this session were delivered via tele-therapy [FreeTextEntry1] : chronic depression and anxiety exacerbated by significant health issues and [facility] [FreeTextEntry3] : [facility] [FreeTextEntry5] : [facility]

## 2024-04-01 ENCOUNTER — RX ONLY (RX ONLY)
Age: 69
End: 2024-04-01

## 2024-04-01 ENCOUNTER — OFFICE (OUTPATIENT)
Dept: URBAN - METROPOLITAN AREA CLINIC 104 | Facility: CLINIC | Age: 69
Setting detail: OPHTHALMOLOGY
End: 2024-04-01
Payer: MEDICARE

## 2024-04-01 DIAGNOSIS — H01.002: ICD-10-CM

## 2024-04-01 DIAGNOSIS — H01.004: ICD-10-CM

## 2024-04-01 DIAGNOSIS — H04.121: ICD-10-CM

## 2024-04-01 DIAGNOSIS — H35.40: ICD-10-CM

## 2024-04-01 DIAGNOSIS — H01.001: ICD-10-CM

## 2024-04-01 DIAGNOSIS — M35.00: ICD-10-CM

## 2024-04-01 DIAGNOSIS — H16.223: ICD-10-CM

## 2024-04-01 DIAGNOSIS — Z96.1: ICD-10-CM

## 2024-04-01 DIAGNOSIS — H01.005: ICD-10-CM

## 2024-04-01 DIAGNOSIS — H04.122: ICD-10-CM

## 2024-04-01 DIAGNOSIS — B88.0: ICD-10-CM

## 2024-04-01 DIAGNOSIS — H04.123: ICD-10-CM

## 2024-04-01 PROCEDURE — 99213 OFFICE O/P EST LOW 20 MIN: CPT | Performed by: OPHTHALMOLOGY

## 2024-04-01 ASSESSMENT — LID POSITION - COMMENTS
OS_COMMENTS: FLOPPY LIDS
OD_COMMENTS: FLOPPY LIDS

## 2024-04-01 ASSESSMENT — LID EXAM ASSESSMENTS
OS_BLEPHARITIS: LLL LUL 3+
OD_BLEPHARITIS: RLL RUL 3+

## 2024-04-03 ENCOUNTER — APPOINTMENT (OUTPATIENT)
Dept: PSYCHIATRY | Facility: CLINIC | Age: 69
End: 2024-04-03
Payer: MEDICARE

## 2024-04-03 PROCEDURE — 90837 PSYTX W PT 60 MINUTES: CPT

## 2024-04-03 NOTE — REASON FOR VISIT
[FreeTextEntry1] : chronic depression and anxiety exacerbated by significant health issues and [facility] [Patient] : patient, [unfilled] is a ~age~ year old ~male/female~ being seen for a follow-up visit  [Duration of Psychotherapy Visit (minutes spent in synchronous communication): ____] : Duration of Psychotherapy Visit (minutes spent in synchronous communication): [unfilled] [Teletherapy Service Provided] : The services provided in this session were delivered via tele-therapy [Individual Psychotherapy for 53+ minutes] : Individual Psychotherapy for 53+ minutes  [FreeTextEntry3] : [facility] [FreeTextEntry5] : [facility]

## 2024-04-03 NOTE — PLAN
[FreeTextEntry2] : Problem: trauma: \par  Objective and goal: psycho-ed, symptom management in context of critical health issues, many losses, Patient will become more aware of her symptoms and how she manages them\par  \par  Problem: Depression and anxiety\par  Objective and Goal: decrease symptoms, build skills to cope effectively, use mindfulness and insight to feel less overwhelmed and unmotivated \par  \par  Problem: Isolation\par  Goal: more activity with less depressive symptoms [Psychoeducation] : Psychoeducation  [Cognitive and/or Behavior Therapy] : Cognitive and/or Behavior Therapy  [Skills training (all types)] : Skills training (all types)  [de-identified] :   The above named patient presents on time for a weekly session. She's alert and oriented, engages well spends time reviewing the past week with much activity. She reflects on how her activity level has changed for the better over the past year. She endorses exhaustion, however is pleased with her ability to keep up with daily tasks. She also spends time reviewing the course of her life and reflects on her children and wishes for the future. Patient seems to be managing her time well. Takes time away from the home and independence from her  as he does the same. No other needs are concerns noted . Will meet with patient again on the 10th at 2:00  [Supportive Therapy] : Supportive Therapy [FreeTextEntry1] : weekly therapy session in person to address depression, anxiety, isolation, and impact of medical issues on emotional health. 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

## 2024-04-10 ENCOUNTER — APPOINTMENT (OUTPATIENT)
Dept: PSYCHIATRY | Facility: CLINIC | Age: 69
End: 2024-04-10
Payer: MEDICARE

## 2024-04-10 ENCOUNTER — NON-APPOINTMENT (OUTPATIENT)
Age: 69
End: 2024-04-10

## 2024-04-10 PROCEDURE — 90837 PSYTX W PT 60 MINUTES: CPT

## 2024-04-10 NOTE — REASON FOR VISIT
[FreeTextEntry1] : chronic depression and anxiety exacerbated by significant health issues and sn[facility] [Patient] : patient, [unfilled] is a ~age~ year old ~male/female~ being seen for a follow-up visit  [Duration of Psychotherapy Visit (minutes spent in synchronous communication): ____] : Duration of Psychotherapy Visit (minutes spent in synchronous communication): [unfilled] [Individual Psychotherapy for 53+ minutes] : Individual Psychotherapy for 53+ minutes  [Teletherapy Service Provided] : The services provided in this session were delivered via tele-therapy [FreeTextEntry3] : [facility] [FreeTextEntry5] : [facility]

## 2024-04-10 NOTE — PLAN
[FreeTextEntry2] : Problem: trauma: \par  Objective and goal: psycho-ed, symptom management in context of critical health issues, many losses, Patient will become more aware of her symptoms and how she manages them\par  \par  Problem: Depression and anxiety\par  Objective and Goal: decrease symptoms, build skills to cope effectively, use mindfulness and insight to feel less overwhelmed and unmotivated \par  \par  Problem: Isolation\par  Goal: more activity with less depressive symptoms [Cognitive and/or Behavior Therapy] : Cognitive and/or Behavior Therapy  [Psychoeducation] : Psychoeducation  [Skills training (all types)] : Skills training (all types)  [Supportive Therapy] : Supportive Therapy [de-identified] :   The above named patient presents on time for her weekly session. She's alert and oriented, engages well ,spends time reviewing her level of activity. She seems pleased. She reflects on last year, the impact of stress and how she is better managing now. Time is also spent reviewing concerns within her immediate family and how being supportive to her children also increase her own stress levels. Reviewed the importance of good self-care and continued awareness of her progress over the past two years. no other needs or concerns noted at this time, will meet with patient again on the 17th at 2:00  [FreeTextEntry1] : weekly therapy session in person to address depression, anxiety, isolation, and impact of medical issues on emotional health. 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

## 2024-04-17 ENCOUNTER — APPOINTMENT (OUTPATIENT)
Dept: PSYCHIATRY | Facility: CLINIC | Age: 69
End: 2024-04-17
Payer: MEDICARE

## 2024-04-17 ENCOUNTER — APPOINTMENT (OUTPATIENT)
Dept: ORTHOPEDIC SURGERY | Facility: CLINIC | Age: 69
End: 2024-04-17
Payer: MEDICARE

## 2024-04-17 VITALS
SYSTOLIC BLOOD PRESSURE: 150 MMHG | HEIGHT: 65 IN | WEIGHT: 255 LBS | BODY MASS INDEX: 42.49 KG/M2 | DIASTOLIC BLOOD PRESSURE: 83 MMHG

## 2024-04-17 DIAGNOSIS — Z63.0 PROBLEMS IN RELATIONSHIP WITH SPOUSE OR PARTNER: ICD-10-CM

## 2024-04-17 DIAGNOSIS — I48.0 PAROXYSMAL ATRIAL FIBRILLATION: ICD-10-CM

## 2024-04-17 DIAGNOSIS — I26.99 OTHER PULMONARY EMBOLISM W/OUT ACUTE COR PULMONALE: ICD-10-CM

## 2024-04-17 DIAGNOSIS — Z79.01 LONG TERM (CURRENT) USE OF ANTICOAGULANTS: ICD-10-CM

## 2024-04-17 PROCEDURE — 90837 PSYTX W PT 60 MINUTES: CPT

## 2024-04-17 PROCEDURE — 99204 OFFICE O/P NEW MOD 45 MIN: CPT | Mod: 25

## 2024-04-17 PROCEDURE — 20610 DRAIN/INJ JOINT/BURSA W/O US: CPT | Mod: RT

## 2024-04-17 PROCEDURE — 73564 X-RAY EXAM KNEE 4 OR MORE: CPT | Mod: RT

## 2024-04-17 SDOH — SOCIAL STABILITY - SOCIAL INSECURITY: PROBLEMS IN RELATIONSHIP WITH SPOUSE OR PARTNER: Z63.0

## 2024-04-17 NOTE — REASON FOR VISIT
[FreeTextEntry1] : chronic depression and anxiety exacerbated by significant health issues and senior care [Patient] : patient, [unfilled] is a ~age~ year old ~male/female~ being seen for a follow-up visit  [Duration of Psychotherapy Visit (minutes spent in synchronous communication): ____] : Duration of Psychotherapy Visit (minutes spent in synchronous communication): [unfilled] [Individual Psychotherapy for 53+ minutes] : Individual Psychotherapy for 53+ minutes  [Teletherapy Service Provided] : The services provided in this session were delivered via tele-therapy [FreeTextEntry3] : [facility] [FreeTextEntry5] : [facility]

## 2024-04-17 NOTE — DISCUSSION/SUMMARY
[Medication Risks Reviewed] : Medication risks reviewed [Surgical risks reviewed] : Surgical risks reviewed [de-identified] : 69 y/o F pt presents with bone on bone tricompartmental osteoarthritis of the right knee. The nature of her condition and treatment options were discussed in detail. The pt is a candidate for a right TKA. The surgery was discussed in detail. I discussed conservative treatment such as cortisone injections, HA injections, PT, anti-inflammatories, and low impact exercise. She should continue to do low impact exercises. The pt deferred from cortisone since she has a hx of osteoporosis. The pt opted for 1/3 HA injection of the right knee which she tolerated well. The pt will f/u in 1 week for 2/3 HA injection of the right knee. I sent a script for PT of the knee.   The patient has been counseled regarding the elevated risks associated with surgical complications in patients with a BMI> 35. The patient demonstrates an understanding of the increased risk. After a lengthy discussion, the patient agreed to make a coordinated effort at weight loss prior to surgical intervention. The patient understands our BMI policy and they will make a conscious effort to improve their BMI.		   The patient is a 68 year year old individual with end stage arthritis of their right knee joint. The patient has exhausted a minimum of 3 months conservative treatment including prior injections (cortisone and/or hyaluronic acid injections), physical therapy, over the counter NSAIDS and pain medication where indicated.  In addition, the patient's quality of life is diminished due to significant chronic pain. The patient is having difficulty with activities of daily living, including ambulating, descending stairs, and rising from a seated position. Based upon the patients continued symptoms and failure to respond to conservative treatment, I have recommended a right total knee replacement for this patient. A long discussion took place with the patient describing what a total joint replacement is and what the procedure would entail. A knee model, similar to the implant that will be used during the operation, was utilized to demonstrate and to discuss the various bearing surfaces of the implants. Implant fixation, use of cement, was also discussed with the patient. Choices of implant manufacturers, mainly [employer] and [employer], were discussed and reviewed with preference to be made by patient and surgeon prior to operation. Final selection to be based on customary practice as well as preoperative templating with selection confirmed intraoperatively based on the patient's anatomy. The patient participated and agreed with the decision-making process. The hospitalization and post-operative care and rehabilitation were also discussed. The use of perioperative antibiotics and DVT prophylaxis were discussed. The risk, benefits and alternatives to a surgical intervention were discussed at length with the patient. The patient was also advised of risks related to the medical comorbidities and elevated body mass index (BMI). A lengthy discussion took place to review the most common complications including but not limited to: deep vein thrombosis, pulmonary embolism, heart attack, stroke, infection, wound breakdown, numbness, damage to nerves, tendon, muscles, arteries or other blood vessels, death and other possible complications from anesthesia. The patient was told that we will take steps to minimize these risks by using sterile technique, antibiotics and DVT prophylaxis when appropriate and follow the patient postoperatively in the office setting to monitor progress. The possibility of recurrent pain, no improvement in pain and actual worsening of pain were also discussed with the patient.    The discharge plan of care focused on the patient going home following surgery. The patient was encouraged to make the necessary arrangements to have someone stay with them when they are discharged home. Following discharge, a home care nurse will visit the patient. The home care nurse will open your home care case and request home physical therapy services. Home physical therapy will commence following discharge provided it is appropriate and covered by the health insurance benefit plan.   The benefits of surgery were discussed with the patient including the potential for improving his/her current clinical condition through operative intervention. Alternatives to surgical intervention including continued conservative management were also discussed in detail. All questions were answered to the satisfaction of the patient. The treatment plan of care, as well as a model of a total knee equivalent to the one that will be used for their total joint replacement, was shared with the patient. The patient participated and agreed to the plan of care as well as the use of the recommended implants for their total joint replacement surgery.

## 2024-04-17 NOTE — PROCEDURE
[de-identified] : I injected the patient's right knee with (Euflexxa) (Lot [id number]) (exp 04/22/25) for osteoarthritis  Knee injection visco-supplementation: I discussed at length with the patient the planned steroid and lidocaine injection for primary osteoarthritis. The risks, benefits, convalescence and alternatives were reviewed and pt consented for injection. The possible side effects discussed included but were not limited to: pain, swelling, heat and redness. There symptoms are generally mild but if they are extensive then contact the office. Giving pain relievers by mouth such as NSAIDs or Tylenol can generally treat the reactions to injection. Rare cases of infection have been noted. Rash, hives and itching may occur post injection. If you have muscle pain or cramps, flushing and or swelling of the face, rapid heart beat, nausea, dizziness, fever, chills, headache, difficulty breathing, swelling in the arms or legs, or have a prickly feeling of your skin, contact a health care provider immediately. Following this discussion, the knee was prepped with alcohol and under sterile condition the injection was performed through a superolateral injection site with a 20 gauge needle. The needle was introduced into the joint, aspiration was performed to ensure intra-articular placement and the medication was injected. Upon withdrawal of the needle the site was cleaned with alcohol and a band aid applied. The patient tolerated the injection well and there were no adverse effects. Post injection instructions included no strenuous activity for 24 hours, cryotherapy and if there are any adverse effects to contact the office.

## 2024-04-17 NOTE — PHYSICAL EXAM
[LE] : Sensory: Intact in bilateral lower extremities [ALL] : dorsalis pedis, posterior tibial, femoral, popliteal, and radial 2+ and symmetric bilaterally [Normal] : Alert and in no acute distress [Poor Appearance] : well-appearing [de-identified] : GENERAL APPEARANCE: Well nourished and hydrated, pleasant, alert, and oriented x 3. Appears their stated age.  HEENT: Normocephalic, extraocular eye motion intact. Nasal septum midline. Oral cavity clear. External auditory canal clear.  RESPIRATORY: Breath sounds clear and audible in all lobes. No wheezing, No accessory muscle use. CARDIOVASCULAR: No apparent abnormalities. No lower leg edema. No varicosities. Pedal pulses are palpable. NEUROLOGIC: Sensation is normal, no muscle weakness in the upper or lower extremities. DERMATOLOGIC: No apparent skin lesions, moist, warm, no rash. SPINE: Cervical spine appears normal and moves freely; thoracic spine appears normal and moves freely; lumbosacral spine appears normal and moves freely, normal, nontender. MUSCULOSKELETAL: Hands, wrists, and elbows are normal and move freely, shoulders are normal and move freely.  [de-identified] : Right knee exam shows medial jointline tenderness, ROM 0-120 [de-identified] : We did review previous x-rays taken of the patient's right knee, AP, lateral and oblique views and an outside facility, [facility] on 3/9/2024.  Full report in chart.  There is mild arthritic change of the medial and patellofemoral compartments.  Joint spaces appear well-preserved.  No fracture, dislocation or loose body.

## 2024-04-17 NOTE — PLAN
[FreeTextEntry2] : Problem: trauma: \par  Objective and goal: psycho-ed, symptom management in context of critical health issues, many losses, Patient will become more aware of her symptoms and how she manages them\par  \par  Problem: Depression and anxiety\par  Objective and Goal: decrease symptoms, build skills to cope effectively, use mindfulness and insight to feel less overwhelmed and unmotivated \par  \par  Problem: Isolation\par  Goal: more activity with less depressive symptoms [Cognitive and/or Behavior Therapy] : Cognitive and/or Behavior Therapy  [Psychoeducation] : Psychoeducation  [Skills training (all types)] : Skills training (all types)  [Supportive Therapy] : Supportive Therapy [de-identified] :   Patient , Madelyn, presents for her weekly session. She's alert and oriented. Engages well. Spends time reviewing her own stress reaction and coping skills she utilizes to decrease anxiety and depression. This writer continues to review the importance of regularly scheduled activity and continues to review her progress over the past year medically, this has positively impacted her emotional health. Time spent reviewing her 's stress response, how it has affected her and how use of effective communication may be helpful to decrease tension .No other needs or concerns noted. Will meet with Madelyn again at 2:00 on the 24th.  [FreeTextEntry1] : weekly therapy session in person to address depression, anxiety, isolation, and impact of medical issues on emotional health. 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

## 2024-04-17 NOTE — REASON FOR VISIT
[Initial Visit] : an initial visit for [Other: ____] : [unfilled] [FreeTextEntry2] : Right knee Euflexxa #1 Lot [id number] exp 04/22/25

## 2024-04-17 NOTE — HISTORY OF PRESENT ILLNESS
[de-identified] : Patient is a 68-year-old female here for right knee pain.  She is status post left total knee replacement 2020 and right total hip replacement 2017 and reports her right hip and left knee are both doing very well.  She reports over the past 6 months she has been having worsening right knee pain.  She reports a throbbing aching pain that has been waking her from sleep.  She has been icing and resting the knee with no relief.  She reports stiffness in the knee.  Patient has history of osteoporosis and had been on long-term steroids so was hesitant to do cortisone injections.  She did do a course of gel injections on her left knee prior to surgery.  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

## 2024-04-23 ENCOUNTER — APPOINTMENT (OUTPATIENT)
Dept: ORTHOPEDIC SURGERY | Facility: CLINIC | Age: 69
End: 2024-04-23
Payer: MEDICARE

## 2024-04-23 PROCEDURE — 20610 DRAIN/INJ JOINT/BURSA W/O US: CPT | Mod: RT

## 2024-04-23 NOTE — REASON FOR VISIT
[Follow-Up Visit] : a follow-up visit for [Other: ____] : [unfilled] [FreeTextEntry2] :  Right knee Euflexxa #2 Lot [id number] exp 04/22/25. Follow up with Your cardiologist in 1-2 weeks Low Salt Diet Follow up with Your PMD or Cardiologist in 1 week Diet and Meds compliance Follow up with Endocrine Clinic   [address] [place] [address]  [phone] prevent reoccurrence of pain Follow up with your Cardiologist Dr. Guajardo in 1-2 weeks, cardiac workup normal in hospital Follow up with Your PMD or Cardiologist in 1-2 weeks, continue your antihypertensive meds Follow up with Endocrine Clinic [address] [place] [address] [phone], consistent carbohydrate diet, continue insulin regimen as prescribed, keep log of your fingersticks to show Endocrinologist Follow up with your Cardiologist Dr. Guajardo on 9/14/ 17 at 1:30 pm, stress test was normal study, outpatient echocardiogram Follow up with your Cardiologist Dr. Guajardo on 9/14/ 17 at 1:30 pm, stress test and echocardiogram was normal

## 2024-04-23 NOTE — DISCUSSION/SUMMARY
[de-identified] : Medication risks reviewed. Surgical risks reviewed. 67 y/o F pt presents with bone on bone tricompartmental osteoarthritis of the right knee. The nature of her condition and treatment options were discussed in detail. The pt is a candidate for a right TKA. The surgery was discussed in detail. I discussed conservative treatment such as cortisone injections, HA injections, PT, anti-inflammatories, and low impact exercise. She should continue to do low impact exercises. The pt deferred from cortisone since she has a hx of osteoporosis. The pt opted for 2/3 HA injection of the right knee which she tolerated well. The pt will f/u in 1 week for 3/3 HA injection of the right knee.   The patient has been counseled regarding the elevated risks associated with surgical complications in patients with a BMI> 35. The patient demonstrates an understanding of the increased risk. After a lengthy discussion, the patient agreed to make a coordinated effort at weight loss prior to surgical intervention. The patient understands our BMI policy and they will make a conscious effort to improve their BMI.    The patient is a 68 year year old individual with end stage arthritis of their right knee joint. The patient has exhausted a minimum of 3 months conservative treatment including prior injections (cortisone and/or hyaluronic acid injections), physical therapy, over the counter NSAIDS and pain medication where indicated. In addition, the patient's quality of life is diminished due to significant chronic pain. The patient is having difficulty with activities of daily living, including ambulating, descending stairs, and rising from a seated position. Based upon the patients continued symptoms and failure to respond to conservative treatment, I have recommended a right total knee replacement for this patient. A long discussion took place with the patient describing what a total joint replacement is and what the procedure would entail. A knee model, similar to the implant that will be used during the operation, was utilized to demonstrate and to discuss the various bearing surfaces of the implants. Implant fixation, use of cement, was also discussed with the patient. Choices of implant manufacturers, mainly [employer] and [employer], were discussed and reviewed with preference to be made by patient and surgeon prior to operation. Final selection to be based on customary practice as well as preoperative templating with selection confirmed intraoperatively based on the patient's anatomy. The patient participated and agreed with the decision-making process. The hospitalization and post-operative care and rehabilitation were also discussed. The use of perioperative antibiotics and DVT prophylaxis were discussed. The risk, benefits and alternatives to a surgical intervention were discussed at length with the patient. The patient was also advised of risks related to the medical comorbidities and elevated body mass index (BMI). A lengthy discussion took place to review the most common complications including but not limited to: deep vein thrombosis, pulmonary embolism, heart attack, stroke, infection, wound breakdown, numbness, damage to nerves, tendon, muscles, arteries or other blood vessels, death and other possible complications from anesthesia. The patient was told that we will take steps to minimize these risks by using sterile technique, antibiotics and DVT prophylaxis when appropriate and follow the patient postoperatively in the office setting to monitor progress. The possibility of recurrent pain, no improvement in pain and actual worsening of pain were also discussed with the patient.   The discharge plan of care focused on the patient going home following surgery. The patient was encouraged to make the necessary arrangements to have someone stay with them when they are discharged home. Following discharge, a home care nurse will visit the patient. The home care nurse will open your home care case and request home physical therapy services. Home physical therapy will commence following discharge provided it is appropriate and covered by the health insurance benefit plan.  The benefits of surgery were discussed with the patient including the potential for improving his/her current clinical condition through operative intervention. Alternatives to surgical intervention including continued conservative management were also discussed in detail. All questions were answered to the satisfaction of the patient. The treatment plan of care, as well as a model of a total knee equivalent to the one that will be used for their total joint replacement, was shared with the patient. The patient participated and agreed to the plan of care as well as the use of the recommended implants for their total joint replacement surgery.

## 2024-04-23 NOTE — PROCEDURE
[de-identified] :  I injected the patient's right knee with (Euflexxa) (Lot [id number]) (exp 04/22/25) for osteoarthritis Knee injection visco-supplementation: I discussed at length with the patient the planned steroid and lidocaine injection for primary osteoarthritis. The risks, benefits, convalescence and alternatives were reviewed and pt consented for injection. The possible side effects discussed included but were not limited to: pain, swelling, heat and redness. There symptoms are generally mild but if they are extensive then contact the office. Giving pain relievers by mouth such as NSAIDs or Tylenol can generally treat the reactions to injection. Rare cases of infection have been noted. Rash, hives and itching may occur post injection. If you have muscle pain or cramps, flushing and or swelling of the face, rapid heart beat, nausea, dizziness, fever, chills, headache, difficulty breathing, swelling in the arms or legs, or have a prickly feeling of your skin, contact a health care provider immediately. Following this discussion, the knee was prepped with alcohol and under sterile condition the injection was performed through a superolateral injection site with a 20 gauge needle. The needle was introduced into the joint, aspiration was performed to ensure intra-articular placement and the medication was injected. Upon withdrawal of the needle the site was cleaned with alcohol and a band aid applied. The patient tolerated the injection well and there were no adverse effects. Post injection instructions included no strenuous activity for 24 hours, cryotherapy and if there are any adverse effects to contact the office.

## 2024-04-24 ENCOUNTER — APPOINTMENT (OUTPATIENT)
Dept: PSYCHIATRY | Facility: CLINIC | Age: 69
End: 2024-04-24
Payer: MEDICARE

## 2024-04-24 PROCEDURE — 90837 PSYTX W PT 60 MINUTES: CPT

## 2024-04-24 NOTE — PLAN
[FreeTextEntry2] : Problem: trauma: \par  Objective and goal: psycho-ed, symptom management in context of critical health issues, many losses, Patient will become more aware of her symptoms and how she manages them\par  \par  Problem: Depression and anxiety\par  Objective and Goal: decrease symptoms, build skills to cope effectively, use mindfulness and insight to feel less overwhelmed and unmotivated \par  \par  Problem: Isolation\par  Goal: more activity with less depressive symptoms [Cognitive and/or Behavior Therapy] : Cognitive and/or Behavior Therapy  [Psychoeducation] : Psychoeducation  [Skills training (all types)] : Skills training (all types)  [Supportive Therapy] : Supportive Therapy [de-identified] :  Patient, Madelyn presents for her weekly session. She's alert and oriented. Engages well reports to be in discomfort on this day due to knee pain. She spends time reviewing concerns about her . This writer provided education regarding stress and depression and the benefits of effective communication. Madelyn has struggled with loss of independence, therefore is looking forward to time by herself, this writer helped her to reflect on their past, changes made, and progress, no other needs or concerns noted at this time, have arranged to meet with patient @ 12:00 on the 1st.  [FreeTextEntry1] : weekly therapy session in person to address depression, anxiety, isolation, and impact of medical issues on emotional health. 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

## 2024-04-24 NOTE — REASON FOR VISIT
[FreeTextEntry1] : chronic depression and anxiety exacerbated by significant health issues and alf [Patient] : patient, [unfilled] is a ~age~ year old ~male/female~ being seen for a follow-up visit  [Duration of Psychotherapy Visit (minutes spent in synchronous communication): ____] : Duration of Psychotherapy Visit (minutes spent in synchronous communication): [unfilled] [Individual Psychotherapy for 53+ minutes] : Individual Psychotherapy for 53+ minutes  [Teletherapy Service Provided] : The services provided in this session were delivered via tele-therapy [FreeTextEntry3] : [facility] [FreeTextEntry5] : [facility]

## 2024-05-01 ENCOUNTER — APPOINTMENT (OUTPATIENT)
Dept: ORTHOPEDIC SURGERY | Facility: CLINIC | Age: 69
End: 2024-05-01
Payer: MEDICARE

## 2024-05-01 ENCOUNTER — APPOINTMENT (OUTPATIENT)
Dept: PSYCHIATRY | Facility: CLINIC | Age: 69
End: 2024-05-01
Payer: MEDICARE

## 2024-05-01 PROCEDURE — 20610 DRAIN/INJ JOINT/BURSA W/O US: CPT | Mod: RT

## 2024-05-01 PROCEDURE — 90837 PSYTX W PT 60 MINUTES: CPT

## 2024-05-01 NOTE — DISCUSSION/SUMMARY
[Medication Risks Reviewed] : Medication risks reviewed [Surgical risks reviewed] : Surgical risks reviewed [de-identified] : Medication risks reviewed. Surgical risks reviewed. 69 y/o F pt presents with bone on bone tricompartmental osteoarthritis of the right knee. The nature of her condition and treatment options were discussed in detail. The pt is a candidate for a right TKA. The surgery was discussed in detail. I discussed conservative treatment such as cortisone injections, HA injections, PT, anti-inflammatories, and low impact exercise. She should continue to do low impact exercises. The pt deferred from cortisone since she has a hx of osteoporosis. The pt opted for 3/3 HA injection of the right knee which she tolerated well. she is feeling more stiffness and pain, if it doew not improve in 1 m we will give cortisone injection in the right knee  The patient has been counseled regarding the elevated risks associated with surgical complications in patients with a BMI> 35. The patient demonstrates an understanding of the increased risk. After a lengthy discussion, the patient agreed to make a coordinated effort at weight loss prior to surgical intervention. The patient understands our BMI policy and they will make a conscious effort to improve their BMI.    The patient is a 68 year year old individual with end stage arthritis of their right knee joint. The patient has exhausted a minimum of 3 months conservative treatment including prior injections (cortisone and/or hyaluronic acid injections), physical therapy, over the counter NSAIDS and pain medication where indicated. In addition, the patient's quality of life is diminished due to significant chronic pain. The patient is having difficulty with activities of daily living, including ambulating, descending stairs, and rising from a seated position. Based upon the patients continued symptoms and failure to respond to conservative treatment, I have recommended a right total knee replacement for this patient. A long discussion took place with the patient describing what a total joint replacement is and what the procedure would entail. A knee model, similar to the implant that will be used during the operation, was utilized to demonstrate and to discuss the various bearing surfaces of the implants. Implant fixation, use of cement, was also discussed with the patient. Choices of implant manufacturers, mainly [employer] and [employer], were discussed and reviewed with preference to be made by patient and surgeon prior to operation. Final selection to be based on customary practice as well as preoperative templating with selection confirmed intraoperatively based on the patient's anatomy. The patient participated and agreed with the decision-making process. The hospitalization and post-operative care and rehabilitation were also discussed. The use of perioperative antibiotics and DVT prophylaxis were discussed. The risk, benefits and alternatives to a surgical intervention were discussed at length with the patient. The patient was also advised of risks related to the medical comorbidities and elevated body mass index (BMI). A lengthy discussion took place to review the most common complications including but not limited to: deep vein thrombosis, pulmonary embolism, heart attack, stroke, infection, wound breakdown, numbness, damage to nerves, tendon, muscles, arteries or other blood vessels, death and other possible complications from anesthesia. The patient was told that we will take steps to minimize these risks by using sterile technique, antibiotics and DVT prophylaxis when appropriate and follow the patient postoperatively in the office setting to monitor progress. The possibility of recurrent pain, no improvement in pain and actual worsening of pain were also discussed with the patient.   The discharge plan of care focused on the patient going home following surgery. The patient was encouraged to make the necessary arrangements to have someone stay with them when they are discharged home. Following discharge, a home care nurse will visit the patient. The home care nurse will open your home care case and request home physical therapy services. Home physical therapy will commence following discharge provided it is appropriate and covered by the health insurance benefit plan.  The benefits of surgery were discussed with the patient including the potential for improving his/her current clinical condition through operative intervention. Alternatives to surgical intervention including continued conservative management were also discussed in detail. All questions were answered to the satisfaction of the patient. The treatment plan of care, as well as a model of a total knee equivalent to the one that will be used for their total joint replacement, was shared with the patient. The patient participated and agreed to the plan of care as well as the use of the recommended implants for their total joint replacement surgery.

## 2024-05-01 NOTE — REASON FOR VISIT
[Follow-Up Visit] : a follow-up visit for [Other: ____] : [unfilled] [FreeTextEntry2] :  Euflexxa Lot # [id number] exp 03/17/25 #3

## 2024-05-01 NOTE — REASON FOR VISIT
[FreeTextEntry1] : chronic depression and anxiety exacerbated by significant health issues and prison [Patient] : patient, [unfilled] is a ~age~ year old ~male/female~ being seen for a follow-up visit  [Duration of Psychotherapy Visit (minutes spent in synchronous communication): ____] : Duration of Psychotherapy Visit (minutes spent in synchronous communication): [unfilled] [Individual Psychotherapy for 53+ minutes] : Individual Psychotherapy for 53+ minutes  [Teletherapy Service Provided] : The services provided in this session were delivered via tele-therapy [FreeTextEntry3] : [facility] [FreeTextEntry5] : [facility]

## 2024-05-01 NOTE — END OF VISIT
[FreeTextEntry3] : I, Anika Varela, acted solely as a scribe for Dr. Qamar Bradley on 05/01/2024.  I personally performed the services described in the documentation, reviewed the documentation recorded by the scribe in my presence, and it accurately and completely records my words and actions.

## 2024-05-01 NOTE — PLAN
[FreeTextEntry2] : Problem: trauma: \par  Objective and goal: psycho-ed, symptom management in context of critical health issues, many losses, Patient will become more aware of her symptoms and how she manages them\par  \par  Problem: Depression and anxiety\par  Objective and Goal: decrease symptoms, build skills to cope effectively, use mindfulness and insight to feel less overwhelmed and unmotivated \par  \par  Problem: Isolation\par  Goal: more activity with less depressive symptoms [Cognitive and/or Behavior Therapy] : Cognitive and/or Behavior Therapy  [Psychoeducation] : Psychoeducation  [Skills training (all types)] : Skills training (all types)  [Supportive Therapy] : Supportive Therapy [de-identified] :   Madelyn presents for her weekly session, she's alert and oriented, spends time reviewing event over the weekend that increased her awareness of change in her activity level and awareness of self perception. Reviewed her awareness and how contact with people from the past increases her sense of self. Madelyn properly managing stress anticipating her  traveling next week, she has set up plans to remain distracted to deter isolation and anxiety, no other needs next appt scheduled 5/8 @ 2   [FreeTextEntry1] : weekly therapy session in person to address depression, anxiety, isolation, and impact of medical issues on emotional health. 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

## 2024-05-01 NOTE — PROCEDURE
[de-identified] :  I injected the patient's right knee with (Euflexxa) (Lot [id number]) (exp 04/22/25) for osteoarthritis Knee injection visco-supplementation: I discussed at length with the patient the planned steroid and lidocaine injection for primary osteoarthritis. The risks, benefits, convalescence and alternatives were reviewed and pt consented for injection. The possible side effects discussed included but were not limited to: pain, swelling, heat and redness. There symptoms are generally mild but if they are extensive then contact the office. Giving pain relievers by mouth such as NSAIDs or Tylenol can generally treat the reactions to injection. Rare cases of infection have been noted. Rash, hives and itching may occur post injection. If you have muscle pain or cramps, flushing and or swelling of the face, rapid heart beat, nausea, dizziness, fever, chills, headache, difficulty breathing, swelling in the arms or legs, or have a prickly feeling of your skin, contact a health care provider immediately. Following this discussion, the knee was prepped with alcohol and under sterile condition the injection was performed through a superolateral injection site with a 20 gauge needle. The needle was introduced into the joint, aspiration was performed to ensure intra-articular placement and the medication was injected. Upon withdrawal of the needle the site was cleaned with alcohol and a band aid applied. The patient tolerated the injection well and there were no adverse effects. Post injection instructions included no strenuous activity for 24 hours, cryotherapy and if there are any adverse effects to contact the office.

## 2024-05-08 ENCOUNTER — APPOINTMENT (OUTPATIENT)
Dept: PSYCHIATRY | Facility: CLINIC | Age: 69
End: 2024-05-08
Payer: MEDICARE

## 2024-05-08 DIAGNOSIS — F43.23 ADJUSTMENT DISORDER WITH MIXED ANXIETY AND DEPRESSED MOOD: ICD-10-CM

## 2024-05-08 PROCEDURE — 90837 PSYTX W PT 60 MINUTES: CPT | Mod: 2W

## 2024-05-08 NOTE — REASON FOR VISIT
[FreeTextEntry1] : chronic depression and anxiety exacerbated by significant health issues and assisted [Patient] : patient, [unfilled] is a ~age~ year old ~male/female~ being seen for a follow-up visit  [Duration of Psychotherapy Visit (minutes spent in synchronous communication): ____] : Duration of Psychotherapy Visit (minutes spent in synchronous communication): [unfilled] [Individual Psychotherapy for 53+ minutes] : Individual Psychotherapy for 53+ minutes  [Teletherapy Service Provided] : The services provided in this session were delivered via tele-therapy [FreeTextEntry3] : [facility] [FreeTextEntry5] : [facility]

## 2024-05-08 NOTE — PLAN
[FreeTextEntry2] : Problem: trauma: \par  Objective and goal: psycho-ed, symptom management in context of critical health issues, many losses, Patient will become more aware of her symptoms and how she manages them\par  \par  Problem: Depression and anxiety\par  Objective and Goal: decrease symptoms, build skills to cope effectively, use mindfulness and insight to feel less overwhelmed and unmotivated \par  \par  Problem: Isolation\par  Goal: more activity with less depressive symptoms [Cognitive and/or Behavior Therapy] : Cognitive and/or Behavior Therapy  [Psychoeducation] : Psychoeducation  [Skills training (all types)] : Skills training (all types)  [Supportive Therapy] : Supportive Therapy [de-identified] : Madelyn presents for her weekly session. She's alert and oriented, engages well, has been doing well, spending time independent of her . This writer reviewed the importance of her awareness of her level of confidence and improvement and how she manages without the care of others. Shes  looking forward to the weekend with her family, she's goal oriented understands the importance of good self-care and  use of good coping tools, no other needs or concerns noted at this time, will meet with Madelyn again at 2:00 on the 15th  [FreeTextEntry1] : weekly therapy session in person to address depression, anxiety, isolation, and impact of medical issues on emotional health. 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

## 2024-05-15 ENCOUNTER — APPOINTMENT (OUTPATIENT)
Dept: PSYCHIATRY | Facility: CLINIC | Age: 69
End: 2024-05-15
Payer: MEDICARE

## 2024-05-15 PROCEDURE — 90837 PSYTX W PT 60 MINUTES: CPT

## 2024-05-15 NOTE — PLAN
[FreeTextEntry2] : Problem: trauma: \par  Objective and goal: psycho-ed, symptom management in context of critical health issues, many losses, Patient will become more aware of her symptoms and how she manages them\par  \par  Problem: Depression and anxiety\par  Objective and Goal: decrease symptoms, build skills to cope effectively, use mindfulness and insight to feel less overwhelmed and unmotivated \par  \par  Problem: Isolation\par  Goal: more activity with less depressive symptoms [Cognitive and/or Behavior Therapy] : Cognitive and/or Behavior Therapy  [Psychoeducation] : Psychoeducation  [Skills training (all types)] : Skills training (all types)  [Supportive Therapy] : Supportive Therapy [de-identified] : Madelyn presents for her weekly session, she's alert and oriented, engages well, in pin on this date, due to weather, fatigue and recent slip at home. Spends time reviewing the weekend  her family, as well as acknowledging her continued progress in independence and movement forward. Reviewed the importance of good coping tools, reflection, and self-care. No other needs or concerns noted. Will meet the patient again on the 22nd at 2:00.  [FreeTextEntry1] : weekly therapy session in person to address depression, anxiety, isolation, and impact of medical issues on emotional health. 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

## 2024-05-20 ENCOUNTER — RX RENEWAL (OUTPATIENT)
Age: 69
End: 2024-05-20

## 2024-05-20 ENCOUNTER — TRANSCRIPTION ENCOUNTER (OUTPATIENT)
Age: 69
End: 2024-05-20

## 2024-05-20 RX ORDER — PANTOPRAZOLE 40 MG/1
40 TABLET, DELAYED RELEASE ORAL
Qty: 90 | Refills: 1 | Status: ACTIVE | COMMUNITY
Start: 2023-10-02 | End: 1900-01-01

## 2024-05-20 RX ORDER — FLUTICASONE FUROATE 100 UG/1
100 POWDER RESPIRATORY (INHALATION) DAILY
Qty: 3 | Refills: 3 | Status: ACTIVE | COMMUNITY
Start: 2021-05-14 | End: 1900-01-01

## 2024-05-22 ENCOUNTER — APPOINTMENT (OUTPATIENT)
Dept: PSYCHIATRY | Facility: CLINIC | Age: 69
End: 2024-05-22
Payer: MEDICARE

## 2024-05-22 PROCEDURE — 90837 PSYTX W PT 60 MINUTES: CPT

## 2024-05-22 NOTE — PLAN
[FreeTextEntry2] : Problem: trauma: \par  Objective and goal: psycho-ed, symptom management in context of critical health issues, many losses, Patient will become more aware of her symptoms and how she manages them\par  \par  Problem: Depression and anxiety\par  Objective and Goal: decrease symptoms, build skills to cope effectively, use mindfulness and insight to feel less overwhelmed and unmotivated \par  \par  Problem: Isolation\par  Goal: more activity with less depressive symptoms [Cognitive and/or Behavior Therapy] : Cognitive and/or Behavior Therapy  [Psychoeducation] : Psychoeducation  [Skills training (all types)] : Skills training (all types)  [Supportive Therapy] : Supportive Therapy [de-identified] : Madelyn presents on time for her weekly session. She's alert and oriented, engages well, spends time reviewing the past week, her level of activity, and coping tools utilized. She received good news from her cardiologist that has decreased her anxiety. This writer continues to encourage Madelyn to reflect on her progress, her increased control of scenarios and her openness to step out of comfort levels. No other needs or concerns noted at this time, have arranged to meet with Madelyn again on the 29th at 9am.  [FreeTextEntry1] : weekly therapy session in person to address depression, anxiety, isolation, and impact of medical issues on emotional health. 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

## 2024-05-29 ENCOUNTER — APPOINTMENT (OUTPATIENT)
Dept: PSYCHIATRY | Facility: CLINIC | Age: 69
End: 2024-05-29
Payer: MEDICARE

## 2024-05-29 PROCEDURE — 90837 PSYTX W PT 60 MINUTES: CPT | Mod: 2W

## 2024-05-29 NOTE — REASON FOR VISIT
[Patient preference] : as per patient preference [Telehealth (audio & video) - Individual/Group] : This visit was provided via telehealth using real-time 2-way audio visual technology. [Medical Office: ([facility])___] : The provider was located at the medical office in [unfilled]. [Home] : The patient, [unfilled], was located at home, [unfilled], at the time of the visit. [Verbal consent obtained from patient/other participant(s)] : Verbal consent for telehealth/telephonic services obtained from patient/other participant(s) [FreeTextEntry4] : 2 [FreeTextEntry2] : 5/22/24 [FreeTextEntry1] : chronic depression and anxiety exacerbated by significant health issues and intermediate [Patient] : patient, [unfilled] is a ~age~ year old ~male/female~ being seen for a follow-up visit  [Duration of Psychotherapy Visit (minutes spent in synchronous communication): ____] : Duration of Psychotherapy Visit (minutes spent in synchronous communication): [unfilled] [Individual Psychotherapy for 53+ minutes] : Individual Psychotherapy for 53+ minutes  [Teletherapy Service Provided] : The services provided in this session were delivered via tele-therapy [FreeTextEntry3] : home [FreeTextEntry5] : [facility]

## 2024-05-29 NOTE — PLAN
[FreeTextEntry2] : Problem: trauma: \par  Objective and goal: psycho-ed, symptom management in context of critical health issues, many losses, Patient will become more aware of her symptoms and how she manages them\par  \par  Problem: Depression and anxiety\par  Objective and Goal: decrease symptoms, build skills to cope effectively, use mindfulness and insight to feel less overwhelmed and unmotivated \par  \par  Problem: Isolation\par  Goal: more activity with less depressive symptoms [Cognitive and/or Behavior Therapy] : Cognitive and/or Behavior Therapy  [Psychoeducation] : Psychoeducation  [Skills training (all types)] : Skills training (all types)  [Supportive Therapy] : Supportive Therapy [de-identified] : Madelyn presents on time for her weekly session. She's alert and oriented, engages well, reports high levels of pain that increases her anxiety. She spends time reviewing the history of her pain. How she's managed, and how she's hopeful for the future with new medications. She reports increased dreams about her time working  and how it's affected her emotional health and anger. Time was spent reviewing finding purpose and  how she may reframe purpose to be reestablishing a social [place] within her friend's group. She agrees. No other needs or concerns noted, will meet with Madelyn again at 12:00 on the 5th.  [FreeTextEntry1] : weekly therapy session in person to address depression, anxiety, isolation, and impact of medical issues on emotional health. 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

## 2024-06-04 ENCOUNTER — APPOINTMENT (OUTPATIENT)
Dept: ORTHOPEDIC SURGERY | Facility: CLINIC | Age: 69
End: 2024-06-04
Payer: MEDICARE

## 2024-06-04 VITALS
HEIGHT: 65 IN | WEIGHT: 255 LBS | SYSTOLIC BLOOD PRESSURE: 139 MMHG | BODY MASS INDEX: 42.49 KG/M2 | HEART RATE: 69 BPM | DIASTOLIC BLOOD PRESSURE: 79 MMHG

## 2024-06-04 DIAGNOSIS — M17.11 UNILATERAL PRIMARY OSTEOARTHRITIS, RIGHT KNEE: ICD-10-CM

## 2024-06-04 DIAGNOSIS — E66.01 MORBID (SEVERE) OBESITY DUE TO EXCESS CALORIES: ICD-10-CM

## 2024-06-04 PROCEDURE — 99214 OFFICE O/P EST MOD 30 MIN: CPT | Mod: 25

## 2024-06-04 PROCEDURE — 20610 DRAIN/INJ JOINT/BURSA W/O US: CPT | Mod: RT

## 2024-06-04 NOTE — PHYSICAL EXAM
[de-identified] : GENERAL APPEARANCE: Well nourished and hydrated, pleasant, alert, and oriented x 3. Appears their stated age. HEENT: Normocephalic, extraocular eye motion intact. Nasal septum midline. Oral cavity clear. External auditory canal clear. RESPIRATORY: Breath sounds clear and audible in all lobes. No wheezing, No accessory muscle use. CARDIOVASCULAR: No apparent abnormalities. No lower leg edema. No varicosities. Pedal pulses are palpable. NEUROLOGIC: Sensation is normal, no muscle weakness in the upper or lower extremities. DERMATOLOGIC: No apparent skin lesions, moist, warm, no rash. SPINE: Cervical spine appears normal and moves freely; thoracic spine appears normal and moves freely; lumbosacral spine appears normal and moves freely, normal, nontender. MUSCULOSKELETAL: Hands, wrists, and elbows are normal and move freely, shoulders are normal and move freely.   Musculoskeletal:. Right knee exam shows medial jointline tenderness, ROM 0-120. 5/5 motor strength in bilateral lower extremities. Sensory: Intact in bilateral lower extremities. DTRs: Biceps, brachioradialis, triceps, patellar, ankle and plantar 2+ and symmetric bilaterally. Pulses: dorsalis pedis, posterior tibial, femoral, popliteal, and radial 2+ and symmetric bilaterally.   Constitutional: Alert and in no acute distress, but well-appearing.

## 2024-06-04 NOTE — DISCUSSION/SUMMARY
[Medication Risks Reviewed] : Medication risks reviewed [Surgical risks reviewed] : Surgical risks reviewed [de-identified] : Medication risks reviewed. Surgical risks reviewed. 70 y/o F pt presents with bone on bone tricompartmental osteoarthritis of the right knee. The nature of her condition and treatment options were discussed in detail. she is feeling more pain and stiffness .   The pt is a candidate for a right TKA. The surgery was discussed in detail but she would like to postpone surgical treatment. I discussed conservative treatment such as cortisone injections, HA injections, PT, anti-inflammatories, and low impact exercise. She should continue to do low impact exercises. The opted right knee cortisone she failed on recent HA injection of the right knee.   The patient has been counseled regarding the elevated risks associated with surgical complications in patients with a BMI> 35. The patient demonstrates an understanding of the increased risk. After a lengthy discussion, the patient agreed to make a coordinated effort at weight loss prior to surgical intervention. The patient understands our BMI policy and they will make a conscious effort to improve their BMI.    The patient is a 69 year year old individual with end stage arthritis of their right knee joint. The patient has exhausted a minimum of 3 months conservative treatment including prior injections (cortisone and/or hyaluronic acid injections), physical therapy, over the counter NSAIDS and pain medication where indicated. In addition, the patient's quality of life is diminished due to significant chronic pain. The patient is having difficulty with activities of daily living, including ambulating, descending stairs, and rising from a seated position. Based upon the patients continued symptoms and failure to respond to conservative treatment, I have recommended a right total knee replacement for this patient. A long discussion took place with the patient describing what a total joint replacement is and what the procedure would entail. A knee model, similar to the implant that will be used during the operation, was utilized to demonstrate and to discuss the various bearing surfaces of the implants. Implant fixation, use of cement, was also discussed with the patient. Choices of implant manufacturers, mainly [employer] and [employer], were discussed and reviewed with preference to be made by patient and surgeon prior to operation. Final selection to be based on customary practice as well as preoperative templating with selection confirmed intraoperatively based on the patient's anatomy. The patient participated and agreed with the decision-making process. The hospitalization and post-operative care and rehabilitation were also discussed. The use of perioperative antibiotics and DVT prophylaxis were discussed. The risk, benefits and alternatives to a surgical intervention were discussed at length with the patient. The patient was also advised of risks related to the medical comorbidities and elevated body mass index (BMI). A lengthy discussion took place to review the most common complications including but not limited to: deep vein thrombosis, pulmonary embolism, heart attack, stroke, infection, wound breakdown, numbness, damage to nerves, tendon, muscles, arteries or other blood vessels, death and other possible complications from anesthesia. The patient was told that we will take steps to minimize these risks by using sterile technique, antibiotics and DVT prophylaxis when appropriate and follow the patient postoperatively in the office setting to monitor progress. The possibility of recurrent pain, no improvement in pain and actual worsening of pain were also discussed with the patient.   The discharge plan of care focused on the patient going home following surgery. The patient was encouraged to make the necessary arrangements to have someone stay with them when they are discharged home. Following discharge, a home care nurse will visit the patient. The home care nurse will open your home care case and request home physical therapy services. Home physical therapy will commence following discharge provided it is appropriate and covered by the health insurance benefit plan.  The benefits of surgery were discussed with the patient including the potential for improving his/her current clinical condition through operative intervention. Alternatives to surgical intervention including continued conservative management were also discussed in detail. All questions were answered to the satisfaction of the patient. The treatment plan of care, as well as a model of a total knee equivalent to the one that will be used for their total joint replacement, was shared with the patient. The patient participated and agreed to the plan of care as well as the use of the recommended implants for their total joint replacement surgery.

## 2024-06-04 NOTE — HISTORY OF PRESENT ILLNESS
[de-identified] : Patient is a 69-year-old female here for right knee pain. She is status post left total knee replacement 2020 and right total hip replacement 2017 and reports her right hip and left knee are both doing very well. She reports over the past 9 months she has been having worsening right knee pain. She reports a throbbing aching pain that has been waking her from sleep. She has been icing and resting the knee with no relief. She reports stiffness in the knee. Patient has history of osteoporosis and had been on long-term steroids so was hesitant to do cortisone injections. She did do a course of gel injections on her left knee prior to surgery. 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 Pain levels include a current pain level of 6/10, a minimum pain level of 3/10 and a maximum pain level of 9/10.   [Pain Location] : pain [Worsening] : worsening [___ yrs] : [unfilled] year(s) ago [6] : a current pain level of 6/10 [8] : a maximum pain level of 8/10 [Sitting] : worsened by sitting [Walking] : worsened by walking [Rest] : relieved by rest

## 2024-06-04 NOTE — PROCEDURE
[de-identified] : Patient received right knee 80mg cortisone injection for osteoarthritis  I discussed at length with the patient the planned steroid and lidocaine injection. The risks, benefits, convalescence and alternatives were reviewed. The possible side effects discussed included but were not limited to: pain, swelling, heat, bleeding, and redness. Symptoms are generally mild but if they are extensive then contact the office. Giving pain relievers by mouth such as NSAIDs or Tylenol can generally treat the reactions to steroid and lidocaine. Rare cases of infection have been noted. Rash, hives and itching may occur post injection. If you have muscle pain or cramps, flushing and or swelling of the face, rapid heart beat, nausea, dizziness, fever, chills, headache, difficulty breathing, swelling in the arms or legs, or have a prickly feeling of your skin, contact a health care provider immediately. Following this discussion, the knee was prepped with Alcohol and under sterile condition the 80 mg Depo-Medrol and 6 cc Lidocaine injection was performed with a 20 gauge needle through a superolateral injection site. The needle was introduced into the joint, aspiration was performed to ensure intra-articular placement and the medication was injected. Upon withdrawal of the needle the site was cleaned with alcohol and a band aid applied. The patient tolerated the injection well and there were no adverse effects. Post injection instructions included no strenuous activity for 24 hours, cryotherapy and if there are any adverse effects to contact the office.

## 2024-06-05 ENCOUNTER — APPOINTMENT (OUTPATIENT)
Dept: PSYCHIATRY | Facility: CLINIC | Age: 69
End: 2024-06-05
Payer: MEDICARE

## 2024-06-05 DIAGNOSIS — F43.21 ADJUSTMENT DISORDER WITH DEPRESSED MOOD: ICD-10-CM

## 2024-06-05 PROCEDURE — 90837 PSYTX W PT 60 MINUTES: CPT

## 2024-06-05 NOTE — PLAN
[FreeTextEntry2] : Problem: trauma: \par  Objective and goal: psycho-ed, symptom management in context of critical health issues, many losses, Patient will become more aware of her symptoms and how she manages them\par  \par  Problem: Depression and anxiety\par  Objective and Goal: decrease symptoms, build skills to cope effectively, use mindfulness and insight to feel less overwhelmed and unmotivated \par  \par  Problem: Isolation\par  Goal: more activity with less depressive symptoms [Cognitive and/or Behavior Therapy] : Cognitive and/or Behavior Therapy  [Psychoeducation] : Psychoeducation  [Skills training (all types)] : Skills training (all types)  [Supportive Therapy] : Supportive Therapy [de-identified] : Madelyn presents on time for her weekly session. She's alert and oriented, engages well, reports pain has improved, it has improved her mood. Content of this session was related to loss, health and new beginnings She spends time reviewing significant losses through out her life, many of them unexpected and at young ages, reviewed how grief can affect stress levels and anxiety and how her own health has been impacted over the past few years. Karly is goal oriented, feeling positive that her 2 best friends are now more available and looks forward to increased social outlet. No other needs or concerns noted, will meet with Madelyn again at 2pm on 6/19 [FreeTextEntry1] : weekly therapy session in person to address depression, anxiety, isolation, and impact of medical issues on emotional health. 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

## 2024-06-13 ENCOUNTER — APPOINTMENT (OUTPATIENT)
Dept: PSYCHIATRY | Facility: CLINIC | Age: 69
End: 2024-06-13
Payer: MEDICARE

## 2024-06-13 PROCEDURE — 90837 PSYTX W PT 60 MINUTES: CPT

## 2024-06-13 NOTE — PLAN
[FreeTextEntry2] : Problem: trauma: \par  Objective and goal: psycho-ed, symptom management in context of critical health issues, many losses, Patient will become more aware of her symptoms and how she manages them\par  \par  Problem: Depression and anxiety\par  Objective and Goal: decrease symptoms, build skills to cope effectively, use mindfulness and insight to feel less overwhelmed and unmotivated \par  \par  Problem: Isolation\par  Goal: more activity with less depressive symptoms [Cognitive and/or Behavior Therapy] : Cognitive and/or Behavior Therapy  [Psychoeducation] : Psychoeducation  [Skills training (all types)] : Skills training (all types)  [Supportive Therapy] : Supportive Therapy [de-identified] : Madelyn presents on time for her weekly session. She's alert and oriented, engages well, Spends time reviewing her past week, increased activity and more time socializing. She has benefitted greatly from increased time with her best friend. She also spends time reviewing history of her relationship with her , and how they are bonded despite difficult times, she is hopeful he will following through with his [facility] claim. Support and education provided related to the difficulty this may cause him in recalling traumatic events.  No other needs or concerns noted, will meet with Madelyn again at 2pm 6/19 [FreeTextEntry1] : weekly therapy session in person to address depression, anxiety, isolation, and impact of medical issues on emotional health. 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

## 2024-06-19 ENCOUNTER — APPOINTMENT (OUTPATIENT)
Dept: PSYCHIATRY | Facility: CLINIC | Age: 69
End: 2024-06-19
Payer: MEDICARE

## 2024-06-19 PROCEDURE — 90837 PSYTX W PT 60 MINUTES: CPT

## 2024-06-19 NOTE — PLAN
[FreeTextEntry2] : Problem: trauma: \par  Objective and goal: psycho-ed, symptom management in context of critical health issues, many losses, Patient will become more aware of her symptoms and how she manages them\par  \par  Problem: Depression and anxiety\par  Objective and Goal: decrease symptoms, build skills to cope effectively, use mindfulness and insight to feel less overwhelmed and unmotivated \par  \par  Problem: Isolation\par  Goal: more activity with less depressive symptoms [Cognitive and/or Behavior Therapy] : Cognitive and/or Behavior Therapy  [Psychoeducation] : Psychoeducation  [Skills training (all types)] : Skills training (all types)  [Supportive Therapy] : Supportive Therapy [de-identified] : Madelyn presents on time for her weekly session. She's alert and oriented, engages well, Spends time reviewing her past week, increased stress related to assessing changes n her  and his behavior. This writer helped her further examine possible cause and ways she may effectively communicate her concerns. Much time spent reviewing many losses and topic of mortality.  Also reviewed impact of past weekend as a reminder of the struggles from the past, she agreed. No other needs or concerns noted, will meet with Madelyn again at 2pm on 6/26 @ 2 [FreeTextEntry1] : weekly therapy session in person to address depression, anxiety, isolation, and impact of medical issues on emotional health. 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

## 2024-06-26 ENCOUNTER — APPOINTMENT (OUTPATIENT)
Dept: PSYCHIATRY | Facility: CLINIC | Age: 69
End: 2024-06-26
Payer: MEDICARE

## 2024-06-26 DIAGNOSIS — F41.9 ANXIETY DISORDER, UNSPECIFIED: ICD-10-CM

## 2024-06-26 DIAGNOSIS — F32.A ANXIETY DISORDER, UNSPECIFIED: ICD-10-CM

## 2024-06-26 PROCEDURE — 90837 PSYTX W PT 60 MINUTES: CPT

## 2024-07-11 ENCOUNTER — APPOINTMENT (OUTPATIENT)
Dept: PSYCHIATRY | Facility: CLINIC | Age: 69
End: 2024-07-11
Payer: MEDICARE

## 2024-07-11 PROCEDURE — 90837 PSYTX W PT 60 MINUTES: CPT | Mod: 2W

## 2024-07-17 ENCOUNTER — APPOINTMENT (OUTPATIENT)
Dept: PSYCHIATRY | Facility: CLINIC | Age: 69
End: 2024-07-17
Payer: MEDICARE

## 2024-07-17 PROCEDURE — 90837 PSYTX W PT 60 MINUTES: CPT

## 2024-07-22 ENCOUNTER — TRANSCRIPTION ENCOUNTER (OUTPATIENT)
Age: 69
End: 2024-07-22

## 2024-07-24 ENCOUNTER — APPOINTMENT (OUTPATIENT)
Dept: PSYCHIATRY | Facility: CLINIC | Age: 69
End: 2024-07-24
Payer: MEDICARE

## 2024-07-24 DIAGNOSIS — Z63.0 PROBLEMS IN RELATIONSHIP WITH SPOUSE OR PARTNER: ICD-10-CM

## 2024-07-24 DIAGNOSIS — F32.A ANXIETY DISORDER, UNSPECIFIED: ICD-10-CM

## 2024-07-24 DIAGNOSIS — F41.9 ANXIETY DISORDER, UNSPECIFIED: ICD-10-CM

## 2024-07-24 PROCEDURE — 90837 PSYTX W PT 60 MINUTES: CPT

## 2024-07-24 SDOH — SOCIAL STABILITY - SOCIAL INSECURITY: PROBLEMS IN RELATIONSHIP WITH SPOUSE OR PARTNER: Z63.0

## 2024-07-24 NOTE — PLAN
[Cognitive and/or Behavior Therapy] : Cognitive and/or Behavior Therapy  [Psychoeducation] : Psychoeducation  [Skills training (all types)] : Skills training (all types)  [Supportive Therapy] : Supportive Therapy [FreeTextEntry2] : Problem: trauma: \par  Objective and goal: psycho-ed, symptom management in context of critical health issues, many losses, Patient will become more aware of her symptoms and how she manages them\par  \par  Problem: Depression and anxiety\par  Objective and Goal: decrease symptoms, build skills to cope effectively, use mindfulness and insight to feel less overwhelmed and unmotivated \par  \par  Problem: Isolation\par  Goal: more activity with less depressive symptoms [de-identified] : Madelyn presents on time for her weekly session. She's alert and oriented, engages well, spends time reviewing her past week, continued stress related to assessing changes in her  and his behavior and her inability to address concerns . Reviewed 2 areas that may benefit from further communication including health focus and his behavior during his daughter's visits. Encouraged Madelyn to explore how to best engage in conversation in order to decrease defensiveness and produce active conversation.  She explores her resistance.  Madelyn continues to understand the benefits of activity outside of her home, she has lacked said activity this past week and feels this has impacted her emotional health.  No other needs or concerns noted, will meet with Madelyn again at 11 on 7/31 [FreeTextEntry1] : weekly therapy session in person to address depression, anxiety, isolation, and impact of medical issues on emotional health. 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

## 2024-07-24 NOTE — REASON FOR VISIT
[Patient] : patient, [unfilled] is a ~age~ year old ~male/female~ being seen for a follow-up visit  [Duration of Psychotherapy Visit (minutes spent in synchronous communication): ____] : Duration of Psychotherapy Visit (minutes spent in synchronous communication): [unfilled] [Individual Psychotherapy for 53+ minutes] : Individual Psychotherapy for 53+ minutes  [Teletherapy Service Provided] : The services provided in this session were delivered via tele-therapy [FreeTextEntry1] : chronic depression and anxiety exacerbated by significant health issues and custodial [FreeTextEntry3] : [facility] [FreeTextEntry5] : [facility]

## 2024-07-31 ENCOUNTER — APPOINTMENT (OUTPATIENT)
Dept: PSYCHIATRY | Facility: CLINIC | Age: 69
End: 2024-07-31

## 2024-08-07 ENCOUNTER — APPOINTMENT (OUTPATIENT)
Dept: PSYCHIATRY | Facility: CLINIC | Age: 69
End: 2024-08-07

## 2024-08-07 PROCEDURE — 90837 PSYTX W PT 60 MINUTES: CPT

## 2024-08-07 NOTE — REASON FOR VISIT
[FreeTextEntry1] : chronic depression and anxiety exacerbated by significant health issues and [facility] [Patient] : patient, [unfilled] is a ~age~ year old ~male/female~ being seen for a follow-up visit  [Duration of Psychotherapy Visit (minutes spent in synchronous communication): ____] : Duration of Psychotherapy Visit (minutes spent in synchronous communication): [unfilled] [Individual Psychotherapy for 53+ minutes] : Individual Psychotherapy for 53+ minutes  [Teletherapy Service Provided] : The services provided in this session were delivered via tele-therapy [FreeTextEntry3] : [facility] [FreeTextEntry5] : [facility]

## 2024-08-07 NOTE — PLAN
[FreeTextEntry2] : Problem: trauma: \par  Objective and goal: psycho-ed, symptom management in context of critical health issues, many losses, Patient will become more aware of her symptoms and how she manages them\par  \par  Problem: Depression and anxiety\par  Objective and Goal: decrease symptoms, build skills to cope effectively, use mindfulness and insight to feel less overwhelmed and unmotivated \par  \par  Problem: Isolation\par  Goal: more activity with less depressive symptoms [Cognitive and/or Behavior Therapy] : Cognitive and/or Behavior Therapy  [Psychoeducation] : Psychoeducation  [Skills training (all types)] : Skills training (all types)  [Supportive Therapy] : Supportive Therapy [de-identified] : Madelyn presents on time for her weekly session. She's alert and oriented, engages well, spends time reviewing her past week, continued stress related to assessing changes in her  . Education provided related to claim process (final documents recently submitted to [employer]) and the increased stress response her  may be experiencing related to the [place] claim process, re-living trauma, anticipating C&P. Encouraged Madelyn to explore how to best engage in conversation in order to decrease defensiveness and produce active conversation while also using empathy in now understanding high stress of claim process.  Madelyn continues to understand the benefits of activity outside of her home, noting chronic anxiety , reports level of 2 out of 10 when able to remain at home but elevates to 8 when having to complete tasks outside of the home or managing tasks that are impacted by change in mobility.  No other needs or concerns noted, will meet with Madelyn again at 11 8/14 [FreeTextEntry1] : weekly therapy session in person to address depression, anxiety, isolation, and impact of medical issues on emotional health. 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

## 2024-08-14 ENCOUNTER — APPOINTMENT (OUTPATIENT)
Dept: PSYCHIATRY | Facility: CLINIC | Age: 69
End: 2024-08-14
Payer: MEDICARE

## 2024-08-14 PROCEDURE — 90837 PSYTX W PT 60 MINUTES: CPT

## 2024-08-14 NOTE — REASON FOR VISIT
[Patient] : patient, [unfilled] is a ~age~ year old ~male/female~ being seen for a follow-up visit  [Duration of Psychotherapy Visit (minutes spent in synchronous communication): ____] : Duration of Psychotherapy Visit (minutes spent in synchronous communication): [unfilled] [Individual Psychotherapy for 53+ minutes] : Individual Psychotherapy for 53+ minutes  [Teletherapy Service Provided] : The services provided in this session were delivered via tele-therapy [FreeTextEntry1] : chronic depression and anxiety exacerbated by significant health issues and [facility] [FreeTextEntry3] : [facility] [FreeTextEntry5] : [facility]

## 2024-08-14 NOTE — PLAN
[Cognitive and/or Behavior Therapy] : Cognitive and/or Behavior Therapy  [Psychoeducation] : Psychoeducation  [Skills training (all types)] : Skills training (all types)  [Supportive Therapy] : Supportive Therapy [FreeTextEntry2] : Problem: trauma: \par  Objective and goal: psycho-ed, symptom management in context of critical health issues, many losses, Patient will become more aware of her symptoms and how she manages them\par  \par  Problem: Depression and anxiety\par  Objective and Goal: decrease symptoms, build skills to cope effectively, use mindfulness and insight to feel less overwhelmed and unmotivated \par  \par  Problem: Isolation\par  Goal: more activity with less depressive symptoms [de-identified] : Madelyn presents on time for her weekly session. She's alert and oriented, engages well, spends time reviewing her past week, reports increased fatigue and lack of activity .She notes physical and emotional demands over the past few days. She spends time reviewing family stress and how it impacts her emotional health she has also become aware of how this impacts her  and may be a contributing factor to is disconnect during family visits.  She is also more aware of the emotional impact of the claim process and found it helpful to explore during last session the normalcy of  stress reaction to this topic.   Madelyn continues to be encouraged to seek activity outside of the home, her affect somewhat flat this week most likely impacted by fatigue.  Also conducted PHQ0 and KANDICE 7 as Madelyn questions depression noting increased sleep/fatigue, change in appetite (may be related to meds ) , and increased isolation this past week. No other needs or concerns noted, will meet with Madelyn again at 11 8/21 [FreeTextEntry1] : weekly therapy session in person to address depression, anxiety, isolation, and impact of medical issues on emotional health. 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

## 2024-08-21 ENCOUNTER — APPOINTMENT (OUTPATIENT)
Dept: PSYCHIATRY | Facility: CLINIC | Age: 69
End: 2024-08-21
Payer: MEDICARE

## 2024-08-21 PROCEDURE — 90837 PSYTX W PT 60 MINUTES: CPT

## 2024-08-21 NOTE — PLAN
[FreeTextEntry2] : Problem: trauma: \par  Objective and goal: psycho-ed, symptom management in context of critical health issues, many losses, Patient will become more aware of her symptoms and how she manages them\par  \par  Problem: Depression and anxiety\par  Objective and Goal: decrease symptoms, build skills to cope effectively, use mindfulness and insight to feel less overwhelmed and unmotivated \par  \par  Problem: Isolation\par  Goal: more activity with less depressive symptoms [Cognitive and/or Behavior Therapy] : Cognitive and/or Behavior Therapy  [Psychoeducation] : Psychoeducation  [Skills training (all types)] : Skills training (all types)  [Supportive Therapy] : Supportive Therapy [de-identified] : Madelyn presents on time for her weekly session. She's alert and oriented, engages well, spends time reviewing her past week, reports increased pain that results in less activity and increases feelings of depression. She is hopeful for improvement in weather as she understands the correlation between the damp weather and physical discomfort. Madelyn reviews increased anxiety the past couple of days, she relates this to additional health ailments and worry about the future and illness progression. Encouraged reframing and positive self talk, noting her progress and her ability to make changes.  Madelyn is pleased that the claim process for her  is coming to and end she is hopeful that his emotional health will improve. This writer reviewed that  an 100% sc award will result in her  being home more often and it may be the catalyst for her to find regularly scheduled activity outside of the home, she agrees as she has reviewed over the past year the tension they feel when lacking structured time . No other needs or concerns noted, will meet with Madelyn again at 12 on 8/29 [FreeTextEntry1] : weekly therapy session in person to address depression, anxiety, isolation, and impact of medical issues on emotional health. 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

## 2024-08-29 ENCOUNTER — APPOINTMENT (OUTPATIENT)
Dept: PSYCHIATRY | Facility: CLINIC | Age: 69
End: 2024-08-29
Payer: MEDICARE

## 2024-08-29 DIAGNOSIS — F32.A ANXIETY DISORDER, UNSPECIFIED: ICD-10-CM

## 2024-08-29 DIAGNOSIS — F41.9 ANXIETY DISORDER, UNSPECIFIED: ICD-10-CM

## 2024-08-29 PROCEDURE — 90837 PSYTX W PT 60 MINUTES: CPT

## 2024-08-29 NOTE — PLAN
[FreeTextEntry2] : Problem: trauma: \par  Objective and goal: psycho-ed, symptom management in context of critical health issues, many losses, Patient will become more aware of her symptoms and how she manages them\par  \par  Problem: Depression and anxiety\par  Objective and Goal: decrease symptoms, build skills to cope effectively, use mindfulness and insight to feel less overwhelmed and unmotivated \par  \par  Problem: Isolation\par  Goal: more activity with less depressive symptoms [Cognitive and/or Behavior Therapy] : Cognitive and/or Behavior Therapy  [Psychoeducation] : Psychoeducation  [Skills training (all types)] : Skills training (all types)  [Supportive Therapy] : Supportive Therapy [de-identified] : Madelyn presents on time for her weekly session. She's alert and oriented, engages well, spends time reviewing her past week, increased activity, that resulted in physical pain. She has also been taking meds as prescribed with positive response but struggles with "giving in" to needing more medication. Encouraged her to list the pros and the cons. She admits to feeling lonely, education provided related to depression and loneliness and continue to review the importance of self-care and increased activity outside of the home. Madelyn also reports financial worries which can contribute to her emotional response and lack of motivation, will continue to monitor. Continue to encourage reframing and positive self talk, noting her progress and her ability to make changes.  No other needs or concerns noted, will meet with Madelyn again 4pm on 9/11 [FreeTextEntry1] : weekly therapy session in person to address depression, anxiety, isolation, and impact of medical issues on emotional health. 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

## 2024-09-04 ENCOUNTER — APPOINTMENT (OUTPATIENT)
Dept: ORTHOPEDIC SURGERY | Facility: CLINIC | Age: 69
End: 2024-09-04
Payer: MEDICARE

## 2024-09-04 VITALS
DIASTOLIC BLOOD PRESSURE: 77 MMHG | BODY MASS INDEX: 42.49 KG/M2 | WEIGHT: 255 LBS | HEIGHT: 65 IN | HEART RATE: 78 BPM | SYSTOLIC BLOOD PRESSURE: 124 MMHG

## 2024-09-04 DIAGNOSIS — M17.11 UNILATERAL PRIMARY OSTEOARTHRITIS, RIGHT KNEE: ICD-10-CM

## 2024-09-04 PROCEDURE — 99214 OFFICE O/P EST MOD 30 MIN: CPT | Mod: 25

## 2024-09-04 PROCEDURE — 20610 DRAIN/INJ JOINT/BURSA W/O US: CPT | Mod: RT

## 2024-09-04 NOTE — DISCUSSION/SUMMARY
[Medication Risks Reviewed] : Medication risks reviewed [Surgical risks reviewed] : Surgical risks reviewed [de-identified] : Medication risks reviewed. Surgical risks reviewed. 70 y/o F pt presents with bone on bone tricompartmental osteoarthritis of the right knee. The nature of her condition and treatment options were discussed in detail. she is feeling more pain and stiffness. The pt is a candidate for a right TKA. The surgery was discussed in detail but she would like to postpone surgical treatment. I discussed conservative treatment such as cortisone injections, HA injections, PT, anti-inflammatories, and low impact exercise. She should continue to do low impact exercises. The pt opted for right knee cortisone injection which she tolerated well. I sent a script for PT. And pt is instructed to see Dr. Taylor for right shoulder pain. F/u in 3 months for re-evaluation.  The patient has been counseled regarding the elevated risks associated with surgical complications in patients with a BMI> 35. The patient demonstrates an understanding of the increased risk. After a lengthy discussion, the patient agreed to make a coordinated effort at weight loss prior to surgical intervention. The patient understands our BMI policy and they will make a conscious effort to improve their BMI.    The patient is a 69 year old individual with end stage arthritis of their right knee joint. The patient has exhausted a minimum of 3 months conservative treatment including prior injections (cortisone and/or hyaluronic acid injections), physical therapy, over the counter NSAIDS and pain medication where indicated. In addition, the patient's quality of life is diminished due to significant chronic pain. The patient is having difficulty with activities of daily living, including ambulating, descending stairs, and rising from a seated position. Based upon the patients continued symptoms and failure to respond to conservative treatment, I have recommended a right total knee replacement for this patient. A long discussion took place with the patient describing what a total joint replacement is and what the procedure would entail. A knee model, similar to the implant that will be used during the operation, was utilized to demonstrate and to discuss the various bearing surfaces of the implants. Implant fixation, use of cement, was also discussed with the patient. Choices of implant manufacturers, mainly [employer] and [employer], were discussed and reviewed with preference to be made by patient and surgeon prior to operation. Final selection to be based on customary practice as well as preoperative templating with selection confirmed intraoperatively based on the patient's anatomy. The patient participated and agreed with the decision-making process. The hospitalization and post-operative care and rehabilitation were also discussed. The use of perioperative antibiotics and DVT prophylaxis were discussed. The risk, benefits and alternatives to a surgical intervention were discussed at length with the patient. The patient was also advised of risks related to the medical comorbidities and elevated body mass index (BMI). A lengthy discussion took place to review the most common complications including but not limited to: deep vein thrombosis, pulmonary embolism, heart attack, stroke, infection, wound breakdown, numbness, damage to nerves, tendon, muscles, arteries or other blood vessels, death and other possible complications from anesthesia. The patient was told that we will take steps to minimize these risks by using sterile technique, antibiotics and DVT prophylaxis when appropriate and follow the patient postoperatively in the office setting to monitor progress. The possibility of recurrent pain, no improvement in pain and actual worsening of pain were also discussed with the patient.   The discharge plan of care focused on the patient going home following surgery. The patient was encouraged to make the necessary arrangements to have someone stay with them when they are discharged home. Following discharge, a home care nurse will visit the patient. The home care nurse will open your home care case and request home physical therapy services. Home physical therapy will commence following discharge provided it is appropriate and covered by the health insurance benefit plan.  The benefits of surgery were discussed with the patient including the potential for improving his/her current clinical condition through operative intervention. Alternatives to surgical intervention including continued conservative management were also discussed in detail. All questions were answered to the satisfaction of the patient. The treatment plan of care, as well as a model of a total knee equivalent to the one that will be used for their total joint replacement, was shared with the patient. The patient participated and agreed to the plan of care as well as the use of the recommended implants for their total joint replacement surgery.

## 2024-09-04 NOTE — END OF VISIT
[FreeTextEntry3] : I, Amy Narayan, acted solely as a scribe for Dr. Qamar Bradley on this date 09/04/2024. I personally performed the services described in the documentation, reviewed the documentation recorded by the scribe in my presence, and it accurately and completely records my words and actions.   I, Qamar Bradley MD, personally performed the services described in the documentation, reviewed the documentation recorded by the scribe in my presence and it accurately and completely records my words and actions.

## 2024-09-04 NOTE — HISTORY OF PRESENT ILLNESS
[Pain Location] : pain [Worsening] : worsening [___ yrs] : [unfilled] year(s) ago [6] : a current pain level of 6/10 [8] : a maximum pain level of 8/10 [Sitting] : worsened by sitting [Walking] : worsened by walking [Rest] : relieved by rest [de-identified] : 9/4/24: Patient reports recurrent pain in the right knee. She had cortisone shot in June which worked well. Patient complains of a right shoulder pain. She also interested in physical therapy to strengthen her muscle.  6/4/24: Patient is a 69-year-old female here for right knee pain. She is status post left total knee replacement 2020 and right total hip replacement 2017 and reports her right hip and left knee are both doing very well. She reports over the past 9 months she has been having worsening right knee pain. She reports a throbbing aching pain that has been waking her from sleep. She has been icing and resting the knee with no relief. She reports stiffness in the knee. Patient has history of osteoporosis and had been on long-term steroids so was hesitant to do cortisone injections. She did do a course of gel injections on her left knee prior to surgery. 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 Pain levels include a current pain level of 6/10, a minimum pain level of 3/10 and a maximum pain level of 9/10.

## 2024-09-04 NOTE — PROCEDURE
[de-identified] : I injected the patient's right knee today with cortisone for primary osteoarthritis.  I discussed at length with the patient the planned steroid and lidocaine injection. The risks, benefits, convalescence and alternatives were reviewed. The possible side effects discussed included but were not limited to: pain, swelling, heat, bleeding, and redness. Symptoms are generally mild but if they are extensive then contact the office. Giving pain relievers by mouth such as NSAIDs or Tylenol can generally treat the reactions to steroid and lidocaine. Rare cases of infection have been noted. Rash, hives and itching may occur post injection. If you have muscle pain or cramps, flushing and or swelling of the face, rapid heart beat, nausea, dizziness, fever, chills, headache, difficulty breathing, swelling in the arms or legs, or have a prickly feeling of your skin, contact a health care provider immediately. Following this discussion, the knee was prepped with Alcohol and under sterile condition the 80 mg Depo-Medrol and 6 cc Lidocaine injection was performed with a 20 gauge needle through a superolateral injection site. The needle was introduced into the joint, aspiration was performed to ensure intra-articular placement and the medication was injected. Upon withdrawal of the needle the site was cleaned with alcohol and a band aid applied. The patient tolerated the injection well and there were no adverse effects. Post injection instructions included no strenuous activity for 24 hours, cryotherapy and if there are any adverse effects to contact the office.

## 2024-09-04 NOTE — PHYSICAL EXAM
[Cane] : ambulates with cane [Normal] : Alert and in no acute distress [Poor Appearance] : well-appearing [de-identified] : GENERAL APPEARANCE: Well nourished and hydrated, pleasant, alert, and oriented x 3. Appears their stated age. HEENT: Normocephalic, extraocular eye motion intact. Nasal septum midline. Oral cavity clear. External auditory canal clear. RESPIRATORY: Breath sounds clear and audible in all lobes. No wheezing, No accessory muscle use. CARDIOVASCULAR: No apparent abnormalities. No lower leg edema. No varicosities. Pedal pulses are palpable. NEUROLOGIC: Sensation is normal, no muscle weakness in the upper or lower extremities. DERMATOLOGIC: No apparent skin lesions, moist, warm, no rash. SPINE: Cervical spine appears normal and moves freely; thoracic spine appears normal and moves freely; lumbosacral spine appears normal and moves freely, normal, nontender. MUSCULOSKELETAL: Hands, wrists, and elbows are normal and move freely, shoulders are normal and move freely.   Musculoskeletal:. Right knee exam shows medial jointline tenderness, ROM 0-120. 5/5 motor strength in bilateral lower extremities. Sensory: Intact in bilateral lower extremities. DTRs: Biceps, brachioradialis, triceps, patellar, ankle and plantar 2+ and symmetric bilaterally. Pulses: dorsalis pedis, posterior tibial, femoral, popliteal, and radial 2+ and symmetric bilaterally.   Constitutional: Alert and in no acute distress, but well-appearing.

## 2024-09-11 ENCOUNTER — APPOINTMENT (OUTPATIENT)
Dept: PSYCHIATRY | Facility: CLINIC | Age: 69
End: 2024-09-11

## 2024-09-13 ENCOUNTER — APPOINTMENT (OUTPATIENT)
Dept: ORTHOPEDIC SURGERY | Facility: CLINIC | Age: 69
End: 2024-09-13
Payer: MEDICARE

## 2024-09-13 VITALS
HEART RATE: 69 BPM | HEIGHT: 65 IN | WEIGHT: 255 LBS | BODY MASS INDEX: 42.49 KG/M2 | DIASTOLIC BLOOD PRESSURE: 80 MMHG | SYSTOLIC BLOOD PRESSURE: 161 MMHG

## 2024-09-13 DIAGNOSIS — M25.511 PAIN IN RIGHT SHOULDER: ICD-10-CM

## 2024-09-13 DIAGNOSIS — M75.81 OTHER SHOULDER LESIONS, RIGHT SHOULDER: ICD-10-CM

## 2024-09-13 PROCEDURE — 99214 OFFICE O/P EST MOD 30 MIN: CPT | Mod: 25

## 2024-09-13 PROCEDURE — 20610 DRAIN/INJ JOINT/BURSA W/O US: CPT | Mod: RT

## 2024-09-13 NOTE — DISCUSSION/SUMMARY
[de-identified] : Assessment: 69-year-old female with right shoulder pain clinically consistent with rotator cuff tendinitis/subacromial impingement syndrome.  She does have a past medical history significant for pulmonary hypertension and atrial fibrillation, currently taking Eliquis.   Treatment: We discussed operative and nonoperative treatments for the patient's diagnosis.  We discussed her x-rays in the office which show no acute fracture or dislocation but mild arthritic change of the glenohumeral joint and moderate arthritic change of the AC joint.  On physical examination she presents with full, painful range of motion and pain with resisted rotator cuff strength testing consistent with rotator cuff pathology. We did proceed with a subacromial cortisone injection in the office today which was tolerated well.  I have also recommended formalized physical therapy over the next 6 to 8 weeks.  She will follow-up at that point for reevaluation.  We discussed the possibility of further diagnostic imaging to evaluate for rotator cuff tear in the future should her symptoms fail conservative management. She was on board with the plan.  All questions answered.

## 2024-09-13 NOTE — PROCEDURE
[de-identified] : I injected the subacromial space of the RIGHT shoulder.    I discussed at length with the patient the planned steroid and lidocaine injection. The risks, benefits, convalescence and alternatives were reviewed. The possible side effects discussed included but were not limited to: pain, swelling, heat, bleeding, and redness. Symptoms are generally mild but if they are extensive then contact the office. Giving pain relievers by mouth such as NSAIDs or Tylenol can generally treat the reactions to steroid and lidocaine. Rare cases of infection have been noted. Rash, hives and itching may occur post injection. If you have muscle pain or cramps, flushing and or swelling of the face, rapid heart beat, nausea, dizziness, fever, chills, headache, difficulty breathing, swelling in the arms or legs, or have a prickly feeling of your skin, contact a health care provider immediately. After verbal informed consent was obtained, the area was steriley prepped and 80mg of Depo-Medrol, along with 4 cc's of 1% Lidocaine was then injected into the subacromial space of the patient's RIGHT shoulder. Procedure tolerated well.

## 2024-09-13 NOTE — HISTORY OF PRESENT ILLNESS
[de-identified] : 9/13/24: KIERAN is a 69-year-old female that presents today for initial evaluation of right shoulder pain which began 6 to 7 months ago.  No specific injury. She reports insidious onset, intermittent, moderate dull aching and throbbing pain over the lateral deltoid with radiation down the upper arm to the elbow.  Most pronounced at night and early in the morning.  Exacerbated when lifting above the level of the shoulder.  She denies any mechanical symptoms or instability.  No weakness or stiffness.  No paresthesia. No previous history of similar symptoms in the past. Treatment has consisted of the occasional use of Celebrex as prescribed by her rheumatologist and approved by her cardiologist in addition to activity modification without significant relief.  Patient does have a past medical history significant for pulmonary hypertension and atrial fibrillation, currently taking Eliquis.  No history of diabetes.

## 2024-09-13 NOTE — PHYSICAL EXAM
[de-identified] : GENERAL APPEARANCE: Well nourished and hydrated, pleasant, alert, and oriented x 3. Appears their stated age.  HEENT: Normocephalic, extraocular eye motion intact. Nasal septum midline. Oral cavity clear. External auditory canal clear.  RESPIRATORY: Breath sounds clear and audible in all lobes. No wheezing, No accessory muscle use. CARDIOVASCULAR: No apparent abnormalities. No lower leg edema. No varicosities. Pedal pulses are palpable. NEUROLOGIC: Sensation is normal, no muscle weakness in the upper or lower extremities. DERMATOLOGIC: No apparent skin lesions, moist, warm, no rash. SPINE: Cervical spine appears normal and moves freely; thoracic spine appears normal and moves freely; lumbosacral spine appears normal and moves freely, normal, nontender. MUSCULOSKELETAL: Hands, wrists, and elbows are normal and move freely, shoulders are normal and move freely.  PSYCHIATRIC: Oriented to person, place, and time, insight and judgement were intact and the affect was normal.    Right shoulder Exam: Physical exam of the shoulder demonstrates normal skin without signs of skin changes or abnormalities. No erythema, warmth, or joint effusion appreciated. Sensation intact light touch C5-T1 Palpable radial pulse Radial/ulnar/median/axillary/musculocutaneous/AIN/PIN nerves grossly intact Range of motion: Forward Flexion: 180 Abduction: 150 External Rotation: 60 Internal Rotation: T7 Palpation: Not tender to palpation over the glenohumeral joint Mildly tender palpation over the rotator cuff insertion on the greater tuberosity Not tender to palpation over the AC joint Mildly tender to palpation of the biceps tendon/bicipital groove Strength testing: Supraspinatus: 5/5, painful Infraspinatus: 5/5 Teres minor: 5/5 Subscapularis: 5/5 Special test: Empty can test positive Pool impingement test positive Speeds test negative Chesterfield's test negative Lift-off test negative Belly-press test negative Cross-arm adduction test negative [de-identified] : X rays were taken of the RIGHT shoulder. AP internal & external rotation, Scapular Y and Axillary views.     No evidence of obvious fracture or dislocation.  There is mild arthritic changes noted at the glenohumeral joint, moderate at the acromioclavicular joint.  Type II acromion.

## 2024-09-17 NOTE — PATIENT PROFILE ADULT - NSPROMEDSADMININFO_GEN_A_NUR
HR=76 bpm, TWRV=284/82 mmhg, SpO2=98.0 %, Resp=21 B/min, EtCO2=30 mmHg, Apnea=1 Seconds, Pain=0, Tamia=10 no concerns

## 2024-09-19 ENCOUNTER — APPOINTMENT (OUTPATIENT)
Dept: PSYCHIATRY | Facility: CLINIC | Age: 69
End: 2024-09-19
Payer: MEDICARE

## 2024-09-19 DIAGNOSIS — F32.A ANXIETY DISORDER, UNSPECIFIED: ICD-10-CM

## 2024-09-19 DIAGNOSIS — F41.9 ANXIETY DISORDER, UNSPECIFIED: ICD-10-CM

## 2024-09-19 PROCEDURE — 90837 PSYTX W PT 60 MINUTES: CPT

## 2024-10-02 ENCOUNTER — APPOINTMENT (OUTPATIENT)
Dept: PSYCHIATRY | Facility: CLINIC | Age: 69
End: 2024-10-02
Payer: MEDICARE

## 2024-10-02 PROCEDURE — 90837 PSYTX W PT 60 MINUTES: CPT

## 2024-10-02 NOTE — PLAN
[FreeTextEntry2] : Problem: trauma: \par  Objective and goal: psycho-ed, symptom management in context of critical health issues, many losses, Patient will become more aware of her symptoms and how she manages them\par  \par  Problem: Depression and anxiety\par  Objective and Goal: decrease symptoms, build skills to cope effectively, use mindfulness and insight to feel less overwhelmed and unmotivated \par  \par  Problem: Isolation\par  Goal: more activity with less depressive symptoms [Cognitive and/or Behavior Therapy] : Cognitive and/or Behavior Therapy  [Psychoeducation] : Psychoeducation  [Skills training (all types)] : Skills training (all types)  [Supportive Therapy] : Supportive Therapy [de-identified] : Madelyn presents on time for her weekly session. She's alert and oriented, engages well, spends time reviewing her 2 weeks, enjoyed her vacation with her , was pleased with her mobility was able to name a time when she felt she over exerted but was able to make adjustments to meet her needs. April also reviews her 's possible long term pending [place] decision. Reviewed the benefits of finding purpose and structured activity for each of them, especially in light of tension recently felt when being in the home together for long periods of time. Madelyn struggling with finding a direction and admits to feeling unmotivated, she does not feel depressed however often feels physically and emotionally fatigued due to her health. Continue to encourage reframing and positive self talk, noting her progress and her ability to make changes.  No other needs or concerns noted, will meet with Madelyn again 11am on 10/11 virtually [FreeTextEntry1] : weekly therapy session in person to address depression, anxiety, isolation, and impact of medical issues on emotional health. 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

## 2024-10-02 NOTE — REASON FOR VISIT
[FreeTextEntry1] : chronic depression and anxiety exacerbated by significant health issues and USP [Patient] : patient, [unfilled] is a ~age~ year old ~male/female~ being seen for a follow-up visit  [Duration of Psychotherapy Visit (minutes spent in synchronous communication): ____] : Duration of Psychotherapy Visit (minutes spent in synchronous communication): [unfilled] [Individual Psychotherapy for 53+ minutes] : Individual Psychotherapy for 53+ minutes  [Teletherapy Service Provided] : The services provided in this session were delivered via tele-therapy [FreeTextEntry3] : [facility] [FreeTextEntry5] : [facility] Private car

## 2024-10-11 ENCOUNTER — APPOINTMENT (OUTPATIENT)
Dept: PSYCHIATRY | Facility: CLINIC | Age: 69
End: 2024-10-11
Payer: MEDICARE

## 2024-10-11 PROCEDURE — 90837 PSYTX W PT 60 MINUTES: CPT | Mod: 2W

## 2024-10-16 ENCOUNTER — APPOINTMENT (OUTPATIENT)
Dept: PSYCHIATRY | Facility: CLINIC | Age: 69
End: 2024-10-16
Payer: MEDICARE

## 2024-10-16 DIAGNOSIS — F41.9 ANXIETY DISORDER, UNSPECIFIED: ICD-10-CM

## 2024-10-16 DIAGNOSIS — F32.A ANXIETY DISORDER, UNSPECIFIED: ICD-10-CM

## 2024-10-16 PROCEDURE — 90837 PSYTX W PT 60 MINUTES: CPT

## 2024-10-21 ENCOUNTER — OFFICE (OUTPATIENT)
Dept: URBAN - METROPOLITAN AREA CLINIC 104 | Facility: CLINIC | Age: 69
Setting detail: OPHTHALMOLOGY
End: 2024-10-21
Payer: MEDICARE

## 2024-10-21 DIAGNOSIS — H04.122: ICD-10-CM

## 2024-10-21 DIAGNOSIS — H01.005: ICD-10-CM

## 2024-10-21 DIAGNOSIS — H04.123: ICD-10-CM

## 2024-10-21 DIAGNOSIS — H01.001: ICD-10-CM

## 2024-10-21 DIAGNOSIS — M35.00: ICD-10-CM

## 2024-10-21 DIAGNOSIS — H16.223: ICD-10-CM

## 2024-10-21 DIAGNOSIS — H01.002: ICD-10-CM

## 2024-10-21 DIAGNOSIS — B88.0: ICD-10-CM

## 2024-10-21 DIAGNOSIS — H01.004: ICD-10-CM

## 2024-10-21 DIAGNOSIS — H04.121: ICD-10-CM

## 2024-10-21 DIAGNOSIS — H35.40: ICD-10-CM

## 2024-10-21 DIAGNOSIS — Z96.1: ICD-10-CM

## 2024-10-21 PROCEDURE — 99213 OFFICE O/P EST LOW 20 MIN: CPT | Performed by: OPHTHALMOLOGY

## 2024-10-21 ASSESSMENT — PUNCTA - ASSESSMENT
OD_PUNCTA: 3MON PLUG
OS_PUNCTA: 3MON PLUG

## 2024-10-21 ASSESSMENT — CONFRONTATIONAL VISUAL FIELD TEST (CVF)
OS_FINDINGS: FULL
OD_FINDINGS: FULL

## 2024-10-21 ASSESSMENT — TONOMETRY
OS_IOP_MMHG: 11
OD_IOP_MMHG: 11

## 2024-10-21 ASSESSMENT — LID POSITION - COMMENTS
OD_COMMENTS: FLOPPY LIDS
OS_COMMENTS: FLOPPY LIDS

## 2024-10-21 ASSESSMENT — LID EXAM ASSESSMENTS
OD_BLEPHARITIS: RLL RUL 3+
OS_BLEPHARITIS: LLL LUL 3+

## 2024-10-21 ASSESSMENT — VISUAL ACUITY
OS_BCVA: 20/30
OD_BCVA: 20/30

## 2024-10-21 ASSESSMENT — DECREASING TEAR LAKE - SEVERITY SCORE
OD_DEC_TEARLAKE: 1+
OS_DEC_TEARLAKE: 1+

## 2024-10-21 ASSESSMENT — SUPERFICIAL PUNCTATE KERATITIS (SPK)
OS_SPK: 1+ 2+
OD_SPK: 1+ 2+

## 2024-10-30 ENCOUNTER — APPOINTMENT (OUTPATIENT)
Dept: PSYCHIATRY | Facility: CLINIC | Age: 69
End: 2024-10-30
Payer: MEDICARE

## 2024-10-30 DIAGNOSIS — F41.9 ANXIETY DISORDER, UNSPECIFIED: ICD-10-CM

## 2024-10-30 DIAGNOSIS — F32.A ANXIETY DISORDER, UNSPECIFIED: ICD-10-CM

## 2024-10-30 PROCEDURE — 90837 PSYTX W PT 60 MINUTES: CPT

## 2024-11-13 ENCOUNTER — APPOINTMENT (OUTPATIENT)
Dept: PSYCHIATRY | Facility: CLINIC | Age: 69
End: 2024-11-13
Payer: MEDICARE

## 2024-11-13 DIAGNOSIS — F41.9 ANXIETY DISORDER, UNSPECIFIED: ICD-10-CM

## 2024-11-13 DIAGNOSIS — F32.A ANXIETY DISORDER, UNSPECIFIED: ICD-10-CM

## 2024-11-13 PROCEDURE — 90837 PSYTX W PT 60 MINUTES: CPT

## 2024-11-27 ENCOUNTER — APPOINTMENT (OUTPATIENT)
Dept: PSYCHIATRY | Facility: CLINIC | Age: 69
End: 2024-11-27
Payer: MEDICARE

## 2024-11-27 DIAGNOSIS — F32.A ANXIETY DISORDER, UNSPECIFIED: ICD-10-CM

## 2024-11-27 DIAGNOSIS — F41.9 ANXIETY DISORDER, UNSPECIFIED: ICD-10-CM

## 2024-11-27 PROCEDURE — 90837 PSYTX W PT 60 MINUTES: CPT

## 2024-12-10 ENCOUNTER — APPOINTMENT (OUTPATIENT)
Dept: ORTHOPEDIC SURGERY | Facility: CLINIC | Age: 69
End: 2024-12-10
Payer: MEDICARE

## 2024-12-10 VITALS
SYSTOLIC BLOOD PRESSURE: 136 MMHG | HEIGHT: 65 IN | BODY MASS INDEX: 42.49 KG/M2 | HEART RATE: 60 BPM | WEIGHT: 255 LBS | DIASTOLIC BLOOD PRESSURE: 87 MMHG

## 2024-12-10 DIAGNOSIS — M17.11 UNILATERAL PRIMARY OSTEOARTHRITIS, RIGHT KNEE: ICD-10-CM

## 2024-12-10 DIAGNOSIS — E66.01 MORBID (SEVERE) OBESITY DUE TO EXCESS CALORIES: ICD-10-CM

## 2024-12-10 PROCEDURE — 20610 DRAIN/INJ JOINT/BURSA W/O US: CPT | Mod: RT

## 2024-12-10 PROCEDURE — 99213 OFFICE O/P EST LOW 20 MIN: CPT | Mod: 25

## 2024-12-11 ENCOUNTER — APPOINTMENT (OUTPATIENT)
Dept: PSYCHIATRY | Facility: CLINIC | Age: 69
End: 2024-12-11
Payer: MEDICARE

## 2024-12-11 PROCEDURE — 90837 PSYTX W PT 60 MINUTES: CPT

## 2024-12-18 ENCOUNTER — APPOINTMENT (OUTPATIENT)
Dept: PSYCHIATRY | Facility: CLINIC | Age: 69
End: 2024-12-18
Payer: MEDICARE

## 2024-12-18 DIAGNOSIS — F32.A ANXIETY DISORDER, UNSPECIFIED: ICD-10-CM

## 2024-12-18 DIAGNOSIS — F41.9 ANXIETY DISORDER, UNSPECIFIED: ICD-10-CM

## 2024-12-18 PROCEDURE — 90837 PSYTX W PT 60 MINUTES: CPT

## 2025-01-08 ENCOUNTER — APPOINTMENT (OUTPATIENT)
Dept: PSYCHIATRY | Facility: CLINIC | Age: 70
End: 2025-01-08
Payer: MEDICARE

## 2025-01-08 DIAGNOSIS — F32.A ANXIETY DISORDER, UNSPECIFIED: ICD-10-CM

## 2025-01-08 DIAGNOSIS — F41.9 ANXIETY DISORDER, UNSPECIFIED: ICD-10-CM

## 2025-01-08 PROCEDURE — 90837 PSYTX W PT 60 MINUTES: CPT

## 2025-01-15 ENCOUNTER — APPOINTMENT (OUTPATIENT)
Dept: PSYCHIATRY | Facility: CLINIC | Age: 70
End: 2025-01-15
Payer: MEDICARE

## 2025-01-15 DIAGNOSIS — F43.23 ADJUSTMENT DISORDER WITH MIXED ANXIETY AND DEPRESSED MOOD: ICD-10-CM

## 2025-01-15 PROCEDURE — 90837 PSYTX W PT 60 MINUTES: CPT

## 2025-01-24 ENCOUNTER — APPOINTMENT (OUTPATIENT)
Dept: MAMMOGRAPHY | Facility: CLINIC | Age: 70
End: 2025-01-24

## 2025-01-24 ENCOUNTER — OUTPATIENT (OUTPATIENT)
Dept: OUTPATIENT SERVICES | Facility: HOSPITAL | Age: 70
LOS: 1 days | End: 2025-01-24
Payer: MEDICARE

## 2025-01-24 DIAGNOSIS — Z12.31 ENCOUNTER FOR SCREENING MAMMOGRAM FOR MALIGNANT NEOPLASM OF BREAST: ICD-10-CM

## 2025-01-24 DIAGNOSIS — Z98.890 OTHER SPECIFIED POSTPROCEDURAL STATES: Chronic | ICD-10-CM

## 2025-01-24 DIAGNOSIS — Z96.641 PRESENCE OF RIGHT ARTIFICIAL HIP JOINT: Chronic | ICD-10-CM

## 2025-01-24 DIAGNOSIS — Z90.49 ACQUIRED ABSENCE OF OTHER SPECIFIED PARTS OF DIGESTIVE TRACT: Chronic | ICD-10-CM

## 2025-01-24 DIAGNOSIS — Z95.828 PRESENCE OF OTHER VASCULAR IMPLANTS AND GRAFTS: Chronic | ICD-10-CM

## 2025-01-24 PROCEDURE — 77063 BREAST TOMOSYNTHESIS BI: CPT

## 2025-01-24 PROCEDURE — 77067 SCR MAMMO BI INCL CAD: CPT

## 2025-01-24 PROCEDURE — 77063 BREAST TOMOSYNTHESIS BI: CPT | Mod: 26

## 2025-01-24 PROCEDURE — 77067 SCR MAMMO BI INCL CAD: CPT | Mod: 26

## 2025-01-29 ENCOUNTER — APPOINTMENT (OUTPATIENT)
Dept: PSYCHIATRY | Facility: CLINIC | Age: 70
End: 2025-01-29
Payer: MEDICARE

## 2025-01-29 DIAGNOSIS — F32.A ANXIETY DISORDER, UNSPECIFIED: ICD-10-CM

## 2025-01-29 DIAGNOSIS — F41.9 ANXIETY DISORDER, UNSPECIFIED: ICD-10-CM

## 2025-01-29 PROCEDURE — 90837 PSYTX W PT 60 MINUTES: CPT

## 2025-02-12 ENCOUNTER — APPOINTMENT (OUTPATIENT)
Dept: PSYCHIATRY | Facility: CLINIC | Age: 70
End: 2025-02-12
Payer: MEDICARE

## 2025-02-12 DIAGNOSIS — F43.23 ADJUSTMENT DISORDER WITH MIXED ANXIETY AND DEPRESSED MOOD: ICD-10-CM

## 2025-02-12 PROCEDURE — 90837 PSYTX W PT 60 MINUTES: CPT | Mod: 2W

## 2025-02-26 ENCOUNTER — APPOINTMENT (OUTPATIENT)
Dept: PSYCHIATRY | Facility: CLINIC | Age: 70
End: 2025-02-26

## 2025-02-26 DIAGNOSIS — F32.A ANXIETY DISORDER, UNSPECIFIED: ICD-10-CM

## 2025-02-26 DIAGNOSIS — F41.9 ANXIETY DISORDER, UNSPECIFIED: ICD-10-CM

## 2025-03-11 ENCOUNTER — APPOINTMENT (OUTPATIENT)
Dept: ORTHOPEDIC SURGERY | Facility: CLINIC | Age: 70
End: 2025-03-11

## 2025-03-12 ENCOUNTER — APPOINTMENT (OUTPATIENT)
Dept: PSYCHIATRY | Facility: CLINIC | Age: 70
End: 2025-03-12
Payer: MEDICARE

## 2025-03-12 PROCEDURE — 90837 PSYTX W PT 60 MINUTES: CPT

## 2025-03-19 ENCOUNTER — APPOINTMENT (OUTPATIENT)
Dept: PSYCHIATRY | Facility: CLINIC | Age: 70
End: 2025-03-19
Payer: MEDICARE

## 2025-03-19 DIAGNOSIS — F41.9 ANXIETY DISORDER, UNSPECIFIED: ICD-10-CM

## 2025-03-19 DIAGNOSIS — F32.A ANXIETY DISORDER, UNSPECIFIED: ICD-10-CM

## 2025-03-19 PROCEDURE — 90837 PSYTX W PT 60 MINUTES: CPT

## 2025-03-26 ENCOUNTER — OFFICE (OUTPATIENT)
Dept: URBAN - METROPOLITAN AREA CLINIC 104 | Facility: CLINIC | Age: 70
Setting detail: OPHTHALMOLOGY
End: 2025-03-26
Payer: MEDICARE

## 2025-03-26 ENCOUNTER — RX ONLY (RX ONLY)
Age: 70
End: 2025-03-26

## 2025-03-26 DIAGNOSIS — H01.002: ICD-10-CM

## 2025-03-26 DIAGNOSIS — H01.005: ICD-10-CM

## 2025-03-26 DIAGNOSIS — Z96.1: ICD-10-CM

## 2025-03-26 DIAGNOSIS — H16.223: ICD-10-CM

## 2025-03-26 DIAGNOSIS — H01.004: ICD-10-CM

## 2025-03-26 DIAGNOSIS — B88.0: ICD-10-CM

## 2025-03-26 DIAGNOSIS — H01.001: ICD-10-CM

## 2025-03-26 DIAGNOSIS — M35.00: ICD-10-CM

## 2025-03-26 DIAGNOSIS — H04.121: ICD-10-CM

## 2025-03-26 DIAGNOSIS — H35.40: ICD-10-CM

## 2025-03-26 DIAGNOSIS — H04.123: ICD-10-CM

## 2025-03-26 DIAGNOSIS — H04.122: ICD-10-CM

## 2025-03-26 PROBLEM — H43.392 VITREOUS FLOATERS; LEFT EYE: Status: ACTIVE | Noted: 2025-03-26

## 2025-03-26 PROBLEM — H43.811 POSTERIOR VITREOUS DETACHMENT; RIGHT EYE: Status: ACTIVE | Noted: 2025-03-26

## 2025-03-26 PROCEDURE — 92012 INTRM OPH EXAM EST PATIENT: CPT | Performed by: OPHTHALMOLOGY

## 2025-03-26 ASSESSMENT — LID EXAM ASSESSMENTS
OD_BLEPHARITIS: RLL RUL 3+
OS_BLEPHARITIS: LLL LUL 3+

## 2025-03-26 ASSESSMENT — PUNCTA - ASSESSMENT
OD_PUNCTA: 3MON PLUG
OS_PUNCTA: 3MON PLUG

## 2025-03-26 ASSESSMENT — VISUAL ACUITY
OS_BCVA: 20/40-2
OD_BCVA: 20/30

## 2025-03-26 ASSESSMENT — LID POSITION - COMMENTS
OD_COMMENTS: FLOPPY LIDS
OS_COMMENTS: FLOPPY LIDS

## 2025-03-26 ASSESSMENT — TONOMETRY
OD_IOP_MMHG: 10
OS_IOP_MMHG: 10

## 2025-03-26 ASSESSMENT — DECREASING TEAR LAKE - SEVERITY SCORE
OD_DEC_TEARLAKE: 2+
OS_DEC_TEARLAKE: 2+

## 2025-03-26 ASSESSMENT — SUPERFICIAL PUNCTATE KERATITIS (SPK)
OS_SPK: 3+ 4+
OD_SPK: 3+ 4+

## 2025-03-28 ENCOUNTER — APPOINTMENT (OUTPATIENT)
Dept: ORTHOPEDIC SURGERY | Facility: CLINIC | Age: 70
End: 2025-03-28
Payer: MEDICARE

## 2025-03-28 VITALS
DIASTOLIC BLOOD PRESSURE: 79 MMHG | HEIGHT: 65 IN | BODY MASS INDEX: 42.49 KG/M2 | HEART RATE: 66 BPM | SYSTOLIC BLOOD PRESSURE: 129 MMHG | WEIGHT: 255 LBS

## 2025-03-28 DIAGNOSIS — M25.511 PAIN IN RIGHT SHOULDER: ICD-10-CM

## 2025-03-28 PROCEDURE — 99214 OFFICE O/P EST MOD 30 MIN: CPT | Mod: 25

## 2025-03-28 PROCEDURE — 20610 DRAIN/INJ JOINT/BURSA W/O US: CPT | Mod: RT

## 2025-04-02 ENCOUNTER — APPOINTMENT (OUTPATIENT)
Dept: PSYCHIATRY | Facility: CLINIC | Age: 70
End: 2025-04-02
Payer: MEDICARE

## 2025-04-02 DIAGNOSIS — F41.9 ANXIETY DISORDER, UNSPECIFIED: ICD-10-CM

## 2025-04-02 DIAGNOSIS — F32.A ANXIETY DISORDER, UNSPECIFIED: ICD-10-CM

## 2025-04-02 PROBLEM — F43.20 GRIEF REACTION: Status: ACTIVE | Noted: 2023-06-14

## 2025-04-02 PROCEDURE — 90837 PSYTX W PT 60 MINUTES: CPT

## 2025-04-04 ENCOUNTER — APPOINTMENT (OUTPATIENT)
Dept: ORTHOPEDIC SURGERY | Facility: CLINIC | Age: 70
End: 2025-04-04

## 2025-04-14 ENCOUNTER — APPOINTMENT (OUTPATIENT)
Dept: ORTHOPEDIC SURGERY | Facility: CLINIC | Age: 70
End: 2025-04-14
Payer: MEDICARE

## 2025-04-14 VITALS — BODY MASS INDEX: 42.49 KG/M2 | HEIGHT: 65 IN | WEIGHT: 255 LBS

## 2025-04-14 DIAGNOSIS — M17.11 UNILATERAL PRIMARY OSTEOARTHRITIS, RIGHT KNEE: ICD-10-CM

## 2025-04-14 DIAGNOSIS — Z79.01 LONG TERM (CURRENT) USE OF ANTICOAGULANTS: ICD-10-CM

## 2025-04-14 PROCEDURE — 99214 OFFICE O/P EST MOD 30 MIN: CPT | Mod: 25

## 2025-04-14 PROCEDURE — 20610 DRAIN/INJ JOINT/BURSA W/O US: CPT | Mod: RT

## 2025-04-14 PROCEDURE — 73564 X-RAY EXAM KNEE 4 OR MORE: CPT | Mod: RT

## 2025-04-16 ENCOUNTER — APPOINTMENT (OUTPATIENT)
Dept: PSYCHIATRY | Facility: CLINIC | Age: 70
End: 2025-04-16
Payer: MEDICARE

## 2025-04-16 DIAGNOSIS — F41.9 ANXIETY DISORDER, UNSPECIFIED: ICD-10-CM

## 2025-04-16 DIAGNOSIS — F32.A ANXIETY DISORDER, UNSPECIFIED: ICD-10-CM

## 2025-04-16 PROCEDURE — 90837 PSYTX W PT 60 MINUTES: CPT

## 2025-04-28 ENCOUNTER — OFFICE (OUTPATIENT)
Dept: URBAN - METROPOLITAN AREA CLINIC 104 | Facility: CLINIC | Age: 70
Setting detail: OPHTHALMOLOGY
End: 2025-04-28
Payer: MEDICARE

## 2025-04-28 DIAGNOSIS — H04.121: ICD-10-CM

## 2025-04-28 DIAGNOSIS — Z96.1: ICD-10-CM

## 2025-04-28 DIAGNOSIS — H01.001: ICD-10-CM

## 2025-04-28 DIAGNOSIS — H01.004: ICD-10-CM

## 2025-04-28 DIAGNOSIS — H16.223: ICD-10-CM

## 2025-04-28 DIAGNOSIS — H04.123: ICD-10-CM

## 2025-04-28 DIAGNOSIS — H35.40: ICD-10-CM

## 2025-04-28 DIAGNOSIS — B88.0: ICD-10-CM

## 2025-04-28 DIAGNOSIS — H04.122: ICD-10-CM

## 2025-04-28 DIAGNOSIS — H01.002: ICD-10-CM

## 2025-04-28 DIAGNOSIS — H01.005: ICD-10-CM

## 2025-04-28 DIAGNOSIS — M35.00: ICD-10-CM

## 2025-04-28 PROCEDURE — 99213 OFFICE O/P EST LOW 20 MIN: CPT | Performed by: OPHTHALMOLOGY

## 2025-04-28 ASSESSMENT — CONFRONTATIONAL VISUAL FIELD TEST (CVF)
OD_FINDINGS: FULL
OS_FINDINGS: FULL

## 2025-04-28 ASSESSMENT — LID POSITION - COMMENTS
OD_COMMENTS: FLOPPY LIDS
OS_COMMENTS: FLOPPY LIDS

## 2025-04-28 ASSESSMENT — LID EXAM ASSESSMENTS
OS_BLEPHARITIS: LLL LUL 3+
OD_BLEPHARITIS: RLL RUL 3+

## 2025-04-28 ASSESSMENT — VISUAL ACUITY
OD_BCVA: 20/20-1
OS_BCVA: 20/25-1

## 2025-04-28 ASSESSMENT — DECREASING TEAR LAKE - SEVERITY SCORE
OS_DEC_TEARLAKE: 2+
OD_DEC_TEARLAKE: 2+

## 2025-04-28 ASSESSMENT — SUPERFICIAL PUNCTATE KERATITIS (SPK)
OS_SPK: 3+ 4+
OD_SPK: 3+ 4+

## 2025-04-28 ASSESSMENT — TONOMETRY
OS_IOP_MMHG: 10
OD_IOP_MMHG: 10

## 2025-04-28 ASSESSMENT — PUNCTA - ASSESSMENT
OD_PUNCTA: 3MON PLUG
OS_PUNCTA: 3MON PLUG

## 2025-04-30 ENCOUNTER — APPOINTMENT (OUTPATIENT)
Dept: PSYCHIATRY | Facility: CLINIC | Age: 70
End: 2025-04-30
Payer: MEDICARE

## 2025-04-30 DIAGNOSIS — F32.A ANXIETY DISORDER, UNSPECIFIED: ICD-10-CM

## 2025-04-30 DIAGNOSIS — F41.9 ANXIETY DISORDER, UNSPECIFIED: ICD-10-CM

## 2025-04-30 PROCEDURE — 90837 PSYTX W PT 60 MINUTES: CPT

## 2025-05-16 ENCOUNTER — APPOINTMENT (OUTPATIENT)
Dept: PSYCHIATRY | Facility: CLINIC | Age: 70
End: 2025-05-16
Payer: MEDICARE

## 2025-05-16 DIAGNOSIS — F41.9 ANXIETY DISORDER, UNSPECIFIED: ICD-10-CM

## 2025-05-16 DIAGNOSIS — F32.A ANXIETY DISORDER, UNSPECIFIED: ICD-10-CM

## 2025-05-16 PROCEDURE — 90837 PSYTX W PT 60 MINUTES: CPT

## 2025-05-29 ENCOUNTER — APPOINTMENT (OUTPATIENT)
Dept: PSYCHIATRY | Facility: CLINIC | Age: 70
End: 2025-05-29
Payer: MEDICARE

## 2025-05-29 DIAGNOSIS — F41.9 ANXIETY DISORDER, UNSPECIFIED: ICD-10-CM

## 2025-05-29 DIAGNOSIS — F32.A ANXIETY DISORDER, UNSPECIFIED: ICD-10-CM

## 2025-05-29 PROCEDURE — 90837 PSYTX W PT 60 MINUTES: CPT

## 2025-06-09 NOTE — HEALTH RISK ASSESSMENT
Rx's sent for July and August to pharmacy.  Please let Nadine know. Thanks.   [Good] : ~his/her~  mood as  good [No falls in past year] : Patient reported no falls in the past year [0] : 1) Little interest or pleasure doing things: Not at all (0) [Patient reported bone density results were abnormal] : Patient reported bone density results were abnormal [Patient reported mammogram was normal] : Patient reported mammogram was normal [Patient reported colonoscopy was normal] : Patient reported colonoscopy was normal [Employed] : employed [With Significant Other] : lives with significant other [None] : None [High School] : high school [] :  [# Of Children ___] : has [unfilled] children [Feels Safe at Home] : Feels safe at home [Fully functional (bathing, dressing, toileting, transferring, walking, feeding)] : Fully functional (bathing, dressing, toileting, transferring, walking, feeding) [Fully functional (using the telephone, shopping, preparing meals, housekeeping, doing laundry, using] : Fully functional and needs no help or supervision to perform IADLs (using the telephone, shopping, preparing meals, housekeeping, doing laundry, using transportation, managing medications and managing finances) [Smoke Detector] : smoke detector [Carbon Monoxide Detector] : carbon monoxide detector [Seat Belt] :  uses seat belt [FreeTextEntry1] : more generalized fatigue  [de-identified] : Pulmonology /Dr. Meek;  cardiology/Dr. Houston/ Rheumatology    [] : No [[id number]] : 0 [de-identified] : limited due to pain  [Change in mental status noted] : No change in mental status noted [Reports changes in hearing] : Reports no changes in hearing [Guns at Home] : no guns at home [Travel to Developing Areas] : does not  travel to developing areas [MammogramDate] : 4/18 [MammogramComments] : Bi Rads 1 [BoneDensityDate] : 2017  [BoneDensityComments] : osteopenia  [ColonoscopyDate] : "Years"  [FreeTextEntry2] :  at [facility] .

## 2025-06-12 ENCOUNTER — APPOINTMENT (OUTPATIENT)
Dept: PSYCHIATRY | Facility: CLINIC | Age: 70
End: 2025-06-12
Payer: MEDICARE

## 2025-06-12 PROCEDURE — 90837 PSYTX W PT 60 MINUTES: CPT

## 2025-06-25 ENCOUNTER — APPOINTMENT (OUTPATIENT)
Dept: PSYCHIATRY | Facility: CLINIC | Age: 70
End: 2025-06-25
Payer: MEDICARE

## 2025-06-25 PROCEDURE — 90837 PSYTX W PT 60 MINUTES: CPT

## 2025-07-02 ENCOUNTER — APPOINTMENT (OUTPATIENT)
Dept: ORTHOPEDIC SURGERY | Facility: CLINIC | Age: 70
End: 2025-07-02
Payer: MEDICARE

## 2025-07-02 PROBLEM — E66.813 OBESITY, CLASS III, BMI 40-49.9 (MORBID OBESITY): Status: ACTIVE | Noted: 2021-05-14

## 2025-07-02 PROCEDURE — 99215 OFFICE O/P EST HI 40 MIN: CPT

## 2025-07-10 ENCOUNTER — NON-APPOINTMENT (OUTPATIENT)
Age: 70
End: 2025-07-10

## 2025-07-16 ENCOUNTER — APPOINTMENT (OUTPATIENT)
Dept: PSYCHIATRY | Facility: CLINIC | Age: 70
End: 2025-07-16

## 2025-07-16 PROCEDURE — 90837 PSYTX W PT 60 MINUTES: CPT

## 2025-07-30 ENCOUNTER — APPOINTMENT (OUTPATIENT)
Dept: PSYCHIATRY | Facility: CLINIC | Age: 70
End: 2025-07-30
Payer: MEDICARE

## 2025-07-30 PROCEDURE — 90837 PSYTX W PT 60 MINUTES: CPT

## 2025-08-01 ENCOUNTER — APPOINTMENT (OUTPATIENT)
Dept: ORTHOPEDIC SURGERY | Facility: CLINIC | Age: 70
End: 2025-08-01
Payer: MEDICARE

## 2025-08-01 DIAGNOSIS — M48.062 SPINAL STENOSIS, LUMBAR REGION WITH NEUROGENIC CLAUDICATION: ICD-10-CM

## 2025-08-01 PROCEDURE — 99204 OFFICE O/P NEW MOD 45 MIN: CPT

## 2025-08-13 ENCOUNTER — APPOINTMENT (OUTPATIENT)
Dept: PSYCHIATRY | Facility: CLINIC | Age: 70
End: 2025-08-13
Payer: MEDICARE

## 2025-08-13 DIAGNOSIS — F32.A ANXIETY DISORDER, UNSPECIFIED: ICD-10-CM

## 2025-08-13 DIAGNOSIS — F41.9 ANXIETY DISORDER, UNSPECIFIED: ICD-10-CM

## 2025-08-13 PROCEDURE — 90837 PSYTX W PT 60 MINUTES: CPT

## 2025-08-20 ENCOUNTER — APPOINTMENT (OUTPATIENT)
Dept: MRI IMAGING | Facility: CLINIC | Age: 70
End: 2025-08-20
Payer: MEDICARE

## 2025-08-20 ENCOUNTER — RESULT REVIEW (OUTPATIENT)
Age: 70
End: 2025-08-20

## 2025-08-20 ENCOUNTER — OUTPATIENT (OUTPATIENT)
Dept: OUTPATIENT SERVICES | Facility: HOSPITAL | Age: 70
LOS: 1 days | End: 2025-08-20

## 2025-08-20 DIAGNOSIS — Z96.641 PRESENCE OF RIGHT ARTIFICIAL HIP JOINT: Chronic | ICD-10-CM

## 2025-08-20 DIAGNOSIS — Z98.890 OTHER SPECIFIED POSTPROCEDURAL STATES: Chronic | ICD-10-CM

## 2025-08-20 DIAGNOSIS — Z95.828 PRESENCE OF OTHER VASCULAR IMPLANTS AND GRAFTS: Chronic | ICD-10-CM

## 2025-08-20 DIAGNOSIS — Z90.49 ACQUIRED ABSENCE OF OTHER SPECIFIED PARTS OF DIGESTIVE TRACT: Chronic | ICD-10-CM

## 2025-08-20 DIAGNOSIS — M48.062 SPINAL STENOSIS, LUMBAR REGION WITH NEUROGENIC CLAUDICATION: ICD-10-CM

## 2025-08-20 PROCEDURE — 72148 MRI LUMBAR SPINE W/O DYE: CPT | Mod: 26

## 2025-08-28 ENCOUNTER — APPOINTMENT (OUTPATIENT)
Dept: PULMONOLOGY | Facility: CLINIC | Age: 70
End: 2025-08-28

## 2025-08-29 ENCOUNTER — APPOINTMENT (OUTPATIENT)
Dept: ORTHOPEDIC SURGERY | Facility: CLINIC | Age: 70
End: 2025-08-29

## 2025-09-03 ENCOUNTER — NON-APPOINTMENT (OUTPATIENT)
Age: 70
End: 2025-09-03

## 2025-09-03 ENCOUNTER — APPOINTMENT (OUTPATIENT)
Dept: PSYCHIATRY | Facility: CLINIC | Age: 70
End: 2025-09-03

## 2025-09-17 ENCOUNTER — APPOINTMENT (OUTPATIENT)
Dept: PSYCHIATRY | Facility: CLINIC | Age: 70
End: 2025-09-17

## 2025-09-17 ENCOUNTER — NON-APPOINTMENT (OUTPATIENT)
Age: 70
End: 2025-09-17

## 2025-09-18 ENCOUNTER — APPOINTMENT (OUTPATIENT)
Dept: PULMONOLOGY | Facility: CLINIC | Age: 70
End: 2025-09-18